# Patient Record
Sex: MALE | Race: WHITE | Employment: FULL TIME | URBAN - METROPOLITAN AREA
[De-identification: names, ages, dates, MRNs, and addresses within clinical notes are randomized per-mention and may not be internally consistent; named-entity substitution may affect disease eponyms.]

---

## 2017-01-25 RX ORDER — DICLOFENAC SODIUM 75 MG/1
75 TABLET, DELAYED RELEASE ORAL 2 TIMES DAILY
Qty: 60 TAB | Refills: 0 | Status: SHIPPED | OUTPATIENT
Start: 2017-01-25 | End: 2017-04-05 | Stop reason: SDUPTHER

## 2017-01-25 NOTE — TELEPHONE ENCOUNTER
Patient called for   Requested Prescriptions     Pending Prescriptions Disp Refills    diclofenac EC (VOLTAREN) 75 mg EC tablet 60 Tab 0     Sig: Take 1 Tab by mouth two (2) times a day. Pharmacy confirmed.

## 2017-02-06 ENCOUNTER — OFFICE VISIT (OUTPATIENT)
Dept: ORTHOPEDIC SURGERY | Age: 57
End: 2017-02-06

## 2017-02-06 VITALS
RESPIRATION RATE: 18 BRPM | SYSTOLIC BLOOD PRESSURE: 148 MMHG | OXYGEN SATURATION: 97 % | HEIGHT: 73 IN | BODY MASS INDEX: 30.43 KG/M2 | DIASTOLIC BLOOD PRESSURE: 81 MMHG | WEIGHT: 229.6 LBS | TEMPERATURE: 98.1 F | HEART RATE: 83 BPM

## 2017-02-06 DIAGNOSIS — M51.26 LUMBAR HERNIATED DISC: Primary | Chronic | ICD-10-CM

## 2017-02-06 RX ORDER — HYDROCODONE BITARTRATE AND ACETAMINOPHEN 5; 325 MG/1; MG/1
TABLET ORAL
Qty: 40 TAB | Refills: 0 | Status: SHIPPED | OUTPATIENT
Start: 2017-02-06 | End: 2017-08-18 | Stop reason: ALTCHOICE

## 2017-02-06 RX ORDER — OXYCODONE AND ACETAMINOPHEN 5; 325 MG/1; MG/1
TABLET ORAL
COMMUNITY
Start: 2016-02-09 | End: 2017-02-06

## 2017-02-06 NOTE — MR AVS SNAPSHOT
Visit Information Date & Time Provider Department Dept. Phone Encounter #  
 2/6/2017  3:45 PM Phuong Lester  [facility], [address] [facility] [phone] [id number] Follow-up Instructions Return for sx eval for L4/5/S1 lami. Your Appointments 2/8/2017  9:45 AM  
LAB with [facility] NURSE VISIT Internist of [facility] ([facility]) Appt Note: labs for rpe sarris; labs for rpe sarris [address], [address] [place] [facility] [address]  
  
   
 [address], [place]  
  
    
 2/14/2017 11:00 AM  
PHYSICAL with Rama Horan MD  
Internist of [facility]) Appt Note: rpe sarris; ov  
 [address], [address] [place] [address] [address]  
  
   
 [address], [facility] Up[facility] Health Maintenance Date Due INFLUENZA AGE 9 TO ADULT 8/1/2016 COLONOSCOPY 1/16/2017 DTaP/Tdap/Td series (3 - Td) 1/16/2024 Allergies as of 2/6/2017  Review Complete On: 2/6/2017 By: Phuong Lester MD  
  
 Severity Noted Reaction Type Reactions Tree Nuts High 03/08/2016    Anaphylaxis Ace Inhibitors  04/19/2013    Cough Nuts [Tree Nut]  04/29/2015    Itching Current Immunizations  Reviewed on 1/28/2015 Name Date Influenza Vaccine 11/12/2014, 11/1/2013 TDAP Vaccine 1/20/2004 Td 1/1/2004 Tdap 1/16/2014 Not reviewed this visit You Were Diagnosed With   
  
 Codes Comments Lumbar herniated disc    -  Primary ICD-10-CM: M51.26 
ICD-9-CM: 722.10 Vitals BP Pulse Temp Resp Height(growth percentile) Weight(growth percentile) 148/81 83 98.1 °F (36.7 °C) (Oral) 18 6' 1\" (1.854 m) 229 lb 9.6 oz (104.1 kg) SpO2 BMI Smoking Status 97% 30.29 kg/m2 Former Smoker BMI and BSA Data Body Mass Index Body Surface Area  
 30.29 kg/m 2 2.32 m 2 Preferred Pharmacy Pharmacy Name Phone [facility], [address] [phone] Your Updated Medication List  
  
   
This list is accurate as of: 2/6/17  4:27 PM.  Always use your most recent med list. amLODIPine 10 mg tablet Commonly known as:  Tad Ty TAKE 1 TABLET DAILY  
  
 ascorbic acid (vitamin C) 500 mg tablet Commonly known as:  VITAMIN C  
500 mg.  
  
 aspirin delayed-release 81 mg tablet 81 mg. Cholecalciferol (Vitamin D3) 2,000 unit Cap capsule Commonly known as:  VITAMIN D3 Take 2,000 Units by mouth daily. * cyclobenzaprine 5 mg tablet Commonly known as:  FLEXERIL Take 1 Tab by mouth three (3) times daily as needed for Muscle Spasm(s). * cyclobenzaprine 5 mg tablet Commonly known as:  FLEXERIL Take 1 Tab by mouth three (3) times daily as needed for Muscle Spasm(s). diazePAM 5 mg tablet Commonly known as:  VALIUM Take 1 Tab by mouth every six (6) hours as needed. Max Daily Amount: 20 mg.  
  
 diclofenac EC 75 mg EC tablet Commonly known as:  VOLTAREN Take 1 Tab by mouth two (2) times a day. fenofibrate micronized 67 mg capsule Commonly known as:  LOFIBRA Take 1 Cap by mouth every morning. FISH OIL PO Take  by mouth daily. gabapentin 300 mg capsule Commonly known as:  NEURONTIN Take 1 Cap by mouth three (3) times daily. HYDROcodone-acetaminophen 5-325 mg per tablet Commonly known as:  Rachna Prakash One-half to one tab po every day to bid prn severe pain  Indications: Pain  
  
 ibuprofen 800 mg tablet Commonly known as:  MOTRIN Take 1 Tab by mouth two (2) times daily as needed for Pain.  
  
 losartan 100 mg tablet Commonly known as:  COZAAR  
TAKE 1 TABLET DAILY  
  
 magnesium oxide 400 mg tablet Commonly known as:  MAG-OX Take 400 mg by mouth.  
  
 methylPREDNISolone 4 mg tablet Commonly known as:  Abbey Blakes Per dose pack instructions nitroglycerin 0.4 mg SL tablet Commonly known as:  NITROSTAT  
1 Tab by SubLINGual route every five (5) minutes as needed for Chest Pain. PROTONIX 40 mg tablet Generic drug:  pantoprazole Take 40 mg by mouth daily as needed. sildenafil citrate 50 mg tablet Commonly known as:  VIAGRA Take 1 Tab by mouth as needed. valACYclovir 500 mg tablet Commonly known as:  VALTREX  
TAKE 1 TABLET DAILY * Notice: This list has 2 medication(s) that are the same as other medications prescribed for you. Read the directions carefully, and ask your doctor or other care provider to review them with you. Prescriptions Printed Refills HYDROcodone-acetaminophen (NORCO) 5-325 mg per tablet 0 Sig: One-half to one tab po every day to bid prn severe pain  Indications: Pain Class: Print Follow-up Instructions Return for sx eval for L4/5/S1 lami. Patient Instructions [email or web address] Activation Thank you for requesting access to [facility]. Please follow the instructions below to securely access and download your online medical record. Everbridge allows you to send messages to your doctor, view your test results, renew your prescriptions, schedule appointments, and more. How Do I Sign Up? 1. In your internet browser, go to [email or web address] 
2. Click on the First Time User? Click Here link in the Sign In box. You will be redirect to the New Member Sign Up page. 3. Enter your Everbridge Access Code exactly as it appears below. You will not need to use this code after youve completed the sign-up process. If you do not sign up before the expiration date, you must request a new code. Everbridge Access Code: [email or web address] Expires: 3/28/2017  8:53 AM (This is the date your Everbridge access code will ) 4. Enter the last four digits of your Social Security Number (xxxx) and Date of Birth (mm/dd/yyyy) as indicated and click Submit.  You will be taken to the next sign-up page. 5. Create a [place] ID. This will be your Ingenico login ID and cannot be changed, so think of one that is secure and easy to remember. 6. Create a Ingenico password. You can change your password at any time. 7. Enter your Password Reset Question and Answer. This can be used at a later time if you forget your password. 8. Enter your e-mail address. You will receive e-mail notification when new information is available in [address] 9. Click Sign Up. You can now view and download portions of your medical record. 10. Click the Download Summary menu link to download a portable copy of your medical information. Additional Information If you have questions, please visit the Frequently Asked Questions section of the Ingenico website at [email or web address] Remember, Ingenico is NOT to be used for urgent needs. For medical emergencies, dial 911. Learning About Laminotomy and Laminectomy Surgery What are laminotomy and laminectomy? Laminotomy and laminectomy are two surgeries to relieve pressure on the spinal cord. They make the opening in the spine (spinal canal) larger. As you get older or if you have an injury, the opening for the spine gets smaller. This condition is called stenosis. During a laminectomy, the doctor removes pieces of the bony covering (the lamina) and other tissues that are squeezing the spinal cord and nerves. A laminotomy removes less of the bony covering. Both surgeries make the opening for the spinal cord and nerves larger. This takes pressure off the nerves. How is a laminotomy or laminectomy done? The doctor will make a cut (incision) over your spine. The muscles around your spine are pulled to the side so the doctor can work on the bones (vertebra) of the spine. The doctor can then trim thickened tissue, such as bulging discs. And he or she can take out some bone to make the opening for the spinal cord or nerves larger. Sometimes, after the doctor makes the opening larger, he or she will do another type of surgery called spinal fusion. The doctor will insert a small piece of bone from another part of the body or from a bone bank. This piece of bone can help the spine heal. Sometimes small plates and screws are used to keep the bones in place. Surgery will last from 1 to 1½ hours, depending on how much tissue the doctor cuts away. If a spinal fusion is done at the same time, surgery will last 2 to 4 hours. What can you expect after a laminectomy or laminotomy? Plan for a hospital stay of 1 or 2 days. You will have some pain after surgery. You will get medicine to treat the pain. Do not be discouraged if you do not feel better right away. As soon as the anesthesia wears off, you can start moving in the hospital. Get up and walk carefully as soon as you can. This will help your body heal. You may shower on the day after surgery. You will have help changing the bandages over your stitches after you bathe. You can return to your usual activities after 4 to 6 weeks. It may take longer for you to do more strenuous exercise. Do not drive until your doctor says it is okay. If you had a spinal fusion, it needs time to heal. Sudden twisting movements can slow healing. If you had a spinal fusion done, you may need more time to get back to your usual activities. You may need to wear a brace. You may feel tired for several weeks after surgery. You will feel stiff and sore. But you should feel a little better every day. Daily exercise will help you get back your energy. Try walking. See if you can walk a little bit farther every day. Follow-up care is a key part of your treatment and safety. Be sure to make and go to all appointments, and call your doctor if you are having problems. It's also a good idea to know your test results and keep a list of the medicines you take. Where can you learn more? Go to http://pat-cuate.info/. Enter B987 in the search box to learn more about \"Learning About Laminotomy and Laminectomy Surgery. \" Current as of: June 30, 2016 Content Version: 11.1 © 7715-1735 [employer]rimmage[employer]. Care instructions adapted under license by [employer] (which disclaims liability or warranty for this information). If you have questions about a medical condition or this instruction, always ask your healthcare professional. [address] any warranty or liability for your use of this information. Introducing [facility]! Curt Aguilera introduces The [employer] Guides patient portal. Now you can access parts of your medical record, email your doctor's office, and request medication refills online. 1. In your internet browser, go to [email or web address] 2. Click on the First Time User? Click Here link in the Sign In box. You will see the New Member Sign Up page. 3. Enter your The [employer] Access Code exactly as it appears below. You will not need to use this code after youve completed the sign-up process. If you do not sign up before the expiration date, you must request a new code. · The [employer] Access Code: [email or web address] Expires: 3/28/2017  8:53 AM 
 
4. Enter the last four digits of your Social Security Number (xxxx) and Date of Birth (mm/dd/yyyy) as indicated and click Submit. You will be taken to the next sign-up page. 5. Create a The [employer]ides ID. This will be your The [employer] login ID and cannot be changed, so think of one that is secure and easy to remember. 6. Create a The [employer]ides password. You can change your password at any time. 7. Enter your Password Reset Question and Answer. This can be used at a later time if you forget your password. 8. Enter your e-mail address. You will receive e-mail notification when new information is available in [address] 9. Click Sign Up. You can now view and download portions of your medical record. 10. Click the Download Summary menu link to download a portable copy of your medical information. If you have questions, please visit the Frequently Asked Questions section of the [employer] website. Remember, [employer] is NOT to be used for urgent needs. For medical emergencies, dial 911. Now available from your iPhone and Android! Please provide this summary of care documentation to your next provider. Your primary care clinician is listed as [facility]. If you have any questions after today's visit, please call [phone].

## 2017-02-06 NOTE — PROGRESS NOTES
[address], [address]  [place]  Phone: [phone]  Fax: [phone]        Clive León  : 1960  PCP: Pratik Pederson MD    PROGRESS NOTE      ASSESSMENT AND PLAN    Haylie Contreras was seen today for back pain. Diagnoses and all orders for this visit:    Lumbar herniated disc  right L4/5/S1 chronic  -     HYDROcodone-acetaminophen (NORCO) 5-325 mg per tablet; One-half to one tab po every day to bid prn severe pain  Indications: Pain    1. Referral to Dr. Julissa Montes for surgical evaluation. 2. Continue Norco and Flexeril. 3. Given surgical information about a laminectomy. Follow-up Disposition:  Return for sx eval for L4/5/S1 lami. HISTORY OF PRESENT ILLNESS  Jesusita Tapia is a 60 y.o. male. Pt presents to the office for a f/u visit for recurrent back pain. He had another recurrence and his tired of his back and right leg flaring- want to discuss surgery. Pt was trying to move a lawnmower and felt instant pain which caused his pain to increase. He was last seen by NP Tayo Magallon who ordered a Lumbar MRI. Last visit pt was sent to have a lumbar spine MRI. Images reviewed with the pt. He has focal disc herniations right paracentral at L4/5 and L5/S1. Pt continues to have back pain. He has had mild benefit from MDP. Had benefit w/blocks years ago. His pain is mainly RT sided that goes into his RLE. He also c/o LT foot pain that he sees Dr. Sonia Escobedo for. He denies any recent fractures. He has bone spurs and a stabbing pain in the small joints in his foot. Pt notes that his RLE has been felt intermittently for years now. He admits to weakness of his RLE. He denies any saddle paresthesia. Pt takes Norco 5-325 mg. Pt denies any dizziness, confusion, uncontrolled constipation, and cravings due to controlled substances. Pt was given Gabapentin 300 mg but has not taken as he read the side effects.  Pt takes Flexeril with Norco in the morning with benefit. Denies persistent fevers, chills, weight changes, neurogenic bowel or bladder symptoms. Pt denies recent ED visits or hospitalizations. His wife has been diagnosed with new breast cancer, previously had lymphoma. Works as an  at [employer] which requires overhead lifting and heavy labor. Pain Assessment  2/6/2017   Location of Pain Back; Foot   Pain Location Comment -   Location Modifiers Left   Severity of Pain 6   Quality of Pain Aching   Quality of Pain Comment numbness, tingling   Duration of Pain -   Frequency of Pain Constant   Aggravating Factors (No Data)   Aggravating Factors Comment any movement   Limiting Behavior -   Relieving Factors Rest   Relieving Factors Comment -   Result of Injury No               MRI Results (most recent):    Results from Hospital Encounter encounter on 12/30/16   MRI LUMB SPINE WO CONT   Narrative Procedure: MRI of the lumbar spine without contrast.    Indications: Lumbar back pain with right lower extremity radiculopathy    Comparisons: Lumbar spine MRI 7/19/2010    Technique: T1 weighted, T2 FSE with fat saturation, FSE inversion recovery  sagittal images are supplemented by T2 weighted with fat saturation and T1  weighted axial images. Findings: There is overall straightening of normal lumbar lordosis. No listhesis. Asymmetric disc desiccation L3-S1 without associated intervertebral disc space  height loss. Remaining more cephalad intervertebral discs are maintained and  well hydrated. Small hemangiomas are seen posteriorly within the L2 and L3  vertebral bodies. No suspicious osseous lesion. Normal vertebral body  morphology. No fracture. Subtle endplate reactive changes are seen anteriorly  adjacent to the L3-L4 and L2-L3 intervertebral discs. Conus medullaris ends at  the L1 vertebral body level.     Correlation of axial and sagittal images reveals the following:    T12-L1: Minimal broad-based disc bulge abuts the ventral thecal sac. No facet  arthropathy. Canal remains adequate. No foraminal stenosis. L1-L2: Very minimal central disc protrusion abuts the ventral thecal sac. Mild  bilateral facet arthropathy and ligamentum flavum buckling. No central canal or  foraminal stenosis. L2-L3: No significant disc pathology. Mild bilateral facet arthropathy and  ligamentum flavum buckling. No central canal or foraminal stenosis. L3-L4: Moderate circumferential disc bulge flattens the ventral thecal sac. Moderate bilateral facet arthropathy and ligamentum flavum buckling. There is  partial effacement of the left lateral recess with abutment of the adjacent  descending left L4 nerve root. Mild overall canal stenosis, largest AP dimension  of the thecal sac measuring just over 8 mm. No right foraminal stenosis. Moderate left, not impinging foraminal stenosis. L4-L5: Moderate broad-based disc bulge with superimposed inferiorly extending  right paracentral disc extrusion (sagittal image 9; axial images 31 and 32). This inferior disc extrusion measures 10 mm transverse by 6 mm anteroposterior,  and extends approximately 7 mm below the level of the L5 superior endplate. There is complete effacement of the right lateral recess with posterior  displacement and impingement of the adjacent descending right L5 nerve root. Moderately severe overall canal stenosis. Mild right and moderate left foraminal  stenoses, neither with foraminal nerve root impingement. L5-S1:  Moderate broad-based disc bulge with prominent superimposed right  paracentral disc protrusion (axial image 36; sagittal image 9). This focal right  paracentral disc protrusion measures 14 mm transverse by 11 mm anteroposterior. There is complete effacement of the right lateral recess with posterior  displacement of the adjacent descending right S1 nerve root. Moderate overall  canal stenosis otherwise.  Moderate right greater than left foraminal stenoses. There is abutment but no deformation of the foraminal right L5 nerve root. The visualized portions of the sacroiliac joints are unremarkable. Partially  visualized exophytic right renal cyst. No acute abnormality in the visualized  retroperitoneal structures. Impression IMPRESSION:    1. Prominent right paracentral L4-L5 disc extrusion with effacement of the right  lateral recess and impingement of the descending right L5 nerve root. Moderately  severe multifactorial canal stenosis at this level otherwise. 2. Additional prominent right paracentral L5-S1 disc protrusion with right  lateral recess effacement and impingement of the descending right S1 nerve root. 3. Abutment of the foraminal right L5 nerve root related to moderate foraminal  stenosis at L5-S1. No high-grade foraminal stenosis or foraminal nerve root  impingement elsewhere. 4. Mild multifactorial canal stenosis at L3-L4 with partial left lateral recess  effacement and abutment of the descending left L4 nerve root. PAST MEDICAL HISTORY   Past Medical History   Diagnosis Date    Abnormal myocardial perfusion study 2/28/2016     Partially reversible inferior perfusion defect, EF 57%    AI (aortic insufficiency)      Moderate with possible bicuspid aortic valve    Chronic low back pain     Chronic venous insufficiency     Colon polyp 03/2012     Benign serrated polyp    Fracture 05/2016     rib fx's    GERD (gastroesophageal reflux disease)     History of echocardiogram 2/28/2016     EF 55%. Basal inferior hypokinesis. Gr 1 DDfx. Mod AI.  Hyperlipidemia     Hypertension     Lumbar herniated disc 07/2010     MRI with moderate sized disc protrusion to right canal at L4-L5 impacting L5 nerve root and at L5-S1 impacting S1 nerve root. No central canal stenosis.     Osteoarthritis of right knee     Prediabetes     Prepatellar bursitis of right knee     Recurrent genital herpes 1/16/2013    S/P cardiac catheterization 3/1/2016     Normal coronary anatomy       Past Surgical History   Procedure Laterality Date    Hx cholecystectomy      Hx tonsillectomy      Endoscopy, colon, diagnostic     . MEDICATIONS    Current Outpatient Prescriptions   Medication Sig Dispense Refill    diclofenac EC (VOLTAREN) 75 mg EC tablet Take 1 Tab by mouth two (2) times a day. 60 Tab 0    HYDROcodone-acetaminophen (NORCO) 5-325 mg per tablet One-half to one tab po every day to bid prn severe pain  Indications: PAIN 40 Tab 0    gabapentin (NEURONTIN) 300 mg capsule Take 1 Cap by mouth three (3) times daily. 30 Cap 1    amLODIPine (NORVASC) 10 mg tablet TAKE 1 TABLET DAILY 90 Tab 3    sildenafil citrate (VIAGRA) 50 mg tablet Take 1 Tab by mouth as needed. 9 Tab 3    valACYclovir (VALTREX) 500 mg tablet TAKE 1 TABLET DAILY 90 Tab 3    fenofibrate micronized (LOFIBRA) 67 mg capsule Take 1 Cap by mouth every morning. 90 Cap 3    losartan (COZAAR) 100 mg tablet TAKE 1 TABLET DAILY 90 Tab 3    nitroglycerin (NITROSTAT) 0.4 mg SL tablet 1 Tab by SubLINGual route every five (5) minutes as needed for Chest Pain. 25 Tab 3    ascorbic acid (VITAMIN C) 500 mg tablet 500 mg.      magnesium oxide (MAG-OX) 400 mg tablet Take 400 mg by mouth.  aspirin delayed-release 81 mg tablet 81 mg.      diazepam (VALIUM) 5 mg tablet Take 1 Tab by mouth every six (6) hours as needed. Max Daily Amount: 20 mg. 30 Tab 0    ibuprofen (MOTRIN) 800 mg tablet Take 1 Tab by mouth two (2) times daily as needed for Pain. 60 Tab 3    cyclobenzaprine (FLEXERIL) 5 mg tablet Take 1 Tab by mouth three (3) times daily as needed for Muscle Spasm(s). 30 Tab 1    Cholecalciferol, Vitamin D3, 2,000 unit cap Take 2,000 Units by mouth daily.  DOCOSAHEXANOIC ACID/EPA (FISH OIL PO) Take  by mouth daily.  oxyCODONE-acetaminophen (PERCOCET) 5-325 mg per tablet Take 1 Tab by Mouth Every 8 Hours As Needed for Pain.       methylPREDNISolone (MEDROL DOSEPACK) 4 mg tablet Per dose pack instructions 1 Dose Pack 0    cyclobenzaprine (FLEXERIL) 5 mg tablet Take 1 Tab by mouth three (3) times daily as needed for Muscle Spasm(s). 90 Tab 0    pantoprazole (PROTONIX) 40 mg tablet Take 40 mg by mouth daily as needed. ALLERGIES  Allergies   Allergen Reactions    Tree Nuts Anaphylaxis    Ace Inhibitors Cough    Nuts [Tree Nut] Itching          SOCIAL HISTORY    Social History     Social History    Marital status:      Spouse name: N/A    Number of children: N/A    Years of education: N/A     Occupational History    Not on file. Social History Main Topics    Smoking status: Former Smoker     Quit date: 6/28/2001    Smokeless tobacco: Former User    Alcohol use 1.0 oz/week     2 Cans of beer per week      Comment: occasionally    Drug use: No    Sexual activity: Not on file     Other Topics Concern    Not on file     Social History Narrative       FAMILY HISTORY  Family History   Problem Relation Age of Onset    Cancer Father 61     esophageal    Hypertension Father     Hypertension Brother     Diabetes Other      Grandmother    Arthritis-osteo Other     Other Other      Stomach problems; Father, grandfather       REVIEW OF SYSTEMS  Review of Systems   Constitutional: Negative for chills, fever and weight loss. Respiratory: Negative for shortness of breath. Cardiovascular: Negative for chest pain. Gastrointestinal: Negative for constipation. Negative for fecal incontinence   Genitourinary: Negative for dysuria. Negative for urinary incontinence   Musculoskeletal:        Per HPI   Skin: Negative for rash. Neurological: Positive for focal weakness. Negative for dizziness, tingling, tremors and headaches. Endo/Heme/Allergies: Does not bruise/bleed easily. Psychiatric/Behavioral: The patient does not have insomnia.         PHYSICAL EXAMINATION  Visit Vitals    /81    Pulse 83    Temp 98.1 °F (36.7 °C) (Oral)    Resp 18    Ht 6' 1\" (1.854 m)    Wt 229 lb 9.6 oz (104.1 kg)    SpO2 97%    BMI 30.29 kg/m2         Accompanied by self. Constitutional:  Well developed, well nourished, in no acute distress. Psychiatric: Affect and mood are appropriate. Integumentary: No rashes or abrasions noted on exposed areas. Cardiovascular/Peripheral Vascular: Intact l pulses. No peripheral edema is noted. Lymphatic:  No evidence of lymphedema. No cervical lymphadenopathy. SPINE/MUSCULOSKELETAL EXAM    Lumbar spine:  No rash, ecchymosis, or gross obliquity. No fasciculations. No focal atrophy is noted. Tenderness to palpation on the RT at L4-5. Tenderness to palpation of RT sciatic notch. SI joints non-tender. Trochanters non tender. Sensation grossly intact to light touch. MOTOR:     Hip Flex Quads Hamstrings Ankle DF EHL Ankle PF   Right 5/5 5/5 5/5 5/5 5/5 5/5   Left 5/5 5/5 5/5 5/5 5/5 5/5         RT eversion weakness , mild 4+/5    DTRs are 2+  patella, and Achilles. Straight Leg raise + on the RT at 90 degrees. Ambulation without assistive device. FWB. Written by Jourdan Medrano, as dictated by Sonia Britt MD.    Mr. Vicente Lancaster may have a reminder for a \"due or due soon\" health maintenance. I have asked that he contact his primary care provider for follow-up on this health maintenance.

## 2017-02-06 NOTE — PATIENT INSTRUCTIONS
[facility] Activation    Thank you for requesting access to [facility]. Please follow the instructions below to securely access and download your online medical record. [facility] allows you to send messages to your doctor, view your test results, renew your prescriptions, schedule appointments, and more. How Do I Sign Up? 1. In your internet browser, go to [email or web address]  2. Click on the First Time User? Click Here link in the Sign In box. You will be redirect to the New Member Sign Up page. 3. Enter your [facility] Access Code exactly as it appears below. You will not need to use this code after youve completed the sign-up process. If you do not sign up before the expiration date, you must request a new code. [facility] Access Code: [email or web address]  Expires: 3/28/2017  8:53 AM (This is the date your [facility] access code will )    4. Enter the last four digits of your Social Security Number (xxxx) and Date of Birth (mm/dd/yyyy) as indicated and click Submit. You will be taken to the next sign-up page. 5. Create a [facility] ID. This will be your [facility] login ID and cannot be changed, so think of one that is secure and easy to remember. 6. Create a [facility] password. You can change your password at any time. 7. Enter your Password Reset Question and Answer. This can be used at a later time if you forget your password. 8. Enter your e-mail address. You will receive e-mail notification when new information is available in [address]. 9. Click Sign Up. You can now view and download portions of your medical record. 10. Click the Download Summary menu link to download a portable copy of your medical information. Additional Information    If you have questions, please visit the Frequently Asked Questions section of the [facility] website at [email or web address]hart/. Remember, [facility] is NOT to be used for urgent needs. For medical emergencies, dial 911.          Learning About Laminotomy and Laminectomy Surgery  What are laminotomy and laminectomy? Laminotomy and laminectomy are two surgeries to relieve pressure on the spinal cord. They make the opening in the spine (spinal canal) larger. As you get older or if you have an injury, the opening for the spine gets smaller. This condition is called stenosis. During a laminectomy, the doctor removes pieces of the bony covering (the lamina) and other tissues that are squeezing the spinal cord and nerves. A laminotomy removes less of the bony covering. Both surgeries make the opening for the spinal cord and nerves larger. This takes pressure off the nerves. How is a laminotomy or laminectomy done? The doctor will make a cut (incision) over your spine. The muscles around your spine are pulled to the side so the doctor can work on the bones (vertebra) of the spine. The doctor can then trim thickened tissue, such as bulging discs. And he or she can take out some bone to make the opening for the spinal cord or nerves larger. Sometimes, after the doctor makes the opening larger, he or she will do another type of surgery called spinal fusion. The doctor will insert a small piece of bone from another part of the body or from a bone bank. This piece of bone can help the spine heal. Sometimes small plates and screws are used to keep the bones in place. Surgery will last from 1 to 1½ hours, depending on how much tissue the doctor cuts away. If a spinal fusion is done at the same time, surgery will last 2 to 4 hours. What can you expect after a laminectomy or laminotomy? Plan for a hospital stay of 1 or 2 days. You will have some pain after surgery. You will get medicine to treat the pain. Do not be discouraged if you do not feel better right away. As soon as the anesthesia wears off, you can start moving in the hospital. Get up and walk carefully as soon as you can. This will help your body heal. You may shower on the day after surgery.  You will have help changing the bandages over your stitches after you bathe. You can return to your usual activities after 4 to 6 weeks. It may take longer for you to do more strenuous exercise. Do not drive until your doctor says it is okay. If you had a spinal fusion, it needs time to heal. Sudden twisting movements can slow healing. If you had a spinal fusion done, you may need more time to get back to your usual activities. You may need to wear a brace. You may feel tired for several weeks after surgery. You will feel stiff and sore. But you should feel a little better every day. Daily exercise will help you get back your energy. Try walking. See if you can walk a little bit farther every day. Follow-up care is a key part of your treatment and safety. Be sure to make and go to all appointments, and call your doctor if you are having problems. It's also a good idea to know your test results and keep a list of the medicines you take. Where can you learn more? Go to http://pat-cuate.info/. Enter E934 in the search box to learn more about \"Learning About Laminotomy and Laminectomy Surgery. \"  Current as of: June 30, 2016  Content Version: 11.1  © 9761-3210 [employer]. Care instructions adapted under license by [employer] (which disclaims liability or warranty for this information). If you have questions about a medical condition or this instruction, always ask your healthcare professional. [address] any warranty or liability for your use of this information.

## 2017-02-08 ENCOUNTER — HOSPITAL ENCOUNTER (OUTPATIENT)
Dept: LAB | Age: 57
Discharge: HOME | End: 2017-02-08
Payer: COMMERCIAL

## 2017-02-08 DIAGNOSIS — E55.9 VITAMIN D INSUFFICIENCY: ICD-10-CM

## 2017-02-08 DIAGNOSIS — R73.03 PREDIABETES: ICD-10-CM

## 2017-02-08 DIAGNOSIS — I10 ESSENTIAL HYPERTENSION WITH GOAL BLOOD PRESSURE LESS THAN 140/90: ICD-10-CM

## 2017-02-08 DIAGNOSIS — E78.5 HYPERLIPIDEMIA, UNSPECIFIED HYPERLIPIDEMIA TYPE: ICD-10-CM

## 2017-02-08 LAB
ALBUMIN SERPL BCP-MCNC: 4.1 G/DL (ref 3.4–5)
ALBUMIN/GLOB SERPL: 1.7 {RATIO} (ref 0.8–1.7)
ALP SERPL-CCNC: 62 U/L (ref 45–117)
ALT SERPL-CCNC: 46 U/L (ref 16–61)
ANION GAP BLD CALC-SCNC: 9 MMOL/L (ref 3–18)
APPEARANCE UR: CLEAR
AST SERPL W P-5'-P-CCNC: 32 U/L (ref 15–37)
BACTERIA URNS QL MICRO: NEGATIVE /HPF
BASOPHILS # BLD AUTO: 0.1 K/UL (ref 0–0.06)
BASOPHILS # BLD: 1 % (ref 0–2)
BILIRUB SERPL-MCNC: 0.5 MG/DL (ref 0.2–1)
BILIRUB UR QL: NEGATIVE
BUN SERPL-MCNC: 18 MG/DL (ref 7–18)
BUN/CREAT SERPL: 15 (ref 12–20)
CALCIUM SERPL-MCNC: 8.8 MG/DL (ref 8.5–10.1)
CHLORIDE SERPL-SCNC: 108 MMOL/L (ref 100–108)
CHOLEST SERPL-MCNC: 188 MG/DL
CO2 SERPL-SCNC: 25 MMOL/L (ref 21–32)
COLOR UR: YELLOW
CREAT SERPL-MCNC: 1.19 MG/DL (ref 0.6–1.3)
DIFFERENTIAL METHOD BLD: ABNORMAL
EOSINOPHIL # BLD: 0.2 K/UL (ref 0–0.4)
EOSINOPHIL NFR BLD: 3 % (ref 0–5)
EPITH CASTS URNS QL MICRO: NORMAL /LPF (ref 0–5)
ERYTHROCYTE [DISTWIDTH] IN BLOOD BY AUTOMATED COUNT: 13.7 % (ref 11.6–14.5)
GLOBULIN SER CALC-MCNC: 2.4 G/DL (ref 2–4)
GLUCOSE SERPL-MCNC: 109 MG/DL (ref 74–99)
GLUCOSE UR STRIP.AUTO-MCNC: NEGATIVE MG/DL
HBA1C MFR BLD: 6 % (ref 4.2–5.6)
HCT VFR BLD AUTO: 44.7 % (ref 36–48)
HDLC SERPL-MCNC: 52 MG/DL (ref 40–60)
HDLC SERPL: 3.6 {RATIO} (ref 0–5)
HGB BLD-MCNC: 15 G/DL (ref 13–16)
HGB UR QL STRIP: NEGATIVE
KETONES UR QL STRIP.AUTO: NEGATIVE MG/DL
LDLC SERPL CALC-MCNC: 117.6 MG/DL (ref 0–100)
LEUKOCYTE ESTERASE UR QL STRIP.AUTO: NEGATIVE
LIPID PROFILE,FLP: ABNORMAL
LYMPHOCYTES # BLD AUTO: 36 % (ref 21–52)
LYMPHOCYTES # BLD: 2.1 K/UL (ref 0.9–3.6)
MCH RBC QN AUTO: 30.8 PG (ref 24–34)
MCHC RBC AUTO-ENTMCNC: 33.6 G/DL (ref 31–37)
MCV RBC AUTO: 91.8 FL (ref 74–97)
MONOCYTES # BLD: 0.3 K/UL (ref 0.05–1.2)
MONOCYTES NFR BLD AUTO: 6 % (ref 3–10)
NEUTS SEG # BLD: 3.1 K/UL (ref 1.8–8)
NEUTS SEG NFR BLD AUTO: 54 % (ref 40–73)
NITRITE UR QL STRIP.AUTO: NEGATIVE
PH UR STRIP: 6.5 [PH] (ref 5–8)
PLATELET # BLD AUTO: 213 K/UL (ref 135–420)
PMV BLD AUTO: 11.3 FL (ref 9.2–11.8)
POTASSIUM SERPL-SCNC: 4.3 MMOL/L (ref 3.5–5.5)
PROT SERPL-MCNC: 6.5 G/DL (ref 6.4–8.2)
PROT UR STRIP-MCNC: NEGATIVE MG/DL
PSA SERPL-MCNC: 0.4 NG/ML (ref 0–4)
RBC # BLD AUTO: 4.87 M/UL (ref 4.7–5.5)
RBC #/AREA URNS HPF: 0 /HPF (ref 0–5)
SODIUM SERPL-SCNC: 142 MMOL/L (ref 136–145)
SP GR UR REFRACTOMETRY: 1.02 (ref 1–1.03)
TRIGL SERPL-MCNC: 92 MG/DL (ref ?–150)
TSH SERPL DL<=0.05 MIU/L-ACNC: 1.08 UIU/ML (ref 0.36–3.74)
UROBILINOGEN UR QL STRIP.AUTO: 0.2 EU/DL (ref 0.2–1)
VLDLC SERPL CALC-MCNC: 18.4 MG/DL
WBC # BLD AUTO: 5.8 K/UL (ref 4.6–13.2)
WBC URNS QL MICRO: 0 /HPF (ref 0–4)

## 2017-02-08 PROCEDURE — 84153 ASSAY OF PSA TOTAL: CPT | Performed by: INTERNAL MEDICINE

## 2017-02-08 PROCEDURE — 80061 LIPID PANEL: CPT | Performed by: INTERNAL MEDICINE

## 2017-02-08 PROCEDURE — 85025 COMPLETE CBC W/AUTO DIFF WBC: CPT | Performed by: INTERNAL MEDICINE

## 2017-02-08 PROCEDURE — 36415 COLL VENOUS BLD VENIPUNCTURE: CPT | Performed by: INTERNAL MEDICINE

## 2017-02-08 PROCEDURE — 82043 UR ALBUMIN QUANTITATIVE: CPT | Performed by: INTERNAL MEDICINE

## 2017-02-08 PROCEDURE — 83036 HEMOGLOBIN GLYCOSYLATED A1C: CPT | Performed by: INTERNAL MEDICINE

## 2017-02-08 PROCEDURE — 84443 ASSAY THYROID STIM HORMONE: CPT | Performed by: INTERNAL MEDICINE

## 2017-02-08 PROCEDURE — 81001 URINALYSIS AUTO W/SCOPE: CPT | Performed by: INTERNAL MEDICINE

## 2017-02-08 PROCEDURE — 80053 COMPREHEN METABOLIC PANEL: CPT | Performed by: INTERNAL MEDICINE

## 2017-02-08 PROCEDURE — 82306 VITAMIN D 25 HYDROXY: CPT | Performed by: INTERNAL MEDICINE

## 2017-02-09 LAB
25(OH)D3 SERPL-MCNC: 36.7 NG/ML (ref 30–100)
CREAT UR-MCNC: 96.07 MG/DL (ref 30–125)
MICROALBUMIN UR-MCNC: 0.6 MG/DL (ref 0–3)
MICROALBUMIN/CREAT UR-RTO: 6 MG/G (ref 0–30)

## 2017-02-14 ENCOUNTER — OFFICE VISIT (OUTPATIENT)
Dept: INTERNAL MEDICINE CLINIC | Age: 57
End: 2017-02-14

## 2017-02-14 VITALS
SYSTOLIC BLOOD PRESSURE: 122 MMHG | WEIGHT: 222 LBS | DIASTOLIC BLOOD PRESSURE: 74 MMHG | BODY MASS INDEX: 29.42 KG/M2 | TEMPERATURE: 97.5 F | HEART RATE: 74 BPM | HEIGHT: 73 IN | OXYGEN SATURATION: 97 %

## 2017-02-14 DIAGNOSIS — Z00.01 ENCOUNTER FOR ROUTINE ADULT PHYSICAL EXAM WITH ABNORMAL FINDINGS: Primary | ICD-10-CM

## 2017-02-14 DIAGNOSIS — M54.41 CHRONIC RIGHT-SIDED LOW BACK PAIN WITH RIGHT-SIDED SCIATICA: ICD-10-CM

## 2017-02-14 DIAGNOSIS — R73.03 PREDIABETES: ICD-10-CM

## 2017-02-14 DIAGNOSIS — M85.80 OSTEOPENIA DETERMINED BY X-RAY: Chronic | ICD-10-CM

## 2017-02-14 DIAGNOSIS — R73.09 ABNORMAL GLUCOSE: ICD-10-CM

## 2017-02-14 DIAGNOSIS — G89.29 CHRONIC RIGHT-SIDED LOW BACK PAIN WITH RIGHT-SIDED SCIATICA: ICD-10-CM

## 2017-02-14 DIAGNOSIS — I10 ESSENTIAL HYPERTENSION: ICD-10-CM

## 2017-02-14 DIAGNOSIS — I35.1 AORTIC VALVE INSUFFICIENCY, UNSPECIFIED ETIOLOGY: ICD-10-CM

## 2017-02-14 DIAGNOSIS — I87.2 CHRONIC VENOUS INSUFFICIENCY: ICD-10-CM

## 2017-02-14 DIAGNOSIS — M51.26 LUMBAR HERNIATED DISC: Chronic | ICD-10-CM

## 2017-02-14 DIAGNOSIS — K21.9 GASTROESOPHAGEAL REFLUX DISEASE WITHOUT ESOPHAGITIS: ICD-10-CM

## 2017-02-14 DIAGNOSIS — E78.5 HYPERLIPIDEMIA, UNSPECIFIED HYPERLIPIDEMIA TYPE: ICD-10-CM

## 2017-02-14 RX ORDER — ROSUVASTATIN CALCIUM 10 MG/1
10 TABLET, COATED ORAL
Qty: 30 TAB | Refills: 5 | Status: SHIPPED | OUTPATIENT
Start: 2017-02-14 | End: 2017-05-08

## 2017-02-14 NOTE — PROGRESS NOTES
1. Have you been to the ER, urgent care clinic or hospitalized since your last visit? YES. ED for right hand injury    2. Have you seen or consulted any other health care providers outside of the [facility] since your last visit (Include any pap smears or colon screening)? NO      Do you have an Advanced Directive? NO    Would you like information on Advanced Directives? NO    Pt aware that colonoscopy needs to be done, he will make this appt.

## 2017-02-14 NOTE — MR AVS SNAPSHOT
Visit Information Date & Time Provider Department Dept. Phone Encounter #  
 2/14/2017 11:00 AM Ravi Young MD [facility] of [place] [phone] [id number] Follow-up Instructions Return in about 6 months (around 8/14/2017), or if symptoms worsen or fail to improve. Your Appointments 3/10/2017  9:00 AM  
SURGERY CONSULT with Adam Morris MD  
[address] [facility], [address] and [facility]) Appt Note: REF DR DURAND/ WILL BRING MRI DISC/  
 [address] [facility] [address]  
[phone]  
  
   
 [address] [facility],[address] [address] [facility]  
  
    
 8/9/2017  8:00 AM  
LAB with [facility] NURSE VISIT Internist of [facility] ([facility]) Appt Note: lab  
 [address], [address] [facility] [address] [address] [facility]  
  
   
 [address], [address]  
  
    
 8/15/2017  9:00 AM  
Office Visit with Ravi Young MD  
Internist of [facility]) Appt Note: ov bs  
 [address] [facility] [address] [address] [facility]  
  
   
 [address], [address] Upcoming Health Maintenance Date Due COLONOSCOPY 1/16/2017 DTaP/Tdap/Td series (3 - Td) 1/16/2024 Allergies as of 2/14/2017  Review Complete On: 2/14/2017 By: Modesta Gosselin, LPN Severity Noted Reaction Type Reactions Tree Nuts High 03/08/2016    Anaphylaxis Ace Inhibitors  04/19/2013    Cough Nuts [Tree Nut]  04/29/2015    Itching Current Immunizations  Reviewed on 1/28/2015 Name Date Influenza Vaccine 11/12/2014, 11/1/2013 TDAP Vaccine 1/20/2004 Td 1/1/2004 Tdap 1/16/2014 Not reviewed this visit You Were Diagnosed With   
  
 Codes Comments Prediabetes    -  Primary ICD-10-CM: R73.03 
ICD-9-CM: 790.29 Vitals BP Pulse Temp Height(growth percentile) Weight(growth percentile) SpO2 122/74 74 97.5 °F (36.4 °C) (Oral) 6' 1\" (1.854 m) 222 lb (100.7 kg) 97% BMI Smoking Status 29.29 kg/m2 Former Smoker Vitals History BMI and BSA Data Body Mass Index Body Surface Area  
 29.29 kg/m 2 2.28 m 2 Preferred Pharmacy Pharmacy Name Phone [facility] [address] [facility] [phone] Your Updated Medication List  
  
   
This list is accurate as of: 2/14/17 12:02 PM.  Always use your most recent med list. amLODIPine 10 mg tablet Commonly known as:  Novoa Higinio TAKE 1 TABLET DAILY  
  
 ascorbic acid (vitamin C) 500 mg tablet Commonly known as:  VITAMIN C  
500 mg.  
  
 aspirin delayed-release 81 mg tablet 81 mg. Cholecalciferol (Vitamin D3) 2,000 unit Cap capsule Commonly known as:  VITAMIN D3 Take 2,000 Units by mouth daily. * cyclobenzaprine 5 mg tablet Commonly known as:  FLEXERIL Take 1 Tab by mouth three (3) times daily as needed for Muscle Spasm(s). * cyclobenzaprine 5 mg tablet Commonly known as:  FLEXERIL Take 1 Tab by mouth three (3) times daily as needed for Muscle Spasm(s). diazePAM 5 mg tablet Commonly known as:  VALIUM Take 1 Tab by mouth every six (6) hours as needed. Max Daily Amount: 20 mg.  
  
 diclofenac EC 75 mg EC tablet Commonly known as:  VOLTAREN Take 1 Tab by mouth two (2) times a day. FISH OIL PO Take  by mouth daily. HYDROcodone-acetaminophen 5-325 mg per tablet Commonly known as:  Wayna Glaze One-half to one tab po every day to bid prn severe pain  Indications: Pain  
  
 ibuprofen 800 mg tablet Commonly known as:  MOTRIN Take 1 Tab by mouth two (2) times daily as needed for Pain.  
  
 losartan 100 mg tablet Commonly known as:  COZAAR  
TAKE 1 TABLET DAILY  
  
 magnesium oxide 400 mg tablet Commonly known as:  MAG-OX Take 400 mg by mouth. nitroglycerin 0.4 mg SL tablet Commonly known as:  NITROSTAT  
1 Tab by SubLINGual route every five (5) minutes as needed for Chest Pain. rosuvastatin 10 mg tablet Commonly known as:  CRESTOR Take 1 Tab by mouth nightly. valACYclovir 500 mg tablet Commonly known as:  VALTREX  
TAKE 1 TABLET DAILY * Notice: This list has 2 medication(s) that are the same as other medications prescribed for you. Read the directions carefully, and ask your doctor or other care provider to review them with you. Prescriptions Sent to Pharmacy Refills  
 rosuvastatin (CRESTOR) 10 mg tablet 5 Sig: Take 1 Tab by mouth nightly. Class: Normal  
 Pharmacy: [id number] [address] [facility], [address] Ph #: [phone] Route: Oral  
  
We Performed the Following  DIABETES FOOT EXAM [[facility] Custom] Follow-up Instructions Return in about 6 months (around 8/14/2017), or if symptoms worsen or fail to improve. Patient Instructions Learning About Diabetes Food Guidelines Your Care Instructions Meal planning is important to manage diabetes. It helps keep your blood sugar at a target level (which you set with your doctor). You don't have to eat special foods. You can eat what your family eats, including sweets once in a while. But you do have to pay attention to how often you eat and how much you eat of certain foods. You may want to work with a dietitian or a certified diabetes educator (CDE) to help you plan meals and snacks. A dietitian or CDE can also help you lose weight if that is one of your goals. What should you know about eating carbs? Managing the amount of carbohydrate (carbs) you eat is an important part of healthy meals when you have diabetes. Carbohydrate is found in many foods. · Learn which foods have carbs. And learn the amounts of carbs in different foods.  
¨ Bread, cereal, pasta, and rice have about 15 grams of carbs in a serving. A serving is 1 slice of bread (1 ounce), ½ cup of cooked cereal, or 1/3 cup of cooked pasta or rice. ¨ Fruits have 15 grams of carbs in a serving. A serving is 1 small fresh fruit, such as an apple or orange; ½ of a banana; ½ cup of cooked or canned fruit; ½ cup of fruit juice; 1 cup of melon or raspberries; or 2 tablespoons of dried fruit. ¨ Milk and no-sugar-added yogurt have 15 grams of carbs in a serving. A serving is 1 cup of milk or 2/3 cup of no-sugar-added yogurt. ¨ Starchy vegetables have 15 grams of carbs in a serving. A serving is ½ cup of mashed potatoes or sweet potato; 1 cup winter squash; ½ of a small baked potato; ½ cup of cooked beans; or ½ cup cooked corn or green peas. · Learn how much carbs to eat each day and at each meal. A dietitian or CDE can teach you how to keep track of the amount of carbs you eat. This is called carbohydrate counting. · If you are not sure how to count carbohydrate grams, use the Plate Method to plan meals. It is a good, quick way to make sure that you have a balanced meal. It also helps you spread carbs throughout the day. ¨ Divide your plate by types of foods. Put non-starchy vegetables on half the plate, meat or other protein food on one-quarter of the plate, and a grain or starchy vegetable in the final quarter of the plate. To this you can add a small piece of fruit and 1 cup of milk or yogurt, depending on how many carbs you are supposed to eat at a meal. 
· Try to eat about the same amount of carbs at each meal. Do not \"save up\" your daily allowance of carbs to eat at one meal. 
· Proteins have very little or no carbs per serving. Examples of proteins are beef, chicken, turkey, fish, eggs, tofu, cheese, cottage cheese, and peanut butter. A serving size of meat is 3 ounces, which is about the size of a deck of cards.  Examples of meat substitute serving sizes (equal to 1 ounce of meat) are 1/4 cup of cottage cheese, 1 egg, 1 tablespoon of peanut butter, and ½ cup of tofu. How can you eat out and still eat healthy? · Learn to estimate the serving sizes of foods that have carbohydrate. If you measure food at home, it will be easier to estimate the amount in a serving of restaurant food. · If the meal you order has too much carbohydrate (such as potatoes, corn, or baked beans), ask to have a low-carbohydrate food instead. Ask for a salad or green vegetables. · If you use insulin, check your blood sugar before and after eating out to help you plan how much to eat in the future. · If you eat more carbohydrate at a meal than you had planned, take a walk or do other exercise. This will help lower your blood sugar. What else should you know? · Limit saturated fat, such as the fat from meat and dairy products. This is a healthy choice because people who have diabetes are at higher risk of heart disease. So choose lean cuts of meat and nonfat or low-fat dairy products. Use olive or canola oil instead of butter or shortening when cooking. · Don't skip meals. Your blood sugar may drop too low if you skip meals and take insulin or certain medicines for diabetes. · Check with your doctor before you drink alcohol. Alcohol can cause your blood sugar to drop too low. Alcohol can also cause a bad reaction if you take certain diabetes medicines. Follow-up care is a key part of your treatment and safety. Be sure to make and go to all appointments, and call your doctor if you are having problems. It's also a good idea to know your test results and keep a list of the medicines you take. Where can you learn more? Go to http://pat-cuate.info/. Enter Q112 in the search box to learn more about \"Learning About Diabetes Food Guidelines. \" Current as of: May 23, 2016 Content Version: 11.1 © 0096-8557 [employer].  Care instructions adapted under license by [employer] (which disclaims liability or warranty for this information). If you have questions about a medical condition or this instruction, always ask your healthcare professional. [address] any warranty or liability for your use of this information. Discontinue fenofibrate Begin rosuvastatin 10 mg each day Introducing [facility] SERVICES! [facility] introduces [employer] patient portal. Now you can access parts of your medical record, email your doctor's office, and request medication refills online. 1. In your internet browser, go to [email or web address] 2. Click on the First Time User? Click Here link in the Sign In box. You will see the New Member Sign Up page. 3. Enter your Aspectiva Access Code exactly as it appears below. You will not need to use this code after youve completed the sign-up process. If you do not sign up before the expiration date, you must request a new code. · As[employer] Access Code: [email or web address] Expires: 3/28/2017  8:53 AM 
 
4. Enter the last four digits of your Social Security Number (xxxx) and Date of Birth (mm/dd/yyyy) as indicated and click Submit. You will be taken to the next sign-up page. 5. Create a Aspectiva ID. This will be your Aspectiva login ID and cannot be changed, so think of one that is secure and easy to remember. 6. Create a As[employer]iva password. You can change your password at any time. 7. Enter your Password Reset Question and Answer. This can be used at a later time if you forget your password. 8. Enter your e-mail address. You will receive e-mail notification when new information is available in [address] 9. Click Sign Up. You can now view and download portions of your medical record. 10. Click the Download Summary menu link to download a portable copy of your medical information. If you have questions, please visit the Frequently Asked Questions section of the [employer] website.  Remember, [employer] is NOT to be used for urgent needs. For medical emergencies, dial 911. Now available from your iPhone and Android! Please provide this summary of care documentation to your next provider. Your primary care clinician is listed as Jil Roach. If you have any questions after today's visit, please call [phone].

## 2017-02-17 NOTE — PROGRESS NOTES
HPI:   Micheline Raymond is a 60y.o. year old male who presents today for evaluation of hypertension, hyperlipidemia, moderate aortic insufficiency, atypical chest pain, prediabetes, GERD, and chronic venous insufficiency. He also has a history of osteoarthritis, involving his right knee, right great toe, and lumbar spine, and reports today that he has been having increasing difficulty with low back pain and right sided sciatica. He underwent a lumbar MRI (12/2016) which showed progression when compared to a prior lumbar MRI from 2010. It showed prominent right paracentral L4-L5 disc extrusion with impingement of the descending right L5 nerve root and moderately severe multifactorial canal stenosis; additional prominent right paracentral L5-S1 disc protrusion with impingement of the descending right S1 nerve root; abutment of the foraminal right L5 nerve root related to moderate foramina stenosis at L5-S1; mild multifactorial canal stenosis at L3-L4 with abutment of the descending left L4 nerve root. He was evaluated by Dr. Lukas Allen, and treated with Medrol dose pack, Norco and Flexeril without significant improvement. He states that he has an appointment with Dr. Niurka Plummer in 3/2017 for evaluation for possible surgical intervention. He denies any neurogenic bowel or bladder symptoms. He has a history of hypertension, treated with losartan and amlodipine. He does not check his blood pressure at home regularly. He also does not exercise regularly, although admits to doing yard work. He denies any shortness of breath at rest or with exertion, palpitations, lightheadedness, or edema. He does have a history of substernal chest pain that has no relation to exertion and was thought to be atypical. [facility] to [facility]2016 with a complaint of chest pain and diaphoresis, which seemed to be relieved by aspirin and sublingual nitroglycerin in the ambulance. Evaluation included negative ECGs and troponins.  He had an exercise nuclear stress test (2/29/2016) which showed normal LV function (EF 57%) and a medium sized partially reversible defect inferiorly (intermediate risk study). On 3/1/2016, he underwent a cardiac catheterization which showed no significant epicardial coronary artery disease. He also had an echocargiogram (2/28/2016) showing mild LV septal hypertrophy, hypokinetic basal inferior wall with preserved LV function (EF 24%), mild diastolic dysfunction, and moderate aortic regurgitation with questionable dilation of the distal arch of aorta (3.5 cm). He was found to have elevated triglycerides, with lipid panel showing total cholesterol 201/ / HDL 52/ LDL 89. He was started on Fenofibrate prior to discharge on 3/1/2016. He reports that has no significant recurrence of chest pain since that time. He does have difficulty with mild lower extremity edema due to chronic renal insufficiency. He is followed by Dr. Alana Briseno. He has a history of prediabetes, with HbA1c ranging from 5.8-6.0 since 2012. He denies any polyuria, polydipsia, nocturia, or blurry vision, and has no history of retinopathy, neuropathy, or nephropathy. He has regular eye exams with Dr. Gamal Zarco. His weight has gradually increased approximately 10 pounds over the last 2 years, but has remained relatively stable over the last 6 months. He has a history of GERD, with symptoms well controlled with prn pantoprazole. He had a screening colonoscopy by Dr. Brandon Montalvo in 3/2012 with removal of a benign serrated polyp (pathology shows hyperplastic and adenomatous features). Follow-up recommended for 5 years. He denies any abdominal pain, nausea, vomiting, melena, hematochezia, or change in bowel movements. In 5/2016, he fell at work with his chest striking the edge of a table and he sustained fractures of the anterior right 7th and 8th ribs.  He had serial rib x-rays since that time showing slow interval healing, but reports that he no longer has pain related to this. He underwent a bone density scan in 10/2016 which showed evidence of osteopenia with T-scores:  femoral neck left -1.7  /right -1.5 and lumbar -1.2. He was started on calcium and Vitamin D supplementation. Past Medical History   Diagnosis Date    Abnormal myocardial perfusion study 2/28/2016     Partially reversible inferior perfusion defect, EF 57%    AI (aortic insufficiency)      Moderate with possible bicuspid aortic valve    Chronic low back pain     Chronic venous insufficiency     Colon polyp 03/2012     Benign serrated polyp    Fracture 05/2016     rib fx's    GERD (gastroesophageal reflux disease)     History of echocardiogram 2/28/2016     EF 55%. Basal inferior hypokinesis. Gr 1 DDfx. Mod AI.  Hyperlipidemia     Hypertension     Osteoarthritis of right knee     Osteopenia     Prediabetes     Prepatellar bursitis of right knee     Recurrent genital herpes 1/16/2013    Right lumbar radiculopathy 10/2016     MRI: prominent right paracentral L4-L5 disc extrusion/impingement of descending right L5 nerve root; moderately severe canal stenosis; prominent right paracentral L5-S1 disc protrusion/ impingement of the descending right S1 nerve root; abutment of the foraminal right L5 nerve root/moderate foramina stenosis at L5-S1; mild canal stenosis at L3-L4 with abutment of the descending left L4 nerve root.  S/P cardiac catheterization 3/1/2016     Normal coronary anatomy     Past Surgical History   Procedure Laterality Date    Hx cholecystectomy      Hx tonsillectomy      Endoscopy, colon, diagnostic       Current Outpatient Prescriptions   Medication Sig    rosuvastatin (CRESTOR) 10 mg tablet Take 1 Tab by mouth nightly.  HYDROcodone-acetaminophen (NORCO) 5-325 mg per tablet One-half to one tab po every day to bid prn severe pain  Indications: Pain    diclofenac EC (VOLTAREN) 75 mg EC tablet Take 1 Tab by mouth two (2) times a day.     amLODIPine (NORVASC) 10 mg tablet TAKE 1 TABLET DAILY    cyclobenzaprine (FLEXERIL) 5 mg tablet Take 1 Tab by mouth three (3) times daily as needed for Muscle Spasm(s).  valACYclovir (VALTREX) 500 mg tablet TAKE 1 TABLET DAILY    losartan (COZAAR) 100 mg tablet TAKE 1 TABLET DAILY    nitroglycerin (NITROSTAT) 0.4 mg SL tablet 1 Tab by SubLINGual route every five (5) minutes as needed for Chest Pain.  ascorbic acid (VITAMIN C) 500 mg tablet 500 mg.    magnesium oxide (MAG-OX) 400 mg tablet Take 400 mg by mouth.  aspirin delayed-release 81 mg tablet 81 mg.    diazepam (VALIUM) 5 mg tablet Take 1 Tab by mouth every six (6) hours as needed. Max Daily Amount: 20 mg.    ibuprofen (MOTRIN) 800 mg tablet Take 1 Tab by mouth two (2) times daily as needed for Pain.  cyclobenzaprine (FLEXERIL) 5 mg tablet Take 1 Tab by mouth three (3) times daily as needed for Muscle Spasm(s).  Cholecalciferol, Vitamin D3, 2,000 unit cap Take 2,000 Units by mouth daily.  DOCOSAHEXANOIC ACID/EPA (FISH OIL PO) Take  by mouth daily. No current facility-administered medications for this visit. Allergies and Intolerances: Allergies   Allergen Reactions    Tree Nuts Anaphylaxis    Ace Inhibitors Cough    Nuts [Tree Nut] Itching     Family History: His mother is alive with stage 3 ovarian cancer. His father  from esophageal cancer at age 61. His maternal aunt  at age 48 from colon cancer. He has no family history of prostate cancer. Family History   Problem Relation Age of Onset    Cancer Father 61     esophageal    Hypertension Father     Hypertension Brother     Diabetes Other      Grandmother    Arthritis-osteo Other     Other Other      Stomach problems; Father, grandfather     Social History:   He  reports that he quit smoking about 15 years ago. He has quit using smokeless tobacco. Reports that he smoked less than 0.5 ppd and stopped in . He is  and they have one son.  His wife was just diagnosed with Paget's disease of the breast, which represents a recurrence of prior breast cancer. He is employed as an  for [employer]. He drinks about a 12 pack of beer each month. History   Alcohol Use    1.0 oz/week    2 Cans of beer per week     Comment: occasionally     Immunization History:  Immunization History   Administered Date(s) Administered    Influenza Vaccine 11/01/2013, 11/12/2014    TDAP Vaccine 01/20/2004    Td 01/01/2004    Tdap 01/16/2014       Review of Systems:   As above included in HPI. Otherwise 11 point review of systems negative including constitutional, skin, HENT, eyes, respiratory, cardiovascular, gastrointestinal, genitourinary, musculoskeletal, endocrine, hematologic, allergy, and neurologic. Physical:   Vitals:   BP: 122/74  HR: 74  WT: 222 lb (100.7 kg)  BMI:  29.29 kg/m2    Exam:   Patient appears in no apparent distress. Affect is appropriate. HEENT --Anicteric sclerae, tympanic membranes normal,  ear canals normal.  PERRL, EOMI, conjunctiva and lids normal.   Sinuses were nontender, turbinates normal, hearing normal.  Oropharynx without  erythema, normal tongue, oral mucosa and tonsils. No cervical lymphadenopathy. No thyromegaly, JVD, or bruits. Carotid pulses 2+ with normal upstroke. Lungs --Clear to auscultation. No wheezing or rales. Heart --Regular rate and rhythm, no murmurs, rubs, gallops, or clicks. Chest wall --Nontender to palpation. PMI normal.  Abdomen -- Soft and nontender, no hepatosplenomegaly or masses. Prostate  -- no asymmetry, nodularity, tenderness or enlargement  Rectal  -- normal tone, guaiac negative brown stool  Extremities -- Without cyanosis, clubbing, edema. 2+ pulses equally and bilaterally.   Normal looking digits, ROM intact  Neuro -- CN 2-12 intact, strength 5/5 with intact soft touch in all extremities, cerebellar  exam finger to nose test normal, 2+DTRs  Derm - no obvious abnormalities noted, no rash    Foot exam: tuning fork and microfilament test with normal sensation    Review of Data:  Labs:  Hospital Outpatient Visit on 02/08/2017   Component Date Value Ref Range Status    LIPID PROFILE 02/08/2017        Final    Cholesterol, total 02/08/2017 188  <200 MG/DL Final    Triglyceride 02/08/2017 92  <150 MG/DL Final    HDL Cholesterol 02/08/2017 52  40 - 60 MG/DL Final    LDL, calculated 02/08/2017 117.6* 0 - 100 MG/DL Final    VLDL, calculated 02/08/2017 18.4  MG/DL Final    CHOL/HDL Ratio 02/08/2017 3.6  0 - 5.0   Final    Hemoglobin A1c 02/08/2017 6.0* 4.2 - 5.6 % Final    Prostate Specific Ag 02/08/2017 0.4  0.0 - 4.0 ng/mL Final    TSH 02/08/2017 1.08  0.36 - 3.74 uIU/mL Final    Microalbumin,urine random 02/08/2017 0.60  0 - 3.0 MG/DL Final    Creatinine, urine 02/08/2017 96.07  30 - 125 mg/dL Final    Microalbumin/Creat ratio (mg/g cre* 02/08/2017 6  0 - 30 mg/g Final    Color 02/08/2017 YELLOW    Final    Appearance 02/08/2017 CLEAR    Final    Specific gravity 02/08/2017 1.018  1.005 - 1.030   Final    pH (UA) 02/08/2017 6.5  5.0 - 8.0   Final    Protein 02/08/2017 NEGATIVE   NEG mg/dL Final    Glucose 02/08/2017 NEGATIVE   NEG mg/dL Final    Ketone 02/08/2017 NEGATIVE   NEG mg/dL Final    Bilirubin 02/08/2017 NEGATIVE   NEG   Final    Blood 02/08/2017 NEGATIVE   NEG   Final    Urobilinogen 02/08/2017 0.2  0.2 - 1.0 EU/dL Final    Nitrites 02/08/2017 NEGATIVE   NEG   Final    Leukocyte Esterase 02/08/2017 NEGATIVE   NEG   Final    WBC 02/08/2017 0  0 - 4 /hpf Final    RBC 02/08/2017 0  0 - 5 /hpf Final    Epithelial cells 02/08/2017 FEW  0 - 5 /lpf Final    Bacteria 02/08/2017 NEGATIVE   NEG /hpf Final    Sodium 02/08/2017 142  136 - 145 mmol/L Final    Potassium 02/08/2017 4.3  3.5 - 5.5 mmol/L Final    Chloride 02/08/2017 108  100 - 108 mmol/L Final    CO2 02/08/2017 25  21 - 32 mmol/L Final    Anion gap 02/08/2017 9  3.0 - 18 mmol/L Final    Glucose 02/08/2017 109* 74 - 99 mg/dL Final    BUN 02/08/2017 18  7.0 - 18 MG/DL Final    Creatinine 02/08/2017 1.19  0.6 - 1.3 MG/DL Final    BUN/Creatinine ratio 02/08/2017 15  12 - 20   Final    GFR est AA 02/08/2017 >60  >60 ml/min/1.73m2 Final    GFR est non-AA 02/08/2017 >60  >60 ml/min/1.73m2 Final    Calcium 02/08/2017 8.8  8.5 - 10.1 MG/DL Final    Bilirubin, total 02/08/2017 0.5  0.2 - 1.0 MG/DL Final    ALT (SGPT) 02/08/2017 46  16 - 61 U/L Final    AST (SGOT) 02/08/2017 32  15 - 37 U/L Final    Alk. phosphatase 02/08/2017 62  45 - 117 U/L Final    Protein, total 02/08/2017 6.5  6.4 - 8.2 g/dL Final    Albumin 02/08/2017 4.1  3.4 - 5.0 g/dL Final    Globulin 02/08/2017 2.4  2.0 - 4.0 g/dL Final    A-G Ratio 02/08/2017 1.7  0.8 - 1.7   Final    WBC 02/08/2017 5.8  4.6 - 13.2 K/uL Final    RBC 02/08/2017 4.87  4.70 - 5.50 M/uL Final    HGB 02/08/2017 15.0  13.0 - 16.0 g/dL Final    HCT 02/08/2017 44.7  36.0 - 48.0 % Final    MCV 02/08/2017 91.8  74.0 - 97.0 FL Final    MCH 02/08/2017 30.8  24.0 - 34.0 PG Final    MCHC 02/08/2017 33.6  31.0 - 37.0 g/dL Final    RDW 02/08/2017 13.7  11.6 - 14.5 % Final    PLATELET 27/54/5787 023  135 - 420 K/uL Final    MPV 02/08/2017 11.3  9.2 - 11.8 FL Final    NEUTROPHILS 02/08/2017 54  40 - 73 % Final    LYMPHOCYTES 02/08/2017 36  21 - 52 % Final    MONOCYTES 02/08/2017 6  3 - 10 % Final    EOSINOPHILS 02/08/2017 3  0 - 5 % Final    BASOPHILS 02/08/2017 1  0 - 2 % Final    ABS. NEUTROPHILS 02/08/2017 3.1  1.8 - 8.0 K/UL Final    ABS. LYMPHOCYTES 02/08/2017 2.1  0.9 - 3.6 K/UL Final    ABS. MONOCYTES 02/08/2017 0.3  0.05 - 1.2 K/UL Final    ABS. EOSINOPHILS 02/08/2017 0.2  0.0 - 0.4 K/UL Final    ABS.  BASOPHILS 02/08/2017 0.1* 0.0 - 0.06 K/UL Final    DF 02/08/2017 AUTOMATED    Final    Vitamin D 25-Hydroxy 02/08/2017 36.7  30 - 100 ng/mL Final     Calculated 10 year ASCVD risk score:  8.0 %    Health Maintenance:  Screening:    Colorectal: colonoscopy (3/2012) benign serrated polyp. Dr. Keith Isbell. Due 2017. Depression: none   DM (HbA1c/FPG): HbA1c 6.0 (2/2017)   Hepatitis C: negative (9/2016)   Falls: none    DEXA: N/A   PSA/TONI: PSA 0.4/ TONI (2/2017)   Glaucoma: regular eye exams with Dr. Trung Galeano. Smoking: none   Vitamin D: 36.7 (2/2017)   Medicare Wellness: N/A    Impression:  Patient Active Problem List   Diagnosis Code    Hyperlipidemia E78.5    GERD (gastroesophageal reflux disease) K21.9    Chronic venous insufficiency I87.2    Colon polyp K63.5    Prediabetes R73.03    Recurrent genital herpes A60.00    Vitamin D insufficiency E55.9    Aortic insufficiency, moderate I35.1    Essential hypertension I10    Primary osteoarthritis of knee M17.10    Closed fracture of multiple ribs of right side with delayed healing S22.41XG    Lumbar herniated disc  right L4/5/S1 chronic M51.26    Pain of right sacroiliac joint-episodic M53.3    Osteopenia determined by DEXA 2016, lowest T score -1.7 M85.80    Annular tear of lumbar disc M51.36    Chronic right-sided low back pain with sciatica M54.40, G89.29       Plan:  1. Hypertension. Well controlled on current regimen of losartan and amlodipine. Renal function normal with creatinine 1.19 / eGFR >60. Continue to follow. 2. Dyslipidemia. Patient with elevated triglycerides of 303 during hospitalization in 3/2016 and started on fenofibrate. However, studies have not been shown that lowering the triglyceride level actually decreases the risk of CAD. In addition, first line therapy for treating triglycerides < 500 would be initiation of a statin rather than a fibrate. Repeat lipid panel shows his calculated 10 year ASCVD risk is 8.0 % while on fibrate, which does place him in one of the four statin benefit groups as per new AHA/ACC guidelines (primary prevention: ASCVD risk > 7.5%). Discussed at length with patient.  Will discontinue fenofibrate and initiating treatment with rosuvastatin 10 mg. Emphasized importance of lifestyle modifications, including diet, exercise, and weight loss. Will repeat lipid panel at next visit to reassess. Continue to follow. 3. Aortic insufficiency, moderate with mildly dilated aortic root. Probable bicuspid valve. Needs annual echocardiograms to evaluate AV. Last 2/2016. Also, considering thoracic CT scan if aortic root appears to progress on echocardiogram.  Patient is due for follow-up with Dr. Marjan Sears. Urged to schedule appointment. 4. Prediabetes. HbA1c increased to 6.0. No evidence of microvascular complications. On ARB and to begin statin. Continue follow-up with  for annual eye exams. Foot exam normal today. Urine microalbumin/ creatinine ratio without evidence of microalbuminuria. Encouraged weight loss and dietary changes, particularly avoiding concentrated sweets. Improvement in blood sugar will also help triglyceride level. 5. Osteopenia. Last bone density scan 10/2016. Using femoral neck T-scores, calculated FRAX score estimates her 10 year risk of a major osteoporetic fracture at 12 % and hip fracture at 2.0 %, which are not an indication for biphosphonate treatment (>20% and >3%, respectively). Continue calcium and Vitamin D. Encouraged exercise, particularly weight bearing activities. Repeat bone density scan in 2018. 6. GERD. Continue occasional use of Protonix with good control of symptoms. Follow. 7. Lumbar radiculopathy. Progression of symptoms and disease on MRI. Not responding to conservative therapy. Appointment with Dr. Christine Coffman to consider possible laminectomy. Follow. 8. Health maintenance. Patient not wishing to receive influenza vaccine today. Other immunizations up to date. Colonoscopy due 3/2017. Urged patient to schedule. Prostate cancer screen up to date. Continue regular eye exams. Stressed importance of weight loss. Vitamin D level low normal. Continue maintenance dose supplement.  .     Total time: 40 minutes spent with the patient in face-to-face consultation of which greater than 50% was spent on counseling, answering questions and/or coordination of care. Complex medical review and management performed. Patient understands recommendations and agrees with plan. Follow-up in 6 months.

## 2017-03-08 ENCOUNTER — TELEPHONE (OUTPATIENT)
Dept: INTERNAL MEDICINE CLINIC | Age: 57
End: 2017-03-08

## 2017-03-08 NOTE — TELEPHONE ENCOUNTER
Pt called stating he is getting a bill for his labs drawn on 02/08/2017.  Insurance company advised him it will need to be re-coded for a preventive care appt    Can we re-code his labs for appt on 02/14/2017

## 2017-03-09 NOTE — TELEPHONE ENCOUNTER
Lolis Newton,    Please contact patient and find out who is billing him? Matty Khan or outside company. If it is Matty Khan need to [employer] with corrected information via fax. Outside company you will need to fax the information to them and ask them to update and re file. Let me know if you have questions.

## 2017-03-14 ENCOUNTER — OFFICE VISIT (OUTPATIENT)
Dept: ORTHOPEDIC SURGERY | Age: 57
End: 2017-03-14

## 2017-03-14 VITALS
HEIGHT: 73 IN | TEMPERATURE: 97.7 F | WEIGHT: 222 LBS | RESPIRATION RATE: 18 BRPM | DIASTOLIC BLOOD PRESSURE: 88 MMHG | OXYGEN SATURATION: 97 % | BODY MASS INDEX: 29.42 KG/M2 | HEART RATE: 74 BPM | SYSTOLIC BLOOD PRESSURE: 141 MMHG

## 2017-03-14 DIAGNOSIS — M51.36 ANNULAR TEAR OF LUMBAR DISC: ICD-10-CM

## 2017-03-14 DIAGNOSIS — M51.26 LUMBAR HERNIATED DISC: Primary | Chronic | ICD-10-CM

## 2017-03-14 RX ORDER — FENOFIBRATE 67 MG/1
CAPSULE ORAL
COMMUNITY
Start: 2017-01-08 | End: 2017-07-13 | Stop reason: SDUPTHER

## 2017-03-14 RX ORDER — METHYLPREDNISOLONE 4 MG/1
TABLET ORAL
Refills: 0 | COMMUNITY
Start: 2016-12-28 | End: 2017-05-08 | Stop reason: ALTCHOICE

## 2017-03-14 RX ORDER — GABAPENTIN 300 MG/1
CAPSULE ORAL
Refills: 0 | COMMUNITY
Start: 2016-12-28 | End: 2017-05-08 | Stop reason: DRUGHIGH

## 2017-03-14 NOTE — PATIENT INSTRUCTIONS
Herniated Disc: Care Instructions  Your Care Instructions    The bones that form the spine in your back are cushioned by small discs. If a disc is damaged, it may bulge or break open (herniate). A herniated disc can result from normal wear and tear as we age or from an injury or disease. If a herniated disc presses on a nerve, it can cause pain and numbness in your leg (sciatica) and/or back pain. You may be able to heal your herniated disc with a few weeks or months of rest, medicine, and exercises. In some cases, you may need surgery. Follow-up care is a key part of your treatment and safety. Be sure to make and go to all appointments, and call your doctor if you are having problems. It's also a good idea to know your test results and keep a list of the medicines you take. How can you care for yourself at home? · Take your medicines exactly as prescribed. Call your doctor if you think you are having a problem with your medicine. · Ask your doctor if you can take an over-the-counter pain medicine, such as acetaminophen (Tylenol), ibuprofen (Advil, Motrin), or naproxen (Aleve). Read and follow all instructions on the label. · Do not take two or more pain medicines at the same time unless the doctor told you to. Many pain medicines have acetaminophen, which is Tylenol. Too much acetaminophen (Tylenol) can be harmful. · Rest your back if your pain is severe. · Avoid movements and positions that increase your pain or numbness. · Try taking short walks and doing light activities that do not cause pain. Even if you are feeling some pain, it is important to keep your muscles active and strong. · Use heat or ice to relieve pain. ¨ To apply heat, put a warm water bottle, heating pad set on low, or warm cloth on your back. Do not go to sleep with a heating pad on your skin. ¨ To use ice, put ice or a cold pack on the area for 10 to 20 minutes at a time. Put a thin cloth between the ice and your skin.   · Your doctor may recommend a physical therapy program, where you learn exercises to do at home. These exercises strengthen the muscles that support your lower back and prevent reinjury. · Stay at a healthy weight. This may reduce the load on your back. · Quit smoking if you smoke. If you need help quitting, talk to your doctor about stop-smoking programs and medicines. These can increase your chances of quitting for good. · To avoid hurting your back when lifting:  ¨ Lift with your legs, not your back, by squatting and bending your knees. Avoid bending forward at the waist when lifting. ¨ Rise slowly. ¨ Keep the load as close to your body as possible, at the level of your navel. ¨ Avoid turning or twisting your body while holding a heavy object. ¨ Get help if you need to lift a heavy object. Never lift a heavy object above shoulder level. When should you call for help? Call 911 anytime you think you may need emergency care. For example, call if:  · You are unable to move a leg at all. Call your doctor now or seek immediate medical care if:  · You have new or worse symptoms in your arms, legs, chest, belly, or buttocks. Symptoms may include:  ¨ Numbness or tingling. ¨ Weakness. ¨ Pain. · You lose bladder or bowel control. Watch closely for changes in your health, and be sure to contact your doctor if:  · You are not getting better as expected. Where can you learn more? Go to http://pat-cuate.info/. Enter F534 in the search box to learn more about \"Herniated Disc: Care Instructions. \"  Current as of: May 23, 2016  Content Version: 11.1  © 5507-7604 "[employer]. Care instructions adapted under license by [employer] (which disclaims liability or warranty for this information).  If you have questions about a medical condition or this instruction, always ask your healthcare professional. [address] any warranty or liability for your use of this information. Herniated Disc: Exercises  Your Care Instructions  Here are some examples of typical rehabilitation exercises for your condition. Start each exercise slowly. Ease off the exercise if you start to have pain. Your doctor or physical therapist will tell you when you can start these exercises and which ones will work best for you. How to do the exercises  Note: These exercises can help you move easier and feel better. But when you first start doing them, you may have more pain in your back. This is normal. But it is important to pay close attention to your pain during and after each exercise. · Keep doing these exercises if your pain stays the same or moves from your leg and buttock more toward the middle of your spine. Pain moving out of your leg and buttock is a good sign. · Stop doing these exercises if your pain gets worse in your leg and buttock. Stop if you start to have pain in your leg and buttock that you didn't have before. Be sure to do these exercises in the order they appear. Note how your pain changes before you move to the next one. If your pain is much worse right after exercise and stays worse the next day, do not do any of these exercises. 1. Rest on belly    1. Lie on your stomach, face down, with your head turned to the side. Place your arms beside your body. If this bothers your neck, place your hands, one on top of the other, underneath your forehead. This will help support your head and neck. 2. Try to relax your lower back muscles as much as you can. 3. Continue to lie on your stomach for 2 minutes. 4. If your pain spreads down your leg or increases down your leg, stop this exercise and do not do the next exercises. 2. Press-up    1. Lie on your stomach, face down. Keep your elbows tucked into your sides and under your shoulders. 2. Press your elbows down into the floor to raise your upper back. As you do this, relax your stomach muscles.  Allow your back to arch without using your back muscles. Let your low back relax completely as you arch up. 3. Hold this position for 2 minutes. 4. Repeat 2 to 4 times. 5. If your pain spreads down your leg or increases down your leg, stop this exercise and do not do the next exercises. 3. Full press-up    1. Lie on your stomach, face down. Keep your elbows tucked into your sides and under your shoulders. 2. Straighten your elbows, and push your upper body up as far as you can. Allow your lower back to sag. Keep your hips, pelvis, and legs relaxed. 3. Hold this position for 5 seconds, and then relax. 4. Repeat 10 times. Each time, try to raise your upper body a little higher and hold your arms a bit straighter. 5. If your pain spreads down your leg or gets worse down your leg, stop this exercise and do not move to the next exercise. 6. If you can't do this exercise, you may instead try the backward bend exercise that follows. 4. Backward bend    · Stand with your feet hip-width apart. Your toes should point forward. Do not lock your knees. · Place your hands in the small of your back. · Bend backward as far as you can, keeping your knees straight. Hold this position for 2 to 3 seconds. Then return to your starting position. · Repeat 2 to 4 times. Each time, try to bend backward a little farther, until you bend backward as far as you can. · If your pain spreads down your leg or increases down your leg, stop this exercise. Follow-up care is a key part of your treatment and safety. Be sure to make and go to all appointments, and call your doctor if you are having problems. It's also a good idea to know your test results and keep a list of the medicines you take. Where can you learn more? Go to http://pat-cuate.info/. Enter 37091 [id number] in the search box to learn more about \"Herniated Disc: Exercises. \"  Current as of: May 23, 2016  Content Version: 11.1  © 4566-7773 [employer], [facility].  Care instructions adapted under license by [employer] (which disclaims liability or warranty for this information). If you have questions about a medical condition or this instruction, always ask your healthcare professional. [address] any warranty or liability for your use of this information.

## 2017-03-14 NOTE — PROGRESS NOTES
[address]  [address], [address]  [address]  Phone: [phone]  Fax: [phone]  INITIAL CONSULTATION  Patient: Roxana aHll                MRN: [id number]       SSN: [id number]  YOB: 1960        AGE: 60 y.o. SEX: male  Body mass index is 29.29 kg/(m^2). PCP: Alfreda Haas MD  03/14/17    Chief Complaint   Patient presents with    Back Pain     lower back pain    Referral / Consult         HISTORY OF PRESENT ILLNESS, RADIOGRAPHS, and PLAN:         HISTORY OF PRESENT ILLNESS:  Mr. Jordan Núñez is seen today at the request of Dr. Inna Cannon and Dr. Lynne Fischer. Mr. Jordan Núñez is a 15-year-old male with a history of mild coronary artery disease. He works as an . He has had a history of chronic, right-sided buttock and leg pain more acute over the past year or so. His pain is a 7/10 and has really interfered with his quality of life. It has been unresponsive to injections, medications, and therapy. He is referred today for consideration for surgical intervention. PHYSICAL EXAMINATION:  His physical exam is significant for positive straight leg raise on the right but normal strength in his lower extremities and normal reflexes and sensation. RADIOGRAPHS:  MRI is significant for large, extruded disc herniations at both levels in a right paracentral position causing significant nerve root compression. ASSESSMENT/PLAN: I discussed the matter at length with him. He has classic sciatica with nerve tension signs with significant disc herniations at two levels. My concern about his outcome would really stem from the chronicity of his symptomatology. I discussed the nature history of disc herniations, the risks, benefits, complications, and alternatives of intervention, which would be a laminotomy at both levels on the right.   At this time, his wife is in treatment for cancer, and he would like to wait a couple of months until that situation clarifies itself before he considers intervention. I told him to simply recontact my office when the time allows for him to focus on his own health and care. I will reevaluate him at that moment and we will discuss treatments. cc: Leyla Ramirez M.D. Past Medical History:   Diagnosis Date    Abnormal myocardial perfusion study 2/28/2016    Partially reversible inferior perfusion defect, EF 57%    AI (aortic insufficiency)     Moderate with possible bicuspid aortic valve    Chronic low back pain     Chronic venous insufficiency     Colon polyp 03/2012    Benign serrated polyp    Fracture 05/2016    rib fx's    GERD (gastroesophageal reflux disease)     History of echocardiogram 2/28/2016    EF 55%. Basal inferior hypokinesis. Gr 1 DDfx. Mod AI.  Hyperlipidemia     Hypertension     Osteoarthritis of right knee     Osteopenia     Prediabetes     Prepatellar bursitis of right knee     Recurrent genital herpes 1/16/2013    Right lumbar radiculopathy 10/2016    MRI: prominent right paracentral L4-L5 disc extrusion/impingement of descending right L5 nerve root; moderately severe canal stenosis; prominent right paracentral L5-S1 disc protrusion/ impingement of the descending right S1 nerve root; abutment of the foraminal right L5 nerve root/moderate foramina stenosis at L5-S1; mild canal stenosis at L3-L4 with abutment of the descending left L4 nerve root.  S/P cardiac catheterization 3/1/2016    Normal coronary anatomy       Family History   Problem Relation Age of Onset    Cancer Father 61     esophageal    Hypertension Father     Hypertension Brother     Diabetes Other      Grandmother    Arthritis-osteo Other     Other Other      Stomach problems;  Father, grandfather       Current Outpatient Prescriptions   Medication Sig Dispense Refill    gabapentin (NEURONTIN) 300 mg capsule take 1 capsule by mouth three times a day  0    HYDROcodone-acetaminophen (NORCO) 5-325 mg per tablet One-half to one tab po every day to bid prn severe pain  Indications: Pain 40 Tab 0    diclofenac EC (VOLTAREN) 75 mg EC tablet Take 1 Tab by mouth two (2) times a day. 60 Tab 0    amLODIPine (NORVASC) 10 mg tablet TAKE 1 TABLET DAILY 90 Tab 3    valACYclovir (VALTREX) 500 mg tablet TAKE 1 TABLET DAILY 90 Tab 3    losartan (COZAAR) 100 mg tablet TAKE 1 TABLET DAILY 90 Tab 3    ascorbic acid (VITAMIN C) 500 mg tablet 500 mg.      magnesium oxide (MAG-OX) 400 mg tablet Take 400 mg by mouth.  aspirin delayed-release 81 mg tablet 81 mg.      diazepam (VALIUM) 5 mg tablet Take 1 Tab by mouth every six (6) hours as needed. Max Daily Amount: 20 mg. 30 Tab 0    cyclobenzaprine (FLEXERIL) 5 mg tablet Take 1 Tab by mouth three (3) times daily as needed for Muscle Spasm(s). 30 Tab 1    Cholecalciferol, Vitamin D3, 2,000 unit cap Take 2,000 Units by mouth daily.  DOCOSAHEXANOIC ACID/EPA (FISH OIL PO) Take  by mouth daily.  fenofibrate micronized (LOFIBRA) 67 mg capsule       methylPREDNISolone (MEDROL DOSEPACK) 4 mg tablet take as directed on pack  0    rosuvastatin (CRESTOR) 10 mg tablet Take 1 Tab by mouth nightly. 30 Tab 5    cyclobenzaprine (FLEXERIL) 5 mg tablet Take 1 Tab by mouth three (3) times daily as needed for Muscle Spasm(s). 90 Tab 0    nitroglycerin (NITROSTAT) 0.4 mg SL tablet 1 Tab by SubLINGual route every five (5) minutes as needed for Chest Pain. 25 Tab 3    ibuprofen (MOTRIN) 800 mg tablet Take 1 Tab by mouth two (2) times daily as needed for Pain.  60 Tab 3       Allergies   Allergen Reactions    Tree Nuts Anaphylaxis    Ace Inhibitors Cough    Nuts [Tree Nut] Itching       Past Surgical History:   Procedure Laterality Date    ENDOSCOPY, COLON, DIAGNOSTIC      HX CHOLECYSTECTOMY      HX TONSILLECTOMY         Past Medical History:   Diagnosis Date    Abnormal myocardial perfusion study 2/28/2016    Partially reversible inferior perfusion defect, EF 57%    AI (aortic insufficiency)     Moderate with possible bicuspid aortic valve    Chronic low back pain     Chronic venous insufficiency     Colon polyp 03/2012    Benign serrated polyp    Fracture 05/2016    rib fx's    GERD (gastroesophageal reflux disease)     History of echocardiogram 2/28/2016    EF 55%. Basal inferior hypokinesis. Gr 1 DDfx. Mod AI.  Hyperlipidemia     Hypertension     Osteoarthritis of right knee     Osteopenia     Prediabetes     Prepatellar bursitis of right knee     Recurrent genital herpes 1/16/2013    Right lumbar radiculopathy 10/2016    MRI: prominent right paracentral L4-L5 disc extrusion/impingement of descending right L5 nerve root; moderately severe canal stenosis; prominent right paracentral L5-S1 disc protrusion/ impingement of the descending right S1 nerve root; abutment of the foraminal right L5 nerve root/moderate foramina stenosis at L5-S1; mild canal stenosis at L3-L4 with abutment of the descending left L4 nerve root.  S/P cardiac catheterization 3/1/2016    Normal coronary anatomy       Social History     Social History    Marital status:      Spouse name: N/A    Number of children: N/A    Years of education: N/A     Occupational History    Not on file. Social History Main Topics    Smoking status: Former Smoker     Quit date: 6/28/2001    Smokeless tobacco: Former User    Alcohol use 1.0 oz/week     2 Cans of beer per week      Comment: occasionally    Drug use: No    Sexual activity: Not on file     Other Topics Concern    Not on file     Social History Narrative       REVIEW OF SYSTEMS:   CONSTITUTIONAL SYMPTOMS:  Negative. EYES:  Negative. EARS, NOSE, THROAT AND MOUTH:  Negative. CARDIOVASCULAR:  Negative. RESPIRATORY:  Negative. GENITOURINARY: Negative. GASTROINTESTINAL:  Negative. INTEGUMENTARY (SKIN AND/OR BREAST):  Negative.    MUSCULOSKELETAL: Per HPI.   ENDOCRINE/RHEUMATOLOGIC:  Negative. NEUROLOGICAL:  Per HPI. HEMATOLOGIC/LYMPHATIC:  Negative. ALLERGIC/IMMUNOLOGIC:  Negative. PSYCHIATRIC:  Negative. PHYSICAL EXAMINATION:   Visit Vitals    /88    Pulse 74    Temp 97.7 °F (36.5 °C) (Oral)    Resp 18    Ht 6' 1\" (1.854 m)    Wt 222 lb (100.7 kg)    SpO2 97%    BMI 29.29 kg/m2    PAIN SCALE: 7/10    CONSTITUTIONAL: The patient is in no apparent distress and is alert and oriented x 3. HEENT: Normocephalic. Hearing grossly intact. NECK: Supple and symmetric. no tenderness, or masses were felt. RESPIRATORY: No labored breathing. CARDIOVASCULAR: The carotid pulses were normal. Peripheral pulses were 2+. CHEST: Normal AP diameter and normal contour without any kyphoscoliosis. LYMPHATIC: No lymphadenopathy was appreciated in the neck, axillae or groin. SKIN:  Negative for scars, rashes, lesions, or ulcers on the right upper, right lower, left upper, left lower and trunk. NEUROLOGICAL: Alert and oriented x 3. Ambulation without assistive device. FWB. EXTREMITIES:  See musculoskeletal.  MUSCULOSKELETAL:   Head and Neck:  Negative for misalignment, asymmetry, crepitation, defects, tenderness masses or effusions.  Left Upper Extremity: Inspection, percussion and palpation preformed. Galavizs sign is negative.  Right Upper Extremity: Inspection, percussion and palpation preformed. Galavizs sign is negative.  Spine, Ribs and Pelvis:RT sided low back pain that radiates into posterior aspect of RLE. Inspection, percussion and palpation preformed. Negative for misalignment, asymmetry, crepitation, defects, tenderness masses or effusions.  Left Lower Extremity: Inspection, percussion and palpation preformed. Negative straight leg raise.  Right Lower Extremity: Pain in posterior aspect of thigh. Paresthesia in ankle. Inspection, percussion and palpation preformed. Positive straight leg raise.       SPINE EXAM:     Lumbar spine: No rash, ecchymosis, or gross obliquity. No focal atrophy is noted. ASSESSMENT    ICD-10-CM ICD-9-CM    1. Lumbar herniated disc  right L4/5/S1 chronic M51.26 722.10    2.  Annular tear of lumbar disc M51.36 722.52        Written by Ruthie Izaguirre, as dictated by Tashia Glass MD.

## 2017-03-14 NOTE — MR AVS SNAPSHOT
Visit Information Date & Time Provider Department Dept. Phone Encounter #  
 3/14/2017  2:20 PM Kitty Chavez  [facility], [address] [facility] [phone] [id number] Your Appointments 8/9/2017  8:00 AM  
LAB with C NURSE VISIT Internist of [facility] ([address]) Appt Note: lab  
 [address], [address] [place] [address]  
  
   
 [address], [address]  
  
    
 8/15/2017  9:00 AM  
Office Visit with Myesha Gregory MD  
Internist of [facility] [address] [place] [address]) Appt Note: ov bs  
 [address] [facility] [place] [address]  
  
   
 [address], [address] Upcoming Health Maintenance Date Due COLONOSCOPY 1/16/2017 DTaP/Tdap/Td series (3 - Td) 1/16/2024 Allergies as of 3/14/2017  Review Complete On: 3/14/2017 By: Kitty Chavez MD  
  
 Severity Noted Reaction Type Reactions Tree Nuts High 03/08/2016    Anaphylaxis Ace Inhibitors  04/19/2013    Cough Nuts [Tree Nut]  04/29/2015    Itching Current Immunizations  Reviewed on 1/28/2015 Name Date Influenza Vaccine 11/12/2014, 11/1/2013 TDAP Vaccine 1/20/2004 Td 1/1/2004 Tdap 1/16/2014 Not reviewed this visit You Were Diagnosed With   
  
 Codes Comments Lumbar herniated disc    -  Primary ICD-10-CM: M51.26 
ICD-9-CM: 722.10 Annular tear of lumbar disc     ICD-10-CM: M51.36 
ICD-9-CM: 722.52 Vitals BP Pulse Temp Resp Height(growth percentile) Weight(growth percentile) 141/88 74 97.7 °F (36.5 °C) (Oral) 18 6' 1\" (1.854 m) 222 lb (100.7 kg) SpO2 BMI Smoking Status 97% 29.29 kg/m2 Former Smoker BMI and BSA Data Body Mass Index Body Surface Area  
 29.29 kg/m 2 2.28 m 2 Preferred Pharmacy Pharmacy Name Phone  [phone] [facility] [place] [phone] Your Updated Medication List  
  
   
This list is accurate as of: 3/14/17  3:35 PM.  Always use your most recent med list. amLODIPine 10 mg tablet Commonly known as:  Justine Ferguson TAKE 1 TABLET DAILY  
  
 ascorbic acid (vitamin C) 500 mg tablet Commonly known as:  VITAMIN C  
500 mg.  
  
 aspirin delayed-release 81 mg tablet 81 mg. Cholecalciferol (Vitamin D3) 2,000 unit Cap capsule Commonly known as:  VITAMIN D3 Take 2,000 Units by mouth daily. * cyclobenzaprine 5 mg tablet Commonly known as:  FLEXERIL Take 1 Tab by mouth three (3) times daily as needed for Muscle Spasm(s). * cyclobenzaprine 5 mg tablet Commonly known as:  FLEXERIL Take 1 Tab by mouth three (3) times daily as needed for Muscle Spasm(s). diazePAM 5 mg tablet Commonly known as:  VALIUM Take 1 Tab by mouth every six (6) hours as needed. Max Daily Amount: 20 mg.  
  
 diclofenac EC 75 mg EC tablet Commonly known as:  VOLTAREN Take 1 Tab by mouth two (2) times a day. fenofibrate micronized 67 mg capsule Commonly known as:  LOFIBRA FISH OIL PO Take  by mouth daily. gabapentin 300 mg capsule Commonly known as:  NEURONTIN  
take 1 capsule by mouth three times a day HYDROcodone-acetaminophen 5-325 mg per tablet Commonly known as:  Hugo Rogers One-half to one tab po every day to bid prn severe pain  Indications: Pain  
  
 ibuprofen 800 mg tablet Commonly known as:  MOTRIN Take 1 Tab by mouth two (2) times daily as needed for Pain.  
  
 losartan 100 mg tablet Commonly known as:  COZAAR  
TAKE 1 TABLET DAILY  
  
 magnesium oxide 400 mg tablet Commonly known as:  MAG-OX Take 400 mg by mouth.  
  
 methylPREDNISolone 4 mg tablet Commonly known as:  MEDROL DOSEPACK  
take as directed on pack  
  
 nitroglycerin 0.4 mg SL tablet Commonly known as:  NITROSTAT  
1 Tab by SubLINGual route every five (5) minutes as needed for Chest Pain. rosuvastatin 10 mg tablet Commonly known as:  CRESTOR Take 1 Tab by mouth nightly. valACYclovir 500 mg tablet Commonly known as:  VALTREX  
TAKE 1 TABLET DAILY * Notice: This list has 2 medication(s) that are the same as other medications prescribed for you. Read the directions carefully, and ask your doctor or other care provider to review them with you. Patient Instructions Herniated Disc: Care Instructions Your Care Instructions The bones that form the spine in your back are cushioned by small discs. If a disc is damaged, it may bulge or break open (herniate). A herniated disc can result from normal wear and tear as we age or from an injury or disease. If a herniated disc presses on a nerve, it can cause pain and numbness in your leg (sciatica) and/or back pain. You may be able to heal your herniated disc with a few weeks or months of rest, medicine, and exercises. In some cases, you may need surgery. Follow-up care is a key part of your treatment and safety. Be sure to make and go to all appointments, and call your doctor if you are having problems. It's also a good idea to know your test results and keep a list of the medicines you take. How can you care for yourself at home? · Take your medicines exactly as prescribed. Call your doctor if you think you are having a problem with your medicine. · Ask your doctor if you can take an over-the-counter pain medicine, such as acetaminophen (Tylenol), ibuprofen (Advil, Motrin), or naproxen (Aleve). Read and follow all instructions on the label. · Do not take two or more pain medicines at the same time unless the doctor told you to. Many pain medicines have acetaminophen, which is Tylenol. Too much acetaminophen (Tylenol) can be harmful. · Rest your back if your pain is severe. · Avoid movements and positions that increase your pain or numbness.  
· Try taking short walks and doing light activities that do not cause pain. Even if you are feeling some pain, it is important to keep your muscles active and strong. · Use heat or ice to relieve pain. ¨ To apply heat, put a warm water bottle, heating pad set on low, or warm cloth on your back. Do not go to sleep with a heating pad on your skin. ¨ To use ice, put ice or a cold pack on the area for 10 to 20 minutes at a time. Put a thin cloth between the ice and your skin. · Your doctor may recommend a physical therapy program, where you learn exercises to do at home. These exercises strengthen the muscles that support your lower back and prevent reinjury. · Stay at a healthy weight. This may reduce the load on your back. · Quit smoking if you smoke. If you need help quitting, talk to your doctor about stop-smoking programs and medicines. These can increase your chances of quitting for good. · To avoid hurting your back when lifting: ¨ Lift with your legs, not your back, by squatting and bending your knees. Avoid bending forward at the waist when lifting. ¨ Rise slowly. ¨ Keep the load as close to your body as possible, at the level of your navel. ¨ Avoid turning or twisting your body while holding a heavy object. ¨ Get help if you need to lift a heavy object. Never lift a heavy object above shoulder level. When should you call for help? Call 911 anytime you think you may need emergency care. For example, call if: 
· You are unable to move a leg at all. Call your doctor now or seek immediate medical care if: 
· You have new or worse symptoms in your arms, legs, chest, belly, or buttocks. Symptoms may include: ¨ Numbness or tingling. ¨ Weakness. ¨ Pain. · You lose bladder or bowel control. Watch closely for changes in your health, and be sure to contact your doctor if: 
· You are not getting better as expected. Where can you learn more? Go to http://pat-cuate.info/.  
Enter F534 in the search box to learn more about \"Herniated Disc: Care Instructions. \" Current as of: May 23, 2016 Content Version: 11.1 © 6480-3032 Merlin. Care instructions adapted under license by [employer] (which disclaims liability or warranty for this information). If you have questions about a medical condition or this instruction, always ask your healthcare professional. [address] any warranty or liability for your use of this information. Herniated Disc: Exercises Your Care Instructions Here are some examples of typical rehabilitation exercises for your condition. Start each exercise slowly. Ease off the exercise if you start to have pain. Your doctor or physical therapist will tell you when you can start these exercises and which ones will work best for you. How to do the exercises Note: These exercises can help you move easier and feel better. But when you first start doing them, you may have more pain in your back. This is normal. But it is important to pay close attention to your pain during and after each exercise. · Keep doing these exercises if your pain stays the same or moves from your leg and buttock more toward the middle of your spine. Pain moving out of your leg and buttock is a good sign. · Stop doing these exercises if your pain gets worse in your leg and buttock. Stop if you start to have pain in your leg and buttock that you didn't have before. Be sure to do these exercises in the order they appear. Note how your pain changes before you move to the next one. If your pain is much worse right after exercise and stays worse the next day, do not do any of these exercises. 1. Rest on belly 1. Lie on your stomach, face down, with your head turned to the side. Place your arms beside your body. If this bothers your neck, place your hands, one on top of the other, underneath your forehead. This will help support your head and neck. 2. Try to relax your lower back muscles as much as you can. 3. Continue to lie on your stomach for 2 minutes. 4. If your pain spreads down your leg or increases down your leg, stop this exercise and do not do the next exercises. 2. Press-up 1. Lie on your stomach, face down. Keep your elbows tucked into your sides and under your shoulders. 2. Press your elbows down into the floor to raise your upper back. As you do this, relax your stomach muscles. Allow your back to arch without using your back muscles. Let your low back relax completely as you arch up. 3. Hold this position for 2 minutes. 4. Repeat 2 to 4 times. 5. If your pain spreads down your leg or increases down your leg, stop this exercise and do not do the next exercises. 3. Full press-up 1. Lie on your stomach, face down. Keep your elbows tucked into your sides and under your shoulders. 2. Straighten your elbows, and push your upper body up as far as you can. Allow your lower back to sag. Keep your hips, pelvis, and legs relaxed. 3. Hold this position for 5 seconds, and then relax. 4. Repeat 10 times. Each time, try to raise your upper body a little higher and hold your arms a bit straighter. 5. If your pain spreads down your leg or gets worse down your leg, stop this exercise and do not move to the next exercise. 6. If you can't do this exercise, you may instead try the backward bend exercise that follows. 4. Backward bend · Stand with your feet hip-width apart. Your toes should point forward. Do not lock your knees. · Place your hands in the small of your back. · Bend backward as far as you can, keeping your knees straight. Hold this position for 2 to 3 seconds. Then return to your starting position. · Repeat 2 to 4 times. Each time, try to bend backward a little farther, until you bend backward as far as you can. · If your pain spreads down your leg or increases down your leg, stop this exercise. Follow-up care is a key part of your treatment and safety.  Be sure to make and go to all appointments, and call your doctor if you are having problems. It's also a good idea to know your test results and keep a list of the medicines you take. Where can you learn more? Go to http://pat-cuate.info/. Enter [id number] [id number] in the search box to learn more about \"Herniated Disc: Exercises. \" Current as of: May 23, 2016 Content Version: 11.1 © 3785-4000 [employer]. Care instructions adapted under license by [employer] (which disclaims liability or warranty for this information). If you have questions about a medical condition or this instruction, always ask your healthcare professional. [address] any warranty or liability for your use of this information. Introducing [facility]! [facility] introduces Educabilia patient portal. Now you can access parts of your medical record, email your doctor's office, and request medication refills online. 1. In your internet browser, go to [email or web address] 2. Click on the First Time User? Click Here link in the Sign In box. You will see the New Member Sign Up page. 3. Enter your Educabilia Access Code exactly as it appears below. You will not need to use this code after youve completed the sign-up process. If you do not sign up before the expiration date, you must request a new code. · Educ[employer]ia Access Code: [email or web address] Expires: 3/28/2017  9:53 AM 
 
4. Enter the last four digits of your Social Security Number (xxxx) and Date of Birth (mm/dd/yyyy) as indicated and click Submit. You will be taken to the next sign-up page. 5. Create a Educabilia ID. This will be your Educabilia login ID and cannot be changed, so think of one that is secure and easy to remember. 6. Create a Educabilia password. You can change your password at any time. 7. Enter your Password Reset Question and Answer. This can be used at a later time if you forget your password. 8. Enter your e-mail address. You will receive e-mail notification when new information is available in [address] 9. Click Sign Up. You can now view and download portions of your medical record. 10. Click the Download Summary menu link to download a portable copy of your medical information. If you have questions, please visit the Frequently Asked Questions section of the Pinch[employer] website. Remember, Pinchd is NOT to be used for urgent needs. For medical emergencies, dial 911. Now available from your iPhone and Android! Please provide this summary of care documentation to your next provider. Your primary care clinician is listed as Yadira Duque. If you have any questions after today's visit, please call [phone].

## 2017-04-05 ENCOUNTER — OFFICE VISIT (OUTPATIENT)
Dept: INTERNAL MEDICINE CLINIC | Age: 57
End: 2017-04-05

## 2017-04-05 VITALS
DIASTOLIC BLOOD PRESSURE: 76 MMHG | OXYGEN SATURATION: 97 % | HEART RATE: 86 BPM | SYSTOLIC BLOOD PRESSURE: 132 MMHG | TEMPERATURE: 99.1 F | HEIGHT: 73 IN | BODY MASS INDEX: 29.03 KG/M2 | WEIGHT: 219 LBS

## 2017-04-05 DIAGNOSIS — M54.41 CHRONIC RIGHT-SIDED LOW BACK PAIN WITH RIGHT-SIDED SCIATICA: ICD-10-CM

## 2017-04-05 DIAGNOSIS — I35.1 AORTIC VALVE REGURGITATION, UNSPECIFIED ETIOLOGY: ICD-10-CM

## 2017-04-05 DIAGNOSIS — I10 ESSENTIAL HYPERTENSION: ICD-10-CM

## 2017-04-05 DIAGNOSIS — M51.26 LUMBAR HERNIATED DISC: Chronic | ICD-10-CM

## 2017-04-05 DIAGNOSIS — G89.29 CHRONIC RIGHT-SIDED LOW BACK PAIN WITH RIGHT-SIDED SCIATICA: ICD-10-CM

## 2017-04-05 DIAGNOSIS — E78.5 HYPERLIPIDEMIA, UNSPECIFIED HYPERLIPIDEMIA TYPE: ICD-10-CM

## 2017-04-05 DIAGNOSIS — J06.9 URTI (ACUTE UPPER RESPIRATORY INFECTION): Primary | ICD-10-CM

## 2017-04-05 DIAGNOSIS — R73.03 PREDIABETES: ICD-10-CM

## 2017-04-05 RX ORDER — DOXYCYCLINE 100 MG/1
100 CAPSULE ORAL 2 TIMES DAILY
Qty: 20 CAP | Refills: 0 | Status: SHIPPED | OUTPATIENT
Start: 2017-04-05 | End: 2017-04-15

## 2017-04-05 RX ORDER — DICLOFENAC SODIUM 75 MG/1
75 TABLET, DELAYED RELEASE ORAL 2 TIMES DAILY
Qty: 180 TAB | Refills: 2 | Status: SHIPPED | OUTPATIENT
Start: 2017-04-05 | End: 2018-06-20

## 2017-04-05 RX ORDER — DICLOFENAC SODIUM 75 MG/1
75 TABLET, DELAYED RELEASE ORAL 2 TIMES DAILY
Qty: 60 TAB | Refills: 0 | Status: SHIPPED | OUTPATIENT
Start: 2017-04-05 | End: 2017-04-05 | Stop reason: SDUPTHER

## 2017-04-05 NOTE — PATIENT INSTRUCTIONS
Cough: Care Instructions  Your Care Instructions  A cough is your body's response to something that bothers your throat or airways. Many things can cause a cough. You might cough because of a cold or the flu, bronchitis, or asthma. Smoking, postnasal drip, allergies, and stomach acid that backs up into your throat also can cause coughs. A cough is a symptom, not a disease. Most coughs stop when the cause, such as a cold, goes away. You can take a few steps at home to cough less and feel better. Follow-up care is a key part of your treatment and safety. Be sure to make and go to all appointments, and call your doctor if you are having problems. It's also a good idea to know your test results and keep a list of the medicines you take. How can you care for yourself at home? · Drink lots of water and other fluids. This helps thin the mucus and soothes a dry or sore throat. Honey or lemon juice in hot water or tea may ease a dry cough. · Take cough medicine as directed by your doctor. · Prop up your head on pillows to help you breathe and ease a dry cough. · Try cough drops to soothe a dry or sore throat. Cough drops don't stop a cough. Medicine-flavored cough drops are no better than candy-flavored drops or hard candy. · Do not smoke. Avoid secondhand smoke. If you need help quitting, talk to your doctor about stop-smoking programs and medicines. These can increase your chances of quitting for good. When should you call for help? Call 911 anytime you think you may need emergency care. For example, call if:  · You have severe trouble breathing. Call your doctor now or seek immediate medical care if:  · You cough up blood. · You have new or worse trouble breathing. · You have a new or higher fever. · You have a new rash.   Watch closely for changes in your health, and be sure to contact your doctor if:  · You cough more deeply or more often, especially if you notice more mucus or a change in the color of your mucus. · You have new symptoms, such as a sore throat, an earache, or sinus pain. · You do not get better as expected. Where can you learn more? Go to http://pat-cuate.info/. Enter D279 in the search box to learn more about \"Cough: Care Instructions. \"  Current as of: May 27, 2016  Content Version: 11.2  © 6825-2513 [employer]. Care instructions adapted under license by [employer] (which disclaims liability or warranty for this information). If you have questions about a medical condition or this instruction, always ask your healthcare professional. [address] any warranty or liability for your use of this information.

## 2017-04-05 NOTE — MR AVS SNAPSHOT
Visit Information Date & Time Provider Department Dept. Phone Encounter #  
 4/5/2017 10:00 AM Brittny Horton MD Internist of [address] [place] [address] [phone] Follow-up Instructions Return if symptoms worsen or fail to improve. Your Appointments 8/9/2017  8:00 AM  
LAB with C NURSE VISIT Internist of [facility] ([address] [place] [address]) Appt Note: lab  
 [address], [address] [address] [address] [place]  
  
   
 [address], [address]  
  
    
 8/15/2017  9:00 AM  
Office Visit with Brittny Horton MD  
Internist of [address]) Appt Note: ov bs  
 [address] [facility], [address] [address] [address] [place]  
  
   
 [address], [address] Upcoming Health Maintenance Date Due COLONOSCOPY 3/22/2022 DTaP/Tdap/Td series (3 - Td) 1/16/2024 Allergies as of 4/5/2017  Review Complete On: 4/5/2017 By: Pippa Ybarra Severity Noted Reaction Type Reactions Tree Nuts High 03/08/2016    Anaphylaxis Ace Inhibitors  04/19/2013    Cough Nuts [Tree Nut]  04/29/2015    Itching Current Immunizations  Reviewed on 1/28/2015 Name Date Influenza Vaccine 11/12/2014, 11/1/2013 TDAP Vaccine 1/20/2004 Td 1/1/2004 Tdap 1/16/2014 Not reviewed this visit Vitals BP Pulse Temp Height(growth percentile) Weight(growth percentile) SpO2  
 132/76 86 99.1 °F (37.3 °C) (Oral) 6' 1\" (1.854 m) 219 lb (99.3 kg) 97% BMI Smoking Status 28.89 kg/m2 Former Smoker Vitals History BMI and BSA Data Body Mass Index Body Surface Area  
 28.89 kg/m 2 2.26 m 2 Preferred Pharmacy Pharmacy Name Phone [facility], [address] [phone] Your Updated Medication List  
  
   
This list is accurate as of: 4/5/17 10:59 AM.  Always use your most recent med list.  
 amLODIPine 10 mg tablet Commonly known as:  Barabara Puls TAKE 1 TABLET DAILY  
  
 ascorbic acid (vitamin C) 500 mg tablet Commonly known as:  VITAMIN C  
500 mg.  
  
 aspirin delayed-release 81 mg tablet 81 mg. Cholecalciferol (Vitamin D3) 2,000 unit Cap capsule Commonly known as:  VITAMIN D3 Take 2,000 Units by mouth daily. cyclobenzaprine 5 mg tablet Commonly known as:  FLEXERIL Take 1 Tab by mouth three (3) times daily as needed for Muscle Spasm(s). diazePAM 5 mg tablet Commonly known as:  VALIUM Take 1 Tab by mouth every six (6) hours as needed. Max Daily Amount: 20 mg.  
  
 diclofenac EC 75 mg EC tablet Commonly known as:  VOLTAREN Take 1 Tab by mouth two (2) times a day. fenofibrate micronized 67 mg capsule Commonly known as:  LOFIBRA FISH OIL PO Take  by mouth daily. gabapentin 300 mg capsule Commonly known as:  NEURONTIN  
take 1 capsule by mouth three times a day HYDROcodone-acetaminophen 5-325 mg per tablet Commonly known as:  [address] One-half to one tab po every day to bid prn severe pain  Indications: Pain  
  
 ibuprofen 800 mg tablet Commonly known as:  MOTRIN Take 1 Tab by mouth two (2) times daily as needed for Pain.  
  
 losartan 100 mg tablet Commonly known as:  COZAAR  
TAKE 1 TABLET DAILY  
  
 magnesium oxide 400 mg tablet Commonly known as:  MAG-OX Take 400 mg by mouth.  
  
 methylPREDNISolone 4 mg tablet Commonly known as:  MEDROL DOSEPACK  
take as directed on pack  
  
 nitroglycerin 0.4 mg SL tablet Commonly known as:  NITROSTAT  
1 Tab by SubLINGual route every five (5) minutes as needed for Chest Pain. rosuvastatin 10 mg tablet Commonly known as:  CRESTOR Take 1 Tab by mouth nightly. valACYclovir 500 mg tablet Commonly known as:  VALTREX  
TAKE 1 TABLET DAILY Follow-up Instructions Return if symptoms worsen or fail to improve. Patient Instructions Cough: Care Instructions Your Care Instructions A cough is your body's response to something that bothers your throat or airways. Many things can cause a cough. You might cough because of a cold or the flu, bronchitis, or asthma. Smoking, postnasal drip, allergies, and stomach acid that backs up into your throat also can cause coughs. A cough is a symptom, not a disease. Most coughs stop when the cause, such as a cold, goes away. You can take a few steps at home to cough less and feel better. Follow-up care is a key part of your treatment and safety. Be sure to make and go to all appointments, and call your doctor if you are having problems. It's also a good idea to know your test results and keep a list of the medicines you take. How can you care for yourself at home? · Drink lots of water and other fluids. This helps thin the mucus and soothes a dry or sore throat. Honey or lemon juice in hot water or tea may ease a dry cough. · Take cough medicine as directed by your doctor. · Prop up your head on pillows to help you breathe and ease a dry cough. · Try cough drops to soothe a dry or sore throat. Cough drops don't stop a cough. Medicine-flavored cough drops are no better than candy-flavored drops or hard candy. · Do not smoke. Avoid secondhand smoke. If you need help quitting, talk to your doctor about stop-smoking programs and medicines. These can increase your chances of quitting for good. When should you call for help? Call 911 anytime you think you may need emergency care. For example, call if: 
· You have severe trouble breathing. Call your doctor now or seek immediate medical care if: 
· You cough up blood. · You have new or worse trouble breathing. · You have a new or higher fever. · You have a new rash.  
Watch closely for changes in your health, and be sure to contact your doctor if: 
· You cough more deeply or more often, especially if you notice more mucus or a change in the color of your mucus. · You have new symptoms, such as a sore throat, an earache, or sinus pain. · You do not get better as expected. Where can you learn more? Go to http://pat-cuate.info/. Enter D279 in the search box to learn more about \"Cough: Care Instructions. \" Current as of: May 27, 2016 Content Version: 11.2 © 9569-3052 [employer]renting. Care instructions adapted under license by [employer] (which disclaims liability or warranty for this information). If you have questions about a medical condition or this instruction, always ask your healthcare professional. [address] any warranty or liability for your use of this information. Introducing [facility]! [facility] introduces AdAdapted patient portal. Now you can access parts of your medical record, email your doctor's office, and request medication refills online. 1. In your internet browser, go to [email or web address] 2. Click on the First Time User? Click Here link in the Sign In box. You will see the New Member Sign Up page. 3. Enter your AdAdapted Access Code exactly as it appears below. You will not need to use this code after youve completed the sign-up process. If you do not sign up before the expiration date, you must request a new code. · AdAdapted Access Code: [email or web address] Expires: 7/4/2017 10:58 AM 
 
4. Enter the last four digits of your Social Security Number (xxxx) and Date of Birth (mm/dd/yyyy) as indicated and click Submit. You will be taken to the next sign-up page. 5. Create a Algorithmiat ID. This will be your AdAdapted login ID and cannot be changed, so think of one that is secure and easy to remember. 6. Create a Ad[employer]pted password. You can change your password at any time. 7. Enter your Password Reset Question and Answer. This can be used at a later time if you forget your password. 8. Enter your e-mail address. You will receive e-mail notification when new information is available in [address] 9. Click Sign Up. You can now view and download portions of your medical record. 10. Click the Download Summary menu link to download a portable copy of your medical information. If you have questions, please visit the Frequently Asked Questions section of the [employer] website. Remember, [employer] is NOT to be used for urgent needs. For medical emergencies, dial 911. Now available from your iPhone and Android! Please provide this summary of care documentation to your next provider. Your primary care clinician is listed as Radha Rodríguez. If you have any questions after today's visit, please call [phone].

## 2017-04-05 NOTE — PROGRESS NOTES
1. Have you been to the ER, urgent care clinic or hospitalized since your last visit? NO.     2. Have you seen or consulted any other health care providers outside of the [address] [place] [address] since your last visit (Include any pap smears or colon screening)? NO      Pt complains of cold like symptoms lasting 4 days. Symptoms include productive cough with green sputum.

## 2017-04-07 RX ORDER — FENOFIBRATE 67 MG/1
CAPSULE ORAL
Qty: 90 CAP | Refills: 3 | OUTPATIENT
Start: 2017-04-07

## 2017-04-09 NOTE — PROGRESS NOTES
HPI:   Venkata Bhardwaj is a 60y.o. year old male who presents today for evaluation of a productive cough. He has a history of hypertension, hyperlipidemia, moderate aortic insufficiency, atypical chest pain, prediabetes, GERD, osteoarthritis, lumbar radiculopathy, and chronic venous insufficiency. He reports that for the last four days he has been experiencing a cough productive of green sputum, myalgias, and mild shortness of breath. He denies any documented fevers but reports frequent chills. He does report post nasal drainage and a mild sore throat. He denies any headache, facial pain, neck pain, abdominal pain, or rash. He has received the influenza vaccine. He is otherwise without complaints. He states that within the next week, he will be accompanying his wife to [place] for treatment of recurrent breast cancer. He has a history of hypertension, treated with losartan and amlodipine. He does not check his blood pressure at home regularly. He also does not exercise regularly, although admits to doing yard work. He denies any shortness of breath at rest or with exertion, palpitations, lightheadedness, or edema. He does have a history of substernal chest pain that has no relation to exertion and was thought to be atypical. He presented to [facility] on 2/27/2016 with a complaint of chest pain and diaphoresis, which seemed to be relieved by aspirin and sublingual nitroglycerin in the ambulance. Evaluation included negative ECGs and troponins. He had an exercise nuclear stress test (2/29/2016) which showed normal LV function (EF 57%) and a medium sized partially reversible defect inferiorly (intermediate risk study). On 3/1/2016, he underwent a cardiac catheterization which showed no significant epicardial coronary artery disease.  He also had an echocargiogram (2/28/2016) showing mild LV septal hypertrophy, hypokinetic basal inferior wall with preserved LV function (EF 07%), mild diastolic dysfunction, and moderate aortic regurgitation with questionable dilation of the distal arch of aorta (3.5 cm). He was found to have elevated triglycerides, with lipid panel showing total cholesterol 201/ / HDL 52/ LDL 89. He was started on Fenofibrate prior to discharge on 3/1/2016. He reports that has no significant recurrence of chest pain since that time. He does have difficulty with mild lower extremity edema due to chronic renal insufficiency. He is followed by Dr. Cardenas Later. He has a history of prediabetes, with HbA1c ranging from 5.8-6.0 since 2012. He denies any polyuria, polydipsia, nocturia, or blurry vision, and has no history of retinopathy, neuropathy, or nephropathy. He has regular eye exams with Dr. Bhargav Schroeder. His weight has gradually increased approximately 10 pounds over the last 2 years, but has remained relatively stable over the last 6 months. He has a history of osteoarthritis, involving his right knee, right great toe, and lumbar spine, with recent increasing difficulty with low back pain and right sided sciatica. He underwent a lumbar MRI (12/2016) which showed progression when compared to a prior lumbar MRI from 2010. It showed prominent right paracentral L4-L5 disc extrusion with impingement of the descending right L5 nerve root and moderately severe multifactorial canal stenosis; additional prominent right paracentral L5-S1 disc protrusion with impingement of the descending right S1 nerve root; abutment of the foraminal right L5 nerve root related to moderate foramina stenosis at L5-S1; mild multifactorial canal stenosis at L3-L4 with abutment of the descending left L4 nerve root. He was evaluated by Dr. Katherine Worthy, and treated with Medrol dose pack, Norco and Flexeril without significant improvement. He was evaluated by Dr. Janae Akins in 3/2017 for possible surgical intervention, and this was deferred until issues regarding his wife's health have resolved.  He denies any neurogenic bowel or bladder symptoms. He has a history of GERD, with symptoms well controlled with prn pantoprazole. He had a screening colonoscopy by Dr. Jeanne Abad in 3/2012 with removal of a benign serrated polyp (pathology shows hyperplastic and adenomatous features). Follow-up recommended for 5 years. He denies any abdominal pain, nausea, vomiting, melena, hematochezia, or change in bowel movements. In 5/2016, he fell at work with his chest striking the edge of a table and he sustained fractures of the anterior right 7th and 8th ribs. He had serial rib x-rays since that time showing slow interval healing, but reports that he no longer has pain related to this. He underwent a bone density scan in 10/2016 which showed evidence of osteopenia with T-scores:  femoral neck left -1.7  /right -1.5 and lumbar -1.2. He was started on calcium and Vitamin D supplementation. Past Medical History:   Diagnosis Date    Abnormal myocardial perfusion study 2/28/2016    Partially reversible inferior perfusion defect, EF 57%    AI (aortic insufficiency)     Moderate with possible bicuspid aortic valve    Chronic low back pain     Chronic venous insufficiency     Colon polyp 03/2012    Benign serrated polyp    Fracture 05/2016    rib fx's    GERD (gastroesophageal reflux disease)     History of echocardiogram 2/28/2016    EF 55%. Basal inferior hypokinesis. Gr 1 DDfx. Mod AI.       Hyperlipidemia     Hypertension     Osteoarthritis of right knee     Osteopenia     Prediabetes     Prepatellar bursitis of right knee     Recurrent genital herpes 1/16/2013    Right lumbar radiculopathy 10/2016    MRI: prominent right paracentral L4-L5 disc extrusion/impingement of descending right L5 nerve root; moderately severe canal stenosis; prominent right paracentral L5-S1 disc protrusion/ impingement of the descending right S1 nerve root; abutment of the foraminal right L5 nerve root/moderate foramina stenosis at L5-S1; mild canal stenosis at L3-L4 with abutment of the descending left L4 nerve root.  S/P cardiac catheterization 3/1/2016    Normal coronary anatomy     Past Surgical History:   Procedure Laterality Date    ENDOSCOPY, COLON, DIAGNOSTIC      HX CHOLECYSTECTOMY      HX TONSILLECTOMY       Current Outpatient Prescriptions   Medication Sig    doxycycline (VIBRAMYCIN) 100 mg capsule Take 1 Cap by mouth two (2) times a day for 10 days.  diclofenac EC (VOLTAREN) 75 mg EC tablet Take 1 Tab by mouth two (2) times a day.  fenofibrate micronized (LOFIBRA) 67 mg capsule     HYDROcodone-acetaminophen (NORCO) 5-325 mg per tablet One-half to one tab po every day to bid prn severe pain  Indications: Pain    amLODIPine (NORVASC) 10 mg tablet TAKE 1 TABLET DAILY    valACYclovir (VALTREX) 500 mg tablet TAKE 1 TABLET DAILY    losartan (COZAAR) 100 mg tablet TAKE 1 TABLET DAILY    nitroglycerin (NITROSTAT) 0.4 mg SL tablet 1 Tab by SubLINGual route every five (5) minutes as needed for Chest Pain.  ascorbic acid (VITAMIN C) 500 mg tablet 500 mg.    magnesium oxide (MAG-OX) 400 mg tablet Take 400 mg by mouth.  aspirin delayed-release 81 mg tablet 81 mg.    diazepam (VALIUM) 5 mg tablet Take 1 Tab by mouth every six (6) hours as needed. Max Daily Amount: 20 mg.    cyclobenzaprine (FLEXERIL) 5 mg tablet Take 1 Tab by mouth three (3) times daily as needed for Muscle Spasm(s).  Cholecalciferol, Vitamin D3, 2,000 unit cap Take 2,000 Units by mouth daily.  DOCOSAHEXANOIC ACID/EPA (FISH OIL PO) Take  by mouth daily.  gabapentin (NEURONTIN) 300 mg capsule take 1 capsule by mouth three times a day    methylPREDNISolone (MEDROL DOSEPACK) 4 mg tablet take as directed on pack    rosuvastatin (CRESTOR) 10 mg tablet Take 1 Tab by mouth nightly. No current facility-administered medications for this visit. Allergies and Intolerances:    Allergies   Allergen Reactions    Tree Nuts Anaphylaxis    Ace Inhibitors Cough    Nuts [Tree Nut] Itching     Family History: His mother is alive with stage 3 ovarian cancer. His father  from esophageal cancer at age 61. His maternal aunt  at age 48 from colon cancer. He has no family history of prostate cancer. Family History   Problem Relation Age of Onset    Cancer Father 61     esophageal    Hypertension Father     Hypertension Brother     Diabetes Other      Grandmother    Arthritis-osteo Other     Other Other      Stomach problems; Father, grandfather     Social History:   He  reports that he quit smoking about 15 years ago. He has quit using smokeless tobacco. Reports that he smoked less than 0.5 ppd and stopped in . He is  and they have one son. His wife was just diagnosed with Paget's disease of the breast, which represents a recurrence of prior breast cancer. He is employed as an  for [employer]. He drinks about a 12 pack of beer each month. History   Alcohol Use    1.0 oz/week    2 Cans of beer per week     Comment: occasionally     Immunization History:  Immunization History   Administered Date(s) Administered    Influenza Vaccine 2013, 2014    TDAP Vaccine 2004    Td 2004    Tdap 2014       Review of Systems:   As above included in HPI. Otherwise 11 point review of systems negative including constitutional, skin, HENT, eyes, respiratory, cardiovascular, gastrointestinal, genitourinary, musculoskeletal, endocrine, hematologic, allergy, and neurologic. Physical:   Vitals:   BP: 132/76  HR: 86  WT: 219 lb (99.3 kg)  BMI:  28.89 kg/m2  T: 99.1 F    Exam:   Pt appears well; alert and oriented x 3; appropriate affect. HEENT: PERRLA, anicteric, oropharynx clear, no JVD, adenopathy or thyromegaly. No facial tenderness. No carotid bruits or radiated murmur. Lungs: clear to auscultation, no wheezes, rhonchi, or rales. Heart: regular rate and rhythm.  No murmur, rubs, gallops  Abdomen: soft, nontender, nondistended, normal bowel sounds, no hepatosplenomegaly or masses. Extremities: without edema. Pulses 1-2+ bilaterally. Review of Data:  Labs:  No visits with results within 1 Month(s) from this visit.   Latest known visit with results is:    Hospital Outpatient Visit on 02/08/2017   Component Date Value Ref Range Status    LIPID PROFILE 02/08/2017        Final    Cholesterol, total 02/08/2017 188  <200 MG/DL Final    Triglyceride 02/08/2017 92  <150 MG/DL Final    HDL Cholesterol 02/08/2017 52  40 - 60 MG/DL Final    LDL, calculated 02/08/2017 117.6* 0 - 100 MG/DL Final    VLDL, calculated 02/08/2017 18.4  MG/DL Final    CHOL/HDL Ratio 02/08/2017 3.6  0 - 5.0   Final    Hemoglobin A1c 02/08/2017 6.0* 4.2 - 5.6 % Final    Prostate Specific Ag 02/08/2017 0.4  0.0 - 4.0 ng/mL Final    TSH 02/08/2017 1.08  0.36 - 3.74 uIU/mL Final    Microalbumin,urine random 02/08/2017 0.60  0 - 3.0 MG/DL Final    Creatinine, urine 02/08/2017 96.07  30 - 125 mg/dL Final    Microalbumin/Creat ratio (mg/g cre* 02/08/2017 6  0 - 30 mg/g Final    Color 02/08/2017 YELLOW    Final    Appearance 02/08/2017 CLEAR    Final    Specific gravity 02/08/2017 1.018  1.005 - 1.030   Final    pH (UA) 02/08/2017 6.5  5.0 - 8.0   Final    Protein 02/08/2017 NEGATIVE   NEG mg/dL Final    Glucose 02/08/2017 NEGATIVE   NEG mg/dL Final    Ketone 02/08/2017 NEGATIVE   NEG mg/dL Final    Bilirubin 02/08/2017 NEGATIVE   NEG   Final    Blood 02/08/2017 NEGATIVE   NEG   Final    Urobilinogen 02/08/2017 0.2  0.2 - 1.0 EU/dL Final    Nitrites 02/08/2017 NEGATIVE   NEG   Final    Leukocyte Esterase 02/08/2017 NEGATIVE   NEG   Final    WBC 02/08/2017 0  0 - 4 /hpf Final    RBC 02/08/2017 0  0 - 5 /hpf Final    Epithelial cells 02/08/2017 FEW  0 - 5 /lpf Final    Bacteria 02/08/2017 NEGATIVE   NEG /hpf Final    Sodium 02/08/2017 142  136 - 145 mmol/L Final    Potassium 02/08/2017 4.3  3.5 - 5.5 mmol/L Final    Chloride 02/08/2017 108  100 - 108 mmol/L Final    CO2 02/08/2017 25  21 - 32 mmol/L Final    Anion gap 02/08/2017 9  3.0 - 18 mmol/L Final    Glucose 02/08/2017 109* 74 - 99 mg/dL Final    BUN 02/08/2017 18  7.0 - 18 MG/DL Final    Creatinine 02/08/2017 1.19  0.6 - 1.3 MG/DL Final    BUN/Creatinine ratio 02/08/2017 15  12 - 20   Final    GFR est AA 02/08/2017 >60  >60 ml/min/1.73m2 Final    GFR est non-AA 02/08/2017 >60  >60 ml/min/1.73m2 Final    Calcium 02/08/2017 8.8  8.5 - 10.1 MG/DL Final    Bilirubin, total 02/08/2017 0.5  0.2 - 1.0 MG/DL Final    ALT (SGPT) 02/08/2017 46  16 - 61 U/L Final    AST (SGOT) 02/08/2017 32  15 - 37 U/L Final    Alk. phosphatase 02/08/2017 62  45 - 117 U/L Final    Protein, total 02/08/2017 6.5  6.4 - 8.2 g/dL Final    Albumin 02/08/2017 4.1  3.4 - 5.0 g/dL Final    Globulin 02/08/2017 2.4  2.0 - 4.0 g/dL Final    A-G Ratio 02/08/2017 1.7  0.8 - 1.7   Final    WBC 02/08/2017 5.8  4.6 - 13.2 K/uL Final    RBC 02/08/2017 4.87  4.70 - 5.50 M/uL Final    HGB 02/08/2017 15.0  13.0 - 16.0 g/dL Final    HCT 02/08/2017 44.7  36.0 - 48.0 % Final    MCV 02/08/2017 91.8  74.0 - 97.0 FL Final    MCH 02/08/2017 30.8  24.0 - 34.0 PG Final    MCHC 02/08/2017 33.6  31.0 - 37.0 g/dL Final    RDW 02/08/2017 13.7  11.6 - 14.5 % Final    PLATELET 92/03/9658 837  135 - 420 K/uL Final    MPV 02/08/2017 11.3  9.2 - 11.8 FL Final    NEUTROPHILS 02/08/2017 54  40 - 73 % Final    LYMPHOCYTES 02/08/2017 36  21 - 52 % Final    MONOCYTES 02/08/2017 6  3 - 10 % Final    EOSINOPHILS 02/08/2017 3  0 - 5 % Final    BASOPHILS 02/08/2017 1  0 - 2 % Final    ABS. NEUTROPHILS 02/08/2017 3.1  1.8 - 8.0 K/UL Final    ABS. LYMPHOCYTES 02/08/2017 2.1  0.9 - 3.6 K/UL Final    ABS. MONOCYTES 02/08/2017 0.3  0.05 - 1.2 K/UL Final    ABS. EOSINOPHILS 02/08/2017 0.2  0.0 - 0.4 K/UL Final    ABS.  BASOPHILS 02/08/2017 0.1* 0.0 - 0.06 K/UL Final    DF 02/08/2017 AUTOMATED    Final    Vitamin D 25-Hydroxy 02/08/2017 36.7  30 - 100 ng/mL Final     Calculated 10 year ASCVD risk score:  8.0 %    Health Maintenance:  Screening:    Colorectal: colonoscopy (3/2012) benign serrated polyp. Dr. Fede Giordano. Due 2017. Depression: none   DM (HbA1c/FPG): HbA1c 6.0 (2/2017)   Hepatitis C: negative (9/2016)   Falls: none    DEXA: N/A   PSA/TONI: PSA 0.4/ TONI (2/2017)   Glaucoma: regular eye exams with Dr. Oxana Ford. Smoking: none   Vitamin D: 36.7 (2/2017)   Medicare Wellness: N/A    Impression:  Patient Active Problem List   Diagnosis Code    Hyperlipidemia E78.5    GERD (gastroesophageal reflux disease) K21.9    Chronic venous insufficiency I87.2    Colon polyp K63.5    Prediabetes R73.03    Recurrent genital herpes A60.00    Vitamin D insufficiency E55.9    Aortic insufficiency, moderate I35.1    Essential hypertension I10    Primary osteoarthritis of knee M17.10    Closed fracture of multiple ribs of right side with delayed healing S22.41XG    Lumbar herniated disc  right L4/5/S1 chronic M51.26    Pain of right sacroiliac joint-episodic M53.3    Osteopenia determined by DEXA 2016, lowest T score -1.7 M85.80    Annular tear of lumbar disc M51.36    Chronic right-sided low back pain with sciatica M54.40, G89.29       Plan:  1. Acute URTI. Productive cough and low grade fever for four days. Will treat with doxycycline 100 mg BID for 10 days. No need for inhaler or steroids as no wheezing. Symptomatic therapies discussed. 2. Hypertension. Well controlled on current regimen of losartan and amlodipine. Renal function normal with creatinine 1.19 / eGFR >60. Continue to follow. 2. Dyslipidemia. Patient with elevated triglycerides of 303 during hospitalization in 3/2016 and started on fenofibrate. However, no studies have shown that triglyceride lowering actually decreases the risk of CAD.  In addition, first line therapy for treating triglycerides < 500 would be initiation of a statin rather than a fibrate. Repeat lipid panel shows his calculated 10 year ASCVD risk is 8.0 % while on fibrate, which does place him in one of the four statin benefit groups as per new AHA/ACC guidelines (primary prevention: ASCVD risk > 7.5%). He was instructed at last visit to discontinue fenofibrate and initiating treatment with rosuvastatin 10 mg. However, he states that he has not done so due to concerns regarding possible side effects from statin. Emphasized importance of lifestyle modifications, including diet, exercise, and weight loss. Will repeat lipid panel at next visit to reassess. Continue to follow. 3. Aortic insufficiency, moderate with mildly dilated aortic root. Probable bicuspid valve. Needs annual echocardiograms to evaluate AV. Last 2/2016. Also, considering thoracic CT scan if aortic root appears to progress on echocardiogram.  Patient is due for follow-up with Dr. Denise Vega. Urged to schedule appointment. 4. Prediabetes. HbA1c increased to 6.0. No evidence of microvascular complications. On ARB and to begin statin. Continue follow-up with  for annual eye exams. Foot exam normal today. Urine microalbumin/ creatinine ratio without evidence of microalbuminuria. Encouraged weight loss and dietary changes, particularly avoiding concentrated sweets. Improvement in blood sugar will also help triglyceride level. 5. Osteopenia. Last bone density scan 10/2016. Using femoral neck T-scores, calculated FRAX score estimates her 10 year risk of a major osteoporetic fracture at 12 % and hip fracture at 2.0 %, which are not an indication for biphosphonate treatment (>20% and >3%, respectively). Continue calcium and Vitamin D. Encouraged exercise, particularly weight bearing activities. Repeat bone density scan in 2018. 6. GERD. Continue occasional use of Protonix with good control of symptoms. Follow. 7. Lumbar radiculopathy. Progression of symptoms and disease on MRI.  Not responding to conservative therapy. Possible surgical intervention by Dr. Devendra Welsh being considered, but on hold until after wife's treatment. Refill for Voltaren EC sent to mail order. Follow. 8. Health maintenance. Patient states that he has received the influenza vaccine. Other immunizations up to date. Colonoscopy due 3/2017. Urged patient to schedule. Prostate cancer screen up to date. Continue regular eye exams. Stressed importance of weight loss. Vitamin D level low normal. Continue maintenance dose supplement. .     Patient understands recommendations and agrees with plan. Follow-up as previously scheduled.

## 2017-04-10 ENCOUNTER — TELEPHONE (OUTPATIENT)
Dept: ORTHOPEDIC SURGERY | Age: 57
End: 2017-04-10

## 2017-04-10 NOTE — TELEPHONE ENCOUNTER
Pt called states he has strained his back out over the weekend and that first available in May will not be soon enough to get in. Pt was under impression that we had same day appts. Pt says he has plenty of medication. Pt states he will not be able to work on the Wednesday 04/12/2017 and Thursday 04/13/2017 and wanted to know if he would be able to get a letter since he would not be able to be seen. Please advise Pt at [phone], last saw Dr. Zeferino Kim on 03/14/17.

## 2017-04-11 NOTE — TELEPHONE ENCOUNTER
Patient already had an appointment for tomorrow at 750Am and message was not closed when this was resolved, my apologies. I did call patient anyway to offer afternoon appointment in case this would have been more convenient for the patient. He indicated that he would prefer to keep the appointment he was given with Dr. Nieves Cole.

## 2017-04-11 NOTE — TELEPHONE ENCOUNTER
Please advise as to whether or not you could see this patient this week, and about OOW note. Thanks!

## 2017-04-12 ENCOUNTER — OFFICE VISIT (OUTPATIENT)
Dept: ORTHOPEDIC SURGERY | Age: 57
End: 2017-04-12

## 2017-04-12 VITALS
HEIGHT: 73 IN | DIASTOLIC BLOOD PRESSURE: 80 MMHG | OXYGEN SATURATION: 97 % | HEART RATE: 72 BPM | WEIGHT: 219 LBS | TEMPERATURE: 97.8 F | SYSTOLIC BLOOD PRESSURE: 123 MMHG | RESPIRATION RATE: 18 BRPM | BODY MASS INDEX: 29.03 KG/M2

## 2017-04-12 DIAGNOSIS — M48.061 LUMBAR SPINAL STENOSIS: ICD-10-CM

## 2017-04-12 DIAGNOSIS — M51.26 HNP (HERNIATED NUCLEUS PULPOSUS), LUMBAR: Primary | ICD-10-CM

## 2017-04-12 DIAGNOSIS — R29.898 RIGHT LEG WEAKNESS: ICD-10-CM

## 2017-04-12 DIAGNOSIS — G57.91 NEURITIS OF LOWER EXTREMITY, RIGHT: ICD-10-CM

## 2017-04-12 PROBLEM — G57.90 NEURITIS OF LOWER EXTREMITY: Status: ACTIVE | Noted: 2017-04-12

## 2017-04-12 RX ORDER — METHYLPREDNISOLONE 4 MG/1
TABLET ORAL
Qty: 1 DOSE PACK | Refills: 0 | Status: SHIPPED | OUTPATIENT
Start: 2017-04-12 | End: 2017-05-08 | Stop reason: ALTCHOICE

## 2017-04-12 RX ORDER — GABAPENTIN 300 MG/1
CAPSULE ORAL
Qty: 90 CAP | Refills: 2 | Status: SHIPPED | OUTPATIENT
Start: 2017-04-12 | End: 2017-05-08 | Stop reason: DRUGHIGH

## 2017-04-12 NOTE — PROGRESS NOTES
[facility]  Brenda Flanagan., [address] [place] [address], [facility] [address]  Phone: [phone]  Fax: [phone]      ASSESSMENT   Ameena Velazco was seen today for back pain. Diagnoses and all orders for this visit:    HNP (herniated nucleus pulposus), lumbar  -     methylPREDNISolone (MEDROL DOSEPACK) 4 mg tablet; Per dose pack instructions  -     gabapentin (NEURONTIN) 300 mg capsule; Take 1 cap po q am and 2 caps po q pm. Taper up as directed    Neuritis of lower extremity, right  -     methylPREDNISolone (MEDROL DOSEPACK) 4 mg tablet; Per dose pack instructions  -     gabapentin (NEURONTIN) 300 mg capsule; Take 1 cap po q am and 2 caps po q pm. Taper up as directed    Right leg weakness  -     methylPREDNISolone (MEDROL DOSEPACK) 4 mg tablet; Per dose pack instructions  -     gabapentin (NEURONTIN) 300 mg capsule; Take 1 cap po q am and 2 caps po q pm. Taper up as directed    Lumbar spinal stenosis      IMPRESSION AND PLAN:  Uma Duffy is a 60 y.o. male with history of chronic lumbar pain. He c/o pain in the lower back that radiates down the right lower extremity. Pt admits to experiencing increased pain when lifting up a  on Monday (04/10/2017). He has followed up with Dr. Cory Briscoe for surgical evaluation but is unable to proceed with surgery at this time. He is currently taking Flexeril 5 mg and Norco 5-325 mg and reports moderate relief when trying a Medrol Dosepak in 12/2016.     1) Pt was given information on herniated disc exercises. 2) Discussed treatment options with the Pt, including medication and steroid injections. 3) I recommend consideration for a right L5 SNRB as symptoms indicate. 4) Pt will continue taking Norco 5-325 mg and Flexeril 5 mg as prescribed and does not need any refills at this time. 5) He was prescribed Neurontin 300 mg 1 tab QAM and 2 tabs QHS, tapering up as directed. 6) Pt was prescribed a Medrol Dosepak.   7) He was given an out of work note for 3 weeks. 8) Mr. Sy Jaimes has a reminder for a \"due or due soon\" health maintenance. I have asked that he contact his primary care provider, Brittny Horton MD, for follow-up on this health maintenance. 9)  reviewed. 10) Pt will follow-up in 3 weeks with Dr. Nani Acosta. HISTORY OF PRESENT ILLNESS:  Sera Landeros is a 60 y.o. male with history of chronic lumbar pain. He c/o pain in the lower back that radiates down the right lower extremity. Pt admits to experiencing increased pain when lifting up a  on Monday (04/10/2017). He has followed up with Dr. Jaqui Espino for surgical evaluation but is unable to proceed with surgery at this time. Pt reports that his wife has breast cancer so he wishes to take care of her medical needs before considering a back surgery. He has considered scheduling the surgery in the summer. Pt has tried lumbar injections in the past with slight relief. He currently takes Flexeril 5 mg and Norco 5-325 mg. Pt states that he is not diabetic and has received moderate relief with a Medrol Dosepak in 12/2016. He has been prescribed Neurontin 300 mg but does not take the medication since he is concerned about the side effects. Pt at this time desires to proceed with medication evaluation. Of note, Pt has not worked since last week. He reports that he had Monday and Tuesday off but will need an out of work note. Pain Scale: 7/10    PCP: Brittny Horton MD       Past Medical History:   Diagnosis Date    Abnormal myocardial perfusion study 2/28/2016    Partially reversible inferior perfusion defect, EF 57%    AI (aortic insufficiency)     Moderate with possible bicuspid aortic valve    Chronic low back pain     Chronic venous insufficiency     Colon polyp 03/2012    Benign serrated polyp    Fracture 05/2016    rib fx's    GERD (gastroesophageal reflux disease)     History of echocardiogram 2/28/2016    EF 55%. Basal inferior hypokinesis.   Gr 1 DDfx.  Mod AI.  Hyperlipidemia     Hypertension     Osteoarthritis of right knee     Osteopenia     Prediabetes     Prepatellar bursitis of right knee     Recurrent genital herpes 1/16/2013    Right lumbar radiculopathy 10/2016    MRI: prominent right paracentral L4-L5 disc extrusion/impingement of descending right L5 nerve root; moderately severe canal stenosis; prominent right paracentral L5-S1 disc protrusion/ impingement of the descending right S1 nerve root; abutment of the foraminal right L5 nerve root/moderate foramina stenosis at L5-S1; mild canal stenosis at L3-L4 with abutment of the descending left L4 nerve root.  S/P cardiac catheterization 3/1/2016    Normal coronary anatomy        Social History     Social History    Marital status:      Spouse name: N/A    Number of children: N/A    Years of education: N/A     Occupational History    Not on file. Social History Main Topics    Smoking status: Former Smoker     Quit date: 6/28/2001    Smokeless tobacco: Former User    Alcohol use 1.0 oz/week     2 Cans of beer per week      Comment: occasionally    Drug use: No    Sexual activity: Not on file     Other Topics Concern    Not on file     Social History Narrative       Current Outpatient Prescriptions   Medication Sig Dispense Refill    methylPREDNISolone (MEDROL DOSEPACK) 4 mg tablet Per dose pack instructions 1 Dose Pack 0    gabapentin (NEURONTIN) 300 mg capsule Take 1 cap po q am and 2 caps po q pm. Taper up as directed 90 Cap 2    doxycycline (VIBRAMYCIN) 100 mg capsule Take 1 Cap by mouth two (2) times a day for 10 days. 20 Cap 0    diclofenac EC (VOLTAREN) 75 mg EC tablet Take 1 Tab by mouth two (2) times a day.  180 Tab 2    fenofibrate micronized (LOFIBRA) 67 mg capsule       gabapentin (NEURONTIN) 300 mg capsule take 1 capsule by mouth three times a day  0    HYDROcodone-acetaminophen (NORCO) 5-325 mg per tablet One-half to one tab po every day to bid prn severe pain  Indications: Pain 40 Tab 0    amLODIPine (NORVASC) 10 mg tablet TAKE 1 TABLET DAILY 90 Tab 3    valACYclovir (VALTREX) 500 mg tablet TAKE 1 TABLET DAILY 90 Tab 3    losartan (COZAAR) 100 mg tablet TAKE 1 TABLET DAILY 90 Tab 3    nitroglycerin (NITROSTAT) 0.4 mg SL tablet 1 Tab by SubLINGual route every five (5) minutes as needed for Chest Pain. 25 Tab 3    ascorbic acid (VITAMIN C) 500 mg tablet 500 mg.      magnesium oxide (MAG-OX) 400 mg tablet Take 400 mg by mouth.  aspirin delayed-release 81 mg tablet 81 mg.      diazepam (VALIUM) 5 mg tablet Take 1 Tab by mouth every six (6) hours as needed. Max Daily Amount: 20 mg. 30 Tab 0    cyclobenzaprine (FLEXERIL) 5 mg tablet Take 1 Tab by mouth three (3) times daily as needed for Muscle Spasm(s). 30 Tab 1    Cholecalciferol, Vitamin D3, 2,000 unit cap Take 2,000 Units by mouth daily.  DOCOSAHEXANOIC ACID/EPA (FISH OIL PO) Take  by mouth daily.  methylPREDNISolone (MEDROL DOSEPACK) 4 mg tablet take as directed on pack  0    rosuvastatin (CRESTOR) 10 mg tablet Take 1 Tab by mouth nightly. 30 Tab 5       Allergies   Allergen Reactions    Tree Nuts Anaphylaxis    Ace Inhibitors Cough    Nuts [Tree Nut] Itching         REVIEW OF SYSTEMS    Constitutional: Negative for fever, chills, or weight change. Respiratory: Negative for cough or shortness of breath. Cardiovascular: Negative for chest pain or palpitations. Gastrointestinal: Negative for acid reflux, change in bowel habits, or constipation. Genitourinary: Negative for dysuria and flank pain. Musculoskeletal: Positive for lumbar pain and right leg weakness. Skin: Negative for rash. Neurological: Negative for headaches, dizziness, or numbness. Endo/Heme/Allergies: Negative for increased bruising. Psychiatric/Behavioral: Negative for difficulty with sleep.       PHYSICAL EXAMINATION  Visit Vitals    /80    Pulse 72    Temp 97.8 °F (36.6 °C) (Oral)    Resp 18  Ht 6' 1\" (1.854 m)    Wt 219 lb (99.3 kg)    SpO2 97%    BMI 28.89 kg/m2       Constitutional: Awake, alert, and in no acute distress  Neurological: 1+ symmetrical DTRs in the lower extremities. Sensation to light touch is intact. Skin: warm, dry, and intact. Musculoskeletal: Pt is unable to fully extend his right leg secondary to pain. Pain with extension, axial loading, and forward flexion. No pain with internal or external rotation of his hips. Negative straight leg raise on the left; positive on the right. Hip Flex  Quads Hamstrings Ankle DF EHL Ankle PF   Right -4/5 -4/5 -4/5 +4/5 +4/5 +4/5   Left 4/5 4/5 4/5 +4/5 +4/5 +4/5     IMAGING:    Lumbar spine MRI from 12/30/2016 was personally reviewed with the Pt and demonstrated:    Results from [place] encounter on 12/30/16   MRI LUMB SPINE WO CONT   Narrative Procedure: MRI of the lumbar spine without contrast.    Indications: Lumbar back pain with right lower extremity radiculopathy    Comparisons: Lumbar spine MRI 7/19/2010    Technique: T1 weighted, T2 FSE with fat saturation, FSE inversion recovery  sagittal images are supplemented by T2 weighted with fat saturation and T1  weighted axial images. Findings: There is overall straightening of normal lumbar lordosis. No listhesis. Asymmetric disc desiccation L3-S1 without associated intervertebral disc space  height loss. Remaining more cephalad intervertebral discs are maintained and  well hydrated. Small hemangiomas are seen posteriorly within the L2 and L3  vertebral bodies. No suspicious osseous lesion. Normal vertebral body  morphology. No fracture. Subtle endplate reactive changes are seen anteriorly  adjacent to the L3-L4 and L2-L3 intervertebral discs. Conus medullaris ends at  the L1 vertebral body level. Correlation of axial and sagittal images reveals the following:    T12-L1: Minimal broad-based disc bulge abuts the ventral thecal sac. No facet  arthropathy.  Canal remains adequate. No foraminal stenosis. L1-L2: Very minimal central disc protrusion abuts the ventral thecal sac. Mild  bilateral facet arthropathy and ligamentum flavum buckling. No central canal or  foraminal stenosis. L2-L3: No significant disc pathology. Mild bilateral facet arthropathy and  ligamentum flavum buckling. No central canal or foraminal stenosis. L3-L4: Moderate circumferential disc bulge flattens the ventral thecal sac. Moderate bilateral facet arthropathy and ligamentum flavum buckling. There is  partial effacement of the left lateral recess with abutment of the adjacent  descending left L4 nerve root. Mild overall canal stenosis, largest AP dimension  of the thecal sac measuring just over 8 mm. No right foraminal stenosis. Moderate left, not impinging foraminal stenosis. L4-L5: Moderate broad-based disc bulge with superimposed inferiorly extending  right paracentral disc extrusion (sagittal image 9; axial images 31 and 32). This inferior disc extrusion measures 10 mm transverse by 6 mm anteroposterior,  and extends approximately 7 mm below the level of the L5 superior endplate. There is complete effacement of the right lateral recess with posterior  displacement and impingement of the adjacent descending right L5 nerve root. Moderately severe overall canal stenosis. Mild right and moderate left foraminal  stenoses, neither with foraminal nerve root impingement. L5-S1:  Moderate broad-based disc bulge with prominent superimposed right  paracentral disc protrusion (axial image 36; sagittal image 9). This focal right  paracentral disc protrusion measures 14 mm transverse by 11 mm anteroposterior. There is complete effacement of the right lateral recess with posterior  displacement of the adjacent descending right S1 nerve root. Moderate overall  canal stenosis otherwise. Moderate right greater than left foraminal stenoses.   There is abutment but no deformation of the foraminal right L5 nerve root. The visualized portions of the sacroiliac joints are unremarkable. Partially  visualized exophytic right renal cyst. No acute abnormality in the visualized  retroperitoneal structures. Impression IMPRESSION:    1. Prominent right paracentral L4-L5 disc extrusion with effacement of the right  lateral recess and impingement of the descending right L5 nerve root. Moderately  severe multifactorial canal stenosis at this level otherwise. 2. Additional prominent right paracentral L5-S1 disc protrusion with right  lateral recess effacement and impingement of the descending right S1 nerve root. 3. Abutment of the foraminal right L5 nerve root related to moderate foraminal  stenosis at L5-S1. No high-grade foraminal stenosis or foraminal nerve root  impingement elsewhere. 4. Mild multifactorial canal stenosis at L3-L4 with partial left lateral recess  effacement and abutment of the descending left L4 nerve root. Written by Lulu Caldwell, as dictated by Berenice Villanueva MD.  I, Dr. Berenice Villanueva confirm that all documentation is accurate.

## 2017-04-12 NOTE — MR AVS SNAPSHOT
Visit Information Date & Time Provider Department Dept. Phone Encounter #  
 4/12/2017  7:50 AM Zeny eLe MD [address] [facility] [phone] Follow-up Instructions Return in about 3 weeks (around 5/3/2017) for follow up with Dr. Joyce Carrasco, Medication follow up. Your Appointments 8/9/2017  8:00 AM  
LAB with [facility] NURSE VISIT [facility] of [facility] ([facility]) Appt Note: lab  
 [address], [place] [id number] [address] [place]  
  
   
 [address], [place]  
  
    
 8/15/2017  9:00 AM  
Office Visit with Leandro Banda MD  
Internist of [facility]) Appt Note: ov bs  
 [address] [facility] [address] [address] [place]  
  
   
 [address], [facility] Upcoming Health Maintenance Date Due COLONOSCOPY 3/22/2022 DTaP/Tdap/Td series (3 - Td) 1/16/2024 Allergies as of 4/12/2017  Review Complete On: 4/12/2017 By: Genny Kearney Severity Noted Reaction Type Reactions Tree Nuts High 03/08/2016    Anaphylaxis Ace Inhibitors  04/19/2013    Cough Nuts [Tree Nut]  04/29/2015    Itching Current Immunizations  Reviewed on 1/28/2015 Name Date Influenza Vaccine 11/12/2014, 11/1/2013 TDAP Vaccine 1/20/2004 Td 1/1/2004 Tdap 1/16/2014 Not reviewed this visit You Were Diagnosed With   
  
 Codes Comments HNP (herniated nucleus pulposus), lumbar    -  Primary ICD-10-CM: M51.26 
ICD-9-CM: 722.10 Neuritis of lower extremity, right     ICD-10-CM: G57.91 
ICD-9-CM: 355.8 Right leg weakness     ICD-10-CM: R29.898 ICD-9-CM: 729.89 Vitals BP Pulse Temp Resp Height(growth percentile) Weight(growth percentile) 123/80 72 97.8 °F (36.6 °C) (Oral) 18 6' 1\" (1.854 m) 219 lb (99.3 kg) SpO2 BMI Smoking Status 97% 28.89 kg/m2 Former Smoker BMI and BSA Data Body Mass Index Body Surface Area  
 28.89 kg/m 2 2.26 m 2 Preferred Pharmacy Pharmacy Name Phone [facility] [address] Christin Pereyra. [phone] Your Updated Medication List  
  
   
This list is accurate as of: 4/12/17  8:33 AM.  Always use your most recent med list. amLODIPine 10 mg tablet Commonly known as:  [place] TAKE 1 TABLET DAILY  
  
 ascorbic acid (vitamin C) 500 mg tablet Commonly known as:  VITAMIN C  
500 mg.  
  
 aspirin delayed-release 81 mg tablet 81 mg. Cholecalciferol (Vitamin D3) 2,000 unit Cap capsule Commonly known as:  VITAMIN D3 Take 2,000 Units by mouth daily. cyclobenzaprine 5 mg tablet Commonly known as:  FLEXERIL Take 1 Tab by mouth three (3) times daily as needed for Muscle Spasm(s). diazePAM 5 mg tablet Commonly known as:  VALIUM Take 1 Tab by mouth every six (6) hours as needed. Max Daily Amount: 20 mg.  
  
 diclofenac EC 75 mg EC tablet Commonly known as:  VOLTAREN Take 1 Tab by mouth two (2) times a day. doxycycline 100 mg capsule Commonly known as:  VIBRAMYCIN Take 1 Cap by mouth two (2) times a day for 10 days. fenofibrate micronized 67 mg capsule Commonly known as:  LOFIBRA FISH OIL PO Take  by mouth daily. gabapentin 300 mg capsule Commonly known as:  NEURONTIN  
take 1 capsule by mouth three times a day HYDROcodone-acetaminophen 5-325 mg per tablet Commonly known as:  Jarome Yuli One-half to one tab po every day to bid prn severe pain  Indications: Pain  
  
 losartan 100 mg tablet Commonly known as:  COZAAR  
TAKE 1 TABLET DAILY  
  
 magnesium oxide 400 mg tablet Commonly known as:  MAG-OX Take 400 mg by mouth.  
  
 methylPREDNISolone 4 mg tablet Commonly known as:  MEDROL DOSEPACK  
take as directed on pack  
  
 nitroglycerin 0.4 mg SL tablet Commonly known as:  NITROSTAT  
1 Tab by SubLINGual route every five (5) minutes as needed for Chest Pain. rosuvastatin 10 mg tablet Commonly known as:  CRESTOR Take 1 Tab by mouth nightly. valACYclovir 500 mg tablet Commonly known as:  VALTREX  
TAKE 1 TABLET DAILY Follow-up Instructions Return in about 3 weeks (around 5/3/2017) for follow up with Dr. Mari Dunne, Medication follow up. Patient Instructions Herniated Disc: Exercises Your Care Instructions Here are some examples of typical rehabilitation exercises for your condition. Start each exercise slowly. Ease off the exercise if you start to have pain. Your doctor or physical therapist will tell you when you can start these exercises and which ones will work best for you. How to do the exercises Note: These exercises can help you move easier and feel better. But when you first start doing them, you may have more pain in your back. This is normal. But it is important to pay close attention to your pain during and after each exercise. · Keep doing these exercises if your pain stays the same or moves from your leg and buttock more toward the middle of your spine. Pain moving out of your leg and buttock is a good sign. · Stop doing these exercises if your pain gets worse in your leg and buttock. Stop if you start to have pain in your leg and buttock that you didn't have before. Be sure to do these exercises in the order they appear. Note how your pain changes before you move to the next one. If your pain is much worse right after exercise and stays worse the next day, do not do any of these exercises. 1. Rest on belly 1. Lie on your stomach, face down, with your head turned to the side. Place your arms beside your body. If this bothers your neck, place your hands, one on top of the other, underneath your forehead. This will help support your head and neck. 2. Try to relax your lower back muscles as much as you can. 3. Continue to lie on your stomach for 2 minutes. 4. If your pain spreads down your leg or increases down your leg, stop this exercise and do not do the next exercises. 2. Press-up 1. Lie on your stomach, face down. Keep your elbows tucked into your sides and under your shoulders. 2. Press your elbows down into the floor to raise your upper back. As you do this, relax your stomach muscles. Allow your back to arch without using your back muscles. Let your low back relax completely as you arch up. 3. Hold this position for 2 minutes. 4. Repeat 2 to 4 times. 5. If your pain spreads down your leg or increases down your leg, stop this exercise and do not do the next exercises. 3. Full press-up 1. Lie on your stomach, face down. Keep your elbows tucked into your sides and under your shoulders. 2. Straighten your elbows, and push your upper body up as far as you can. Allow your lower back to sag. Keep your hips, pelvis, and legs relaxed. 3. Hold this position for 5 seconds, and then relax. 4. Repeat 10 times. Each time, try to raise your upper body a little higher and hold your arms a bit straighter. 5. If your pain spreads down your leg or gets worse down your leg, stop this exercise and do not move to the next exercise. 6. If you can't do this exercise, you may instead try the backward bend exercise that follows. 4. Backward bend · Stand with your feet hip-width apart. Your toes should point forward. Do not lock your knees. · Place your hands in the small of your back. · Bend backward as far as you can, keeping your knees straight. Hold this position for 2 to 3 seconds. Then return to your starting position. · Repeat 2 to 4 times. Each time, try to bend backward a little farther, until you bend backward as far as you can. · If your pain spreads down your leg or increases down your leg, stop this exercise. Follow-up care is a key part of your treatment and safety. Be sure to make and go to all appointments, and call your doctor if you are having problems. It's also a good idea to know your test results and keep a list of the medicines you take. Where can you learn more? Go to http://pat-cuate.info/. Enter [phone] in the search box to learn more about \"Herniated Disc: Exercises. \" Current as of: May 23, 2016 Content Version: 11.2 © 5757-5814 [employer]. Care instructions adapted under license by [employer] (which disclaims liability or warranty for this information). If you have questions about a medical condition or this instruction, always ask your healthcare professional. [address] any warranty or liability for your use of this information. Introducing [facility]! [employer] introduces [employer] patient portal. Now you can access parts of your medical record, email your doctor's office, and request medication refills online. 1. In your internet browser, go to [email or web address] 2. Click on the First Time User? Click Here link in the Sign In box. You will see the New Member Sign Up page. 3. Enter your [employer] Access Code exactly as it appears below. You will not need to use this code after youve completed the sign-up process. If you do not sign up before the expiration date, you must request a new code. · [employer] Access Code: [email or web address] Expires: 7/4/2017 10:58 AM 
 
4. Enter the last four digits of your Social Security Number (xxxx) and Date of Birth (mm/dd/yyyy) as indicated and click Submit. You will be taken to the next sign-up page. 5. Create a [employer] ID. This will be your [employer] login ID and cannot be changed, so think of one that is secure and easy to remember. 6. Create a [employer] password. You can change your password at any time. 7. Enter your Password Reset Question and Answer. This can be used at a later time if you forget your password. 8. Enter your e-mail address. You will receive e-mail notification when new information is available in [address] 9. Click Sign Up. You can now view and download portions of your medical record. 10. Click the Download Summary menu link to download a portable copy of your medical information. If you have questions, please visit the Frequently Asked Questions section of the [employer] website. Remember, [employer] is NOT to be used for urgent needs. For medical emergencies, dial 911. Now available from your iPhone and Android! Please provide this summary of care documentation to your next provider. Your primary care clinician is listed as Chrissy Purvis. If you have any questions after today's visit, please call [phone].

## 2017-04-12 NOTE — PATIENT INSTRUCTIONS
Herniated Disc: Exercises  Your Care Instructions  Here are some examples of typical rehabilitation exercises for your condition. Start each exercise slowly. Ease off the exercise if you start to have pain. Your doctor or physical therapist will tell you when you can start these exercises and which ones will work best for you. How to do the exercises  Note: These exercises can help you move easier and feel better. But when you first start doing them, you may have more pain in your back. This is normal. But it is important to pay close attention to your pain during and after each exercise. · Keep doing these exercises if your pain stays the same or moves from your leg and buttock more toward the middle of your spine. Pain moving out of your leg and buttock is a good sign. · Stop doing these exercises if your pain gets worse in your leg and buttock. Stop if you start to have pain in your leg and buttock that you didn't have before. Be sure to do these exercises in the order they appear. Note how your pain changes before you move to the next one. If your pain is much worse right after exercise and stays worse the next day, do not do any of these exercises. 1. Rest on belly    1. Lie on your stomach, face down, with your head turned to the side. Place your arms beside your body. If this bothers your neck, place your hands, one on top of the other, underneath your forehead. This will help support your head and neck. 2. Try to relax your lower back muscles as much as you can. 3. Continue to lie on your stomach for 2 minutes. 4. If your pain spreads down your leg or increases down your leg, stop this exercise and do not do the next exercises. 2. Press-up    1. Lie on your stomach, face down. Keep your elbows tucked into your sides and under your shoulders. 2. Press your elbows down into the floor to raise your upper back. As you do this, relax your stomach muscles.  Allow your back to arch without using your back muscles. Let your low back relax completely as you arch up. 3. Hold this position for 2 minutes. 4. Repeat 2 to 4 times. 5. If your pain spreads down your leg or increases down your leg, stop this exercise and do not do the next exercises. 3. Full press-up    1. Lie on your stomach, face down. Keep your elbows tucked into your sides and under your shoulders. 2. Straighten your elbows, and push your upper body up as far as you can. Allow your lower back to sag. Keep your hips, pelvis, and legs relaxed. 3. Hold this position for 5 seconds, and then relax. 4. Repeat 10 times. Each time, try to raise your upper body a little higher and hold your arms a bit straighter. 5. If your pain spreads down your leg or gets worse down your leg, stop this exercise and do not move to the next exercise. 6. If you can't do this exercise, you may instead try the backward bend exercise that follows. 4. Backward bend    · Stand with your feet hip-width apart. Your toes should point forward. Do not lock your knees. · Place your hands in the small of your back. · Bend backward as far as you can, keeping your knees straight. Hold this position for 2 to 3 seconds. Then return to your starting position. · Repeat 2 to 4 times. Each time, try to bend backward a little farther, until you bend backward as far as you can. · If your pain spreads down your leg or increases down your leg, stop this exercise. Follow-up care is a key part of your treatment and safety. Be sure to make and go to all appointments, and call your doctor if you are having problems. It's also a good idea to know your test results and keep a list of the medicines you take. Where can you learn more? Go to http://pat-cuate.info/. Enter 53211 88 64 30 in the search box to learn more about \"Herniated Disc: Exercises. \"  Current as of: May 23, 2016  Content Version: 11.2  © 3229-2666 [employer].  Care instructions adapted under license by [address] (which disclaims liability or warranty for this information). If you have questions about a medical condition or this instruction, always ask your healthcare professional. [address] any warranty or liability for your use of this information.

## 2017-04-12 NOTE — LETTER
NOTIFICATION RETURN TO WORK 
 
4/12/2017 8:57 AM 
 
Mr. Ayaz Galvin [address] [address] [address] [id number] To Whom It May Concern: 
 
Ayaz Galvin is currently under the care of [facility]. He will be out of work for the next 3 weeks until estimated re-evaluation in the of 5/4/17. If there are questions or concerns please have the patient contact our office. Sincerely, Agnes Britt MD

## 2017-04-17 ENCOUNTER — DOCUMENTATION ONLY (OUTPATIENT)
Dept: ORTHOPEDIC SURGERY | Age: 57
End: 2017-04-17

## 2017-04-24 RX ORDER — VALACYCLOVIR HYDROCHLORIDE 500 MG/1
TABLET, FILM COATED ORAL
Qty: 90 TAB | Refills: 3 | Status: SHIPPED | OUTPATIENT
Start: 2017-04-24 | End: 2017-10-12 | Stop reason: SDUPTHER

## 2017-04-26 ENCOUNTER — TELEPHONE (OUTPATIENT)
Dept: INTERNAL MEDICINE CLINIC | Age: 57
End: 2017-04-26

## 2017-04-26 NOTE — TELEPHONE ENCOUNTER
Pharmacist from [facility] called, they said that they want to change the valacyclovir to acyclovir BID, is this ok?  This change is due to his insurance plan     [phone] direct number with same reference number

## 2017-04-27 NOTE — TELEPHONE ENCOUNTER
Suppressive therapy on valacyclovir is once per day. If he switches to acyclovir, he will need to take it twice per day. Is insurance requiring switch to pay for it or are they just suggesting change? Would prefer he stay on valacyclovir if possible.

## 2017-05-08 ENCOUNTER — OFFICE VISIT (OUTPATIENT)
Dept: ORTHOPEDIC SURGERY | Age: 57
End: 2017-05-08

## 2017-05-08 VITALS
OXYGEN SATURATION: 96 % | HEIGHT: 73 IN | TEMPERATURE: 98.1 F | HEART RATE: 71 BPM | SYSTOLIC BLOOD PRESSURE: 133 MMHG | DIASTOLIC BLOOD PRESSURE: 76 MMHG | RESPIRATION RATE: 18 BRPM | WEIGHT: 229.8 LBS | BODY MASS INDEX: 30.46 KG/M2

## 2017-05-08 DIAGNOSIS — M51.26 LUMBAR HERNIATED DISC: Primary | Chronic | ICD-10-CM

## 2017-05-08 RX ORDER — CALCIUM CARBONATE 500(1250)
TABLET ORAL DAILY
COMMUNITY
End: 2018-06-20

## 2017-05-08 RX ORDER — GABAPENTIN 300 MG/1
CAPSULE ORAL
Qty: 90 CAP | Refills: 2 | Status: SHIPPED | OUTPATIENT
Start: 2017-05-08 | End: 2017-08-18 | Stop reason: ALTCHOICE

## 2017-05-08 NOTE — LETTER
5/8/2017 12:01 PM 
 
Mr. Timi Castro [address] [place] [id number] To Whom It May Concern: 
 
Timi Castro is currently under the care of [address] [place] [address]. He is to be out of work until Sunday 5/14/17 and will return to work Monday 5/15/17. If there are questions or concerns please have the patient contact our office. Sincerely, Francisco Meredith MD

## 2017-05-08 NOTE — PATIENT INSTRUCTIONS
Herniated Disc: Exercises  Your Care Instructions  Here are some examples of typical rehabilitation exercises for your condition. Start each exercise slowly. Ease off the exercise if you start to have pain. Your doctor or physical therapist will tell you when you can start these exercises and which ones will work best for you. How to do the exercises  Note: These exercises can help you move easier and feel better. But when you first start doing them, you may have more pain in your back. This is normal. But it is important to pay close attention to your pain during and after each exercise. · Keep doing these exercises if your pain stays the same or moves from your leg and buttock more toward the middle of your spine. Pain moving out of your leg and buttock is a good sign. · Stop doing these exercises if your pain gets worse in your leg and buttock. Stop if you start to have pain in your leg and buttock that you didn't have before. Be sure to do these exercises in the order they appear. Note how your pain changes before you move to the next one. If your pain is much worse right after exercise and stays worse the next day, do not do any of these exercises. 1. Rest on belly    1. Lie on your stomach, face down, with your head turned to the side. Place your arms beside your body. If this bothers your neck, place your hands, one on top of the other, underneath your forehead. This will help support your head and neck. 2. Try to relax your lower back muscles as much as you can. 3. Continue to lie on your stomach for 2 minutes. 4. If your pain spreads down your leg or increases down your leg, stop this exercise and do not do the next exercises. 2. Press-up    1. Lie on your stomach, face down. Keep your elbows tucked into your sides and under your shoulders. 2. Press your elbows down into the floor to raise your upper back. As you do this, relax your stomach muscles.  Allow your back to arch without using your back muscles. Let your low back relax completely as you arch up. 3. Hold this position for 2 minutes. 4. Repeat 2 to 4 times. 5. If your pain spreads down your leg or increases down your leg, stop this exercise and do not do the next exercises. 3. Full press-up    1. Lie on your stomach, face down. Keep your elbows tucked into your sides and under your shoulders. 2. Straighten your elbows, and push your upper body up as far as you can. Allow your lower back to sag. Keep your hips, pelvis, and legs relaxed. 3. Hold this position for 5 seconds, and then relax. 4. Repeat 10 times. Each time, try to raise your upper body a little higher and hold your arms a bit straighter. 5. If your pain spreads down your leg or gets worse down your leg, stop this exercise and do not move to the next exercise. 6. If you can't do this exercise, you may instead try the backward bend exercise that follows. 4. Backward bend    · Stand with your feet hip-width apart. Your toes should point forward. Do not lock your knees. · Place your hands in the small of your back. · Bend backward as far as you can, keeping your knees straight. Hold this position for 2 to 3 seconds. Then return to your starting position. · Repeat 2 to 4 times. Each time, try to bend backward a little farther, until you bend backward as far as you can. · If your pain spreads down your leg or increases down your leg, stop this exercise. Follow-up care is a key part of your treatment and safety. Be sure to make and go to all appointments, and call your doctor if you are having problems. It's also a good idea to know your test results and keep a list of the medicines you take. Where can you learn more? Go to http://pat-cuate.info/. Enter 04121 88 64 30 in the search box to learn more about \"Herniated Disc: Exercises. \"  Current as of: May 23, 2016  Content Version: 11.2  © 1811-5489 [employer].  Care instructions adapted under license by [address] (which disclaims liability or warranty for this information). If you have questions about a medical condition or this instruction, always ask your healthcare professional. [address] any warranty or liability for your use of this information.

## 2017-05-08 NOTE — MR AVS SNAPSHOT
Visit Information Date & Time Provider Department Dept. Phone Encounter #  
 5/8/2017 11:10 AM Martha Jacob MD [facility] [phone] [id number] Follow-up Instructions Return in about 3 months (around 8/8/2017) for routine med f/u. Your Appointments 5/24/2017  8:50 AM  
Follow Up with Martha Jacob MD  
[facility] ([facility]) Appt Note: f/u med f/u  
 [address] [facility] [address]  
[phone]  
  
   
 [address] [facility],[address] [facility]  
  
    
 8/9/2017  8:00 AM  
LAB with [facility] NURSE VISIT Internist of [facility] ([facility]) Appt Note: lab  
 [address], [address] [address] [address] [place]  
  
   
 [address], [address]  
  
    
 8/15/2017  9:00 AM  
Office Visit with Cb Harper MD  
[facility]ist of [facility] Appt Note: ov bs  
 [address] [facility], [facility] [address] [address] [place]  
  
   
 [address], [address] Upcoming Health Maintenance Date Due INFLUENZA AGE 9 TO ADULT 8/1/2017 COLONOSCOPY 3/22/2022 DTaP/Tdap/Td series (3 - Td) 1/16/2024 Allergies as of 5/8/2017  Review Complete On: 5/8/2017 By: Martha Jacob MD  
  
 Severity Noted Reaction Type Reactions Tree Nuts High 03/08/2016    Anaphylaxis Ace Inhibitors  04/19/2013    Cough Nuts [Tree Nut]  04/29/2015    Itching Current Immunizations  Reviewed on 1/28/2015 Name Date Influenza Vaccine 11/12/2014, 11/1/2013 TDAP Vaccine 1/20/2004 Td 1/1/2004 Tdap 1/16/2014 Not reviewed this visit You Were Diagnosed With   
  
 Codes Comments Lumbar herniated disc    -  Primary ICD-10-CM: M51.26 
ICD-9-CM: 722.10 Vitals BP Pulse Temp Resp Height(growth percentile) Weight(growth percentile) 133/76 71 98.1 °F (36.7 °C) (Oral) 18 6' 1\" (1.854 m) 229 lb 12.8 oz (104.2 kg) SpO2 BMI Smoking Status 96% 30.32 kg/m2 Former Smoker BMI and BSA Data Body Mass Index Body Surface Area  
 30.32 kg/m 2 2.32 m 2 Preferred Pharmacy Pharmacy Name Phone [facility] [address] [phone] Your Updated Medication List  
  
   
This list is accurate as of: 5/8/17 12:07 PM.  Always use your most recent med list. amLODIPine 10 mg tablet Commonly known as:  Micki Fraga TAKE 1 TABLET DAILY  
  
 ascorbic acid (vitamin C) 500 mg tablet Commonly known as:  VITAMIN C  
500 mg.  
  
 aspirin delayed-release 81 mg tablet 81 mg.  
  
 calcium carbonate 500 mg calcium (1,250 mg) tablet Commonly known as:  OS-KIARA Take  by mouth daily. Cholecalciferol (Vitamin D3) 2,000 unit Cap capsule Commonly known as:  VITAMIN D3 Take 2,000 Units by mouth daily. diazePAM 5 mg tablet Commonly known as:  VALIUM Take 1 Tab by mouth every six (6) hours as needed. Max Daily Amount: 20 mg.  
  
 diclofenac EC 75 mg EC tablet Commonly known as:  VOLTAREN Take 1 Tab by mouth two (2) times a day. fenofibrate micronized 67 mg capsule Commonly known as:  LOFIBRA FISH OIL PO Take  by mouth daily. gabapentin 300 mg capsule Commonly known as:  NEURONTIN Take 1 cap po q am and 2 caps po q pm. Taper up as directed HYDROcodone-acetaminophen 5-325 mg per tablet Commonly known as:  Amy Proper One-half to one tab po every day to bid prn severe pain  Indications: Pain  
  
 losartan 100 mg tablet Commonly known as:  COZAAR  
TAKE 1 TABLET DAILY  
  
 magnesium oxide 400 mg tablet Commonly known as:  MAG-OX Take 400 mg by mouth. valACYclovir 500 mg tablet Commonly known as:  VALTREX  
TAKE 1 TABLET DAILY Prescriptions Sent to Pharmacy Refills gabapentin (NEURONTIN) 300 mg capsule 2 Sig: Take 1 cap po q am and 2 caps po q pm. Taper up as directed Class: Normal  
 Pharmacy: [id number] [address] [facility],[address] Floor, [address]  #: [phone] Follow-up Instructions Return in about 3 months (around 8/8/2017) for routine med f/u. Patient Instructions Herniated Disc: Exercises Your Care Instructions Here are some examples of typical rehabilitation exercises for your condition. Start each exercise slowly. Ease off the exercise if you start to have pain. Your doctor or physical therapist will tell you when you can start these exercises and which ones will work best for you. How to do the exercises Note: These exercises can help you move easier and feel better. But when you first start doing them, you may have more pain in your back. This is normal. But it is important to pay close attention to your pain during and after each exercise. · Keep doing these exercises if your pain stays the same or moves from your leg and buttock more toward the middle of your spine. Pain moving out of your leg and buttock is a good sign. · Stop doing these exercises if your pain gets worse in your leg and buttock. Stop if you start to have pain in your leg and buttock that you didn't have before. Be sure to do these exercises in the order they appear. Note how your pain changes before you move to the next one. If your pain is much worse right after exercise and stays worse the next day, do not do any of these exercises. 1. Rest on belly 1. Lie on your stomach, face down, with your head turned to the side. Place your arms beside your body. If this bothers your neck, place your hands, one on top of the other, underneath your forehead. This will help support your head and neck. 2. Try to relax your lower back muscles as much as you can. 3. Continue to lie on your stomach for 2 minutes. 4. If your pain spreads down your leg or increases down your leg, stop this exercise and do not do the next exercises. 2. Press-up 1. Lie on your stomach, face down. Keep your elbows tucked into your sides and under your shoulders. 2. Press your elbows down into the floor to raise your upper back. As you do this, relax your stomach muscles. Allow your back to arch without using your back muscles. Let your low back relax completely as you arch up. 3. Hold this position for 2 minutes. 4. Repeat 2 to 4 times. 5. If your pain spreads down your leg or increases down your leg, stop this exercise and do not do the next exercises. 3. Full press-up 1. Lie on your stomach, face down. Keep your elbows tucked into your sides and under your shoulders. 2. Straighten your elbows, and push your upper body up as far as you can. Allow your lower back to sag. Keep your hips, pelvis, and legs relaxed. 3. Hold this position for 5 seconds, and then relax. 4. Repeat 10 times. Each time, try to raise your upper body a little higher and hold your arms a bit straighter. 5. If your pain spreads down your leg or gets worse down your leg, stop this exercise and do not move to the next exercise. 6. If you can't do this exercise, you may instead try the backward bend exercise that follows. 4. Backward bend · Stand with your feet hip-width apart. Your toes should point forward. Do not lock your knees. · Place your hands in the small of your back. · Bend backward as far as you can, keeping your knees straight. Hold this position for 2 to 3 seconds. Then return to your starting position. · Repeat 2 to 4 times. Each time, try to bend backward a little farther, until you bend backward as far as you can. · If your pain spreads down your leg or increases down your leg, stop this exercise. Follow-up care is a key part of your treatment and safety.  Be sure to make and go to all appointments, and call your doctor if you are having problems. It's also a good idea to know your test results and keep a list of the medicines you take. Where can you learn more? Go to http://pat-cuate.info/. Enter [phone] [id number] [phone] in the search box to learn more about \"Herniated Disc: Exercises. \" Current as of: May 23, 2016 Content Version: 11.2 © 6737-9142 [employer]. Care instructions adapted under license by [employer] (which disclaims liability or warranty for this information). If you have questions about a medical condition or this instruction, always ask your healthcare professional. [place] [address] any warranty or liability for your use of this information. Introducing hospitals & HEALTH SERVICES! [employer] introduces epicurio patient portal. Now you can access parts of your medical record, email your doctor's office, and request medication refills online. 1. In your internet browser, go to [email or web address] 2. Click on the First Time User? Click Here link in the Sign In box. You will see the New Member Sign Up page. 3. Enter your epicurio Access Code exactly as it appears below. You will not need to use this code after youve completed the sign-up process. If you do not sign up before the expiration date, you must request a new code. · epicurio Access Code: [email or web address] Expires: 7/4/2017 10:58 AM 
 
4. Enter the last four digits of your Social Security Number (xxxx) and Date of Birth (mm/dd/yyyy) as indicated and click Submit. You will be taken to the next sign-up page. 5. Create a epicurio ID. This will be your epicurio login ID and cannot be changed, so think of one that is secure and easy to remember. 6. Create a epicurio password. You can change your password at any time. 7. Enter your Password Reset Question and Answer. This can be used at a later time if you forget your password. 8. Enter your e-mail address.  You will receive e-mail notification when new information is available in LineHop. 9. Click Sign Up. You can now view and download portions of your medical record. 10. Click the Download Summary menu link to download a portable copy of your medical information. If you have questions, please visit the Frequently Asked Questions section of the LineHop website. Remember, LineHop is NOT to be used for urgent needs. For medical emergencies, dial 911. Now available from your iPhone and Android! Please provide this summary of care documentation to your next provider. Your primary care clinician is listed as Armani Crawford. If you have any questions after today's visit, please call [phone].

## 2017-05-08 NOTE — PROGRESS NOTES
[address]  [address], [address]  [place], [facility]  Phone: [phone]  Fax: [phone]        Antelmo Knapp  : 1960  PCP: Stefani Gee MD    PROGRESS NOTE      ASSESSMENT AND PLAN    Ana Rosa Love was seen today for back pain. Diagnoses and all orders for this visit:    Lumbar herniated disc  right L4/5/S1 chronic  -     gabapentin (NEURONTIN) 300 mg capsule; Take 1 cap po q am and 2 caps po q pm. Taper up as directed    1. Continue Gabapentin and Voltaren. 2. Given work note: He is to be out of work until 17 and will return to work Monday 5/15/17. 3. Given home exercises. Follow-up Disposition:  Return in about 3 months (around 2017) for routine med f/u. HISTORY OF PRESENT ILLNESS  Angi Rose is a 60 y.o. male. Pt presents to the office for a f/u visit for back pain. Pt was seen in my absence by Dr. Marcos Perkins for back pain radiating into RLE. He was given MDP and Gabapentin 300/600. Pt has been evaluated by Dr. Alysha Etienne and was recommended a laminectomy but put on hold due to spouse's medical issues. His flared pain has calmed down since his last office visit with Dr. Marcos Perkins. Pt notes that he no longer has any shooting RLE pain. He denies any constant paresthesia. He has not been back to work but is only scheduled to work two days this week. No saddle paresthesia. He takes Gabapentin 300 mg QAM and 600 mg QHS but will sometimes forget to take a dose. Pt takes Norco 5-325 mg PRN. He takes Voltaren 75 mg QAM. He has not been taking Flexeril. Denies persistent fevers, chills, weight changes, neurogenic bowel or bladder symptoms. Pt denies recent ED visits or hospitalizations. Pt works for [employer].      Pain Assessment  2017   Location of Pain Back   Pain Location Comment -   Location Modifiers -   Severity of Pain 2   Quality of Pain Aching   Quality of Pain Comment -   Duration of Pain Persistent Frequency of Pain Constant   Aggravating Factors -   Aggravating Factors Comment -   Relieving Factors -   Relieving Factors Comment -   Result of Injury No       PAST MEDICAL HISTORY   Past Medical History:   Diagnosis Date    Abnormal myocardial perfusion study 2/28/2016    Partially reversible inferior perfusion defect, EF 57%    AI (aortic insufficiency)     Moderate with possible bicuspid aortic valve    Chronic low back pain     Chronic venous insufficiency     Colon polyp 03/2012    Benign serrated polyp    Fracture 05/2016    rib fx's    GERD (gastroesophageal reflux disease)     History of echocardiogram 2/28/2016    EF 55%. Basal inferior hypokinesis. Gr 1 DDfx. Mod AI.  Hyperlipidemia     Hypertension     Osteoarthritis of right knee     Osteopenia     Prediabetes     Prepatellar bursitis of right knee     Recurrent genital herpes 1/16/2013    Right lumbar radiculopathy 10/2016    MRI: prominent right paracentral L4-L5 disc extrusion/impingement of descending right L5 nerve root; moderately severe canal stenosis; prominent right paracentral L5-S1 disc protrusion/ impingement of the descending right S1 nerve root; abutment of the foraminal right L5 nerve root/moderate foramina stenosis at L5-S1; mild canal stenosis at L3-L4 with abutment of the descending left L4 nerve root.  S/P cardiac catheterization 3/1/2016    Normal coronary anatomy       Past Surgical History:   Procedure Laterality Date    ENDOSCOPY, COLON, DIAGNOSTIC      HX CHOLECYSTECTOMY      HX TONSILLECTOMY     . MEDICATIONS    Current Outpatient Prescriptions   Medication Sig Dispense Refill    calcium carbonate (OS-KIARA) 500 mg calcium (1,250 mg) tablet Take  by mouth daily.  valACYclovir (VALTREX) 500 mg tablet TAKE 1 TABLET DAILY 90 Tab 3    diclofenac EC (VOLTAREN) 75 mg EC tablet Take 1 Tab by mouth two (2) times a day.  180 Tab 2    fenofibrate micronized (LOFIBRA) 67 mg capsule       HYDROcodone-acetaminophen (NORCO) 5-325 mg per tablet One-half to one tab po every day to bid prn severe pain  Indications: Pain 40 Tab 0    amLODIPine (NORVASC) 10 mg tablet TAKE 1 TABLET DAILY 90 Tab 3    losartan (COZAAR) 100 mg tablet TAKE 1 TABLET DAILY 90 Tab 3    ascorbic acid (VITAMIN C) 500 mg tablet 500 mg.      magnesium oxide (MAG-OX) 400 mg tablet Take 400 mg by mouth.  aspirin delayed-release 81 mg tablet 81 mg.      diazepam (VALIUM) 5 mg tablet Take 1 Tab by mouth every six (6) hours as needed. Max Daily Amount: 20 mg. 30 Tab 0    cyclobenzaprine (FLEXERIL) 5 mg tablet Take 1 Tab by mouth three (3) times daily as needed for Muscle Spasm(s). 30 Tab 1    Cholecalciferol, Vitamin D3, 2,000 unit cap Take 2,000 Units by mouth daily.  DOCOSAHEXANOIC ACID/EPA (FISH OIL PO) Take  by mouth daily.  gabapentin (NEURONTIN) 300 mg capsule Take 1 cap po q am and 2 caps po q pm. Taper up as directed 90 Cap 2    rosuvastatin (CRESTOR) 10 mg tablet Take 1 Tab by mouth nightly. 30 Tab 5    nitroglycerin (NITROSTAT) 0.4 mg SL tablet 1 Tab by SubLINGual route every five (5) minutes as needed for Chest Pain. 25 Tab 3        ALLERGIES  Allergies   Allergen Reactions    Tree Nuts Anaphylaxis    Ace Inhibitors Cough    Nuts [Tree Nut] Itching          SOCIAL HISTORY    Social History     Social History    Marital status:      Spouse name: N/A    Number of children: N/A    Years of education: N/A     Occupational History    Not on file.      Social History Main Topics    Smoking status: Former Smoker     Quit date: 6/28/2000    Smokeless tobacco: Former User    Alcohol use 1.0 oz/week     2 Cans of beer per week      Comment: occasionally    Drug use: No    Sexual activity: Not on file     Other Topics Concern    Not on file     Social History Narrative       FAMILY HISTORY  Family History   Problem Relation Age of Onset    Cancer Father 61     esophageal    Hypertension Father  Hypertension Brother     Diabetes Other      Grandmother    Arthritis-osteo Other     Other Other      Stomach problems; Father, grandfather       REVIEW OF SYSTEMS  Review of Systems   Constitutional: Negative for chills, fever and weight loss. Respiratory: Negative for shortness of breath. Cardiovascular: Negative for chest pain. Gastrointestinal: Negative for constipation. Negative for fecal incontinence   Genitourinary: Negative for dysuria. Negative for urinary incontinence   Musculoskeletal:        Per HPI   Skin: Negative for rash. Neurological: Negative for dizziness, tingling, tremors, focal weakness and headaches. Endo/Heme/Allergies: Does not bruise/bleed easily. Psychiatric/Behavioral: The patient does not have insomnia. PHYSICAL EXAMINATION  Visit Vitals    /76    Pulse 71    Temp 98.1 °F (36.7 °C) (Oral)    Resp 18    Ht 6' 1\" (1.854 m)    Wt 229 lb 12.8 oz (104.2 kg)    SpO2 96%    BMI 30.32 kg/m2         Accompanied by self. Constitutional:  Well developed, well nourished, in no acute distress. Psychiatric: Affect and mood are appropriate. Integumentary: No rashes or abrasions noted on exposed areas. Cardiovascular/Peripheral Vascular: Intact l pulses. No peripheral edema is noted. Lymphatic:  No evidence of lymphedema. No cervical lymphadenopathy. SPINE/MUSCULOSKELETAL EXAM    Lumbar spine:  No rash, ecchymosis, or gross obliquity. No fasciculations. No focal atrophy is noted. Tenderness to palpation on the RT at L5-S1. No tenderness to palpation at the sciatic notch. SI joints non-tender. Trochanters non tender. Sensation grossly intact to light touch. MOTOR:       Hip Flex  Quads Hamstrings Ankle DF EHL Ankle PF   Right +4/5 +4/5 +4/5 +4/5 +4/5 +4/5   Left +4/5 +4/5 +4/5 +4/5 +4/5 +4/5       Straight Leg raise + on the RT at 45 degrees. Ambulation without assistive device. FWB.     Written by Jesusita Golden, Anisa Watt, as dictated by Tonio Sloan MD.    I, Dr. Tonio Sloan MD, confirm that all documentation is accurate. Mr. Noam Koch may have a reminder for a \"due or due soon\" health maintenance. I have asked that he contact his primary care provider for follow-up on this health maintenance.

## 2017-05-09 ENCOUNTER — TELEPHONE (OUTPATIENT)
Dept: INTERNAL MEDICINE CLINIC | Age: 57
End: 2017-05-09

## 2017-05-09 NOTE — TELEPHONE ENCOUNTER
Pt had labs done on 2/8/2017 for his pe appt. Labs were not coded as pe. Billing needs lab order corrected with Z00.00 as primary dx so they can re file. Please give to  Cool when done.     Thanks,

## 2017-05-09 NOTE — TELEPHONE ENCOUNTER
Pt calling again, says he has received another bill. I sent message to [facility] in billing to correct and refile.

## 2017-05-09 NOTE — TELEPHONE ENCOUNTER
Valorie Merlos, Dr. Graciela Bermudez ordered these labs in the [address] encounter on 9/8/16. I went back to add Z00.00 and then associate the labs to this dx code, and it's already been done. This dx code is already listed in that encounter and associated to the labs.

## 2017-06-19 RX ORDER — LOSARTAN POTASSIUM 100 MG/1
TABLET ORAL
Qty: 90 TAB | Refills: 3 | OUTPATIENT
Start: 2017-06-19

## 2017-07-13 NOTE — TELEPHONE ENCOUNTER
Patient last seen  3/24/16    Patient scheduled for follow up on  20 Sept    Dr Nola Alejandra informed about patient needing medication. Dr Nola Alejandra stated OK to do bridge medications, since patient is scheduled.

## 2017-07-14 RX ORDER — LOSARTAN POTASSIUM 100 MG/1
TABLET ORAL
Qty: 90 TAB | Refills: 3 | Status: SHIPPED | OUTPATIENT
Start: 2017-07-14 | End: 2018-06-29 | Stop reason: SDUPTHER

## 2017-07-14 RX ORDER — FENOFIBRATE 67 MG/1
67 CAPSULE ORAL DAILY
Qty: 90 CAP | Refills: 3 | Status: SHIPPED | OUTPATIENT
Start: 2017-07-14 | End: 2017-08-18

## 2017-08-09 ENCOUNTER — HOSPITAL ENCOUNTER (OUTPATIENT)
Dept: LAB | Age: 57
Discharge: HOME | End: 2017-08-09
Payer: COMMERCIAL

## 2017-08-09 DIAGNOSIS — R73.09 ABNORMAL GLUCOSE: ICD-10-CM

## 2017-08-09 DIAGNOSIS — I10 ESSENTIAL HYPERTENSION: ICD-10-CM

## 2017-08-09 DIAGNOSIS — R73.03 PREDIABETES: ICD-10-CM

## 2017-08-09 DIAGNOSIS — E78.5 HYPERLIPIDEMIA, UNSPECIFIED HYPERLIPIDEMIA TYPE: ICD-10-CM

## 2017-08-09 LAB
ANION GAP BLD CALC-SCNC: 6 MMOL/L (ref 3–18)
BUN SERPL-MCNC: 16 MG/DL (ref 7–18)
BUN/CREAT SERPL: 14 (ref 12–20)
CALCIUM SERPL-MCNC: 8.6 MG/DL (ref 8.5–10.1)
CHLORIDE SERPL-SCNC: 107 MMOL/L (ref 100–108)
CHOLEST SERPL-MCNC: 202 MG/DL
CO2 SERPL-SCNC: 26 MMOL/L (ref 21–32)
CREAT SERPL-MCNC: 1.17 MG/DL (ref 0.6–1.3)
EST. AVERAGE GLUCOSE BLD GHB EST-MCNC: 126 MG/DL
GLUCOSE SERPL-MCNC: 100 MG/DL (ref 74–99)
HBA1C MFR BLD: 6 % (ref 4.2–5.6)
HDLC SERPL-MCNC: 54 MG/DL (ref 40–60)
HDLC SERPL: 3.7 {RATIO} (ref 0–5)
LDLC SERPL CALC-MCNC: 124.8 MG/DL (ref 0–100)
LIPID PROFILE,FLP: ABNORMAL
POTASSIUM SERPL-SCNC: 4.3 MMOL/L (ref 3.5–5.5)
SODIUM SERPL-SCNC: 139 MMOL/L (ref 136–145)
TRIGL SERPL-MCNC: 116 MG/DL (ref ?–150)
VLDLC SERPL CALC-MCNC: 23.2 MG/DL

## 2017-08-09 PROCEDURE — 36415 COLL VENOUS BLD VENIPUNCTURE: CPT | Performed by: INTERNAL MEDICINE

## 2017-08-09 PROCEDURE — 83036 HEMOGLOBIN GLYCOSYLATED A1C: CPT | Performed by: INTERNAL MEDICINE

## 2017-08-09 PROCEDURE — 80048 BASIC METABOLIC PNL TOTAL CA: CPT | Performed by: INTERNAL MEDICINE

## 2017-08-09 PROCEDURE — 80061 LIPID PANEL: CPT | Performed by: INTERNAL MEDICINE

## 2017-08-14 ENCOUNTER — OFFICE VISIT (OUTPATIENT)
Dept: ORTHOPEDIC SURGERY | Age: 57
End: 2017-08-14

## 2017-08-14 VITALS
TEMPERATURE: 98.1 F | DIASTOLIC BLOOD PRESSURE: 90 MMHG | RESPIRATION RATE: 18 BRPM | HEIGHT: 73 IN | WEIGHT: 228.2 LBS | OXYGEN SATURATION: 95 % | SYSTOLIC BLOOD PRESSURE: 137 MMHG | BODY MASS INDEX: 30.24 KG/M2 | HEART RATE: 67 BPM

## 2017-08-14 DIAGNOSIS — M51.26 LUMBAR HERNIATED DISC: Primary | Chronic | ICD-10-CM

## 2017-08-14 NOTE — MR AVS SNAPSHOT
Visit Information Date & Time Provider Department Dept. Phone Encounter #  
 8/14/2017  8:40 AM Phuong Lester MD [facility] [phone] [id number] Follow-up Instructions Return if symptoms worsen or fail to improve. Your Appointments 8/15/2017  9:00 AM  
Office Visit with Kitty Emmanuel MD  
[facility]ist of [facility] [address] [place] [address]) Appt Note: ov bs  
 [address] [facility] [address] [place]  
  
   
 [address], [address] [facility]  
  
    
 9/20/2017  3:00 PM  
Follow Up with Rea Fam MD  
[facility] [place] [address] ([facility]) Appt Note: r/S  
 [place] [address] [address] [address] [phone] [address] [place] [address] [address] Upcoming Health Maintenance Date Due INFLUENZA AGE 9 TO ADULT 8/1/2017 COLONOSCOPY 3/22/2022 DTaP/Tdap/Td series (3 - Td) 1/16/2024 Allergies as of 8/14/2017  Review Complete On: 8/14/2017 By: Phuong Lester MD  
  
 Severity Noted Reaction Type Reactions Tree Nuts High 03/08/2016    Anaphylaxis Ace Inhibitors  04/19/2013    Cough Nuts [Tree Nut]  04/29/2015    Itching Current Immunizations  Reviewed on 1/28/2015 Name Date Influenza Vaccine 11/12/2014, 11/1/2013 TDAP Vaccine 1/20/2004 Td 1/1/2004 Tdap 1/16/2014 Not reviewed this visit Vitals BP Pulse Temp Resp Height(growth percentile) Weight(growth percentile) 137/90 67 98.1 °F (36.7 °C) (Oral) 18 6' 1\" (1.854 m) 228 lb 3.2 oz (103.5 kg) SpO2 BMI Smoking Status 95% 30.11 kg/m2 Former Smoker BMI and BSA Data Body Mass Index Body Surface Area  
 30.11 kg/m 2 2.31 m 2 Preferred Pharmacy Pharmacy Name Phone  [address] [phone] Your Updated Medication List  
  
   
 This list is accurate as of: 8/14/17  9:32 AM.  Always use your most recent med list. amLODIPine 10 mg tablet Commonly known as:  Tim Sal TAKE 1 TABLET DAILY  
  
 ascorbic acid (vitamin C) 500 mg tablet Commonly known as:  VITAMIN C  
500 mg.  
  
 aspirin delayed-release 81 mg tablet 81 mg.  
  
 calcium carbonate 500 mg calcium (1,250 mg) tablet Commonly known as:  OS-KIARA Take  by mouth daily. Cholecalciferol (Vitamin D3) 2,000 unit Cap capsule Commonly known as:  VITAMIN D3 Take 2,000 Units by mouth daily. diazePAM 5 mg tablet Commonly known as:  VALIUM Take 1 Tab by mouth every six (6) hours as needed. Max Daily Amount: 20 mg.  
  
 diclofenac EC 75 mg EC tablet Commonly known as:  VOLTAREN Take 1 Tab by mouth two (2) times a day. fenofibrate micronized 67 mg capsule Commonly known as:  LOFIBRA Take 1 Cap by mouth daily. FISH OIL PO Take  by mouth daily. gabapentin 300 mg capsule Commonly known as:  NEURONTIN Take 1 cap po q am and 2 caps po q pm. Taper up as directed HYDROcodone-acetaminophen 5-325 mg per tablet Commonly known as:  Lj Quirk One-half to one tab po every day to bid prn severe pain  Indications: Pain  
  
 losartan 100 mg tablet Commonly known as:  COZAAR  
TAKE 1 TABLET DAILY  
  
 magnesium oxide 400 mg tablet Commonly known as:  MAG-OX Take 400 mg by mouth. valACYclovir 500 mg tablet Commonly known as:  VALTREX  
TAKE 1 TABLET DAILY Follow-up Instructions Return if symptoms worsen or fail to improve. Patient Instructions AllClear ID Activation Thank you for requesting access to AllClear ID. Please follow the instructions below to securely access and download your online medical record. AllClear ID allows you to send messages to your doctor, view your test results, renew your prescriptions, schedule appointments, and more. How Do I Sign Up? 1. In your internet browser, go to [email or web address] 
2. Click on the First Time User? Click Here link in the Sign In box. You will be redirect to the New Member Sign Up page. 3. Enter your [employer] Access Code exactly as it appears below. You will not need to use this code after youve completed the sign-up process. If you do not sign up before the expiration date, you must request a new code. [employer] Access Code: [email or web address] Expires: 10/7/2017  2:07 PM (This is the date your [employer] access code will ) 4. Enter the last four digits of your Social Security Number (xxxx) and Date of Birth (mm/dd/yyyy) as indicated and click Submit. You will be taken to the next sign-up page. 5. Create a [employer] ID. This will be your [employer] login ID and cannot be changed, so think of one that is secure and easy to remember. 6. Create a [employer] password. You can change your password at any time. 7. Enter your Password Reset Question and Answer. This can be used at a later time if you forget your password. 8. Enter your e-mail address. You will receive e-mail notification when new information is available in [address] 9. Click Sign Up. You can now view and download portions of your medical record. 10. Click the Download Summary menu link to download a portable copy of your medical information. Additional Information If you have questions, please visit the Frequently Asked Questions section of the [employer] website at [email or web address] Remember, [employer] is NOT to be used for urgent needs. For medical emergencies, dial 911. Back Care and Preventing Injuries: Care Instructions Your Care Instructions You can hurt your back doing many everyday activities: lifting a heavy box, bending down to garden, exercising at the gym, and even getting out of bed.  But you can keep your back strong and healthy by doing some exercises. You also can follow a few tips for sitting, sleeping, and lifting to avoid hurting your back again. Talk to your doctor before you start an exercise program. Ask for help if you want to learn more about keeping your back healthy. Follow-up care is a key part of your treatment and safety. Be sure to make and go to all appointments, and call your doctor if you are having problems. It's also a good idea to know your test results and keep a list of the medicines you take. How can you care for yourself at home? · Stay at a healthy weight to avoid strain on your lower back. · Do not smoke. Smoking increases the risk of osteoporosis, which weakens the spine. If you need help quitting, talk to your doctor about stop-smoking programs and medicines. These can increase your chances of quitting for good. · Make sure you sleep in a position that maintains your back's normal curves and on a mattress that feels comfortable. Sleep on your side with a pillow between your knees, or sleep on your back with a pillow under your knees. These positions can reduce strain on your back. · When you get out of bed, lie on your side and bend both knees. Drop your feet over the edge of the bed as you push up with both arms. Scoot to the edge of the bed. Make sure your feet are in line with your rear end (buttocks), and then stand up. · If you must stand for a long time, put one foot on a stool, ledge, or box. Exercise to strengthen your back and other muscles · Get at least 30 minutes of exercise on most days of the week. Walking is a good choice. You also may want to do other activities, such as running, swimming, cycling, or playing tennis or team sports. · Stretch your back muscles. Here are few exercises to try: ¨ Lie on your back with your knees bent and your feet flat on the floor.  Gently pull one bent knee to your chest. Put that foot back on the floor, and then pull the other knee to your chest. Hold for 15 to 30 seconds. Repeat 2 to 4 times. ¨ Do pelvic tilts. Lie on your back with your knees bent. Tighten your stomach muscles. Pull your belly button (navel) in and up toward your ribs. You should feel like your back is pressing to the floor and your hips and pelvis are slightly lifting off the floor. Hold for 6 seconds while breathing smoothly. · Keep your core muscles strong. The muscles of your back, belly (abdomen), and buttocks support your spine. ¨ Pull in your belly, and imagine pulling your navel toward your spine. Hold this for 6 seconds, then relax. Remember to keep breathing normally as you tense your muscles. ¨ Do curl-ups. Always do them with your knees bent. Keep your low back on the floor, and curl your shoulders toward your knees using a smooth, slow motion. Keep your arms folded across your chest. If this bothers your neck, try putting your hands behind your neck (not your head), with your elbows spread apart. ¨ Lie on your back with your knees bent and your feet flat on the floor. Tighten your belly muscles, and then push with your feet and raise your buttocks up a few inches. Hold this position 6 seconds as you continue to breathe normally, then lower yourself slowly to the floor. Repeat 8 to 12 times. ¨ If you like group exercise, try Pilates or yoga. These classes have poses that strengthen the core muscles. Protect your back when you sit · Place a small pillow, a rolled-up towel, or a lumbar roll in the curve of your back if you need extra support. · Sit in a chair that is low enough to let you place both feet flat on the floor with both knees nearly level with your hips. If your chair or desk is too high, use a foot rest to raise your knees. · When driving, keep your knees nearly level with your hips. Sit straight, and drive with both hands on the steering wheel. Your arms should be in a slightly bent position. · Try a kneeling chair, which helps tilt your hips forward. This takes pressure off your lower back. · Try sitting on an exercise ball. It can rock from side to side, which helps keep your back loose. Lift properly · Squat down, bending at the hips and knees only. If you need to, put one knee to the floor and extend your other knee in front of you, bent at a right angle (half kneeling). · Press your chest straight forward. This helps keep your upper back straight while keeping a slight arch in your low back. · Hold the load as close to your body as possible, at the level of your navel. · Use your feet to change direction, taking small steps. · Lead with your hips as you change direction. Keep your shoulders in line with your hips as you move. Do not twist your body. · Set down your load carefully, squatting with your knees and hips only. When should you call for help? Watch closely for changes in your health, and be sure to contact your doctor if: 
· You want more exercises to make your back and other core muscles stronger. Where can you learn more? Go to http://pat-cuate.info/. Enter S810 in the search box to learn more about \"Back Care and Preventing Injuries: Care Instructions. \" Current as of: March 21, 2017 Content Version: 11.3 © 6865-3162 Healthwise, Incorporated. Care instructions adapted under license by [employer] (which disclaims liability or warranty for this information). If you have questions about a medical condition or this instruction, always ask your healthcare professional. [address] any warranty or liability for your use of this information. Introducing [facility]! Libby Chapman introduces [employer] patient portal. Now you can access parts of your medical record, email your doctor's office, and request medication refills online. 1. In your internet browser, go to [email or web address] 2. Click on the First Time User? Click Here link in the Sign In box. You will see the New Member Sign Up page. 3. Enter your Xeebel Access Code exactly as it appears below. You will not need to use this code after youve completed the sign-up process. If you do not sign up before the expiration date, you must request a new code. · [employer]ee[employer] Access Code: [email or web address] Expires: 10/7/2017  2:07 PM 
 
4. Enter the last four digits of your Social Security Number (xxxx) and Date of Birth (mm/dd/yyyy) as indicated and click Submit. You will be taken to the next sign-up page. 5. Create a Xeebel ID. This will be your Xeebel login ID and cannot be changed, so think of one that is secure and easy to remember. 6. Create a Xeebel password. You can change your password at any time. 7. Enter your Password Reset Question and Answer. This can be used at a later time if you forget your password. 8. Enter your e-mail address. You will receive e-mail notification when new information is available in [address] 9. Click Sign Up. You can now view and download portions of your medical record. 10. Click the Download Summary menu link to download a portable copy of your medical information. If you have questions, please visit the Frequently Asked Questions section of the [employer] website. Remember, [employer] is NOT to be used for urgent needs. For medical emergencies, dial 911. Now available from your iPhone and Android! Please provide this summary of care documentation to your next provider. Your primary care clinician is listed as Lance Kaye. If you have any questions after today's visit, please call [phone].

## 2017-08-14 NOTE — PROGRESS NOTES
[facility] [address]  [address], [address]  [place]  Phone: [phone]  Fax: [phone]        Jonnie Martines  : 1960  PCP: Melisa Rutledge MD    PROGRESS NOTE      ASSESSMENT AND PLAN    Diagnoses and all orders for this visit:    1. Lumbar herniated disc  right L4/5/S1 chronic- radiculopathy resolved     1. Continue Voltaren PRN through Dr. Yaron Mosley. 2. No indications for surgery or injections at this time, has been off Gabapentin for some time. 3. Urgent symptoms discussed with pt.   4. Advised to stay active as tolerated. Avoid any repetitive bending, lifting, and twisting or any heavy impact activities. 5. Proper lifting mechanics discussed. 6. Given injury prevention information. Follow-up Disposition:  Return if symptoms worsen or fail to improve. HISTORY OF PRESENT ILLNESS  Pablo Guajardo is a 59 y.o. male. Pt presents to the office for a f/u visit for RT lumbar HNP, sciatica. He went back to work on 5/15/17. He was previously recommended for laminectomy. Pt  was on Gabapentin 300/600. Pt notes that he has been doing well with his back pain. He is interested in surgical intervention if this is needed. His back pain is mainly soreness. He no longer has any radiating pain. His back pain is mainly in his low back, intermittent. Bending over will increase his pain. No weakness of BLE. No saddle paresthesia. He is no longer taking Gabapentin x a few weeks. Pt notes that he gradually started to stop, he denies any increased pain. He has had injections in his RT wrist for arthritis . He is on Voltaren 75 mg QAM. No longer taking Norco. Denies persistent fevers, chills, weight changes, neurogenic bowel or bladder symptoms. Pt denies recent ED visits or hospitalizations. Wife is doing better. He is an  at [employer], requires heavy labor (20+lbs).  Planning on retiring next year    Pain Assessment  2017 Location of Pain Back   Pain Location Comment -   Location Modifiers -   Severity of Pain 4   Quality of Pain Aching   Quality of Pain Comment -   Duration of Pain -   Frequency of Pain Constant   Aggravating Factors Walking;Bending   Aggravating Factors Comment lifting   Limiting Behavior -   Relieving Factors Rest   Relieving Factors Comment pain med every now and then   Result of Injury -       PAST MEDICAL HISTORY   Past Medical History:   Diagnosis Date    Abnormal myocardial perfusion study 2/28/2016    Partially reversible inferior perfusion defect, EF 57%    AI (aortic insufficiency)     Moderate with possible bicuspid aortic valve    Chronic low back pain     Chronic venous insufficiency     Colon polyp 03/2012    Benign serrated polyp    Fracture 05/2016    rib fx's    GERD (gastroesophageal reflux disease)     History of echocardiogram 2/28/2016    EF 55%. Basal inferior hypokinesis. Gr 1 DDfx. Mod AI.  Hyperlipidemia     Hypertension     Osteoarthritis of right knee     Osteopenia     Prediabetes     Prepatellar bursitis of right knee     Recurrent genital herpes 1/16/2013    Right lumbar radiculopathy 10/2016    MRI: prominent right paracentral L4-L5 disc extrusion/impingement of descending right L5 nerve root; moderately severe canal stenosis; prominent right paracentral L5-S1 disc protrusion/ impingement of the descending right S1 nerve root; abutment of the foraminal right L5 nerve root/moderate foramina stenosis at L5-S1; mild canal stenosis at L3-L4 with abutment of the descending left L4 nerve root.  S/P cardiac catheterization 3/1/2016    Normal coronary anatomy       Past Surgical History:   Procedure Laterality Date    ENDOSCOPY, COLON, DIAGNOSTIC      HX CHOLECYSTECTOMY      HX TONSILLECTOMY     . MEDICATIONS      Current Outpatient Prescriptions   Medication Sig Dispense Refill    fenofibrate micronized (LOFIBRA) 67 mg capsule Take 1 Cap by mouth daily.  80 Cap 3    losartan (COZAAR) 100 mg tablet TAKE 1 TABLET DAILY 90 Tab 3    calcium carbonate (OS-KIARA) 500 mg calcium (1,250 mg) tablet Take  by mouth daily.  valACYclovir (VALTREX) 500 mg tablet TAKE 1 TABLET DAILY 90 Tab 3    diclofenac EC (VOLTAREN) 75 mg EC tablet Take 1 Tab by mouth two (2) times a day. 180 Tab 2    HYDROcodone-acetaminophen (NORCO) 5-325 mg per tablet One-half to one tab po every day to bid prn severe pain  Indications: Pain 40 Tab 0    amLODIPine (NORVASC) 10 mg tablet TAKE 1 TABLET DAILY 90 Tab 3    ascorbic acid (VITAMIN C) 500 mg tablet 500 mg.      magnesium oxide (MAG-OX) 400 mg tablet Take 400 mg by mouth.  aspirin delayed-release 81 mg tablet 81 mg.      Cholecalciferol, Vitamin D3, 2,000 unit cap Take 2,000 Units by mouth daily.  DOCOSAHEXANOIC ACID/EPA (FISH OIL PO) Take  by mouth daily.  gabapentin (NEURONTIN) 300 mg capsule Take 1 cap po q am and 2 caps po q pm. Taper up as directed 90 Cap 2    diazepam (VALIUM) 5 mg tablet Take 1 Tab by mouth every six (6) hours as needed. Max Daily Amount: 20 mg. 30 Tab 0        ALLERGIES    Allergies   Allergen Reactions    Tree Nuts Anaphylaxis    Ace Inhibitors Cough    Nuts [Tree Nut] Itching          SOCIAL HISTORY    Social History     Social History    Marital status:      Spouse name: N/A    Number of children: N/A    Years of education: N/A     Occupational History    Not on file.      Social History Main Topics    Smoking status: Former Smoker     Quit date: 6/28/2000    Smokeless tobacco: Former User    Alcohol use 1.0 oz/week     2 Cans of beer per week      Comment: occasionally    Drug use: No    Sexual activity: Not on file     Other Topics Concern    Not on file     Social History Narrative       FAMILY HISTORY    Family History   Problem Relation Age of Onset    Cancer Father 61     esophageal    Hypertension Father     Hypertension Brother     Diabetes Other      Grandmother    Arthritis-osteo Other     Other Other      Stomach problems; Father, grandfather       REVIEW OF SYSTEMS  Review of Systems   Constitutional: Negative for chills, fever and weight loss. Respiratory: Negative for shortness of breath. Cardiovascular: Negative for chest pain. Gastrointestinal: Negative for constipation. Negative for fecal incontinence   Genitourinary: Negative for dysuria. Negative for urinary incontinence   Musculoskeletal: Positive for back pain. Per HPI   Skin: Negative for rash. Neurological: Negative for dizziness, tingling, tremors, focal weakness and headaches. Endo/Heme/Allergies: Does not bruise/bleed easily. Psychiatric/Behavioral: The patient does not have insomnia. PHYSICAL EXAMINATION  Visit Vitals    /90    Pulse 67    Temp 98.1 °F (36.7 °C) (Oral)    Resp 18    Ht 6' 1\" (1.854 m)    Wt 228 lb 3.2 oz (103.5 kg)    SpO2 95%    BMI 30.11 kg/m2         Accompanied by self. Constitutional:  Well developed, well nourished, in no acute distress. Psychiatric: Affect and mood are appropriate. Integumentary: No rashes or abrasions noted on exposed areas. Cardiovascular/Peripheral Vascular: Intact l pulses. No peripheral edema is noted. Lymphatic:  No evidence of lymphedema. No cervical lymphadenopathy. SPINE/MUSCULOSKELETAL EXAM       Lumbar spine:  No rash, ecchymosis, or gross obliquity. No fasciculations. No focal atrophy is noted. TTP mild R L4/5. Good ROM. No tenderness to palpation at the sciatic notch. SI joints non-tender. Trochanters non tender. Sensation grossly intact to light touch. MOTOR:       Hip Flex  Quads Hamstrings Ankle DF EHL Ankle PF   Right +4/5 +4/5 +4/5 +4/5 +4/5 +4/5   Left +4/5 +4/5 +4/5 +4/5 +4/5 +4/5     Straight Leg raise negative. No difficulty with tandem gait. Heel walk intact. .     Ambulation without assistive device. FWB.     Written by Julieta Jones, as dictated by Lynn Simon MD.    I, Dr. Lynn iSmon MD, confirm that all documentation is accurate. Mr. Bonifacio Zelaya may have a reminder for a \"due or due soon\" health maintenance. I have asked that he contact his primary care provider for follow-up on this health maintenance.

## 2017-08-14 NOTE — PATIENT INSTRUCTIONS
Zenytime Activation    Thank you for requesting access to [employer]. Please follow the instructions below to securely access and download your online medical record. [employer] allows you to send messages to your doctor, view your test results, renew your prescriptions, schedule appointments, and more. How Do I Sign Up? 1. In your internet browser, go to [email or web address]  2. Click on the First Time User? Click Here link in the Sign In box. You will be redirect to the New Member Sign Up page. 3. Enter your Zenytime Access Code exactly as it appears below. You will not need to use this code after youve completed the sign-up process. If you do not sign up before the expiration date, you must request a new code. Zenytime Access Code: [email or web address]  Expires: 10/7/2017  2:07 PM (This is the date your Zenytime access code will )    4. Enter the last four digits of your Social Security Number (xxxx) and Date of Birth (mm/dd/yyyy) as indicated and click Submit. You will be taken to the next sign-up page. 5. Create a Zenytime ID. This will be your Zenytime login ID and cannot be changed, so think of one that is secure and easy to remember. 6. Create a Zenytime password. You can change your password at any time. 7. Enter your Password Reset Question and Answer. This can be used at a later time if you forget your password. 8. Enter your e-mail address. You will receive e-mail notification when new information is available in [address]. 9. Click Sign Up. You can now view and download portions of your medical record. 10. Click the Download Summary menu link to download a portable copy of your medical information. Additional Information    If you have questions, please visit the Frequently Asked Questions section of the Zenytime website at [email or web address] Remember, Zenytime is NOT to be used for urgent needs. For medical emergencies, dial 911.          Back Care and Preventing Injuries: Care Instructions  Your Care Instructions  You can hurt your back doing many everyday activities: lifting a heavy box, bending down to garden, exercising at the gym, and even getting out of bed. But you can keep your back strong and healthy by doing some exercises. You also can follow a few tips for sitting, sleeping, and lifting to avoid hurting your back again. Talk to your doctor before you start an exercise program. Ask for help if you want to learn more about keeping your back healthy. Follow-up care is a key part of your treatment and safety. Be sure to make and go to all appointments, and call your doctor if you are having problems. It's also a good idea to know your test results and keep a list of the medicines you take. How can you care for yourself at home? · Stay at a healthy weight to avoid strain on your lower back. · Do not smoke. Smoking increases the risk of osteoporosis, which weakens the spine. If you need help quitting, talk to your doctor about stop-smoking programs and medicines. These can increase your chances of quitting for good. · Make sure you sleep in a position that maintains your back's normal curves and on a mattress that feels comfortable. Sleep on your side with a pillow between your knees, or sleep on your back with a pillow under your knees. These positions can reduce strain on your back. · When you get out of bed, lie on your side and bend both knees. Drop your feet over the edge of the bed as you push up with both arms. Scoot to the edge of the bed. Make sure your feet are in line with your rear end (buttocks), and then stand up. · If you must stand for a long time, put one foot on a stool, ledge, or box. Exercise to strengthen your back and other muscles  · Get at least 30 minutes of exercise on most days of the week. Walking is a good choice.  You also may want to do other activities, such as running, swimming, cycling, or playing tennis or team sports. · Stretch your back muscles. Here are few exercises to try:  Kaylene Andujar on your back with your knees bent and your feet flat on the floor. Gently pull one bent knee to your chest. Put that foot back on the floor, and then pull the other knee to your chest. Hold for 15 to 30 seconds. Repeat 2 to 4 times. ¨ Do pelvic tilts. Lie on your back with your knees bent. Tighten your stomach muscles. Pull your belly button (navel) in and up toward your ribs. You should feel like your back is pressing to the floor and your hips and pelvis are slightly lifting off the floor. Hold for 6 seconds while breathing smoothly. · Keep your core muscles strong. The muscles of your back, belly (abdomen), and buttocks support your spine. ¨ Pull in your belly, and imagine pulling your navel toward your spine. Hold this for 6 seconds, then relax. Remember to keep breathing normally as you tense your muscles. ¨ Do curl-ups. Always do them with your knees bent. Keep your low back on the floor, and curl your shoulders toward your knees using a smooth, slow motion. Keep your arms folded across your chest. If this bothers your neck, try putting your hands behind your neck (not your head), with your elbows spread apart. ¨ Lie on your back with your knees bent and your feet flat on the floor. Tighten your belly muscles, and then push with your feet and raise your buttocks up a few inches. Hold this position 6 seconds as you continue to breathe normally, then lower yourself slowly to the floor. Repeat 8 to 12 times. ¨ If you like group exercise, try Pilates or yoga. These classes have poses that strengthen the core muscles. Protect your back when you sit  · Place a small pillow, a rolled-up towel, or a lumbar roll in the curve of your back if you need extra support. · Sit in a chair that is low enough to let you place both feet flat on the floor with both knees nearly level with your hips.  If your chair or desk is too high, use a foot rest to raise your knees. · When driving, keep your knees nearly level with your hips. Sit straight, and drive with both hands on the steering wheel. Your arms should be in a slightly bent position. · Try a kneeling chair, which helps tilt your hips forward. This takes pressure off your lower back. · Try sitting on an exercise ball. It can rock from side to side, which helps keep your back loose. Lift properly  · Squat down, bending at the hips and knees only. If you need to, put one knee to the floor and extend your other knee in front of you, bent at a right angle (half kneeling). · Press your chest straight forward. This helps keep your upper back straight while keeping a slight arch in your low back. · Hold the load as close to your body as possible, at the level of your navel. · Use your feet to change direction, taking small steps. · Lead with your hips as you change direction. Keep your shoulders in line with your hips as you move. Do not twist your body. · Set down your load carefully, squatting with your knees and hips only. When should you call for help? Watch closely for changes in your health, and be sure to contact your doctor if:  · You want more exercises to make your back and other core muscles stronger. Where can you learn more? Go to http://pat-cuate.info/. Enter S810 in the search box to learn more about \"Back Care and Preventing Injuries: Care Instructions. \"  Current as of: March 21, 2017  Content Version: 11.3  © 3219-2042 "[employer]". Care instructions adapted under license by [employer] (which disclaims liability or warranty for this information). If you have questions about a medical condition or this instruction, always ask your healthcare professional. [place] [address] any warranty or liability for your use of this information.

## 2017-08-15 ENCOUNTER — OFFICE VISIT (OUTPATIENT)
Dept: INTERNAL MEDICINE CLINIC | Age: 57
End: 2017-08-15

## 2017-08-15 VITALS
SYSTOLIC BLOOD PRESSURE: 126 MMHG | TEMPERATURE: 98.1 F | BODY MASS INDEX: 30.22 KG/M2 | RESPIRATION RATE: 18 BRPM | HEART RATE: 68 BPM | DIASTOLIC BLOOD PRESSURE: 76 MMHG | OXYGEN SATURATION: 97 % | HEIGHT: 73 IN | WEIGHT: 228 LBS

## 2017-08-15 DIAGNOSIS — R73.09 ABNORMAL GLUCOSE: ICD-10-CM

## 2017-08-15 DIAGNOSIS — E55.9 VITAMIN D INSUFFICIENCY: ICD-10-CM

## 2017-08-15 DIAGNOSIS — M54.41 CHRONIC RIGHT-SIDED LOW BACK PAIN WITH RIGHT-SIDED SCIATICA: ICD-10-CM

## 2017-08-15 DIAGNOSIS — I10 ESSENTIAL HYPERTENSION: Primary | ICD-10-CM

## 2017-08-15 DIAGNOSIS — I35.1 AORTIC VALVE REGURGITATION, UNSPECIFIED ETIOLOGY: ICD-10-CM

## 2017-08-15 DIAGNOSIS — E78.5 HYPERLIPIDEMIA, UNSPECIFIED HYPERLIPIDEMIA TYPE: ICD-10-CM

## 2017-08-15 DIAGNOSIS — Z12.5 SCREENING PSA (PROSTATE SPECIFIC ANTIGEN): ICD-10-CM

## 2017-08-15 DIAGNOSIS — K21.9 GASTROESOPHAGEAL REFLUX DISEASE WITHOUT ESOPHAGITIS: ICD-10-CM

## 2017-08-15 DIAGNOSIS — G89.29 CHRONIC RIGHT-SIDED LOW BACK PAIN WITH RIGHT-SIDED SCIATICA: ICD-10-CM

## 2017-08-15 DIAGNOSIS — M85.80 OSTEOPENIA DETERMINED BY X-RAY: Chronic | ICD-10-CM

## 2017-08-15 DIAGNOSIS — M51.26 LUMBAR HERNIATED DISC: Chronic | ICD-10-CM

## 2017-08-15 DIAGNOSIS — R73.03 PREDIABETES: ICD-10-CM

## 2017-08-15 NOTE — MR AVS SNAPSHOT
Visit Information Date & Time Provider Department Dept. Phone Encounter #  
 8/15/2017  9:00 AM Ananth Archibald MD Internist of [address] [place] [address] [phone] Follow-up Instructions Return in about 6 months (around 2/15/2018), or if symptoms worsen or fail to improve. Your Appointments 9/20/2017  3:00 PM  
Follow Up with Zoe Patino MD  
[facility] [place] ([address]) Appt Note: r/S  
 [facility] [address] [address]  
[phone] [place] [phone]  
  
    
 2/16/2018  8:05 AM  
LAB with [facility] SPECIAL[facility] NURSE VISIT Internist of [facility] ([address] [place] [address]) Appt Note: lab  
 [address], [address] [facility] [address] [place]  
  
   
 [address], [address]  
  
    
 2/21/2018  9:00 AM  
PHYSICAL with Ananth Archibald MD  
Internist of [address]) Appt Note: rpe bs  
 [address] [facility], [address] [facility] [address] [place]  
  
   
 [address], [address] Upcoming Health Maintenance Date Due INFLUENZA AGE 9 TO ADULT 8/1/2017 COLONOSCOPY 3/22/2022 DTaP/Tdap/Td series (3 - Td) 1/16/2024 Allergies as of 8/15/2017  Review Complete On: 8/15/2017 By: Rock Harrington Severity Noted Reaction Type Reactions Tree Nuts High 03/08/2016    Anaphylaxis Ace Inhibitors  04/19/2013    Cough Nuts [Tree Nut]  04/29/2015    Itching Current Immunizations  Reviewed on 1/28/2015 Name Date Influenza Vaccine 11/12/2014, 11/1/2013 TDAP Vaccine 1/20/2004 Td 1/1/2004 Tdap 1/16/2014 Not reviewed this visit You Were Diagnosed With   
  
 Codes Comments Aortic valve regurgitation, unspecified etiology    -  Primary ICD-10-CM: I35.1 ICD-9-CM: 424.1 Vitals BP Pulse Temp Resp Height(growth percentile) Weight(growth percentile) 126/76 68 98.1 °F (36.7 °C) (Oral) 18 6' 1\" (1.854 m) 228 lb (103.4 kg) SpO2 BMI Smoking Status 97% 30.08 kg/m2 Former Smoker Vitals History BMI and BSA Data Body Mass Index Body Surface Area 30.08 kg/m 2 2.31 m 2 Preferred Pharmacy Pharmacy Name Phone  [address] [phone] Your Updated Medication List  
  
   
This list is accurate as of: 8/15/17  9:55 AM.  Always use your most recent med list. amLODIPine 10 mg tablet Commonly known as:  Tawanda Milton TAKE 1 TABLET DAILY  
  
 ascorbic acid (vitamin C) 500 mg tablet Commonly known as:  VITAMIN C  
500 mg.  
  
 aspirin delayed-release 81 mg tablet 81 mg.  
  
 calcium carbonate 500 mg calcium (1,250 mg) tablet Commonly known as:  OS-KIARA Take  by mouth daily. Cholecalciferol (Vitamin D3) 2,000 unit Cap capsule Commonly known as:  VITAMIN D3 Take 2,000 Units by mouth daily. diazePAM 5 mg tablet Commonly known as:  VALIUM Take 1 Tab by mouth every six (6) hours as needed. Max Daily Amount: 20 mg.  
  
 diclofenac EC 75 mg EC tablet Commonly known as:  VOLTAREN Take 1 Tab by mouth two (2) times a day. fenofibrate micronized 67 mg capsule Commonly known as:  LOFIBRA Take 1 Cap by mouth daily. FISH OIL PO Take  by mouth daily. gabapentin 300 mg capsule Commonly known as:  NEURONTIN Take 1 cap po q am and 2 caps po q pm. Taper up as directed HYDROcodone-acetaminophen 5-325 mg per tablet Commonly known as:  Lottie Chou One-half to one tab po every day to bid prn severe pain  Indications: Pain  
  
 losartan 100 mg tablet Commonly known as:  COZAAR  
TAKE 1 TABLET DAILY  
  
 magnesium oxide 400 mg tablet Commonly known as:  MAG-OX Take 400 mg by mouth. valACYclovir 500 mg tablet Commonly known as:  VALTREX TAKE 1 TABLET DAILY Follow-up Instructions Return in about 6 months (around 2/15/2018), or if symptoms worsen or fail to improve. To-Do List   
 08/15/2017 ECHO:  2D ECHO COMPLETE ADULT (TTE) W OR WO CONTR Patient Instructions Learning About Diabetes Food Guidelines Your Care Instructions Meal planning is important to manage diabetes. It helps keep your blood sugar at a target level (which you set with your doctor). You don't have to eat special foods. You can eat what your family eats, including sweets once in a while. But you do have to pay attention to how often you eat and how much you eat of certain foods. You may want to work with a dietitian or a certified diabetes educator (CDE) to help you plan meals and snacks. A dietitian or CDE can also help you lose weight if that is one of your goals. What should you know about eating carbs? Managing the amount of carbohydrate (carbs) you eat is an important part of healthy meals when you have diabetes. Carbohydrate is found in many foods. · Learn which foods have carbs. And learn the amounts of carbs in different foods. ¨ Bread, cereal, pasta, and rice have about 15 grams of carbs in a serving. A serving is 1 slice of bread (1 ounce), ½ cup of cooked cereal, or 1/3 cup of cooked pasta or rice. ¨ Fruits have 15 grams of carbs in a serving. A serving is 1 small fresh fruit, such as an apple or orange; ½ of a banana; ½ cup of cooked or canned fruit; ½ cup of fruit juice; 1 cup of melon or raspberries; or 2 tablespoons of dried fruit. ¨ Milk and no-sugar-added yogurt have 15 grams of carbs in a serving. A serving is 1 cup of milk or 2/3 cup of no-sugar-added yogurt. ¨ Starchy vegetables have 15 grams of carbs in a serving. A serving is ½ cup of mashed potatoes or sweet potato; 1 cup winter squash; ½ of a small baked potato; ½ cup of cooked beans; or ½ cup cooked corn or green peas. · Learn how much carbs to eat each day and at each meal. A dietitian or CDE can teach you how to keep track of the amount of carbs you eat. This is called carbohydrate counting. · If you are not sure how to count carbohydrate grams, use the Plate Method to plan meals. It is a good, quick way to make sure that you have a balanced meal. It also helps you spread carbs throughout the day. ¨ Divide your plate by types of foods. Put non-starchy vegetables on half the plate, meat or other protein food on one-quarter of the plate, and a grain or starchy vegetable in the final quarter of the plate. To this you can add a small piece of fruit and 1 cup of milk or yogurt, depending on how many carbs you are supposed to eat at a meal. 
· Try to eat about the same amount of carbs at each meal. Do not \"save up\" your daily allowance of carbs to eat at one meal. 
· Proteins have very little or no carbs per serving. Examples of proteins are beef, chicken, turkey, fish, eggs, tofu, cheese, cottage cheese, and peanut butter. A serving size of meat is 3 ounces, which is about the size of a deck of cards. Examples of meat substitute serving sizes (equal to 1 ounce of meat) are 1/4 cup of cottage cheese, 1 egg, 1 tablespoon of peanut butter, and ½ cup of tofu. How can you eat out and still eat healthy? · Learn to estimate the serving sizes of foods that have carbohydrate. If you measure food at home, it will be easier to estimate the amount in a serving of restaurant food. · If the meal you order has too much carbohydrate (such as potatoes, corn, or baked beans), ask to have a low-carbohydrate food instead. Ask for a salad or green vegetables. · If you use insulin, check your blood sugar before and after eating out to help you plan how much to eat in the future. · If you eat more carbohydrate at a meal than you had planned, take a walk or do other exercise. This will help lower your blood sugar. What else should you know? · Limit saturated fat, such as the fat from meat and dairy products. This is a healthy choice because people who have diabetes are at higher risk of heart disease. So choose lean cuts of meat and nonfat or low-fat dairy products. Use olive or canola oil instead of butter or shortening when cooking. · Don't skip meals. Your blood sugar may drop too low if you skip meals and take insulin or certain medicines for diabetes. · Check with your doctor before you drink alcohol. Alcohol can cause your blood sugar to drop too low. Alcohol can also cause a bad reaction if you take certain diabetes medicines. Follow-up care is a key part of your treatment and safety. Be sure to make and go to all appointments, and call your doctor if you are having problems. It's also a good idea to know your test results and keep a list of the medicines you take. Where can you learn more? Go to http://pat-cuate.info/. Enter J814 in the search box to learn more about \"Learning About Diabetes Food Guidelines. \" Current as of: March 13, 2017 Content Version: 11.3 © 5029-2073 [email or web address]. Care instructions adapted under license by [employer] (which disclaims liability or warranty for this information). If you have questions about a medical condition or this instruction, always ask your healthcare professional. [address] any warranty or liability for your use of this information. Learning About Meal Planning for Diabetes Why plan your meals? Meal planning can be a key part of managing diabetes. Planning meals and snacks with the right balance of carbohydrate, protein, and fat can help you keep your blood sugar at the target level you set with your doctor. You don't have to eat special foods. You can eat what your family eats, including sweets once in a while. But you do have to pay attention to how often you eat and how much you eat of certain foods. You may want to work with a dietitian or a certified diabetes educator. He or she can give you tips and meal ideas and can answer your questions about meal planning. This health professional can also help you reach a healthy weight if that is one of your goals. What plan is right for you? Your dietitian or diabetes educator may suggest that you start with the plate format or carbohydrate counting. The plate format The plate format is a simple way to help you manage how you eat. You plan meals by learning how much space each food should take on a plate. Using the plate format helps you spread carbohydrate throughout the day. It can make it easier to keep your blood sugar level within your target range. It also helps you see if you're eating healthy portion sizes. To use the plate format, you put non-starchy vegetables on half your plate. Add meat or meat substitutes on one-quarter of the plate. Put a grain or starchy vegetable (such as brown rice or a potato) on the final quarter of the plate. You can add a small piece of fruit and some low-fat or fat-free milk or yogurt, depending on your carbohydrate goal for each meal. 
Here are some tips for using the plate format: · Make sure that you are not using an oversized plate. A 9-inch plate is best. Many restaurants use larger plates. · Get used to using the plate format at home. Then you can use it when you eat out. · Write down your questions about using the plate format. Talk to your doctor, a dietitian, or a diabetes educator about your concerns. Carbohydrate counting With carbohydrate counting, you plan meals based on the amount of carbohydrate in each food. Carbohydrate raises blood sugar higher and more quickly than any other nutrient. It is found in desserts, breads and cereals, and fruit. It's also found in starchy vegetables such as potatoes and corn, grains such as rice and pasta, and milk and yogurt.  Spreading carbohydrate throughout the day helps keep your blood sugar levels within your target range. Your daily amount depends on several things, including your weight, how active you are, which diabetes medicines you take, and what your goals are for your blood sugar levels. A registered dietitian or diabetes educator can help you plan how much carbohydrate to include in each meal and snack. A guideline for your daily amount of carbohydrate is: · 45 to 60 grams at each meal. That's about the same as 3 to 4 carbohydrate servings. · 15 to 20 grams at each snack. That's about the same as 1 carbohydrate serving. The Nutrition Facts label on packaged foods tells you how much carbohydrate is in a serving of the food. First, look at the serving size on the food label. Is that the amount you eat in a serving? All of the nutrition information on a food label is based on that serving size. So if you eat more or less than that, you'll need to adjust the other numbers. Total carbohydrate is the next thing you need to look for on the label. If you count carbohydrate servings, one serving of carbohydrate is 15 grams. For foods that don't come with labels, such as fresh fruits and vegetables, you'll need a guide that lists carbohydrate in these foods. Ask your doctor, dietitian, or diabetes educator about books or other nutrition guides you can use. If you take insulin, you need to know how many grams of carbohydrate are in a meal. This lets you know how much rapid-acting insulin to take before you eat. If you use an insulin pump, you get a constant rate of insulin during the day. So the pump must be programmed at meals to give you extra insulin to cover the rise in blood sugar after meals. When you know how much carbohydrate you will eat, you can take the right amount of insulin. Or, if you always use the same amount of insulin, you need to make sure that you eat the same amount of carbohydrate at meals. If you need more help to understand carbohydrate counting and food labels, ask your doctor, dietitian, or diabetes educator. How do you get started with meal planning? Here are some tips to get started: 
· Plan your meals a week at a time. Don't forget to include snacks too. · Use cookbooks or online recipes to plan several main meals. Plan some quick meals for busy nights. You also can double some recipes that freeze well. Then you can save half for other busy nights when you don't have time to cook. · Make sure you have the ingredients you need for your recipes. If you're running low on basic items, put these items on your shopping list too. · List foods that you use to make breakfasts, lunches, and snacks. List plenty of fruits and vegetables. · Post this list on the refrigerator. Add to it as you think of more things you need. · Take the list to the store to do your weekly shopping. Follow-up care is a key part of your treatment and safety. Be sure to make and go to all appointments, and call your doctor if you are having problems. It's also a good idea to know your test results and keep a list of the medicines you take. Where can you learn more? Go to http://pat-cuate.info/. Opal Mckeon in the search box to learn more about \"Learning About Meal Planning for Diabetes. \" Current as of: March 13, 2017 Content Version: 11.3 © 2884-1074 [employer]. Care instructions adapted under license by [employer] (which disclaims liability or warranty for this information). If you have questions about a medical condition or this instruction, always ask your healthcare professional. [place] [address] any warranty or liability for your use of this information. Hyperlipidemia: After Your Visit Your Care Instructions Hyperlipidemia is too much fat in your blood.  The body has several kinds of fat, including cholesterol and triglycerides. Your body needs fat for many things, such as making new cells. But too much fat in your blood increases your chances of having a heart attack or stroke. You may be able to lower your cholesterol and triglycerides with a heart-healthy diet, exercise, and if needed, medicine. Your doctor may want you to try lifestyle changes first to see whether they lower the fat in your blood. You may need to take medicine if lifestyle changes do not lower the fat in your blood enough. Follow-up care is a key part of your treatment and safety. Be sure to make and go to all appointments, and call your doctor if you are having problems. Its also a good idea to know your test results and keep a list of the medicines you take. How can you care for yourself at home? Take your medicines · Take your medicines exactly as prescribed. Call your doctor if you think you are having a problem with your medicine. · If you take medicine to lower your cholesterol, go to follow-up visits. You will need to have blood tests. · Do not take large doses of niacin, which is a B vitamin, while taking medicine called statins. It may increase the chance of muscle pain and liver problems. · Talk to your doctor about avoiding grapefruit juice if you are taking statins. Grapefruit juice can raise the level of this medicine in your blood. This could increase side effects. Eat more fruits, vegetables, and fiber · Fruits and vegetables have lots of nutrients that help protect against heart disease, and they have littleif anyfat. Try to eat at least five servings a day. Dark green, deep orange, or yellow fruits and vegetables are healthy choices. · Keep carrots, celery, and other veggies handy for snacks. Buy fruit that is in season and store it where you can see it so that you will be tempted to eat it. Cook dishes that have a lot of veggies in them, such as stir-fries and soups. · Foods high in fiber may reduce your cholesterol and provide important vitamins and minerals. High-fiber foods include whole-grain cereals and breads, oatmeal, beans, brown rice, citrus fruits, and apples. · Buy whole-grain breads and cereals instead of white bread and pastries. Limit saturated fat · Read food labels and try to avoid saturated fat and trans fat. They increase your risk of heart disease. · Use olive or canola oil when you cook. Try cholesterol-lowering spreads, such as Benecol or Take Control. · Bake, broil, grill, or steam foods instead of frying them. · Limit the amount of high-fat meats you eat, including hot dogs and sausages. Cut out all visible fat when you prepare meat. · Eat fish, skinless poultry, and soy products such as tofu instead of high-fat meats. Soybeans may be especially good for your heart. Eat at least two servings of fish a week. Certain fish, such as salmon, contain omega-3 fatty acids, which may help reduce your risk of heart attack. · Choose low-fat or fat-free milk and dairy products. Get exercise, limit alcohol, and quit smoking · Get more exercise. Work with your doctor to set up an exercise program. Even if you can do only a small amount, exercise will help you get stronger, have more energy, and manage your weight and your stress. Walking is an easy way to get exercise. Gradually increase the amount you walk every day. Aim for at least 30 minutes on most days of the week. You also may want to swim, bike, or do other activities. · Limit alcohol to no more than 2 drinks a day for men and 1 drink a day for women. · Do not smoke. If you need help quitting, talk to your doctor about stop-smoking programs and medicines. These can increase your chances of quitting for good. When should you call for help? Call 911 anytime you think you may need emergency care. For example, call if: 
· You have symptoms of a heart attack. These may include: ¨ Chest pain or pressure, or a strange feeling in the chest. 
¨ Sweating. ¨ Shortness of breath. ¨ Nausea or vomiting. ¨ Pain, pressure, or a strange feeling in the back, neck, jaw, or upper belly or in one or both shoulders or arms. ¨ Lightheadedness or sudden weakness. ¨ A fast or irregular heartbeat. After you call 911, the  may tell you to chew 1 adult-strength or 2 to 4 low-dose aspirin. Wait for an ambulance. Do not try to drive yourself. · You have signs of a stroke. These may include: 
¨ Sudden numbness, paralysis, or weakness in your face, arm, or leg, especially on only one side of your body. ¨ New problems with walking or balance. ¨ Sudden vision changes. ¨ Drooling or slurred speech. ¨ New problems speaking or understanding simple statements, or feeling confused. ¨ A sudden, severe headache that is different from past headaches. · You passed out (lost consciousness). Call your doctor now or seek immediate medical care if: 
· You have muscle pain or weakness. Watch closely for changes in your health, and be sure to contact your doctor if: 
· You are very tired. · You have an upset stomach, gas, constipation, or belly pain or cramps. Where can you learn more? Go to Offline Media.be Enter C406 in the search box to learn more about \"Hyperlipidemia: After Your Visit. \"  
© 4099-0774 Healthwise, Incorporated. Care instructions adapted under license by Jaydon Hernandez (which disclaims liability or warranty for this information). This care instruction is for use with your licensed healthcare professional. If you have questions about a medical condition or this instruction, always ask your healthcare professional. [place] [address] any warranty or liability for your use of this information. Content Version: 6.3.369002; Last Revised: October 13, 2011 Introducing [facility] SERVICES! [address] [facility] introduces Doctor kinetic patient portal. Now you can access parts of your medical record, email your doctor's office, and request medication refills online. 1. In your internet browser, go to [email or web address] 2. Click on the First Time User? Click Here link in the Sign In box. You will see the New Member Sign Up page. 3. Enter your Doctor kinetic Access Code exactly as it appears below. You will not need to use this code after youve completed the sign-up process. If you do not sign up before the expiration date, you must request a new code. · Doctor kinetic Access Code: [email or web address] Expires: 10/7/2017  2:07 PM 
 
4. Enter the last four digits of your Social Security Number (xxxx) and Date of Birth (mm/dd/yyyy) as indicated and click Submit. You will be taken to the next sign-up page. 5. Create a Doctor kinetic ID. This will be your Doctor kinetic login ID and cannot be changed, so think of one that is secure and easy to remember. 6. Create a Doctor kinetic password. You can change your password at any time. 7. Enter your Password Reset Question and Answer. This can be used at a later time if you forget your password. 8. Enter your e-mail address. You will receive e-mail notification when new information is available in [address] 9. Click Sign Up. You can now view and download portions of your medical record. 10. Click the Download Summary menu link to download a portable copy of your medical information. If you have questions, please visit the Frequently Asked Questions section of the Doctor kinetic website. Remember, Doctor kinetic is NOT to be used for urgent needs. For medical emergencies, dial 911. Now available from your iPhone and Android! Please provide this summary of care documentation to your next provider. Your primary care clinician is listed as Tejal Stevens. If you have any questions after today's visit, please call [phone].

## 2017-08-15 NOTE — PATIENT INSTRUCTIONS
Learning About Diabetes Food Guidelines  Your Care Instructions  Meal planning is important to manage diabetes. It helps keep your blood sugar at a target level (which you set with your doctor). You don't have to eat special foods. You can eat what your family eats, including sweets once in a while. But you do have to pay attention to how often you eat and how much you eat of certain foods. You may want to work with a dietitian or a certified diabetes educator (CDE) to help you plan meals and snacks. A dietitian or CDE can also help you lose weight if that is one of your goals. What should you know about eating carbs? Managing the amount of carbohydrate (carbs) you eat is an important part of healthy meals when you have diabetes. Carbohydrate is found in many foods. · Learn which foods have carbs. And learn the amounts of carbs in different foods. ¨ Bread, cereal, pasta, and rice have about 15 grams of carbs in a serving. A serving is 1 slice of bread (1 ounce), ½ cup of cooked cereal, or 1/3 cup of cooked pasta or rice. ¨ Fruits have 15 grams of carbs in a serving. A serving is 1 small fresh fruit, such as an apple or orange; ½ of a banana; ½ cup of cooked or canned fruit; ½ cup of fruit juice; 1 cup of melon or raspberries; or 2 tablespoons of dried fruit. ¨ Milk and no-sugar-added yogurt have 15 grams of carbs in a serving. A serving is 1 cup of milk or 2/3 cup of no-sugar-added yogurt. ¨ Starchy vegetables have 15 grams of carbs in a serving. A serving is ½ cup of mashed potatoes or sweet potato; 1 cup winter squash; ½ of a small baked potato; ½ cup of cooked beans; or ½ cup cooked corn or green peas. · Learn how much carbs to eat each day and at each meal. A dietitian or CDE can teach you how to keep track of the amount of carbs you eat. This is called carbohydrate counting. · If you are not sure how to count carbohydrate grams, use the Plate Method to plan meals.  It is a good, quick way to make sure that you have a balanced meal. It also helps you spread carbs throughout the day. ¨ Divide your plate by types of foods. Put non-starchy vegetables on half the plate, meat or other protein food on one-quarter of the plate, and a grain or starchy vegetable in the final quarter of the plate. To this you can add a small piece of fruit and 1 cup of milk or yogurt, depending on how many carbs you are supposed to eat at a meal.  · Try to eat about the same amount of carbs at each meal. Do not \"save up\" your daily allowance of carbs to eat at one meal.  · Proteins have very little or no carbs per serving. Examples of proteins are beef, chicken, turkey, fish, eggs, tofu, cheese, cottage cheese, and peanut butter. A serving size of meat is 3 ounces, which is about the size of a deck of cards. Examples of meat substitute serving sizes (equal to 1 ounce of meat) are 1/4 cup of cottage cheese, 1 egg, 1 tablespoon of peanut butter, and ½ cup of tofu. How can you eat out and still eat healthy? · Learn to estimate the serving sizes of foods that have carbohydrate. If you measure food at home, it will be easier to estimate the amount in a serving of restaurant food. · If the meal you order has too much carbohydrate (such as potatoes, corn, or baked beans), ask to have a low-carbohydrate food instead. Ask for a salad or green vegetables. · If you use insulin, check your blood sugar before and after eating out to help you plan how much to eat in the future. · If you eat more carbohydrate at a meal than you had planned, take a walk or do other exercise. This will help lower your blood sugar. What else should you know? · Limit saturated fat, such as the fat from meat and dairy products. This is a healthy choice because people who have diabetes are at higher risk of heart disease. So choose lean cuts of meat and nonfat or low-fat dairy products. Use olive or canola oil instead of butter or shortening when cooking.   · Don't skip meals. Your blood sugar may drop too low if you skip meals and take insulin or certain medicines for diabetes. · Check with your doctor before you drink alcohol. Alcohol can cause your blood sugar to drop too low. Alcohol can also cause a bad reaction if you take certain diabetes medicines. Follow-up care is a key part of your treatment and safety. Be sure to make and go to all appointments, and call your doctor if you are having problems. It's also a good idea to know your test results and keep a list of the medicines you take. Where can you learn more? Go to http://pat-cuate.info/. Enter J181 in the search box to learn more about \"Learning About Diabetes Food Guidelines. \"  Current as of: March 13, 2017  Content Version: 11.3  © 5953-0244 [employer]. Care instructions adapted under license by [employer] (which disclaims liability or warranty for this information). If you have questions about a medical condition or this instruction, always ask your healthcare professional. [address] any warranty or liability for your use of this information. Learning About Meal Planning for Diabetes  Why plan your meals? Meal planning can be a key part of managing diabetes. Planning meals and snacks with the right balance of carbohydrate, protein, and fat can help you keep your blood sugar at the target level you set with your doctor. You don't have to eat special foods. You can eat what your family eats, including sweets once in a while. But you do have to pay attention to how often you eat and how much you eat of certain foods. You may want to work with a dietitian or a certified diabetes educator. He or she can give you tips and meal ideas and can answer your questions about meal planning. This health professional can also help you reach a healthy weight if that is one of your goals. What plan is right for you?   Your dietitian or diabetes educator may suggest that you start with the plate format or carbohydrate counting. The plate format  The plate format is a simple way to help you manage how you eat. You plan meals by learning how much space each food should take on a plate. Using the plate format helps you spread carbohydrate throughout the day. It can make it easier to keep your blood sugar level within your target range. It also helps you see if you're eating healthy portion sizes. To use the plate format, you put non-starchy vegetables on half your plate. Add meat or meat substitutes on one-quarter of the plate. Put a grain or starchy vegetable (such as brown rice or a potato) on the final quarter of the plate. You can add a small piece of fruit and some low-fat or fat-free milk or yogurt, depending on your carbohydrate goal for each meal.  Here are some tips for using the plate format:  · Make sure that you are not using an oversized plate. A 9-inch plate is best. Many restaurants use larger plates. · Get used to using the plate format at home. Then you can use it when you eat out. · Write down your questions about using the plate format. Talk to your doctor, a dietitian, or a diabetes educator about your concerns. Carbohydrate counting  With carbohydrate counting, you plan meals based on the amount of carbohydrate in each food. Carbohydrate raises blood sugar higher and more quickly than any other nutrient. It is found in desserts, breads and cereals, and fruit. It's also found in starchy vegetables such as potatoes and corn, grains such as rice and pasta, and milk and yogurt. Spreading carbohydrate throughout the day helps keep your blood sugar levels within your target range. Your daily amount depends on several things, including your weight, how active you are, which diabetes medicines you take, and what your goals are for your blood sugar levels.  A registered dietitian or diabetes educator can help you plan how much carbohydrate to include in each meal and snack. A guideline for your daily amount of carbohydrate is:  · 45 to 60 grams at each meal. That's about the same as 3 to 4 carbohydrate servings. · 15 to 20 grams at each snack. That's about the same as 1 carbohydrate serving. The Nutrition Facts label on packaged foods tells you how much carbohydrate is in a serving of the food. First, look at the serving size on the food label. Is that the amount you eat in a serving? All of the nutrition information on a food label is based on that serving size. So if you eat more or less than that, you'll need to adjust the other numbers. Total carbohydrate is the next thing you need to look for on the label. If you count carbohydrate servings, one serving of carbohydrate is 15 grams. For foods that don't come with labels, such as fresh fruits and vegetables, you'll need a guide that lists carbohydrate in these foods. Ask your doctor, dietitian, or diabetes educator about books or other nutrition guides you can use. If you take insulin, you need to know how many grams of carbohydrate are in a meal. This lets you know how much rapid-acting insulin to take before you eat. If you use an insulin pump, you get a constant rate of insulin during the day. So the pump must be programmed at meals to give you extra insulin to cover the rise in blood sugar after meals. When you know how much carbohydrate you will eat, you can take the right amount of insulin. Or, if you always use the same amount of insulin, you need to make sure that you eat the same amount of carbohydrate at meals. If you need more help to understand carbohydrate counting and food labels, ask your doctor, dietitian, or diabetes educator. How do you get started with meal planning? Here are some tips to get started:  · Plan your meals a week at a time. Don't forget to include snacks too. · Use cookbooks or online recipes to plan several main meals. Plan some quick meals for busy nights. You also can double some recipes that freeze well. Then you can save half for other busy nights when you don't have time to cook. · Make sure you have the ingredients you need for your recipes. If you're running low on basic items, put these items on your shopping list too. · List foods that you use to make breakfasts, lunches, and snacks. List plenty of fruits and vegetables. · Post this list on the refrigerator. Add to it as you think of more things you need. · Take the list to the store to do your weekly shopping. Follow-up care is a key part of your treatment and safety. Be sure to make and go to all appointments, and call your doctor if you are having problems. It's also a good idea to know your test results and keep a list of the medicines you take. Where can you learn more? Go to http://patNotizzacuate.info/. Maximo Gee in the search box to learn more about \"Learning About Meal Planning for Diabetes. \"  Current as of: March 13, 2017  Content Version: 11.3  © 5188-1968 [employer]. Care instructions adapted under license by [employer] (which disclaims liability or warranty for this information). If you have questions about a medical condition or this instruction, always ask your healthcare professional. [place] [address] any warranty or liability for your use of this information. Hyperlipidemia: After Your Visit  Your Care Instructions  Hyperlipidemia is too much fat in your blood. The body has several kinds of fat, including cholesterol and triglycerides. Your body needs fat for many things, such as making new cells. But too much fat in your blood increases your chances of having a heart attack or stroke. You may be able to lower your cholesterol and triglycerides with a heart-healthy diet, exercise, and if needed, medicine. Your doctor may want you to try lifestyle changes first to see whether they lower the fat in your blood.  You may need to take medicine if lifestyle changes do not lower the fat in your blood enough. Follow-up care is a key part of your treatment and safety. Be sure to make and go to all appointments, and call your doctor if you are having problems. Its also a good idea to know your test results and keep a list of the medicines you take. How can you care for yourself at home? Take your medicines  · Take your medicines exactly as prescribed. Call your doctor if you think you are having a problem with your medicine. · If you take medicine to lower your cholesterol, go to follow-up visits. You will need to have blood tests. · Do not take large doses of niacin, which is a B vitamin, while taking medicine called statins. It may increase the chance of muscle pain and liver problems. · Talk to your doctor about avoiding grapefruit juice if you are taking statins. Grapefruit juice can raise the level of this medicine in your blood. This could increase side effects. Eat more fruits, vegetables, and fiber  · Fruits and vegetables have lots of nutrients that help protect against heart disease, and they have little--if any--fat. Try to eat at least five servings a day. Dark green, deep orange, or yellow fruits and vegetables are healthy choices. · Keep carrots, celery, and other veggies handy for snacks. Buy fruit that is in season and store it where you can see it so that you will be tempted to eat it. Cook dishes that have a lot of veggies in them, such as stir-fries and soups. · Foods high in fiber may reduce your cholesterol and provide important vitamins and minerals. High-fiber foods include whole-grain cereals and breads, oatmeal, beans, brown rice, citrus fruits, and apples. · Buy whole-grain breads and cereals instead of white bread and pastries. Limit saturated fat  · Read food labels and try to avoid saturated fat and trans fat. They increase your risk of heart disease. · Use olive or canola oil when you cook.  Try cholesterol-lowering spreads, such as Benecol or Take Control. · Bake, broil, grill, or steam foods instead of frying them. · Limit the amount of high-fat meats you eat, including hot dogs and sausages. Cut out all visible fat when you prepare meat. · Eat fish, skinless poultry, and soy products such as tofu instead of high-fat meats. Soybeans may be especially good for your heart. Eat at least two servings of fish a week. Certain fish, such as salmon, contain omega-3 fatty acids, which may help reduce your risk of heart attack. · Choose low-fat or fat-free milk and dairy products. Get exercise, limit alcohol, and quit smoking  · Get more exercise. Work with your doctor to set up an exercise program. Even if you can do only a small amount, exercise will help you get stronger, have more energy, and manage your weight and your stress. Walking is an easy way to get exercise. Gradually increase the amount you walk every day. Aim for at least 30 minutes on most days of the week. You also may want to swim, bike, or do other activities. · Limit alcohol to no more than 2 drinks a day for men and 1 drink a day for women. · Do not smoke. If you need help quitting, talk to your doctor about stop-smoking programs and medicines. These can increase your chances of quitting for good. When should you call for help? Call 911 anytime you think you may need emergency care. For example, call if:  · You have symptoms of a heart attack. These may include:  ¨ Chest pain or pressure, or a strange feeling in the chest.  ¨ Sweating. ¨ Shortness of breath. ¨ Nausea or vomiting. ¨ Pain, pressure, or a strange feeling in the back, neck, jaw, or upper belly or in one or both shoulders or arms. ¨ Lightheadedness or sudden weakness. ¨ A fast or irregular heartbeat. After you call 911, the  may tell you to chew 1 adult-strength or 2 to 4 low-dose aspirin. Wait for an ambulance. Do not try to drive yourself.   · You have signs of a stroke. These may include:  ¨ Sudden numbness, paralysis, or weakness in your face, arm, or leg, especially on only one side of your body. ¨ New problems with walking or balance. ¨ Sudden vision changes. ¨ Drooling or slurred speech. ¨ New problems speaking or understanding simple statements, or feeling confused. ¨ A sudden, severe headache that is different from past headaches. · You passed out (lost consciousness). Call your doctor now or seek immediate medical care if:  · You have muscle pain or weakness. Watch closely for changes in your health, and be sure to contact your doctor if:  · You are very tired. · You have an upset stomach, gas, constipation, or belly pain or cramps. Where can you learn more? Go to Infolinks.be  Enter C406 in the search box to learn more about \"Hyperlipidemia: After Your Visit. \"   © 0024-7074 [employer]. Care instructions adapted under license by [facility] [employer] (which disclaims liability or warranty for this information). This care instruction is for use with your licensed healthcare professional. If you have questions about a medical condition or this instruction, always ask your healthcare professional. [place] [address] any warranty or liability for your use of this information.   Content Version: 1.2.673416; Last Revised: October 13, 2011

## 2017-08-15 NOTE — PROGRESS NOTES
1. Have you been to the ER, urgent care clinic or hospitalized since your last visit? NO.     2. Have you seen or consulted any other health care providers outside of the [address] [place] [address] since your last visit (Include any pap smears or colon screening)? NO      Do you have an Advanced Directive? NO    Would you like information on Advanced Directives? NO    Health Maintenance Due   Topic Date Due    INFLUENZA AGE 9 TO ADULT  08/01/2017       Patient states he gets his influenza vaccine from his job.

## 2017-08-18 NOTE — PROGRESS NOTES
HPI:   Lluvia Gomez is a 59y.o. year old male who presents today for evaluation of hypertension, hyperlipidemia, moderate aortic insufficiency, atypical chest pain, prediabetes, GERD, osteoarthritis, lumbar radiculopathy, and chronic venous insufficiency. He reports that he is doing relatively well. He reports that he has been having increasing pain at the base of his right thumb and was evaluated by a hand specialist, Dr. Cornell Hernandez, at [address]. He states that x-rays revealed he had severe osteoarthritis, and he received a cortisone injection with some improvement. He also reports an episode while at work where he felt somewhat unsteady, described that \"his balance was off\". He states that he did not check his blood pressure, and was unsure if he was experiencing vertigo or palpitations. He denies any chest pain or shortness of breath associated with the episode. He denies any focal weakness or visual changes. He states that it only lasted for a few minutes and has not recurred. He is otherwise without complaints. He has a history of hypertension, treated with losartan and amlodipine. He does not check his blood pressure at home regularly. He also does not exercise regularly, although admits to doing yard work. He denies any shortness of breath at rest or with exertion, palpitations, lightheadedness, or edema. He does have a history of substernal chest pain that has no relation to exertion and was thought to be atypical. He presented to [facility] on 2/27/2016 with a complaint of chest pain and diaphoresis, which seemed to be relieved by aspirin and sublingual nitroglycerin in the ambulance. Evaluation included negative ECGs and troponins. He had an exercise nuclear stress test (2/29/2016) which showed normal LV function (EF 57%) and a medium sized partially reversible defect inferiorly (intermediate risk study).  On 3/1/2016, he underwent a cardiac catheterization which showed no significant epicardial coronary artery disease. He also had an echocargiogram (2/28/2016) showing mild LV septal hypertrophy, hypokinetic basal inferior wall with preserved LV function (EF 46%), mild diastolic dysfunction, and moderate aortic regurgitation with questionable dilation of the distal arch of aorta (3.5 cm). He was found to have elevated triglycerides, with lipid panel showing total cholesterol 201/ / HDL 52/ LDL 89. He was started on Fenofibrate prior to discharge on 3/1/2016. He reports that he has had no significant recurrence of chest pain since that time. He does have difficulty with mild lower extremity edema due to chronic venous insufficiency. He is followed by Dr. Monika Calvin. He has a history of prediabetes, with HbA1c ranging from 5.8-6.0 since 2012. He denies any polyuria, polydipsia, nocturia, or blurry vision, and has no history of retinopathy, neuropathy, or nephropathy. He has regular eye exams with Dr. Alan Pineda. He has a history of osteoarthritis, involving his right knee, right great toe, and lumbar spine, with recent increasing difficulty with low back pain and right sided sciatica. He underwent a lumbar MRI (12/2016) which showed progression when compared to a prior lumbar MRI from 2010. It showed prominent right paracentral L4-L5 disc extrusion with impingement of the descending right L5 nerve root and moderately severe multifactorial canal stenosis; additional prominent right paracentral L5-S1 disc protrusion with impingement of the descending right S1 nerve root; abutment of the foraminal right L5 nerve root related to moderate foramina stenosis at L5-S1; mild multifactorial canal stenosis at L3-L4 with abutment of the descending left L4 nerve root. He was evaluated by Dr. Aga Morejon, and treated with Medrol dose pack, Norco and Flexeril without significant improvement.  He was evaluated by Dr. Luis Fernando Bill in 3/2017 for possible surgical intervention, but this was deferred due to issues regarding his wife's health. He was treated with gabapentin, and was reevaluated by Dr. Gladys Beck in 8/2017, at which time he reported significant improvement. He states that he no longer required treatment with gabapentin or Norco, and is continuing to take Voltaren ER with good control of symptoms. He has a history of GERD, with symptoms well controlled with prn pantoprazole. He had a screening colonoscopy by Dr. Rea Finney in 3/2012 with removal of a benign serrated polyp (pathology shows hyperplastic and adenomatous features). He had repeat screening colonoscopy in 3/2017 by Dr Tori Olivares which was normal. Follow-up recommended for five years. He denies any abdominal pain, nausea, vomiting, melena, hematochezia, or change in bowel movements. In 5/2016, he fell at work with his chest striking the edge of a table and he sustained fractures of the anterior right 7th and 8th ribs. He had serial rib x-rays since that time showing slow interval healing, but reports that he no longer has pain related to this. He underwent a bone density scan in 10/2016 which showed evidence of osteopenia with T-scores:  femoral neck left -1.7  /right -1.5 and lumbar -1.2. He was started on calcium and Vitamin D supplementation. Past Medical History:   Diagnosis Date    Abnormal myocardial perfusion study 2/28/2016    Partially reversible inferior perfusion defect, EF 57%    AI (aortic insufficiency)     Moderate with possible bicuspid aortic valve    Chronic low back pain     Chronic venous insufficiency     Colon polyp 03/2012    Benign serrated polyp    Fracture 05/2016    rib fx's    GERD (gastroesophageal reflux disease)     History of echocardiogram 2/28/2016    EF 55%. Basal inferior hypokinesis. Gr 1 DDfx. Mod AI.       Hyperlipidemia     Hypertension     Osteoarthritis of right knee     Osteopenia     Prediabetes     Prepatellar bursitis of right knee     Recurrent genital herpes 1/16/2013    Right lumbar radiculopathy 10/2016    MRI: prominent right paracentral L4-L5 disc extrusion/impingement of descending right L5 nerve root; moderately severe canal stenosis; prominent right paracentral L5-S1 disc protrusion/ impingement of the descending right S1 nerve root; abutment of the foraminal right L5 nerve root/moderate foramina stenosis at L5-S1; mild canal stenosis at L3-L4 with abutment of the descending left L4 nerve root.  S/P cardiac catheterization 3/1/2016    Normal coronary anatomy     Past Surgical History:   Procedure Laterality Date    ENDOSCOPY, COLON, DIAGNOSTIC      HX CHOLECYSTECTOMY      HX TONSILLECTOMY       Current Outpatient Prescriptions   Medication Sig    losartan (COZAAR) 100 mg tablet TAKE 1 TABLET DAILY    calcium carbonate (OS-KIARA) 500 mg calcium (1,250 mg) tablet Take  by mouth daily.  valACYclovir (VALTREX) 500 mg tablet TAKE 1 TABLET DAILY    diclofenac EC (VOLTAREN) 75 mg EC tablet Take 1 Tab by mouth two (2) times a day.  amLODIPine (NORVASC) 10 mg tablet TAKE 1 TABLET DAILY    ascorbic acid (VITAMIN C) 500 mg tablet 500 mg.    magnesium oxide (MAG-OX) 400 mg tablet Take 400 mg by mouth.  aspirin delayed-release 81 mg tablet 81 mg.    Cholecalciferol, Vitamin D3, 2,000 unit cap Take 2,000 Units by mouth daily.  DOCOSAHEXANOIC ACID/EPA (FISH OIL PO) Take  by mouth daily.  fenofibrate micronized (LOFIBRA) 67 mg capsule Take 1 Cap by mouth daily.  gabapentin (NEURONTIN) 300 mg capsule Take 1 cap po q am and 2 caps po q pm. Taper up as directed    HYDROcodone-acetaminophen (NORCO) 5-325 mg per tablet One-half to one tab po every day to bid prn severe pain  Indications: Pain    diazepam (VALIUM) 5 mg tablet Take 1 Tab by mouth every six (6) hours as needed. Max Daily Amount: 20 mg. No current facility-administered medications for this visit. Allergies and Intolerances:    Allergies   Allergen Reactions    Tree Nuts Anaphylaxis    Ace Inhibitors Cough  Nuts [Tree Nut] Itching     Family History: His mother is alive with stage 3 ovarian cancer. His father  from esophageal cancer at age 61. His maternal aunt  at age 48 from colon cancer. He has no family history of prostate cancer. Family History   Problem Relation Age of Onset    Cancer Father 61     esophageal    Hypertension Father     Hypertension Brother     Diabetes Other      Grandmother    Arthritis-osteo Other     Other Other      Stomach problems; Father, grandfather     Social History:   He  reports that he quit smoking about 17 years ago. He has quit using smokeless tobacco. Reports that he smoked less than 0.5 ppd and stopped in . He is  and they have one son. His wife was recently diagnosed with Paget's disease of the breast, which represents a recurrence of prior breast cancer. He is employed as an  for [employer]. He drinks about a 12 pack of beer each month. History   Alcohol Use    1.0 oz/week    2 Cans of beer per week     Comment: occasionally     Immunization History:  Immunization History   Administered Date(s) Administered    Influenza Vaccine 2013, 2014    TDAP Vaccine 2004    Td 2004    Tdap 2014       Review of Systems:   As above included in HPI. Otherwise 11 point review of systems negative including constitutional, skin, HENT, eyes, respiratory, cardiovascular, gastrointestinal, genitourinary, musculoskeletal, endocrine, hematologic, allergy, and neurologic. Physical:   Vitals:   BP: 126/76  HR: 68  WT: 228 lb (103.4 kg)  BMI:  30.08 kg/m2    Exam:   Pt appears well; alert and oriented x 3; appropriate affect. HEENT: PERRLA, anicteric, oropharynx clear, no JVD, adenopathy or thyromegaly. No facial tenderness. No carotid bruits or radiated murmur. Lungs: clear to auscultation, no wheezes, rhonchi, or rales. Heart: regular rate and rhythm.  No murmur, rubs, gallops  Abdomen: soft, nontender, nondistended, normal bowel sounds, no hepatosplenomegaly or masses. Extremities: without edema. Pulses 1-2+ bilaterally. Review of Data:  Labs:  Hospital Outpatient Visit on 08/09/2017   Component Date Value Ref Range Status    LIPID PROFILE 08/09/2017        Final    Cholesterol, total 08/09/2017 202* <200 MG/DL Final    Triglyceride 08/09/2017 116  <150 MG/DL Final    HDL Cholesterol 08/09/2017 54  40 - 60 MG/DL Final    LDL, calculated 08/09/2017 124.8* 0 - 100 MG/DL Final    VLDL, calculated 08/09/2017 23.2  MG/DL Final    CHOL/HDL Ratio 08/09/2017 3.7  0 - 5.0   Final    Hemoglobin A1c 08/09/2017 6.0* 4.2 - 5.6 % Final    Est. average glucose 08/09/2017 126  mg/dL Final    Sodium 08/09/2017 139  136 - 145 mmol/L Final    Potassium 08/09/2017 4.3  3.5 - 5.5 mmol/L Final    Chloride 08/09/2017 107  100 - 108 mmol/L Final    CO2 08/09/2017 26  21 - 32 mmol/L Final    Anion gap 08/09/2017 6  3.0 - 18 mmol/L Final    Glucose 08/09/2017 100* 74 - 99 mg/dL Final    BUN 08/09/2017 16  7.0 - 18 MG/DL Final    Creatinine 08/09/2017 1.17  0.6 - 1.3 MG/DL Final    BUN/Creatinine ratio 08/09/2017 14  12 - 20   Final    GFR est AA 08/09/2017 >60  >60 ml/min/1.73m2 Final    GFR est non-AA 08/09/2017 >60  >60 ml/min/1.73m2 Final    Calcium 08/09/2017 8.6  8.5 - 10.1 MG/DL Final     Calculated 10 year ASCVD risk score:  8.6 %    Health Maintenance:  Screening:    Colorectal: colonoscopy (3/2017) normal. Dr. Merrick Keller. Due 2022.    Depression: none   DM (HbA1c/FPG): HbA1c 6.0 (8/2017)   Hepatitis C: negative (9/2016)   Falls: none    DEXA: N/A   PSA/TONI: PSA 0.4/ TONI (2/2017)   Glaucoma: regular eye exams with Dr. Ginny Malik (last 1/2017)   Smoking: none   Vitamin D: 36.7 (2/2017)   Medicare Wellness: N/A    Impression:  Patient Active Problem List   Diagnosis Code    Hyperlipidemia E78.5    GERD (gastroesophageal reflux disease) K21.9    Chronic venous insufficiency I87.2    Colon polyp K63.5    Prediabetes R73.03    Recurrent genital herpes A60.00    Vitamin D insufficiency E55.9    Aortic insufficiency, moderate I35.1    Essential hypertension I10    Primary osteoarthritis of knee M17.10    Closed fracture of multiple ribs of right side with delayed healing S22.41XG    Lumbar herniated disc  right L4/5/S1 chronic M51.26    Pain of right sacroiliac joint-episodic M53.3    Osteopenia determined by DEXA 2016, lowest T score -1.7 M85.80    Annular tear of lumbar disc M51.36    Chronic right-sided low back pain with sciatica M54.40, G89.29    Neuritis of lower extremity G57.90    Right leg weakness R29.898       Plan:    1. Hypertension. Well controlled on current regimen of losartan and amlodipine. Renal function normal with creatinine 1.17 / eGFR >60. Continue to follow. 2. Dyslipidemia. Patient no longer taking fenofibrate. Discontinued last visit given that is unclear that lowering of triglycerides decreases the risk of CAD. In addition, first line therapy for treating triglycerides < 500 would be initiation of a statin rather than a fibrate. Repeat lipid panel after stopping fibrate shows triglyceride level to be only 116. However, his calculated 10 year ASCVD risk is 8.6 %, which does place him in one of the four statin benefit groups as per new AHA/ACC guidelines (primary prevention: ASCVD risk > 7.5%). He was prescribed rosuvastatin 10 mg. However, he has been unwilling to start treatment due to concerns regarding possible side effects from statin. Emphasized importance of lifestyle modifications, including diet, exercise, and weight loss. Will repeat lipid panel at next visit to reassess. Continue to follow. 3. Aortic insufficiency, moderate with mildly dilated aortic root. Probable bicuspid valve. Needs annual echocardiograms to evaluate AV. Last 2/2016.  Also, considering thoracic CT scan if aortic root appears to progress on echocardiogram.  Patient is overdue for follow-up with Dr. Burnie Osgood and has an appointment scheduled in 9/2017. Will order repeat echocardiogram to reassess valve. 4. Prediabetes. HbA1c remains stable at 6.0. No evidence of microvascular complications. On ARB but unwilling to begin statin. Continue follow-up with  for annual eye exams. Foot exam normal 4/2017. Urine microalbumin/ creatinine ratio without evidence of microalbuminuria. Encouraged weight loss and dietary changes, particularly avoiding concentrated sweets. 5. Osteopenia. Last bone density scan 10/2016. Using femoral neck T-scores, calculated FRAX score estimates her 10 year risk of a major osteoporetic fracture at 12 % and hip fracture at 2.0 %, which are not an indication for biphosphonate treatment (>20% and >3%, respectively). Continue calcium and Vitamin D. Encouraged exercise, particularly weight bearing activities. Repeat bone density scan in 2018. 6. GERD. Continue occasional use of Protonix with good control of symptoms. Follow. 7. Lumbar radiculopathy. Progression of symptoms and disease on MRI. Responded well to conservative therapy, so no need for surgical intervention at this time. No longer requiring gabapentin or [place]. Using Voltaren ER with good control. Follow. 8. Right thumb osteoarthritis. Improved pain control with cortisone injection. Being followed by Dr. Han Buchanan. 9. Imbalance. Non specific episode of \"feeling off balance\", although difficult to qualify. No associated symptoms that could help determine cause. Discussed observing to see if it recurs and asked that he make note of details if does occur again. Follow. 10. Health maintenance. Patient receives influenza vaccine at work. Other immunizations up to date. Colonoscopy due 2022. Prostate cancer screen up to date. Continue regular eye exams. Stressed importance of weight loss. Vitamin D level low normal. Continue maintenance dose supplement.  .     Patient understands recommendations and agrees with plan.  Follow-up in 6 months.

## 2017-09-07 ENCOUNTER — HOSPITAL ENCOUNTER (OUTPATIENT)
Dept: NON INVASIVE DIAGNOSTICS | Age: 57
Discharge: HOME | End: 2017-09-07
Attending: INTERNAL MEDICINE
Payer: COMMERCIAL

## 2017-09-07 DIAGNOSIS — I35.1 AORTIC VALVE REGURGITATION, UNSPECIFIED ETIOLOGY: ICD-10-CM

## 2017-09-07 PROCEDURE — 93306 TTE W/DOPPLER COMPLETE: CPT

## 2017-09-20 ENCOUNTER — OFFICE VISIT (OUTPATIENT)
Dept: CARDIOLOGY CLINIC | Age: 57
End: 2017-09-20

## 2017-09-20 VITALS
SYSTOLIC BLOOD PRESSURE: 112 MMHG | HEIGHT: 73 IN | HEART RATE: 72 BPM | WEIGHT: 226 LBS | BODY MASS INDEX: 29.95 KG/M2 | DIASTOLIC BLOOD PRESSURE: 70 MMHG | OXYGEN SATURATION: 97 %

## 2017-09-20 DIAGNOSIS — I35.1 AORTIC VALVE REGURGITATION, UNSPECIFIED ETIOLOGY: Primary | ICD-10-CM

## 2017-09-20 DIAGNOSIS — I87.2 VENOUS INSUFFICIENCY: ICD-10-CM

## 2017-09-20 DIAGNOSIS — E78.5 HYPERLIPIDEMIA, UNSPECIFIED HYPERLIPIDEMIA TYPE: ICD-10-CM

## 2017-09-20 DIAGNOSIS — I10 ESSENTIAL HYPERTENSION: ICD-10-CM

## 2017-09-20 DIAGNOSIS — R07.9 CHEST PAIN, UNSPECIFIED TYPE: ICD-10-CM

## 2017-09-20 NOTE — PROGRESS NOTES
HISTORY OF PRESENT ILLNESS  Shelly Brown is a 59 y.o. male. Follow-up   Pertinent negatives include no chest pain, no abdominal pain, no headaches and no shortness of breath. Hypertension     Pertinent negatives include no chest pain, no abdominal pain, no headaches and no shortness of breath. Patient presents for a followup office visit. Patient was initially referred here for evaluation of worsening hypertension and substernal chest pain. He underwent a low risk stress test at that time. The patient has a history of hypertension, dyslipidemia, aortic insufficiency and possibly a bicuspid aortic valve. The patient was last hospitalized at [facility] in 2016 for an episode of acute chest pain, which was apparently relieved by sublingual nitroglycerin in the ambulance. The patient states he had been under increased amount of stress at his job. He was ruled out for myocardial infarction, but underwent a nuclear stress test which was felt to be intermediate risk with a reversible inferior perfusion defect. This led to a cardiac catheterization on March 2016  which showed normal coronary anatomy. He had preserved LV systolic function. Moderate aortic insufficiency which was unchanged. He was also found to have significantly elevated triglycerides and was started on fenofibrate during his hospital stay. Patient recently underwent a follow-up echocardiogram in September 2017 which showed a low normal left ventricular ejection fraction, EF 50-55% with moderate aortic insufficiency, mild aortic root enlargement, overall unchanged compared to the previous study. He was last seen in the office about a year and a half ago. Since last visit, his been feeling well. His biggest complaint is worsening leg swelling and pain when he is on his feet all day at at his job. He does have a history of known venous insufficiency. He denies any exertional chest pain or shortness of breath.   No orthopnea, no PND.    Past Medical History:   Diagnosis Date    Abnormal myocardial perfusion study 2/28/2016    Partially reversible inferior perfusion defect, EF 57%    AI (aortic insufficiency)     Moderate with possible bicuspid aortic valve    Chronic low back pain     Chronic venous insufficiency     Colon polyp 03/2012    Benign serrated polyp    Fracture 05/2016    rib fx's    GERD (gastroesophageal reflux disease)     History of echocardiogram 2/28/2016    EF 55%. Basal inferior hypokinesis. Gr 1 DDfx. Mod AI.  Hyperlipidemia     Hypertension     Osteoarthritis of right knee     Osteopenia     Prediabetes     Prepatellar bursitis of right knee     Recurrent genital herpes 1/16/2013    Right lumbar radiculopathy 10/2016    MRI: prominent right paracentral L4-L5 disc extrusion/impingement of descending right L5 nerve root; moderately severe canal stenosis; prominent right paracentral L5-S1 disc protrusion/ impingement of the descending right S1 nerve root; abutment of the foraminal right L5 nerve root/moderate foramina stenosis at L5-S1; mild canal stenosis at L3-L4 with abutment of the descending left L4 nerve root.  S/P cardiac catheterization 3/1/2016    Normal coronary anatomy      Current Outpatient Prescriptions   Medication Sig Dispense Refill    losartan (COZAAR) 100 mg tablet TAKE 1 TABLET DAILY 90 Tab 3    calcium carbonate (OS-KIARA) 500 mg calcium (1,250 mg) tablet Take  by mouth daily.  valACYclovir (VALTREX) 500 mg tablet TAKE 1 TABLET DAILY 90 Tab 3    diclofenac EC (VOLTAREN) 75 mg EC tablet Take 1 Tab by mouth two (2) times a day. 180 Tab 2    amLODIPine (NORVASC) 10 mg tablet TAKE 1 TABLET DAILY 90 Tab 3    ascorbic acid (VITAMIN C) 500 mg tablet 500 mg.      magnesium oxide (MAG-OX) 400 mg tablet Take 400 mg by mouth.  aspirin delayed-release 81 mg tablet 81 mg.      diazepam (VALIUM) 5 mg tablet Take 1 Tab by mouth every six (6) hours as needed.  Max Daily Amount: 20 mg. 30 Tab 0    Cholecalciferol, Vitamin D3, 2,000 unit cap Take 2,000 Units by mouth daily.  DOCOSAHEXANOIC ACID/EPA (FISH OIL PO) Take  by mouth daily. Allergies   Allergen Reactions    Tree Nuts Anaphylaxis    Ace Inhibitors Cough    Nuts [Tree Nut] Itching      Social History   Substance Use Topics    Smoking status: Former Smoker     Quit date: 6/28/2000    Smokeless tobacco: Former User    Alcohol use 1.0 oz/week     2 Cans of beer per week      Comment: occasionally               Review of Systems   Constitutional: Negative for chills, fever and weight loss. HENT: Negative for nosebleeds. Eyes: Negative for blurred vision and double vision. Respiratory: Negative for cough, shortness of breath and wheezing. Cardiovascular: Positive for leg swelling. Negative for chest pain, palpitations, orthopnea, claudication and PND. Gastrointestinal: Negative for abdominal pain, heartburn, nausea and vomiting. Genitourinary: Negative for dysuria and hematuria. Musculoskeletal: Negative for falls and myalgias. Skin: Negative for rash. Neurological: Negative for dizziness, focal weakness and headaches. Endo/Heme/Allergies: Does not bruise/bleed easily. Psychiatric/Behavioral: Negative for substance abuse. Visit Vitals    /70    Pulse 72    Ht 6' 1\" (1.854 m)    Wt 102.5 kg (226 lb)    SpO2 97%    BMI 29.82 kg/m2      Physical Exam   Constitutional: He is oriented to person, place, and time. He appears well-developed and well-nourished. HENT:   Head: Normocephalic and atraumatic. Eyes: Conjunctivae are normal.   Neck: Neck supple. No JVD present. Carotid bruit is not present. Cardiovascular: Normal rate, regular rhythm, S1 normal, S2 normal and normal pulses. Exam reveals no gallop and no S3.    Murmur heard.    Midsystolic murmur is present with a grade of 1/6  at the upper right sternal border radiating to the neck   Diastolic murmur is present with a grade of 1/6   Pulmonary/Chest: Breath sounds normal. He has no wheezes. He has no rales. Abdominal: Soft. Bowel sounds are normal. There is no tenderness. Musculoskeletal: He exhibits edema ( Trace bilateral extremity). Neurological: He is alert and oriented to person, place, and time. Skin: Skin is warm and dry. EKG:   Normal sinus rhythm,  Normal axis, normal QTc interval, Nonspecific T wave abnormality, voltage criteria for LVH, no significant change compared the previous EKG. ASSESSMENT and PLAN      Aortic insufficiency. This remains moderate in severity by echocardiogram in September 2017. This was unchanged compared to the previous study from 2016. He did have a calcified aortic valve which did appear to be bicuspid, so this will need to be followed up at least every other year, unless new symptoms arise. Mildly dilated aortic root. He may have an underlying aortopathy as well given the likelihood that he has a bicuspid aortic valve. At some point he should be evaluated with a  CT of his thorax. Hypertension. This Is now reasonably well controlled on his current regimen, Which includes amlodipine and losartan. Dyslipidemia. This is primarily secondary to elevated triglycerides. Patient has been managing this with fish oil capsules. This is followed by his PCP. Chronic lower extremity venous insufficiency. Patient is now complaining of worsening swelling and leg pain especially when he is been on his feet. I have recommended further evaluation with vascular surgery to see if he is a candidate for a venous closure procedure.     Followup in 12 months, sooner

## 2017-09-20 NOTE — PATIENT INSTRUCTIONS
Continue current medications.    If you have any further questions or concerns, please contact our office. [phone]

## 2017-09-20 NOTE — MR AVS SNAPSHOT
Visit Information Date & Time Provider Department Dept. Phone Encounter #  
 9/20/2017  3:00 PM Valorie Galindo MD [facility] [address] [id number] Your Appointments 10/18/2017  9:00 AM  
Office Visit with [facility] ([address] [place] [address]) Appt Note: IOV REFERRAL BY  [facility] FOR VENOUS INSUFF  [address] [place] [address] 090 200 [facility]  
[phone] [address]  
  
    
 2/16/2018  8:05 AM  
LAB with [facility] NURSE VISIT Internist of [facility] ([address] [place] [address]) Appt Note: lab  
 [address], [address] [place] [address] [address]  
  
   
 [address], [address]  
  
    
 2/21/2018  9:00 AM  
PHYSICAL with Ave Riggins MD  
Internist of [address]) Appt Note: rpe bs  
 [address] [facility], [facility] [address]  
  
   
 [address], [address] 9/17/2018  9:20 AM  
Follow Up with Valorie Galindo MD  
[facility] ([address] [place] [address]) Appt Note: 1 year follow up with an EKG  
 [place] [address] [place] [id number]  
[phone] 2300 [facility] [address]  [address] Upcoming Health Maintenance Date Due INFLUENZA AGE 9 TO ADULT 8/1/2017 COLONOSCOPY 3/22/2022 DTaP/Tdap/Td series (3 - Td) 1/16/2024 Allergies as of 9/20/2017  Review Complete On: 9/20/2017 By: Oanh Madrigal Severity Noted Reaction Type Reactions Tree Nuts High 03/08/2016    Anaphylaxis Ace Inhibitors  04/19/2013    Cough Nuts [Tree Nut]  04/29/2015    Itching Current Immunizations  Reviewed on 1/28/2015 Name Date Influenza Vaccine 11/12/2014, 11/1/2013 TDAP Vaccine 1/20/2004 Td 1/1/2004 Tdap 1/16/2014 Not reviewed this visit You Were Diagnosed With   
  
 Codes Comments Chest pain, unspecified type    -  Primary ICD-10-CM: R07.9 ICD-9-CM: 786.50 Venous insufficiency     ICD-10-CM: I87.2 ICD-9-CM: 459.81 Vitals BP Pulse Height(growth percentile) Weight(growth percentile) SpO2 BMI  
 112/70 72 6' 1\" (1.854 m) 226 lb (102.5 kg) 97% 29.82 kg/m2 Smoking Status Former Smoker Vitals History BMI and BSA Data Body Mass Index Body Surface Area  
 29.82 kg/m 2 2.3 m 2 Preferred Pharmacy Pharmacy Name Phone  [address] [place] [facility] [phone] Your Updated Medication List  
  
   
This list is accurate as of: 9/20/17  3:53 PM.  Always use your most recent med list. amLODIPine 10 mg tablet Commonly known as:  Sundra Amboy TAKE 1 TABLET DAILY  
  
 ascorbic acid (vitamin C) 500 mg tablet Commonly known as:  VITAMIN C  
500 mg.  
  
 aspirin delayed-release 81 mg tablet 81 mg.  
  
 calcium carbonate 500 mg calcium (1,250 mg) tablet Commonly known as:  OS-KIARA Take  by mouth daily. Cholecalciferol (Vitamin D3) 2,000 unit Cap capsule Commonly known as:  VITAMIN D3 Take 2,000 Units by mouth daily. diazePAM 5 mg tablet Commonly known as:  VALIUM Take 1 Tab by mouth every six (6) hours as needed. Max Daily Amount: 20 mg.  
  
 diclofenac EC 75 mg EC tablet Commonly known as:  VOLTAREN Take 1 Tab by mouth two (2) times a day. FISH OIL PO Take  by mouth daily. losartan 100 mg tablet Commonly known as:  COZAAR  
TAKE 1 TABLET DAILY  
  
 magnesium oxide 400 mg tablet Commonly known as:  MAG-OX Take 400 mg by mouth. valACYclovir 500 mg tablet Commonly known as:  VALTREX  
TAKE 1 TABLET DAILY We Performed the Following AMB POC EKG ROUTINE W/ 12 LEADS, INTER & REP [10927 CPT(R)] REFERRAL TO VASCULAR SURGERY [XSF082 Custom] Comments:  
 Please evaluate patient for venus insuffiencey . Referral Information Referral ID Referred By Referred To  
  
 [id number] Himanshu Cantrell MD   
   [address] [place] [address], [address] Phone: [phone] Fax: [phone] Visits Status Start Date End Date 1 New Request 9/20/17 9/20/18 If your referral has a status of pending review or denied, additional information will be sent to support the outcome of this decision. Patient Instructions Continue current medications. If you have any further questions or concerns, please contact our office. [phone] Introducing [facility] & HEALTH SERVICES! [address] introduces [employer] patient portal. Now you can access parts of your medical record, email your doctor's office, and request medication refills online. 1. In your internet browser, go to [email or web address] 2. Click on the First Time User? Click Here link in the Sign In box. You will see the New Member Sign Up page. 3. Enter your [employer] Access Code exactly as it appears below. You will not need to use this code after youve completed the sign-up process. If you do not sign up before the expiration date, you must request a new code. · [employer] Access Code: [email or web address] Expires: 10/7/2017  2:07 PM 
 
4. Enter the last four digits of your Social Security Number (xxxx) and Date of Birth (mm/dd/yyyy) as indicated and click Submit. You will be taken to the next sign-up page. 5. Create a Zaldivat ID. This will be your [employer] login ID and cannot be changed, so think of one that is secure and easy to remember. 6. Create a [employer] password. You can change your password at any time. 7. Enter your Password Reset Question and Answer. This can be used at a later time if you forget your password. 8. Enter your e-mail address. You will receive e-mail notification when new information is available in [address] 9. Click Sign Up. You can now view and download portions of your medical record. 10. Click the Download Summary menu link to download a portable copy of your medical information. If you have questions, please visit the Frequently Asked Questions section of the [employer] website. Remember, [employer] is NOT to be used for urgent needs. For medical emergencies, dial 911. Now available from your iPhone and Android! Please provide this summary of care documentation to your next provider. Your primary care clinician is listed as Orinda Habermann. If you have any questions after today's visit, please call [phone].

## 2017-09-20 NOTE — PROGRESS NOTES
1. Have you been to the ER, urgent care clinic since your last visit? Hospitalized since your last visit? no  2. Have you seen or consulted any other health care providers outside of the [address] [place] [address] since your last visit? Include any pap smears or colon screening.   no

## 2017-10-12 RX ORDER — VALACYCLOVIR HYDROCHLORIDE 500 MG/1
TABLET, FILM COATED ORAL
Qty: 90 TAB | Refills: 3 | Status: SHIPPED | OUTPATIENT
Start: 2017-10-12 | End: 2018-09-27 | Stop reason: SDUPTHER

## 2017-10-18 ENCOUNTER — OFFICE VISIT (OUTPATIENT)
Dept: VASCULAR SURGERY | Age: 57
End: 2017-10-18

## 2017-10-18 VITALS
HEIGHT: 73 IN | WEIGHT: 226 LBS | BODY MASS INDEX: 29.95 KG/M2 | DIASTOLIC BLOOD PRESSURE: 84 MMHG | HEART RATE: 76 BPM | SYSTOLIC BLOOD PRESSURE: 140 MMHG

## 2017-10-18 DIAGNOSIS — Z87.891 HISTORY OF TOBACCO ABUSE: ICD-10-CM

## 2017-10-18 DIAGNOSIS — I87.2 VENOUS (PERIPHERAL) INSUFFICIENCY: Primary | ICD-10-CM

## 2017-10-18 DIAGNOSIS — I87.2 VENOUS (PERIPHERAL) INSUFFICIENCY: ICD-10-CM

## 2017-10-18 DIAGNOSIS — I83.813 VARICOSE VEINS OF BOTH LOWER EXTREMITIES WITH PAIN: ICD-10-CM

## 2017-10-18 DIAGNOSIS — I10 ESSENTIAL HYPERTENSION: ICD-10-CM

## 2017-10-18 NOTE — MR AVS SNAPSHOT
Visit Information Date & Time Provider Department Dept. Phone Encounter #  
 10/18/2017  9:00 AM TANK [facility] [phone] [id number] Your Appointments 10/18/2017 10:00 AM  
PROCEDURE with [facility] IMAGING 2 Ozzie Secag[facility]in [facility] ([facility]) Appt Note: reflux wild [address] [facility]  
[phone] 1212 [facility] [address] [facility]  
  
    
 10/25/2017 11:15 AM  
Follow Up with Fransico Stringer [facility] ([facility]) Appt Note: follow up after reflux [address] 200 [facility]  
[phone] 1212 [facility] 47 [facility]  
  
    
 2/16/2018  8:05 AM  
LAB with [facility] NURSE VISIT Internists of [facility] ([facility]) Appt Note: lab  
 [address], [address] [facility] [address]  
  
   
 [address], [address]  
  
    
 2/21/2018  9:00 AM  
PHYSICAL with Alfreda Haas MD  
Internists of [facility] Appt Note: rpe bs  
 [address] [facility], [address] [address] [address]  
  
   
 [address], [address] 9/17/2018  9:20 AM  
Follow Up with Tone Peoples MD  
[facility] ([facility]) Appt Note: 1 year follow up with an EKG  
 [facility] [address] [address] [address] [phone] [address] [place] [address] [address] Upcoming Health Maintenance Date Due INFLUENZA AGE 9 TO ADULT 8/1/2017 COLONOSCOPY 3/22/2022 DTaP/Tdap/Td series (3 - Td) 1/16/2024 Allergies as of 10/18/2017  Review Complete On: 10/18/2017 By: Perry Araiza LPN Severity Noted Reaction Type Reactions Tree Nuts High 03/08/2016    Anaphylaxis Ace Inhibitors  04/19/2013    Cough Nuts [Tree Nut]  04/29/2015    Itching Current Immunizations  Reviewed on 1/28/2015 Name Date Influenza Vaccine 11/12/2014, 11/1/2013 TDAP Vaccine 1/20/2004 Td 1/1/2004 Tdap 1/16/2014 Not reviewed this visit You Were Diagnosed With   
  
 Codes Comments Venous (peripheral) insufficiency    -  Primary ICD-10-CM: K21.3 ICD-9-CM: 459.81 Varicose veins of both lower extremities with pain     ICD-10-CM: L85.706 ICD-9-CM: 454.8 Vitals BP Pulse Height(growth percentile) Weight(growth percentile) BMI Smoking Status 140/84 (BP 1 Location: Right arm, BP Patient Position: Sitting) 76 6' 1\" (1.854 m) 226 lb (102.5 kg) 29.82 kg/m2 Former Smoker BMI and BSA Data Body Mass Index Body Surface Area  
 29.82 kg/m 2 2.3 m 2 Preferred Pharmacy Pharmacy Name Phone  [address] [phone] Your Updated Medication List  
  
   
This list is accurate as of: 10/18/17  9:52 AM.  Always use your most recent med list. amLODIPine 10 mg tablet Commonly known as:  Tam Escobar TAKE 1 TABLET DAILY  
  
 ascorbic acid (vitamin C) 500 mg tablet Commonly known as:  VITAMIN C  
500 mg.  
  
 aspirin delayed-release 81 mg tablet 81 mg.  
  
 calcium carbonate 500 mg calcium (1,250 mg) tablet Commonly known as:  OS-KIARA Take  by mouth daily. Cholecalciferol (Vitamin D3) 2,000 unit Cap capsule Commonly known as:  VITAMIN D3 Take 2,000 Units by mouth daily. diazePAM 5 mg tablet Commonly known as:  VALIUM Take 1 Tab by mouth every six (6) hours as needed. Max Daily Amount: 20 mg.  
  
 diclofenac EC 75 mg EC tablet Commonly known as:  VOLTAREN Take 1 Tab by mouth two (2) times a day. FISH OIL PO Take  by mouth daily. losartan 100 mg tablet Commonly known as:  COZAAR  
TAKE 1 TABLET DAILY magnesium oxide 400 mg tablet Commonly known as:  MAG-OX Take 400 mg by mouth. valACYclovir 500 mg tablet Commonly known as:  VALTREX  
TAKE 1 TABLET DAILY To-Do List   
 11/03/2017 Imaging:  DUPLEX LOWER EXT VENOUS BILAT AMB [facility] & HEALTH SERVICES! [facility] introduces [facility] [employer] patient portal. Now you can access parts of your medical record, email your doctor's office, and request medication refills online. 1. In your internet browser, go to [email or web address] 2. Click on the First Time User? Click Here link in the Sign In box. You will see the New Member Sign Up page. 3. Enter your [facility] [employer] Access Code exactly as it appears below. You will not need to use this code after youve completed the sign-up process. If you do not sign up before the expiration date, you must request a new code. · [facility] [employer] Access Code: [email or web address] Expires: 1/16/2018  9:12 AM 
 
4. Enter the last four digits of your Social Security Number (xxxx) and Date of Birth (mm/dd/yyyy) as indicated and click Submit. You will be taken to the next sign-up page. 5. Create a [facility] [employer] ID. This will be your [facility] [employer] login ID and cannot be changed, so think of one that is secure and easy to remember. 6. Create a [facility] [employer] password. You can change your password at any time. 7. Enter your Password Reset Question and Answer. This can be used at a later time if you forget your password. 8. Enter your e-mail address. You will receive e-mail notification when new information is available in [address] 9. Click Sign Up. You can now view and download portions of your medical record. 10. Click the Download Summary menu link to download a portable copy of your medical information. If you have questions, please visit the Frequently Asked Questions section of the [facility] [employer] website. Remember, [facility] [employer] is NOT to be used for urgent needs. For medical emergencies, dial 911. Now available from your iPhone and Android! Please provide this summary of care documentation to your next provider. Your primary care clinician is listed as Mar Decker. If you have any questions after today's visit, please call [phone].

## 2017-10-18 NOTE — PROCEDURES
[facility]   *** FINAL REPORT ***    Name: Melissa Bunn  MRN: [id number]       Outpatient  : 1960  HIS Order #: [id number]  [address] [facility] Visit #: [id number]  Date: 18 Oct 2017    TYPE OF TEST: Peripheral Venous Testing    REASON FOR TEST  Varicose veins, Venous Insufficiency    Right Leg:-  Deep venous thrombosis:           No  Superficial venous thrombosis:    No  Deep venous insufficiency:        Yes  Superficial venous insufficiency: Yes    Left Leg:-  Deep venous thrombosis:           No  Superficial venous thrombosis:    No  Deep venous insufficiency:        Yes  Superficial venous insufficiency: Yes    Abnormal Valve Closure Times (seconds):    Right Common Femoral: 2.2    Right Femoral:        1.0    Right GSV mid:        3.8    Right GSV distal:     3.9    Left Common Femoral:  2.0    Left GSV mid:         4.7    Left GSV distal:      6.0      INTERPRETATION/FINDINGS  Duplex images were obtained using 2-D gray scale, color flow and  spectral doppler analysis. 1. No evidence of deep venous thrombosis identified in the common  femoral, femoral, profunda, popliteal, posterior tibial or peroneal  veins bilaterally. 2. No reflux detected in the bilateral sapheno-femoral junction in  reverse trendelenburg. 3. Reflux detected in the thigh level of the bilateral great saphenous   veins. 4. No reflux detected in the sapheno-popliteal junction in reverse  trendelenburg. No reflux detected in the remaining levels. 5. Reflux of 0.7 sec noted in a right mid calf  measuring  3.6 mm.  6. Triphasic doppler signals in the tibial vessel at rest.    ADDITIONAL COMMENTS    I have personally reviewed the data relevant to the interpretation of  this  study. TECHNOLOGIST: Arch Homans [place], RVT, ESTELA  Signed: 10/18/2017 11:19 AM    PHYSICIAN: Candie Navarro MD  Signed: 10/18/2017 02:40 PM

## 2017-10-18 NOTE — PROGRESS NOTES
Rodney Duffy    Chief Complaint   Patient presents with    Leg Pain       HPI    Rodney Duffy is a 59 y.o. male who presents to the office today for varicose veins. Patient states that since teenage years he has struggled with varicose veins in his legs bilaterally. He states that he works at [employer] and is on his feet for 12 hours at a time. He has been wearing compression stockings for 22 years. He does also elevate his legs as often as possible throughout the day. He does state that the varicosities will ache and throb and he does take Voltaren to try and help with his discomfort. He did have venous reflux studies in 2009 which showed reflux in the bilateral greater saphenous veins. He states that his wife became ill with cancer at that time and he never underwent any type of therapy or treatment for his veins. He denies any history of DVT. He does report some swelling in his feet and ankles mostly at the end of the day. This does seem to resolve with elevation. He also has some chronic skin changes to his lower legs in the sock region. He denies any history of ulcers. No fevers or chills. He does not describe any claudication type symptoms or rest pain. Patient has no personal history of diabetes. He is a former smoker. Past Medical History:   Diagnosis Date    Abnormal myocardial perfusion study 2/28/2016    Partially reversible inferior perfusion defect, EF 57%    AI (aortic insufficiency)     Moderate with possible bicuspid aortic valve    Chronic low back pain     Chronic venous insufficiency     Colon polyp 03/2012    Benign serrated polyp    Fracture 05/2016    rib fx's    GERD (gastroesophageal reflux disease)     History of echocardiogram 2/28/2016    EF 55%. Basal inferior hypokinesis. Gr 1 DDfx. Mod AI.       Hyperlipidemia     Hypertension     Osteoarthritis of right knee     Osteopenia     Prediabetes     Prepatellar bursitis of right knee     Recurrent genital herpes 1/16/2013    Right lumbar radiculopathy 10/2016    MRI: prominent right paracentral L4-L5 disc extrusion/impingement of descending right L5 nerve root; moderately severe canal stenosis; prominent right paracentral L5-S1 disc protrusion/ impingement of the descending right S1 nerve root; abutment of the foraminal right L5 nerve root/moderate foramina stenosis at L5-S1; mild canal stenosis at L3-L4 with abutment of the descending left L4 nerve root.  S/P cardiac catheterization 3/1/2016    Normal coronary anatomy     Patient Active Problem List   Diagnosis Code    Hyperlipidemia E78.5    GERD (gastroesophageal reflux disease) K21.9    Chronic venous insufficiency I87.2    Colon polyp K63.5    Prediabetes R73.03    Recurrent genital herpes A60.00    Vitamin D insufficiency E55.9    Aortic insufficiency, moderate I35.1    Essential hypertension I10    Primary osteoarthritis of knee M17.10    Closed fracture of multiple ribs of right side with delayed healing S22.41XG    Lumbar herniated disc  right L4/5/S1 chronic M51.26    Pain of right sacroiliac joint-episodic M53.3    Osteopenia determined by DEXA 2016, lowest T score -1.7 M85.80    Annular tear of lumbar disc M51.36    Chronic right-sided low back pain with sciatica M54.40, G89.29    Neuritis of lower extremity G57.90    Right leg weakness R29.898     Past Surgical History:   Procedure Laterality Date    ENDOSCOPY, COLON, DIAGNOSTIC      HX CHOLECYSTECTOMY      HX TONSILLECTOMY       Current Outpatient Prescriptions   Medication Sig Dispense Refill    valACYclovir (VALTREX) 500 mg tablet TAKE 1 TABLET DAILY 90 Tab 3    losartan (COZAAR) 100 mg tablet TAKE 1 TABLET DAILY 90 Tab 3    calcium carbonate (OS-KIARA) 500 mg calcium (1,250 mg) tablet Take  by mouth daily.  diclofenac EC (VOLTAREN) 75 mg EC tablet Take 1 Tab by mouth two (2) times a day.  180 Tab 2    amLODIPine (NORVASC) 10 mg tablet TAKE 1 TABLET DAILY 90 Tab 3    ascorbic acid (VITAMIN C) 500 mg tablet 500 mg.      magnesium oxide (MAG-OX) 400 mg tablet Take 400 mg by mouth.  aspirin delayed-release 81 mg tablet 81 mg.      diazepam (VALIUM) 5 mg tablet Take 1 Tab by mouth every six (6) hours as needed. Max Daily Amount: 20 mg. 30 Tab 0    Cholecalciferol, Vitamin D3, 2,000 unit cap Take 2,000 Units by mouth daily.  DOCOSAHEXANOIC ACID/EPA (FISH OIL PO) Take  by mouth daily. Allergies   Allergen Reactions    Tree Nuts Anaphylaxis    Ace Inhibitors Cough    Nuts Nguyễn Tony Nut] Itching     Social History     Social History    Marital status:      Spouse name: N/A    Number of children: N/A    Years of education: N/A     Occupational History    Not on file. Social History Main Topics    Smoking status: Former Smoker     Quit date: 6/28/2000    Smokeless tobacco: Former User    Alcohol use 1.0 oz/week     2 Cans of beer per week      Comment: occasionally    Drug use: No    Sexual activity: Not on file     Other Topics Concern    Not on file     Social History Narrative      Family History   Problem Relation Age of Onset    Cancer Father 61     esophageal    Hypertension Father     Hypertension Brother     Diabetes Other      Grandmother    Arthritis-osteo Other     Other Other      Stomach problems;  Father, grandfather       Review of Systems    Constitutional: negative   HEENT: negative   Respiratory: negative   Cardiovascular: negative   Gastrointestinal: negative   Genitourinary:negative   Hematologic/lymphatic: negative   Musculoskeletal: Positive for bilateral lower extremity varicose veins which are painful, leg swelling  Neurological: negative   Behavioral/Psych: negative   Endocrine: negative   Allergic/Immunologic: negative      Physical Exam:    Visit Vitals    /84 (BP 1 Location: Right arm, BP Patient Position: Sitting)    Pulse 76    Ht 6' 1\" (1.854 m)    Wt 226 lb (102.5 kg)    BMI 29.82 kg/m2 General: Well-appearing male in no acute distress   HEENT: EOMI, no scleral icterus is noted. No carotid bruits are heard bilaterally   Cardiovascular: Regular rhythm normal S1-S2 no rubs murmurs or gallops   Pulmonary: No increased work or breathing is noted. Clear to auscultation bilaterally. No wheeze, rales or rhonchi. Abdomen: Obese, nondistended. Extremities: Warm and well perfused bilaterally. Pt has no significant bilateral lower extremity edema on exam today. There are torturous varicosities in the bilateral lower extremities extending from his distal thigh to his feet. He does have some hemosiderin staining to the legs and feet bilaterally. Neuro: Cranial nerves II through XII are grossly intact   Integument: No ulcerations are identified visibly      Impression and Plan:  Bharat Valdes is a 59 y.o. male with chronic venous insufficiency and painful varicose veins of the bilateral legs. He does also get some swelling in the foot and ankle regions. He did have ultrasounds done in 2009 which were positive for bilateral greater saphenous vein reflux. Discussed that we will obtain new venous reflux studies and he will follow-up afterwards. Patient was given educational material in the office today. I did also encourage him to continue good compliance with his compression therapy and leg elevation. I did discuss the possibility of a closure procedure pending his ultrasound results. All questions answered to the best my ability. Plan was discussed. Patient expresses understanding and agrees. [address] [place] [address]        PLEASE NOTE:  This document has been produced using voice recognition software. Unrecognized errors in transcription may be present.

## 2017-11-02 ENCOUNTER — OFFICE VISIT (OUTPATIENT)
Dept: VASCULAR SURGERY | Age: 57
End: 2017-11-02

## 2017-11-02 VITALS
DIASTOLIC BLOOD PRESSURE: 78 MMHG | WEIGHT: 226 LBS | SYSTOLIC BLOOD PRESSURE: 124 MMHG | HEART RATE: 76 BPM | HEIGHT: 73 IN | BODY MASS INDEX: 29.95 KG/M2

## 2017-11-02 DIAGNOSIS — I87.2 VENOUS STASIS DERMATITIS OF BOTH LOWER EXTREMITIES: ICD-10-CM

## 2017-11-02 DIAGNOSIS — I83.813 VARICOSE VEINS OF BOTH LOWER EXTREMITIES WITH PAIN: ICD-10-CM

## 2017-11-02 DIAGNOSIS — I87.2 CHRONIC VENOUS INSUFFICIENCY: Primary | ICD-10-CM

## 2017-11-02 DIAGNOSIS — M79.89 LEG SWELLING: ICD-10-CM

## 2017-11-02 NOTE — Clinical Note
Claude Aurora, pt needs closure of BLE GSV. Would like to start with left leg. Pt states will call to speak with you after speaking with his wife and schedule.   thanks

## 2017-11-02 NOTE — PROGRESS NOTES
Alyx Quezada    Chief Complaint   Patient presents with    Leg Pain       History and Physical    Alyx Quezada is a 59 y.o. male who presents to the office today in follow-up for painful varicose veins and leg swelling. Patient states that since teenage years he has struggled with varicose veins in his legs bilaterally. He states that he works at [employer] and is on his feet for 12 hours at a time. He has been wearing compression stockings for 22 years. He does also elevate his legs as often as possible throughout the day. He does state that the varicosities will ache and throb and he does take Voltaren to try and help with his discomfort. He did have venous reflux studies in 2009 which showed reflux in the bilateral greater saphenous veins. He states that his wife became ill with cancer at that time and he never underwent any type of therapy or treatment for his veins. He denies any history of DVT. He does report some swelling in his feet and ankles mostly at the end of the day. This does seem to resolve with elevation. He also has some chronic skin changes to his lower legs in the sock region. He denies any history of ulcers. No fevers or chills. He does report that his pain and symptoms are becoming quite lifestyle limiting at this point is starting to affect his job. His ultrasounds were negative for DVT and do show significant venous insufficiency in the bilateral greater saphenous veins with up to 6 seconds of reflux on the left and 4 seconds on the right.      Past Medical History:   Diagnosis Date    Abnormal myocardial perfusion study 2/28/2016    Partially reversible inferior perfusion defect, EF 57%    AI (aortic insufficiency)     Moderate with possible bicuspid aortic valve    Chronic low back pain     Chronic venous insufficiency     Colon polyp 03/2012    Benign serrated polyp    Fracture 05/2016    rib fx's    GERD (gastroesophageal reflux disease)     History of echocardiogram 2/28/2016    EF 55%. Basal inferior hypokinesis. Gr 1 DDfx. Mod AI.  Hyperlipidemia     Hypertension     Osteoarthritis of right knee     Osteopenia     Prediabetes     Prepatellar bursitis of right knee     Recurrent genital herpes 1/16/2013    Right lumbar radiculopathy 10/2016    MRI: prominent right paracentral L4-L5 disc extrusion/impingement of descending right L5 nerve root; moderately severe canal stenosis; prominent right paracentral L5-S1 disc protrusion/ impingement of the descending right S1 nerve root; abutment of the foraminal right L5 nerve root/moderate foramina stenosis at L5-S1; mild canal stenosis at L3-L4 with abutment of the descending left L4 nerve root.     S/P cardiac catheterization 3/1/2016    Normal coronary anatomy     Past Surgical History:   Procedure Laterality Date    ENDOSCOPY, COLON, DIAGNOSTIC      HX CHOLECYSTECTOMY      HX TONSILLECTOMY       Patient Active Problem List   Diagnosis Code    Hyperlipidemia E78.5    GERD (gastroesophageal reflux disease) K21.9    Chronic venous insufficiency I87.2    Colon polyp K63.5    Prediabetes R73.03    Recurrent genital herpes A60.00    Vitamin D insufficiency E55.9    Aortic insufficiency, moderate I35.1    Essential hypertension I10    Primary osteoarthritis of knee M17.10    Closed fracture of multiple ribs of right side with delayed healing S22.41XG    Lumbar herniated disc  right L4/5/S1 chronic M51.26    Pain of right sacroiliac joint-episodic M53.3    Osteopenia determined by DEXA 2016, lowest T score -1.7 M85.80    Annular tear of lumbar disc M51.36    Chronic right-sided low back pain with sciatica M54.40, G89.29    Neuritis of lower extremity G57.90    Right leg weakness R29.898     Current Outpatient Prescriptions   Medication Sig Dispense Refill    valACYclovir (VALTREX) 500 mg tablet TAKE 1 TABLET DAILY 90 Tab 3    losartan (COZAAR) 100 mg tablet TAKE 1 TABLET DAILY 90 Tab 3  calcium carbonate (OS-KIARA) 500 mg calcium (1,250 mg) tablet Take  by mouth daily.  diclofenac EC (VOLTAREN) 75 mg EC tablet Take 1 Tab by mouth two (2) times a day. 180 Tab 2    amLODIPine (NORVASC) 10 mg tablet TAKE 1 TABLET DAILY 90 Tab 3    ascorbic acid (VITAMIN C) 500 mg tablet 500 mg.      magnesium oxide (MAG-OX) 400 mg tablet Take 400 mg by mouth.  aspirin delayed-release 81 mg tablet 81 mg.      diazepam (VALIUM) 5 mg tablet Take 1 Tab by mouth every six (6) hours as needed. Max Daily Amount: 20 mg. 30 Tab 0    Cholecalciferol, Vitamin D3, 2,000 unit cap Take 2,000 Units by mouth daily.  DOCOSAHEXANOIC ACID/EPA (FISH OIL PO) Take  by mouth daily. Allergies   Allergen Reactions    Tree Nuts Anaphylaxis    Ace Inhibitors Cough    Nuts [Tree Nut] Itching       Physical Exam:    Visit Vitals    /78 (BP 1 Location: Right arm, BP Patient Position: Sitting)    Pulse 76    Ht 6' 1\" (1.854 m)    Wt 226 lb (102.5 kg)    BMI 29.82 kg/m2      General: Well-appearing male in no acute distress   HEENT: EOMI, no scleral icterus is noted. Pulmonary: No increased work or breathing is noted. Abdomen: nondistended. Extremities: Warm and well perfused bilaterally. Neuro: Cranial nerves II through XII are grossly intact   Integument: No ulcerations are identified visibly      Impression and Plan:  Marycruz Arnett is a 59 y.o. male with chronic venous insufficiency and painful varicose veins of the bilateral lower extremities. Imaging was reviewed along with him in the office today. Symptoms have become quite lifestyle limiting at this point as above. He has been wearing compression stockings for greater than the past 20 years and elevates his leg on a regular basis however his symptoms continue to worsen. I did discuss intervention with closure procedure and patient would like to move forward. He feels that his left leg may be slightly worse than his right.   Discussed that we will start with a closure of the left greater saphenous vein and can follow-up with a right greater saphenous vein closure if he is happy with the results of his left leg. Risks versus benefits of the procedure were discussed with him at length. All questions answered to the best my ability. Educational material was given again in the office today. Plan was discussed. Patient expresses understanding and agrees. Nadia Morgan  [phone]        PLEASE NOTE:  This document has been produced using voice recognition software. Unrecognized errors in transcription may be present.

## 2017-11-02 NOTE — MR AVS SNAPSHOT
Visit Information Date & Time Provider Department Dept. Phone Encounter #  
 11/2/2017  1:30 PM Lorenzo Villa, [address] [id number] Your Appointments 2/16/2018  8:05 AM  
LAB with [facility] NURSE VISIT [facility] [address] [place] [address] ([facility]) Appt Note: lab  
 [address], [address] [place] [address] [address] [place]  
  
   
 [address], [address]  
  
    
 2/21/2018  9:00 AM  
PHYSICAL with Jil Roach MD  
[facility] Appt Note: rpe bs  
 [address] [facility], [address] [place]  
  
   
 [address], [address] 9/17/2018  9:20 AM  
Follow Up with Gunjan Monteiro MD  
[facility] ([facility]) Appt Note: 1 year follow up with an EKG  
 [address] [place] [id number]  
[phone] [facility] [place] [address] Upcoming Health Maintenance Date Due INFLUENZA AGE 9 TO ADULT 8/1/2017 COLONOSCOPY 3/22/2022 DTaP/Tdap/Td series (3 - Td) 1/16/2024 Allergies as of 11/2/2017  Review Complete On: 11/2/2017 By: Saranya Aguilera LPN Severity Noted Reaction Type Reactions Tree Nuts High 03/08/2016    Anaphylaxis Ace Inhibitors  04/19/2013    Cough Nuts [Tree Nut]  04/29/2015    Itching Current Immunizations  Reviewed on 1/28/2015 Name Date Influenza Vaccine 11/12/2014, 11/1/2013 TDAP Vaccine 1/20/2004 Td 1/1/2004 Tdap 1/16/2014 Not reviewed this visit Vitals BP Pulse Height(growth percentile) Weight(growth percentile) BMI Smoking Status 124/78 (BP 1 Location: Right arm, BP Patient Position: Sitting) 76 6' 1\" (1.854 m) 226 lb (102.5 kg) 29.82 kg/m2 Former Smoker BMI and BSA Data Body Mass Index Body Surface Area 29.82 kg/m 2 2.3 m 2 Preferred Pharmacy Pharmacy Name Phone  [address] [phone] Your Updated Medication List  
  
   
This list is accurate as of: 11/2/17  3:06 PM.  Always use your most recent med list. amLODIPine 10 mg tablet Commonly known as:  Meme Blade TAKE 1 TABLET DAILY  
  
 ascorbic acid (vitamin C) 500 mg tablet Commonly known as:  VITAMIN C  
500 mg.  
  
 aspirin delayed-release 81 mg tablet 81 mg.  
  
 calcium carbonate 500 mg calcium (1,250 mg) tablet Commonly known as:  OS-KIARA Take  by mouth daily. Cholecalciferol (Vitamin D3) 2,000 unit Cap capsule Commonly known as:  VITAMIN D3 Take 2,000 Units by mouth daily. diazePAM 5 mg tablet Commonly known as:  VALIUM Take 1 Tab by mouth every six (6) hours as needed. Max Daily Amount: 20 mg.  
  
 diclofenac EC 75 mg EC tablet Commonly known as:  VOLTAREN Take 1 Tab by mouth two (2) times a day. FISH OIL PO Take  by mouth daily. losartan 100 mg tablet Commonly known as:  COZAAR  
TAKE 1 TABLET DAILY  
  
 magnesium oxide 400 mg tablet Commonly known as:  MAG-OX Take 400 mg by mouth. valACYclovir 500 mg tablet Commonly known as:  VALTREX  
TAKE 1 TABLET DAILY Introducing [facility]! Annie Bui introduces [employer] patient portal. Now you can access parts of your medical record, email your doctor's office, and request medication refills online. 1. In your internet browser, go to [email or web address] 2. Click on the First Time User? Click Here link in the Sign In box. You will see the New Member Sign Up page. 3. Enter your [employer] Access Code exactly as it appears below. You will not need to use this code after youve completed the sign-up process. If you do not sign up before the expiration date, you must request a new code. · [employer] Access Code: [email or web address] Expires: 1/16/2018  9:12 AM 
 
4. Enter the last four digits of your Social Security Number (xxxx) and Date of Birth (mm/dd/yyyy) as indicated and click Submit. You will be taken to the next sign-up page. 5. Create a [employer] ID. This will be your [employer] login ID and cannot be changed, so think of one that is secure and easy to remember. 6. Create a [employer] password. You can change your password at any time. 7. Enter your Password Reset Question and Answer. This can be used at a later time if you forget your password. 8. Enter your e-mail address. You will receive e-mail notification when new information is available in [address] 9. Click Sign Up. You can now view and download portions of your medical record. 10. Click the Download Summary menu link to download a portable copy of your medical information. If you have questions, please visit the Frequently Asked Questions section of the [employer] website. Remember, [employer] is NOT to be used for urgent needs. For medical emergencies, dial 911. Now available from your iPhone and Android! Please provide this summary of care documentation to your next provider. Your primary care clinician is listed as [facility]. If you have any questions after today's visit, please call [phone].

## 2017-11-30 ENCOUNTER — DOCUMENTATION ONLY (OUTPATIENT)
Dept: VASCULAR SURGERY | Age: 57
End: 2017-11-30

## 2018-02-16 ENCOUNTER — HOSPITAL ENCOUNTER (OUTPATIENT)
Dept: LAB | Age: 58
Discharge: HOME | End: 2018-02-16
Payer: COMMERCIAL

## 2018-02-16 DIAGNOSIS — I10 ESSENTIAL HYPERTENSION: ICD-10-CM

## 2018-02-16 DIAGNOSIS — R73.03 PREDIABETES: ICD-10-CM

## 2018-02-16 DIAGNOSIS — E55.9 VITAMIN D INSUFFICIENCY: ICD-10-CM

## 2018-02-16 DIAGNOSIS — M85.80 OSTEOPENIA DETERMINED BY X-RAY: Chronic | ICD-10-CM

## 2018-02-16 DIAGNOSIS — R73.09 ABNORMAL GLUCOSE: ICD-10-CM

## 2018-02-16 DIAGNOSIS — E78.5 HYPERLIPIDEMIA, UNSPECIFIED HYPERLIPIDEMIA TYPE: ICD-10-CM

## 2018-02-16 DIAGNOSIS — Z12.5 SCREENING PSA (PROSTATE SPECIFIC ANTIGEN): ICD-10-CM

## 2018-02-16 LAB
ALBUMIN SERPL-MCNC: 3.9 G/DL (ref 3.4–5)
ALBUMIN/GLOB SERPL: 1.4 {RATIO} (ref 0.8–1.7)
ALP SERPL-CCNC: 77 U/L (ref 45–117)
ALT SERPL-CCNC: 42 U/L (ref 16–61)
ANION GAP SERPL CALC-SCNC: 9 MMOL/L (ref 3–18)
APPEARANCE UR: CLEAR
AST SERPL-CCNC: 30 U/L (ref 15–37)
BACTERIA URNS QL MICRO: NEGATIVE /HPF
BASOPHILS # BLD: 0.1 K/UL (ref 0–0.06)
BASOPHILS NFR BLD: 1 % (ref 0–2)
BILIRUB SERPL-MCNC: 0.6 MG/DL (ref 0.2–1)
BILIRUB UR QL: NEGATIVE
BUN SERPL-MCNC: 15 MG/DL (ref 7–18)
BUN/CREAT SERPL: 13 (ref 12–20)
CALCIUM SERPL-MCNC: 9 MG/DL (ref 8.5–10.1)
CHLORIDE SERPL-SCNC: 107 MMOL/L (ref 100–108)
CHOLEST SERPL-MCNC: 198 MG/DL
CO2 SERPL-SCNC: 25 MMOL/L (ref 21–32)
COLOR UR: YELLOW
CREAT SERPL-MCNC: 1.18 MG/DL (ref 0.6–1.3)
DIFFERENTIAL METHOD BLD: ABNORMAL
EOSINOPHIL # BLD: 0.2 K/UL (ref 0–0.4)
EOSINOPHIL NFR BLD: 3 % (ref 0–5)
ERYTHROCYTE [DISTWIDTH] IN BLOOD BY AUTOMATED COUNT: 13.3 % (ref 11.6–14.5)
EST. AVERAGE GLUCOSE BLD GHB EST-MCNC: 128 MG/DL
GLOBULIN SER CALC-MCNC: 2.7 G/DL (ref 2–4)
GLUCOSE SERPL-MCNC: 105 MG/DL (ref 74–99)
GLUCOSE UR STRIP.AUTO-MCNC: NEGATIVE MG/DL
HBA1C MFR BLD: 6.1 % (ref 4.2–5.6)
HCT VFR BLD AUTO: 47.8 % (ref 36–48)
HDLC SERPL-MCNC: 45 MG/DL (ref 40–60)
HDLC SERPL: 4.4 {RATIO} (ref 0–5)
HGB BLD-MCNC: 16.3 G/DL (ref 13–16)
HGB UR QL STRIP: NEGATIVE
KETONES UR QL STRIP.AUTO: NEGATIVE MG/DL
LDLC SERPL CALC-MCNC: 125.6 MG/DL (ref 0–100)
LEUKOCYTE ESTERASE UR QL STRIP.AUTO: NEGATIVE
LIPID PROFILE,FLP: ABNORMAL
LYMPHOCYTES # BLD: 2.3 K/UL (ref 0.9–3.6)
LYMPHOCYTES NFR BLD: 38 % (ref 21–52)
MCH RBC QN AUTO: 31.4 PG (ref 24–34)
MCHC RBC AUTO-ENTMCNC: 34.1 G/DL (ref 31–37)
MCV RBC AUTO: 92.1 FL (ref 74–97)
MONOCYTES # BLD: 0.5 K/UL (ref 0.05–1.2)
MONOCYTES NFR BLD: 9 % (ref 3–10)
NEUTS SEG # BLD: 3.1 K/UL (ref 1.8–8)
NEUTS SEG NFR BLD: 49 % (ref 40–73)
NITRITE UR QL STRIP.AUTO: NEGATIVE
PH UR STRIP: 6.5 [PH] (ref 5–8)
PLATELET # BLD AUTO: 197 K/UL (ref 135–420)
PMV BLD AUTO: 11.1 FL (ref 9.2–11.8)
POTASSIUM SERPL-SCNC: 4.4 MMOL/L (ref 3.5–5.5)
PROT SERPL-MCNC: 6.6 G/DL (ref 6.4–8.2)
PROT UR STRIP-MCNC: NEGATIVE MG/DL
PSA SERPL-MCNC: 0.5 NG/ML (ref 0–4)
RBC # BLD AUTO: 5.19 M/UL (ref 4.7–5.5)
RBC #/AREA URNS HPF: NORMAL /HPF (ref 0–5)
SODIUM SERPL-SCNC: 141 MMOL/L (ref 136–145)
SP GR UR REFRACTOMETRY: 1.02 (ref 1–1.03)
TRIGL SERPL-MCNC: 137 MG/DL (ref ?–150)
TSH SERPL DL<=0.05 MIU/L-ACNC: 1.91 UIU/ML (ref 0.36–3.74)
UROBILINOGEN UR QL STRIP.AUTO: 0.2 EU/DL (ref 0.2–1)
VLDLC SERPL CALC-MCNC: 27.4 MG/DL
WBC # BLD AUTO: 6.1 K/UL (ref 4.6–13.2)
WBC URNS QL MICRO: NORMAL /HPF (ref 0–4)

## 2018-02-16 PROCEDURE — 85025 COMPLETE CBC W/AUTO DIFF WBC: CPT | Performed by: INTERNAL MEDICINE

## 2018-02-16 PROCEDURE — 84443 ASSAY THYROID STIM HORMONE: CPT | Performed by: INTERNAL MEDICINE

## 2018-02-16 PROCEDURE — 80061 LIPID PANEL: CPT | Performed by: INTERNAL MEDICINE

## 2018-02-16 PROCEDURE — 36415 COLL VENOUS BLD VENIPUNCTURE: CPT | Performed by: INTERNAL MEDICINE

## 2018-02-16 PROCEDURE — 81001 URINALYSIS AUTO W/SCOPE: CPT | Performed by: INTERNAL MEDICINE

## 2018-02-16 PROCEDURE — 84153 ASSAY OF PSA TOTAL: CPT | Performed by: INTERNAL MEDICINE

## 2018-02-16 PROCEDURE — 82306 VITAMIN D 25 HYDROXY: CPT | Performed by: INTERNAL MEDICINE

## 2018-02-16 PROCEDURE — 80053 COMPREHEN METABOLIC PANEL: CPT | Performed by: INTERNAL MEDICINE

## 2018-02-16 PROCEDURE — 83036 HEMOGLOBIN GLYCOSYLATED A1C: CPT | Performed by: INTERNAL MEDICINE

## 2018-02-17 LAB — 25(OH)D3 SERPL-MCNC: 38.3 NG/ML (ref 30–100)

## 2018-02-19 RX ORDER — AMLODIPINE BESYLATE 10 MG/1
TABLET ORAL
Qty: 90 TAB | Refills: 3 | Status: SHIPPED | OUTPATIENT
Start: 2018-02-19 | End: 2019-02-12 | Stop reason: SDUPTHER

## 2018-02-21 ENCOUNTER — OFFICE VISIT (OUTPATIENT)
Dept: INTERNAL MEDICINE CLINIC | Age: 58
End: 2018-02-21

## 2018-02-21 VITALS
RESPIRATION RATE: 16 BRPM | DIASTOLIC BLOOD PRESSURE: 80 MMHG | TEMPERATURE: 98.6 F | WEIGHT: 228 LBS | BODY MASS INDEX: 30.88 KG/M2 | SYSTOLIC BLOOD PRESSURE: 122 MMHG | HEIGHT: 72 IN | HEART RATE: 73 BPM | OXYGEN SATURATION: 94 %

## 2018-02-21 DIAGNOSIS — E78.5 HYPERLIPIDEMIA, UNSPECIFIED HYPERLIPIDEMIA TYPE: ICD-10-CM

## 2018-02-21 DIAGNOSIS — I35.1 AORTIC VALVE INSUFFICIENCY, ETIOLOGY OF CARDIAC VALVE DISEASE UNSPECIFIED: ICD-10-CM

## 2018-02-21 DIAGNOSIS — G89.29 CHRONIC RIGHT-SIDED LOW BACK PAIN WITH RIGHT-SIDED SCIATICA: ICD-10-CM

## 2018-02-21 DIAGNOSIS — I87.2 CHRONIC VENOUS INSUFFICIENCY: ICD-10-CM

## 2018-02-21 DIAGNOSIS — M51.26 LUMBAR HERNIATED DISC: Chronic | ICD-10-CM

## 2018-02-21 DIAGNOSIS — M79.644 CHRONIC THUMB PAIN, RIGHT: ICD-10-CM

## 2018-02-21 DIAGNOSIS — Z00.01 ENCOUNTER FOR ROUTINE ADULT PHYSICAL EXAM WITH ABNORMAL FINDINGS: Primary | ICD-10-CM

## 2018-02-21 DIAGNOSIS — M17.11 PRIMARY OSTEOARTHRITIS OF RIGHT KNEE: ICD-10-CM

## 2018-02-21 DIAGNOSIS — I10 ESSENTIAL HYPERTENSION: ICD-10-CM

## 2018-02-21 DIAGNOSIS — R73.03 PREDIABETES: ICD-10-CM

## 2018-02-21 DIAGNOSIS — K21.9 GASTROESOPHAGEAL REFLUX DISEASE WITHOUT ESOPHAGITIS: ICD-10-CM

## 2018-02-21 DIAGNOSIS — G89.29 CHRONIC THUMB PAIN, RIGHT: ICD-10-CM

## 2018-02-21 DIAGNOSIS — K62.5 RECTAL BLEEDING: ICD-10-CM

## 2018-02-21 DIAGNOSIS — M54.41 CHRONIC RIGHT-SIDED LOW BACK PAIN WITH RIGHT-SIDED SCIATICA: ICD-10-CM

## 2018-02-21 NOTE — MR AVS SNAPSHOT
[address] [facility] 
 
 
 [address], [address] [facility] 
[phone] Patient: Shweta Guerra MRN: [id number] OOY:[birth year] Visit Information Date & Time Provider Department Dept. Phone Encounter #  
 2/21/2018  9:00 AM Rosie Wild MD [facility] [address] [place] [address] [phone] [id number] Follow-up Instructions Return in about 6 months (around 8/21/2018), or if symptoms worsen or fail to improve. Your Appointments 8/21/2018  8:30 AM  
LAB with [facility] NURSE VISIT [facility] [address] [place] [address] [facility] Appt Note: lab  
 [address], [address] [place] [address]  
  
   
 [address], [address]  
  
    
 8/28/2018  9:00 AM  
Office Visit with Rosie Wild MD  
[facility] Appt Note: 6 month f/u  
 [address] [facility], [address] [place] [address]  
  
   
 [address], [address] 9/17/2018  9:20 AM  
Follow Up with Severo Patee, MD  
[facility] ([facility]) Appt Note: 1 year follow up with an EKG  
 [facility] [place] [address] [phone] [facility] [address] Upcoming Health Maintenance Date Due COLONOSCOPY 3/22/2022 DTaP/Tdap/Td series (3 - Td) 1/16/2024 Allergies as of 2/21/2018  Review Complete On: 11/2/2017 By: HATTIE Prater Severity Noted Reaction Type Reactions Tree Nuts High 03/08/2016    Anaphylaxis Ace Inhibitors  04/19/2013    Cough Nuts [Tree Nut]  04/29/2015    Itching Current Immunizations  Reviewed on 1/28/2015 Name Date Influenza Vaccine 11/12/2014, 11/1/2013 TDAP Vaccine 1/20/2004 Td 1/1/2004 Tdap 1/16/2014 Not reviewed this visit Vitals BP Pulse Temp Resp Height(growth percentile) Weight(growth percentile) 122/80 (BP 1 Location: Left arm, BP Patient Position: Sitting) 73 98.6 °F (37 °C) (Oral) 16 6' (1.829 m) 228 lb (103.4 kg) SpO2 BMI Smoking Status 94% 30.92 kg/m2 Former Smoker Vitals History BMI and BSA Data Body Mass Index Body Surface Area 30.92 kg/m 2 2.29 m 2 Preferred Pharmacy Pharmacy Name Phone  [address] [phone] Your Updated Medication List  
  
   
This list is accurate as of 2/21/18  9:58 AM.  Always use your most recent med list. amLODIPine 10 mg tablet Commonly known as:  Ardeyehuda Squire TAKE 1 TABLET DAILY  
  
 ascorbic acid (vitamin C) 500 mg tablet Commonly known as:  VITAMIN C  
500 mg.  
  
 aspirin delayed-release 81 mg tablet 81 mg.  
  
 calcium carbonate 500 mg calcium (1,250 mg) tablet Commonly known as:  OS-KIARA Take  by mouth daily. Cholecalciferol (Vitamin D3) 2,000 unit Cap capsule Commonly known as:  VITAMIN D3 Take 2,000 Units by mouth daily. diazePAM 5 mg tablet Commonly known as:  VALIUM Take 1 Tab by mouth every six (6) hours as needed. Max Daily Amount: 20 mg.  
  
 diclofenac EC 75 mg EC tablet Commonly known as:  VOLTAREN Take 1 Tab by mouth two (2) times a day. FISH OIL PO Take  by mouth daily. losartan 100 mg tablet Commonly known as:  COZAAR  
TAKE 1 TABLET DAILY  
  
 magnesium oxide 400 mg tablet Commonly known as:  MAG-OX Take 400 mg by mouth. valACYclovir 500 mg tablet Commonly known as:  VALTREX  
TAKE 1 TABLET DAILY Follow-up Instructions Return in about 6 months (around 8/21/2018), or if symptoms worsen or fail to improve. Patient Instructions Learning About Healthy Weight What is a healthy weight?  
 
A healthy weight is the weight at which you feel good about yourself and have energy for work and play. It's also one that lowers your risk for health problems. What can you do to stay at a healthy weight? It can be hard to stay at a healthy weight, especially when fast food, vending-machine snacks, and processed foods are so easy to find. And with your busy lifestyle, activity may be low on your list of things to do. But staying at a healthy weight may be easier than you think. Here are some dos and don'ts for staying at a healthy weight: 
Do eat healthy foods The kinds of foods you eat have a big impact on both your weight and your health. Reaching and staying at a healthy weight is not about going on a diet. It's about making healthier food choices every day and changing your diet for good. Healthy eating means eating a variety of foods so that you get all the nutrients you need. Your body needs protein, carbohydrate, and fats for energy. They keep your heart beating, your brain active, and your muscles working. On most days, try to eat from each food group. This means eating a variety of: · Whole grains, such as whole wheat breads and pastas. · Fruits and vegetables. · Dairy products, such as low-fat milk, yogurt, and cheese. · Lean proteins, such as all types of fish, chicken without the skin, and beans. Don't have too much or too little of one thing. All foods, if eaten in moderation, can be part of healthy eating. Even sweets can be okay. If your favorite foods are high in fat, salt, sugar, or calories, limit how often you eat them. Eat smaller servings, or look for healthy substitutes. Do watch what you eat Many people eat more than their bodies need. Part of staying at a healthy weight means learning how much food you really need from day to day and not eating more than that. Even with healthy foods, eating too much can make you gain weight.  
Having a well-balanced diet means that you eat enough, but not too much, and that your food gives you the nutrients you need to stay healthy. So listen to your body. Eat when you're hungry. Stop when you feel satisfied. It's a good idea to have healthy snacks ready for when you get hungry. Keep healthy snacks with you at work, in your car, and at home. If you have a healthy snack easily available, you'll be less likely to pick a candy bar or bag of chips from a vending machine instead. Some healthy snacks you might want to keep on hand are fruit, low-fat yogurt, string cheese, low-fat microwave popcorn, raisins and other dried fruit, nuts, whole wheat crackers, pretzels, carrots, celery sticks, and broccoli. Do some physical activity A big part of reaching and staying at a healthy weight is being active. When you're active, you burn calories. This makes it easier to reach and stay at a healthy weight. When you're active on a regular basis, your body burns more calories, even when you're at rest. Being active helps you lose fat and build lean muscle. Try to be active for at least 1 hour every day. This may sound like a lot, but it's okay to be active in smaller blocks of time that add up to 1 hour a day. Any activity that makes your heart beat faster and keeps it there for a while counts. A brisk walk, run, or swim will get your heart beating faster. So will climbing stairs, shooting baskets, or cycling. Even some household chores like vacuuming and mowing the lawn will get your heart rate up. Pick activities that you enjoy-ones that make your heart beat faster, your muscles stronger, and your muscles and joints more flexible. If you find more than one thing you like doing, do them all. You don't have to do the same thing every day. Don't diet Diets don't work. Diets are temporary. Because you give up so much when you diet, you may be hungry and think about food all the time.  And after you stop dieting, you also may overeat to make up for what you missed. Most people who diet end up gaining back the pounds they lost-and more. Remember that healthy bodies come in lots of shapes and sizes. Everyone can get healthier by eating better and being more active. Where can you learn more? Go to http://pat-cuate.info/. Enter 423 5807 in the search box to learn more about \"Learning About Healthy Weight. \" Current as of: October 13, 2016 Content Version: 11.4 © 5379-3240 zweitgeist. Care instructions adapted under license by [employer] (which disclaims liability or warranty for this information). If you have questions about a medical condition or this instruction, always ask your healthcare professional. [address] any warranty or liability for your use of this information. Body Mass Index: Care Instructions Your Care Instructions Body mass index (BMI) can help you see if your weight is raising your risk for health problems. It uses a formula to compare how much you weigh with how tall you are. · A BMI lower than 18.5 is considered underweight. · A BMI between 18.5 and 24.9 is considered healthy. · A BMI between 25 and 29.9 is considered overweight. A BMI of 30 or higher is considered obese. If your BMI is in the normal range, it means that you have a lower risk for weight-related health problems. If your BMI is in the overweight or obese range, you may be at increased risk for weight-related health problems, such as high blood pressure, heart disease, stroke, arthritis or joint pain, and diabetes. If your BMI is in the underweight range, you may be at increased risk for health problems such as fatigue, lower protection (immunity) against illness, muscle loss, bone loss, hair loss, and hormone problems. BMI is just one measure of your risk for weight-related health problems. You may be at higher risk for health problems if you are not active, you eat an unhealthy diet, or you drink too much alcohol or use tobacco products. Follow-up care is a key part of your treatment and safety. Be sure to make and go to all appointments, and call your doctor if you are having problems. It's also a good idea to know your test results and keep a list of the medicines you take. How can you care for yourself at home? · Practice healthy eating habits. This includes eating plenty of fruits, vegetables, whole grains, lean protein, and low-fat dairy. · If your doctor recommends it, get more exercise. Walking is a good choice. Bit by bit, increase the amount you walk every day. Try for at least 30 minutes on most days of the week. · Do not smoke. Smoking can increase your risk for health problems. If you need help quitting, talk to your doctor about stop-smoking programs and medicines. These can increase your chances of quitting for good. · Limit alcohol to 2 drinks a day for men and 1 drink a day for women. Too much alcohol can cause health problems. If you have a BMI higher than 25 · Your doctor may do other tests to check your risk for weight-related health problems. This may include measuring the distance around your waist. A waist measurement of more than 40 inches in men or 35 inches in women can increase the risk of weight-related health problems. · Talk with your doctor about steps you can take to stay healthy or improve your health. You may need to make lifestyle changes to lose weight and stay healthy, such as changing your diet and getting regular exercise. If you have a BMI lower than 18.5 · Your doctor may do other tests to check your risk for health problems. · Talk with your doctor about steps you can take to stay healthy or improve your health.  You may need to make lifestyle changes to gain or maintain weight and stay healthy, such as getting more healthy foods in your diet and doing exercises to build muscle. Where can you learn more? Go to http://pat-cuate.info/. Enter S176 in the search box to learn more about \"Body Mass Index: Care Instructions. \" Current as of: October 13, 2016 Content Version: 11.4 © 8913-8429 ChannelMeter. Care instructions adapted under license by [employer] (which disclaims liability or warranty for this information). If you have questions about a medical condition or this instruction, always ask your healthcare professional. [address] any warranty or liability for your use of this information. Learning About Diabetes Food Guidelines Your Care Instructions Meal planning is important to manage diabetes. It helps keep your blood sugar at a target level (which you set with your doctor). You don't have to eat special foods. You can eat what your family eats, including sweets once in a while. But you do have to pay attention to how often you eat and how much you eat of certain foods. You may want to work with a dietitian or a certified diabetes educator (CDE) to help you plan meals and snacks. A dietitian or CDE can also help you lose weight if that is one of your goals. What should you know about eating carbs? Managing the amount of carbohydrate (carbs) you eat is an important part of healthy meals when you have diabetes. Carbohydrate is found in many foods. · Learn which foods have carbs. And learn the amounts of carbs in different foods. ¨ Bread, cereal, pasta, and rice have about 15 grams of carbs in a serving. A serving is 1 slice of bread (1 ounce), ½ cup of cooked cereal, or 1/3 cup of cooked pasta or rice. ¨ Fruits have 15 grams of carbs in a serving. A serving is 1 small fresh fruit, such as an apple or orange; ½ of a banana; ½ cup of cooked or canned fruit; ½ cup of fruit juice; 1 cup of melon or raspberries; or 2 tablespoons of dried fruit. ¨ Milk and no-sugar-added yogurt have 15 grams of carbs in a serving. A serving is 1 cup of milk or 2/3 cup of no-sugar-added yogurt. ¨ Starchy vegetables have 15 grams of carbs in a serving. A serving is ½ cup of mashed potatoes or sweet potato; 1 cup winter squash; ½ of a small baked potato; ½ cup of cooked beans; or ½ cup cooked corn or green peas. · Learn how much carbs to eat each day and at each meal. A dietitian or CDE can teach you how to keep track of the amount of carbs you eat. This is called carbohydrate counting. · If you are not sure how to count carbohydrate grams, use the Plate Method to plan meals. It is a good, quick way to make sure that you have a balanced meal. It also helps you spread carbs throughout the day. ¨ Divide your plate by types of foods. Put non-starchy vegetables on half the plate, meat or other protein food on one-quarter of the plate, and a grain or starchy vegetable in the final quarter of the plate. To this you can add a small piece of fruit and 1 cup of milk or yogurt, depending on how many carbs you are supposed to eat at a meal. 
· Try to eat about the same amount of carbs at each meal. Do not \"save up\" your daily allowance of carbs to eat at one meal. 
· Proteins have very little or no carbs per serving. Examples of proteins are beef, chicken, turkey, fish, eggs, tofu, cheese, cottage cheese, and peanut butter. A serving size of meat is 3 ounces, which is about the size of a deck of cards. Examples of meat substitute serving sizes (equal to 1 ounce of meat) are 1/4 cup of cottage cheese, 1 egg, 1 tablespoon of peanut butter, and ½ cup of tofu. How can you eat out and still eat healthy? · Learn to estimate the serving sizes of foods that have carbohydrate. If you measure food at home, it will be easier to estimate the amount in a serving of restaurant food.  
· If the meal you order has too much carbohydrate (such as potatoes, corn, or baked beans), ask to have a low-carbohydrate food instead. Ask for a salad or green vegetables. · If you use insulin, check your blood sugar before and after eating out to help you plan how much to eat in the future. · If you eat more carbohydrate at a meal than you had planned, take a walk or do other exercise. This will help lower your blood sugar. What else should you know? · Limit saturated fat, such as the fat from meat and dairy products. This is a healthy choice because people who have diabetes are at higher risk of heart disease. So choose lean cuts of meat and nonfat or low-fat dairy products. Use olive or canola oil instead of butter or shortening when cooking. · Don't skip meals. Your blood sugar may drop too low if you skip meals and take insulin or certain medicines for diabetes. · Check with your doctor before you drink alcohol. Alcohol can cause your blood sugar to drop too low. Alcohol can also cause a bad reaction if you take certain diabetes medicines. Follow-up care is a key part of your treatment and safety. Be sure to make and go to all appointments, and call your doctor if you are having problems. It's also a good idea to know your test results and keep a list of the medicines you take. Where can you learn more? Go to http://pat-cuate.info/. Enter E713 in the search box to learn more about \"Learning About Diabetes Food Guidelines. \" Current as of: March 13, 2017 Content Version: 11.4 © 4998-1131 Healthwise, Accelereach. Care instructions adapted under license by [employer] (which disclaims liability or warranty for this information). If you have questions about a medical condition or this instruction, always ask your healthcare professional. [place] [address] any warranty or liability for your use of this information. Introducing [facility]!    
 Héctor Thomas introduces [employer] patient portal. Now you can access parts of your medical record, email your doctor's office, and request medication refills online. 1. In your internet browser, go to [email or web address] 2. Click on the First Time User? Click Here link in the Sign In box. You will see the New Member Sign Up page. 3. Enter your Innobits Access Code exactly as it appears below. You will not need to use this code after youve completed the sign-up process. If you do not sign up before the expiration date, you must request a new code. · Innobits Access Code: [email or web address] Expires: 5/22/2018  9:58 AM 
 
4. Enter the last four digits of your Social Security Number (xxxx) and Date of Birth (mm/dd/yyyy) as indicated and click Submit. You will be taken to the next sign-up page. 5. Create a Innobits ID. This will be your Innobits login ID and cannot be changed, so think of one that is secure and easy to remember. 6. Create a Innobits password. You can change your password at any time. 7. Enter your Password Reset Question and Answer. This can be used at a later time if you forget your password. 8. Enter your e-mail address. You will receive e-mail notification when new information is available in [address] 9. Click Sign Up. You can now view and download portions of your medical record. 10. Click the Download Summary menu link to download a portable copy of your medical information. If you have questions, please visit the Frequently Asked Questions section of the Innobits website. Remember, Innobits is NOT to be used for urgent needs. For medical emergencies, dial 911. Now available from your iPhone and Android! Please provide this summary of care documentation to your next provider. Your primary care clinician is listed as Marcio Valencia. If you have any questions after today's visit, please call [phone].

## 2018-02-21 NOTE — PATIENT INSTRUCTIONS
Learning About Healthy Weight  What is a healthy weight? A healthy weight is the weight at which you feel good about yourself and have energy for work and play. It's also one that lowers your risk for health problems. What can you do to stay at a healthy weight? It can be hard to stay at a healthy weight, especially when fast food, vending-machine snacks, and processed foods are so easy to find. And with your busy lifestyle, activity may be low on your list of things to do. But staying at a healthy weight may be easier than you think. Here are some dos and don'ts for staying at a healthy weight:  Do eat healthy foods  The kinds of foods you eat have a big impact on both your weight and your health. Reaching and staying at a healthy weight is not about going on a diet. It's about making healthier food choices every day and changing your diet for good. Healthy eating means eating a variety of foods so that you get all the nutrients you need. Your body needs protein, carbohydrate, and fats for energy. They keep your heart beating, your brain active, and your muscles working. On most days, try to eat from each food group. This means eating a variety of:  · Whole grains, such as whole wheat breads and pastas. · Fruits and vegetables. · Dairy products, such as low-fat milk, yogurt, and cheese. · Lean proteins, such as all types of fish, chicken without the skin, and beans. Don't have too much or too little of one thing. All foods, if eaten in moderation, can be part of healthy eating. Even sweets can be okay. If your favorite foods are high in fat, salt, sugar, or calories, limit how often you eat them. Eat smaller servings, or look for healthy substitutes. Do watch what you eat  Many people eat more than their bodies need. Part of staying at a healthy weight means learning how much food you really need from day to day and not eating more than that.  Even with healthy foods, eating too much can make you gain weight. Having a well-balanced diet means that you eat enough, but not too much, and that your food gives you the nutrients you need to stay healthy. So listen to your body. Eat when you're hungry. Stop when you feel satisfied. It's a good idea to have healthy snacks ready for when you get hungry. Keep healthy snacks with you at work, in your car, and at home. If you have a healthy snack easily available, you'll be less likely to pick a candy bar or bag of chips from a vending machine instead. Some healthy snacks you might want to keep on hand are fruit, low-fat yogurt, string cheese, low-fat microwave popcorn, raisins and other dried fruit, nuts, whole wheat crackers, pretzels, carrots, celery sticks, and broccoli. Do some physical activity  A big part of reaching and staying at a healthy weight is being active. When you're active, you burn calories. This makes it easier to reach and stay at a healthy weight. When you're active on a regular basis, your body burns more calories, even when you're at rest. Being active helps you lose fat and build lean muscle. Try to be active for at least 1 hour every day. This may sound like a lot, but it's okay to be active in smaller blocks of time that add up to 1 hour a day. Any activity that makes your heart beat faster and keeps it there for a while counts. A brisk walk, run, or swim will get your heart beating faster. So will climbing stairs, shooting baskets, or cycling. Even some household chores like vacuuming and mowing the lawn will get your heart rate up. Pick activities that you enjoy-ones that make your heart beat faster, your muscles stronger, and your muscles and joints more flexible. If you find more than one thing you like doing, do them all. You don't have to do the same thing every day. Don't diet  Diets don't work. Diets are temporary. Because you give up so much when you diet, you may be hungry and think about food all the time.  And after you stop dieting, you also may overeat to make up for what you missed. Most people who diet end up gaining back the pounds they lost-and more. Remember that healthy bodies come in lots of shapes and sizes. Everyone can get healthier by eating better and being more active. Where can you learn more? Go to http://pat-cuate.info/. Enter 462 8469 in the search box to learn more about \"Learning About Healthy Weight. \"  Current as of: October 13, 2016  Content Version: 11.4  © 1936-8595 [employer]. Care instructions adapted under license by [employer] (which disclaims liability or warranty for this information). If you have questions about a medical condition or this instruction, always ask your healthcare professional. [address] any warranty or liability for your use of this information. Body Mass Index: Care Instructions  Your Care Instructions    Body mass index (BMI) can help you see if your weight is raising your risk for health problems. It uses a formula to compare how much you weigh with how tall you are. · A BMI lower than 18.5 is considered underweight. · A BMI between 18.5 and 24.9 is considered healthy. · A BMI between 25 and 29.9 is considered overweight. A BMI of 30 or higher is considered obese. If your BMI is in the normal range, it means that you have a lower risk for weight-related health problems. If your BMI is in the overweight or obese range, you may be at increased risk for weight-related health problems, such as high blood pressure, heart disease, stroke, arthritis or joint pain, and diabetes. If your BMI is in the underweight range, you may be at increased risk for health problems such as fatigue, lower protection (immunity) against illness, muscle loss, bone loss, hair loss, and hormone problems. BMI is just one measure of your risk for weight-related health problems.  You may be at higher risk for health problems if you are not active, you eat an unhealthy diet, or you drink too much alcohol or use tobacco products. Follow-up care is a key part of your treatment and safety. Be sure to make and go to all appointments, and call your doctor if you are having problems. It's also a good idea to know your test results and keep a list of the medicines you take. How can you care for yourself at home? · Practice healthy eating habits. This includes eating plenty of fruits, vegetables, whole grains, lean protein, and low-fat dairy. · If your doctor recommends it, get more exercise. Walking is a good choice. Bit by bit, increase the amount you walk every day. Try for at least 30 minutes on most days of the week. · Do not smoke. Smoking can increase your risk for health problems. If you need help quitting, talk to your doctor about stop-smoking programs and medicines. These can increase your chances of quitting for good. · Limit alcohol to 2 drinks a day for men and 1 drink a day for women. Too much alcohol can cause health problems. If you have a BMI higher than 25  · Your doctor may do other tests to check your risk for weight-related health problems. This may include measuring the distance around your waist. A waist measurement of more than 40 inches in men or 35 inches in women can increase the risk of weight-related health problems. · Talk with your doctor about steps you can take to stay healthy or improve your health. You may need to make lifestyle changes to lose weight and stay healthy, such as changing your diet and getting regular exercise. If you have a BMI lower than 18.5  · Your doctor may do other tests to check your risk for health problems. · Talk with your doctor about steps you can take to stay healthy or improve your health. You may need to make lifestyle changes to gain or maintain weight and stay healthy, such as getting more healthy foods in your diet and doing exercises to build muscle.   Where can you learn more?  Go to http://pat-cuate.info/. Enter S176 in the search box to learn more about \"Body Mass Index: Care Instructions. \"  Current as of: October 13, 2016  Content Version: 11.4  © 5228-2443 [employer]. Care instructions adapted under license by [employer] (which disclaims liability or warranty for this information). If you have questions about a medical condition or this instruction, always ask your healthcare professional. [address] any warranty or liability for your use of this information. Learning About Diabetes Food Guidelines  Your Care Instructions    Meal planning is important to manage diabetes. It helps keep your blood sugar at a target level (which you set with your doctor). You don't have to eat special foods. You can eat what your family eats, including sweets once in a while. But you do have to pay attention to how often you eat and how much you eat of certain foods. You may want to work with a dietitian or a certified diabetes educator (CDE) to help you plan meals and snacks. A dietitian or CDE can also help you lose weight if that is one of your goals. What should you know about eating carbs? Managing the amount of carbohydrate (carbs) you eat is an important part of healthy meals when you have diabetes. Carbohydrate is found in many foods. · Learn which foods have carbs. And learn the amounts of carbs in different foods. ¨ Bread, cereal, pasta, and rice have about 15 grams of carbs in a serving. A serving is 1 slice of bread (1 ounce), ½ cup of cooked cereal, or 1/3 cup of cooked pasta or rice. ¨ Fruits have 15 grams of carbs in a serving. A serving is 1 small fresh fruit, such as an apple or orange; ½ of a banana; ½ cup of cooked or canned fruit; ½ cup of fruit juice; 1 cup of melon or raspberries; or 2 tablespoons of dried fruit. ¨ Milk and no-sugar-added yogurt have 15 grams of carbs in a serving.  A serving is 1 cup of milk or 2/3 cup of no-sugar-added yogurt. ¨ Starchy vegetables have 15 grams of carbs in a serving. A serving is ½ cup of mashed potatoes or sweet potato; 1 cup winter squash; ½ of a small baked potato; ½ cup of cooked beans; or ½ cup cooked corn or green peas. · Learn how much carbs to eat each day and at each meal. A dietitian or CDE can teach you how to keep track of the amount of carbs you eat. This is called carbohydrate counting. · If you are not sure how to count carbohydrate grams, use the Plate Method to plan meals. It is a good, quick way to make sure that you have a balanced meal. It also helps you spread carbs throughout the day. ¨ Divide your plate by types of foods. Put non-starchy vegetables on half the plate, meat or other protein food on one-quarter of the plate, and a grain or starchy vegetable in the final quarter of the plate. To this you can add a small piece of fruit and 1 cup of milk or yogurt, depending on how many carbs you are supposed to eat at a meal.  · Try to eat about the same amount of carbs at each meal. Do not \"save up\" your daily allowance of carbs to eat at one meal.  · Proteins have very little or no carbs per serving. Examples of proteins are beef, chicken, turkey, fish, eggs, tofu, cheese, cottage cheese, and peanut butter. A serving size of meat is 3 ounces, which is about the size of a deck of cards. Examples of meat substitute serving sizes (equal to 1 ounce of meat) are 1/4 cup of cottage cheese, 1 egg, 1 tablespoon of peanut butter, and ½ cup of tofu. How can you eat out and still eat healthy? · Learn to estimate the serving sizes of foods that have carbohydrate. If you measure food at home, it will be easier to estimate the amount in a serving of restaurant food. · If the meal you order has too much carbohydrate (such as potatoes, corn, or baked beans), ask to have a low-carbohydrate food instead. Ask for a salad or green vegetables.   · If you use insulin, check your blood sugar before and after eating out to help you plan how much to eat in the future. · If you eat more carbohydrate at a meal than you had planned, take a walk or do other exercise. This will help lower your blood sugar. What else should you know? · Limit saturated fat, such as the fat from meat and dairy products. This is a healthy choice because people who have diabetes are at higher risk of heart disease. So choose lean cuts of meat and nonfat or low-fat dairy products. Use olive or canola oil instead of butter or shortening when cooking. · Don't skip meals. Your blood sugar may drop too low if you skip meals and take insulin or certain medicines for diabetes. · Check with your doctor before you drink alcohol. Alcohol can cause your blood sugar to drop too low. Alcohol can also cause a bad reaction if you take certain diabetes medicines. Follow-up care is a key part of your treatment and safety. Be sure to make and go to all appointments, and call your doctor if you are having problems. It's also a good idea to know your test results and keep a list of the medicines you take. Where can you learn more? Go to http://pat-cuate.info/. Enter A479 in the search box to learn more about \"Learning About Diabetes Food Guidelines. \"  Current as of: March 13, 2017  Content Version: 11.4  © 4806-7678 [employer]. Care instructions adapted under license by [employer] (which disclaims liability or warranty for this information). If you have questions about a medical condition or this instruction, always ask your healthcare professional. [place] [address] any warranty or liability for your use of this information.

## 2018-02-21 NOTE — PROGRESS NOTES
Chief Complaint   Patient presents with    Complete Physical     Yearly physical with lab results. Hemoglobin A1C 6.1% resulted 2/16/2018. Health Maintenance Due   Topic Date Due    Influenza Age 5 to Adult  08/01/2017     Patient states he hasn't had his flu vaccine. 1. Have you been to the ER, urgent care clinic or hospitalized since your last visit? NO.     2. Have you seen or consulted any other health care providers outside of the [address] [place] [address] since your last visit (Include any pap smears or colon screening)? YES, Patient states he went to [facility] for a cortisone shot in his right bottom thumb joint. Do you have an Advanced Directive? NO    Would you like information on Advanced Directives?  NO

## 2018-02-24 PROBLEM — G89.29 CHRONIC THUMB PAIN, RIGHT: Status: ACTIVE | Noted: 2018-02-24

## 2018-02-24 PROBLEM — M79.644 CHRONIC THUMB PAIN, RIGHT: Status: ACTIVE | Noted: 2018-02-24

## 2018-02-24 PROBLEM — K62.5 RECTAL BLEEDING: Status: ACTIVE | Noted: 2018-02-24

## 2018-02-24 NOTE — PROGRESS NOTES
HPI:   Collette Marks is a 59y.o. year old male who presents today for evaluation of hypertension, hyperlipidemia, moderate aortic insufficiency, atypical chest pain, prediabetes, GERD, osteoarthritis, lumbar radiculopathy, and chronic venous insufficiency. He reports that he is doing relatively well. He reports that he retired from work in 12/2017. He states that he continues to be under a lot of stress related to his wife's breast cancer. He said that they just returned late last night after a follow-up visit in [place]. He does report that he has been noticing some rectal pain and bleeding with wiping intermittently following a bowel movement. He states that he does not have any symptoms currently. He denies any abdominal pain, weight loss, or change in bowel movements. He is otherwise without complaints. He has a history of hypertension, treated with losartan and amlodipine. He does not check his blood pressure at home regularly. He also does not exercise regularly, although admits to doing yard work and projects around the house. He denies any shortness of breath at rest or with exertion, palpitations, lightheadedness, or edema. He does have a history of substernal chest pain that has no relation to exertion and was thought to be atypical. He presented to [facility] on 2/27/2016 with a complaint of chest pain and diaphoresis, which seemed to be relieved by aspirin and sublingual nitroglycerin in the ambulance. Evaluation included negative ECGs and troponins. He had an exercise nuclear stress test (2/29/2016) which showed normal LV function (EF 57%) and a medium sized partially reversible defect inferiorly (intermediate risk study). On 3/1/2016, he underwent a cardiac catheterization which showed no significant epicardial coronary artery disease.  He also had an echocargiogram (2/28/2016) showing mild LV septal hypertrophy, hypokinetic basal inferior wall with preserved LV function (EF 55%), mild diastolic dysfunction, and moderate aortic regurgitation with questionable dilation of the distal arch of aorta (3.5 cm). He was found to have elevated triglycerides, with lipid panel showing total cholesterol 201/ / HDL 52/ LDL 89. He was started on Fenofibrate prior to discharge on 3/1/2016. He reports that he has had no significant recurrence of chest pain since that time. He does have difficulty with mild lower extremity edema due to chronic venous insufficiency. He is followed by Dr. Blanquita Navarro. He has a history of prediabetes, with HbA1c ranging from 5.8-6.0 since 2012. He denies any polyuria, polydipsia, nocturia, or blurry vision, and has no history of retinopathy, neuropathy, or nephropathy. He has regular eye exams with Dr. Carole Holbrook. He has a history of osteoarthritis, involving his right knee, right great toe, and lumbar spine, with recent increasing difficulty with low back pain and right sided sciatica. He underwent a lumbar MRI (12/2016) which showed progression when compared to a prior lumbar MRI from 2010. It showed prominent right paracentral L4-L5 disc extrusion with impingement of the descending right L5 nerve root and moderately severe multifactorial canal stenosis; additional prominent right paracentral L5-S1 disc protrusion with impingement of the descending right S1 nerve root; abutment of the foraminal right L5 nerve root related to moderate foramina stenosis at L5-S1; mild multifactorial canal stenosis at L3-L4 with abutment of the descending left L4 nerve root. He was evaluated by Dr. Shahab Garcia, and treated with Medrol dose pack, Norco and Flexeril without significant improvement. He was evaluated by Dr. Tete Delgado in 3/2017 for possible surgical intervention, but this was deferred due to issues regarding his wife's health. He was treated with gabapentin, and was reevaluated by Dr. Shahab Garcia in 8/2017, at which time he reported significant improvement.  He states that he no longer required treatment with gabapentin or Norco, and is continuing to take Voltaren ER with good control of symptoms. He also reports that he has had increasing pain at the base of his right thumb and was evaluated by Dr. Maylon Schlatter at [address]. He states that x-rays revealed he had severe osteoarthritis, and he received a cortisone injection with some improvement. He has a history of GERD, with symptoms well controlled with prn pantoprazole. He had a screening colonoscopy by Dr. Remy Blackwood in 3/2012 with removal of a benign serrated polyp (pathology shows hyperplastic and adenomatous features). He had repeat screening colonoscopy in 3/2017 by Dr Damaso Lopez which was normal. Follow-up recommended for five years. He denies any abdominal pain, nausea, vomiting, melena, hematochezia, or change in bowel movements. In 5/2016, he fell at work with his chest striking the edge of a table and he sustained fractures of the anterior right 7th and 8th ribs. He had serial rib x-rays since that time showing slow interval healing, but reports that he no longer has pain related to this. He underwent a bone density scan in 10/2016 which showed evidence of osteopenia with T-scores:  femoral neck left -1.7  /right -1.5 and lumbar -1.2. He was started on calcium and Vitamin D supplementation. Past Medical History:   Diagnosis Date    Abnormal myocardial perfusion study 2/28/2016    Partially reversible inferior perfusion defect, EF 57%    AI (aortic insufficiency)     Moderate with possible bicuspid aortic valve    Chronic low back pain     Chronic venous insufficiency     Colon polyp 03/2012    Benign serrated polyp    Fracture 05/2016    rib fx's    GERD (gastroesophageal reflux disease)     History of echocardiogram 2/28/2016    EF 55%. Basal inferior hypokinesis. Gr 1 DDfx. Mod AI.       Hyperlipidemia     Hypertension     Osteoarthritis of right knee     Osteopenia     Prediabetes     Prepatellar bursitis of right knee     Recurrent genital herpes 2013    Right lumbar radiculopathy 10/2016    MRI: prominent right paracentral L4-L5 disc extrusion/impingement of descending right L5 nerve root; moderately severe canal stenosis; prominent right paracentral L5-S1 disc protrusion/ impingement of the descending right S1 nerve root; abutment of the foraminal right L5 nerve root/moderate foramina stenosis at L5-S1; mild canal stenosis at L3-L4 with abutment of the descending left L4 nerve root.  S/P cardiac catheterization 3/1/2016    Normal coronary anatomy     Past Surgical History:   Procedure Laterality Date    ENDOSCOPY, COLON, DIAGNOSTIC      HX CHOLECYSTECTOMY      HX TONSILLECTOMY       Current Outpatient Prescriptions   Medication Sig    amLODIPine (NORVASC) 10 mg tablet TAKE 1 TABLET DAILY    valACYclovir (VALTREX) 500 mg tablet TAKE 1 TABLET DAILY    losartan (COZAAR) 100 mg tablet TAKE 1 TABLET DAILY    diclofenac EC (VOLTAREN) 75 mg EC tablet Take 1 Tab by mouth two (2) times a day.  aspirin delayed-release 81 mg tablet 81 mg.    Cholecalciferol, Vitamin D3, 2,000 unit cap Take 2,000 Units by mouth daily.  DOCOSAHEXANOIC ACID/EPA (FISH OIL PO) Take  by mouth daily.  calcium carbonate (OS-KIARA) 500 mg calcium (1,250 mg) tablet Take  by mouth daily.  ascorbic acid (VITAMIN C) 500 mg tablet 500 mg.    magnesium oxide (MAG-OX) 400 mg tablet Take 400 mg by mouth.  diazepam (VALIUM) 5 mg tablet Take 1 Tab by mouth every six (6) hours as needed. Max Daily Amount: 20 mg. No current facility-administered medications for this visit. Allergies and Intolerances: Allergies   Allergen Reactions    Tree Nuts Anaphylaxis    Ace Inhibitors Cough    Nuts [Tree Nut] Itching     Family History: His mother is alive with stage 3 ovarian cancer. His father  from esophageal cancer at age 61. His maternal aunt  at age 48 from colon cancer.  He has no family history of prostate cancer. Family History   Problem Relation Age of Onset    Cancer Father 61     esophageal    Hypertension Father     Cancer Mother 80     ovarian cancer     Hypertension Brother     Diabetes Other      Grandmother    Arthritis-osteo Other     Other Other      Stomach problems; Father, grandfather     Social History:   He  reports that he quit smoking about 17 years ago. He has quit using smokeless tobacco. Reports that he smoked less than 0.5 ppd and stopped in 2001. He is  and they have one son. His wife was recently diagnosed with Paget's disease of the breast, which represents a recurrence of prior breast cancer. He is employed as an  for [employer]. He drinks about a 12 pack of beer each month. History   Alcohol Use    1.0 oz/week    2 Cans of beer per week     Comment: occasionally     Immunization History:  Immunization History   Administered Date(s) Administered    Influenza Vaccine 11/01/2013, 11/12/2014    TDAP Vaccine 01/20/2004    Td 01/01/2004    Tdap 01/16/2014       Review of Systems:   As above included in HPI. Otherwise 11 point review of systems negative including constitutional, skin, HENT, eyes, respiratory, cardiovascular, gastrointestinal, genitourinary, musculoskeletal, endocrine, hematologic, allergy, and neurologic. Physical:   Vitals:   BP: 122/80  HR: 73  WT: 228 lb (103.4 kg)  BMI:  30.92 kg/m2    Exam:   Patient appears in no apparent distress. Affect is appropriate. HEENT --Anicteric sclerae, tympanic membranes normal,  ear canals normal.  PERRL, EOMI, conjunctiva and lids normal.   Sinuses were nontender, turbinates normal, hearing normal.  Oropharynx without  erythema, normal tongue, oral mucosa and tonsils. No cervical lymphadenopathy. No thyromegaly, JVD, or bruits. Carotid pulses 2+ with normal upstroke. Lungs --Clear to auscultation. No wheezing or rales.   Heart --Regular rate and rhythm with +S4, no murmurs or rubs.  Chest wall --Nontender to palpation. PMI normal.  Abdomen -- Soft and nontender, no hepatosplenomegaly or masses. Prostate  -- no asymmetry, nodularity, tenderness or enlargement  Rectal  -- normal tone, guaiac negative brown stool  Extremities -- Without cyanosis, clubbing, edema. 2+ pulses equally and bilaterally. Normal looking digits, ROM intact  Neuro -- CN 2-12 intact, strength 5/5 with intact soft touch in all extremities  Derm - no obvious abnormalities noted, no rash    Review of Data:  Labs:  Hospital Outpatient Visit on 02/16/2018   Component Date Value Ref Range Status    WBC 02/16/2018 6.1  4.6 - 13.2 K/uL Final    RBC 02/16/2018 5.19  4.70 - 5.50 M/uL Final    HGB 02/16/2018 16.3* 13.0 - 16.0 g/dL Final    HCT 02/16/2018 47.8  36.0 - 48.0 % Final    MCV 02/16/2018 92.1  74.0 - 97.0 FL Final    MCH 02/16/2018 31.4  24.0 - 34.0 PG Final    MCHC 02/16/2018 34.1  31.0 - 37.0 g/dL Final    RDW 02/16/2018 13.3  11.6 - 14.5 % Final    PLATELET 32/72/2436 329  135 - 420 K/uL Final    MPV 02/16/2018 11.1  9.2 - 11.8 FL Final    NEUTROPHILS 02/16/2018 49  40 - 73 % Final    LYMPHOCYTES 02/16/2018 38  21 - 52 % Final    MONOCYTES 02/16/2018 9  3 - 10 % Final    EOSINOPHILS 02/16/2018 3  0 - 5 % Final    BASOPHILS 02/16/2018 1  0 - 2 % Final    ABS. NEUTROPHILS 02/16/2018 3.1  1.8 - 8.0 K/UL Final    ABS. LYMPHOCYTES 02/16/2018 2.3  0.9 - 3.6 K/UL Final    ABS. MONOCYTES 02/16/2018 0.5  0.05 - 1.2 K/UL Final    ABS. EOSINOPHILS 02/16/2018 0.2  0.0 - 0.4 K/UL Final    ABS.  BASOPHILS 02/16/2018 0.1* 0.0 - 0.06 K/UL Final    DF 02/16/2018 AUTOMATED    Final    Hemoglobin A1c 02/16/2018 6.1* 4.2 - 5.6 % Final    Est. average glucose 02/16/2018 128  mg/dL Final    LIPID PROFILE 02/16/2018        Final    Cholesterol, total 02/16/2018 198  <200 MG/DL Final    Triglyceride 02/16/2018 137  <150 MG/DL Final    HDL Cholesterol 02/16/2018 45  40 - 60 MG/DL Final    LDL, calculated 02/16/2018 125.6* 0 - 100 MG/DL Final    VLDL, calculated 02/16/2018 27.4  MG/DL Final    CHOL/HDL Ratio 02/16/2018 4.4  0 - 5.0   Final    Sodium 02/16/2018 141  136 - 145 mmol/L Final    Potassium 02/16/2018 4.4  3.5 - 5.5 mmol/L Final    Chloride 02/16/2018 107  100 - 108 mmol/L Final    CO2 02/16/2018 25  21 - 32 mmol/L Final    Anion gap 02/16/2018 9  3.0 - 18 mmol/L Final    Glucose 02/16/2018 105* 74 - 99 mg/dL Final    BUN 02/16/2018 15  7.0 - 18 MG/DL Final    Creatinine 02/16/2018 1.18  0.6 - 1.3 MG/DL Final    BUN/Creatinine ratio 02/16/2018 13  12 - 20   Final    GFR est AA 02/16/2018 >60  >60 ml/min/1.73m2 Final    GFR est non-AA 02/16/2018 >60  >60 ml/min/1.73m2 Final    Calcium 02/16/2018 9.0  8.5 - 10.1 MG/DL Final    Bilirubin, total 02/16/2018 0.6  0.2 - 1.0 MG/DL Final    ALT (SGPT) 02/16/2018 42  16 - 61 U/L Final    AST (SGOT) 02/16/2018 30  15 - 37 U/L Final    Alk.  phosphatase 02/16/2018 77  45 - 117 U/L Final    Protein, total 02/16/2018 6.6  6.4 - 8.2 g/dL Final    Albumin 02/16/2018 3.9  3.4 - 5.0 g/dL Final    Globulin 02/16/2018 2.7  2.0 - 4.0 g/dL Final    A-G Ratio 02/16/2018 1.4  0.8 - 1.7   Final    Color 02/16/2018 YELLOW    Final    Appearance 02/16/2018 CLEAR    Final    Specific gravity 02/16/2018 1.019  1.005 - 1.030   Final    pH (UA) 02/16/2018 6.5  5.0 - 8.0   Final    Protein 02/16/2018 NEGATIVE   NEG mg/dL Final    Glucose 02/16/2018 NEGATIVE   NEG mg/dL Final    Ketone 02/16/2018 NEGATIVE   NEG mg/dL Final    Bilirubin 02/16/2018 NEGATIVE   NEG   Final    Blood 02/16/2018 NEGATIVE   NEG   Final    Urobilinogen 02/16/2018 0.2  0.2 - 1.0 EU/dL Final    Nitrites 02/16/2018 NEGATIVE   NEG   Final    Leukocyte Esterase 02/16/2018 NEGATIVE   NEG   Final    WBC 02/16/2018 NONE  0 - 4 /hpf Final    RBC 02/16/2018 NONE  0 - 5 /hpf Final    Bacteria 02/16/2018 NEGATIVE   NEG /hpf Final    TSH 02/16/2018 1.91  0.36 - 3.74 uIU/mL Final    Prostate Specific Ag 02/16/2018 0.5  0.0 - 4.0 ng/mL Final    Vitamin D 25-Hydroxy 02/16/2018 38.3  30 - 100 ng/mL Final     Calculated 10 year ASCVD risk score:  9.1 %    Health Maintenance:  Screening:    Colorectal: colonoscopy (3/2017) normal. Dr. Wing Carver. Due 2022. Depression: none   DM (HbA1c/FPG): HbA1c 6.1 (2/2018)   Hepatitis C: negative (9/2016)   Falls: none    DEXA: N/A   PSA/TONI: PSA 0.5/ TONI (2/2018)   Glaucoma: regular eye exams with Dr. Mirta Landa (last 1/2017)   Smoking: none   Vitamin D: 38.3 (2/2018)   Medicare Wellness: N/A    Impression:  Patient Active Problem List   Diagnosis Code    Hyperlipidemia E78.5    GERD (gastroesophageal reflux disease) K21.9    Chronic venous insufficiency I87.2    Colon polyp K63.5    Prediabetes R73.03    Recurrent genital herpes A60.00    Vitamin D insufficiency E55.9    Aortic insufficiency, moderate I35.1    Essential hypertension I10    Primary osteoarthritis of knee M17.10    Closed fracture of multiple ribs of right side with delayed healing S22.41XG    Lumbar herniated disc  right L4/5/S1 chronic M51.26    Pain of right sacroiliac joint-episodic M53.3    Osteopenia determined by DEXA 2016, lowest T score -1.7 M85.80    Annular tear of lumbar disc M51.36    Chronic right-sided low back pain with sciatica M54.40, G89.29    Neuritis of lower extremity G57.90    Right leg weakness R29.898       Plan:    1. Hypertension. Well controlled on current regimen of losartan and amlodipine. Renal function normal with creatinine 1.18 / eGFR >60. Continue to follow. 2. Dyslipidemia. Patient no longer taking fenofibrate. Discontinued last visit given lack of diet showing that lowering triglycerides decreases the risk of CAD. In addition, first line therapy for treating triglycerides < 500 would be initiation of a statin. Repeat lipid panel today with triglyceride level 137.  However, his calculated 10 year ASCVD risk is 9.1 %, which does place him in one of the four statin benefit groups as per new AHA/ACC guidelines (primary prevention: ASCVD risk > 7.5%). However, he has been unwilling to start treatment with statin due to concerns regarding possible side effects. Emphasized importance of lifestyle modifications, including diet, exercise, and weight loss. Will repeat lipid panel at next visit to reassess. Continue to follow. 3. Aortic insufficiency, moderate with mildly dilated aortic root. Probable bicuspid valve. Needs annual echocardiograms to evaluate AV. Also, considering thoracic CT scan if aortic root appears to progress on echocardiogram.  Repeat echocardiogram to reassess valve in 9/2017 without change. Being followed by Dr. Camila Chávez. 4. Prediabetes. HbA1c increased slightly to 6.1. No evidence of microvascular complications. On ARB but unwilling to begin statin. Continue follow-up with  for annual eye exams. Foot exam normal 4/2017. Urine microalbumin/ creatinine ratio without evidence of microalbuminuria. Encouraged weight loss and dietary changes, particularly avoiding concentrated sweets. 5. Osteopenia. Last bone density scan 10/2016. Using femoral neck T-scores, calculated FRAX score estimates her 10 year risk of a major osteoporetic fracture at 12 % and hip fracture at 2.0 %, which are not an indication for biphosphonate treatment (>20% and >3%, respectively). Continue calcium and Vitamin D. Encouraged exercise, particularly weight bearing activities. Consider repeat bone density scan at next visit. 6. GERD. Continue occasional use of Protonix with good control of symptoms. Follow. 7. Lumbar radiculopathy. Progression of symptoms and disease on MRI. Responded well to conservative therapy, so no need for surgical intervention at this time. No longer requiring gabapentin or [place]. Using Voltaren ER with good control. Follow. 8. Right thumb osteoarthritis. Improved pain control with cortisone injection.  Being followed by  Christy Stockton. 9. Rectal bleeding. Guaiac negative on exam today. No visible hemorrhoids. Colonoscopy normal 3/2017. Suspect symptoms may be secondary to fissure. Discussed increasing fiber in diet. Follow. 10. Health maintenance. Patient already received influenza vaccine at work. Other immunizations up to date. Colonoscopy due 2022. Prostate cancer screen up to date. Continue regular eye exams. Stressed importance of weight loss. Vitamin D level normal. Continue maintenance dose supplement. Emphasized importance of lifestyle modifications, including diet, exercise, and weight loss. Patient understands recommendations and agrees with plan. Follow-up in 6 months.

## 2018-03-01 ENCOUNTER — OFFICE VISIT (OUTPATIENT)
Dept: ORTHOPEDIC SURGERY | Age: 58
End: 2018-03-01

## 2018-03-01 VITALS
DIASTOLIC BLOOD PRESSURE: 90 MMHG | BODY MASS INDEX: 31.29 KG/M2 | TEMPERATURE: 97.9 F | RESPIRATION RATE: 18 BRPM | HEART RATE: 73 BPM | WEIGHT: 231 LBS | SYSTOLIC BLOOD PRESSURE: 144 MMHG | HEIGHT: 72 IN | OXYGEN SATURATION: 94 %

## 2018-03-01 DIAGNOSIS — M54.10 RADICULAR PAIN: ICD-10-CM

## 2018-03-01 DIAGNOSIS — M51.26 LUMBAR HERNIATED DISC: Primary | Chronic | ICD-10-CM

## 2018-03-01 RX ORDER — IBUPROFEN 800 MG/1
800 TABLET ORAL
COMMUNITY
Start: 2016-02-09 | End: 2018-09-08

## 2018-03-01 RX ORDER — KETOROLAC TROMETHAMINE 15 MG/ML
60 INJECTION, SOLUTION INTRAMUSCULAR; INTRAVENOUS ONCE
Qty: 1 VIAL | Refills: 0
Start: 2018-03-01 | End: 2018-03-01

## 2018-03-01 RX ORDER — GABAPENTIN 300 MG/1
300 CAPSULE ORAL
Qty: 90 CAP | Refills: 1 | Status: SHIPPED | OUTPATIENT
Start: 2018-03-01 | End: 2018-06-20

## 2018-03-01 RX ORDER — METHYLPREDNISOLONE 4 MG/1
TABLET ORAL
Qty: 1 DOSE PACK | Refills: 0 | Status: SHIPPED | OUTPATIENT
Start: 2018-03-01 | End: 2018-06-20 | Stop reason: ALTCHOICE

## 2018-03-01 NOTE — PATIENT INSTRUCTIONS
Sciatica: Care Instructions  Your Care Instructions    Sciatica (say \"thn-IE-he-kuh\") is an irritation of one of the sciatic nerves, which come from the spinal cord in the lower back. The sciatic nerves and their branches extend down through the buttock to the foot. Sciatica can develop when an injured disc in the back presses against a spinal nerve root. Its main symptom is pain, numbness, or weakness that is often worse in the leg or foot than in the back. Sciatica often will improve and go away with time. Early treatment usually includes medicines and exercises to relieve pain. Follow-up care is a key part of your treatment and safety. Be sure to make and go to all appointments, and call your doctor if you are having problems. It's also a good idea to know your test results and keep a list of the medicines you take. How can you care for yourself at home? · Take pain medicines exactly as directed. ¨ If the doctor gave you a prescription medicine for pain, take it as prescribed. ¨ If you are not taking a prescription pain medicine, ask your doctor if you can take an over-the-counter medicine. · Use heat or ice to relieve pain. ¨ To apply heat, put a warm water bottle, heating pad set on low, or warm cloth on your back. Do not go to sleep with a heating pad on your skin. ¨ To use ice, put ice or a cold pack on the area for 10 to 20 minutes at a time. Put a thin cloth between the ice and your skin. · Avoid sitting if possible, unless it feels better than standing. · Alternate lying down with short walks. Increase your walking distance as you are able to without making your symptoms worse. · Do not do anything that makes your symptoms worse. When should you call for help? Call 911 anytime you think you may need emergency care. For example, call if:  · You are unable to move a leg at all.   Call your doctor now or seek immediate medical care if:  · You have new or worse symptoms in your legs or buttocks. Symptoms may include:  ¨ Numbness or tingling. ¨ Weakness. ¨ Pain. · You lose bladder or bowel control. Watch closely for changes in your health, and be sure to contact your doctor if:  · You are not getting better as expected. Where can you learn more? Go to http://pat-cuate.info/. Enter [phone] in the search box to learn more about \"Sciatica: Care Instructions. \"  Current as of: March 21, 2017  Content Version: 11.4  © 1007-4120 [employer]. Care instructions adapted under license by [employer] (which disclaims liability or warranty for this information). If you have questions about a medical condition or this instruction, always ask your healthcare professional. [address] any warranty or liability for your use of this information. Sciatica: Exercises  Your Care Instructions  Here are some examples of typical rehabilitation exercises for your condition. Start each exercise slowly. Ease off the exercise if you start to have pain. Your doctor or physical therapist will tell you when you can start these exercises and which ones will work best for you. When you are not being active, find a comfortable position for rest. Some people are comfortable on the floor or a medium-firm bed with a small pillow under their head and another under their knees. Some people prefer to lie on their side with a pillow between their knees. Don't stay in one position for too long. Take short walks (10 to 20 minutes) every 2 to 3 hours. Avoid slopes, hills, and stairs until you feel better. Walk only distances you can manage without pain, especially leg pain. How to do the exercises  Back stretches    1. Get down on your hands and knees on the floor. 2. Relax your head and allow it to droop. Round your back up toward the ceiling until you feel a nice stretch in your upper, middle, and lower back.  Hold this stretch for as long as it feels comfortable, or about 15 to 30 seconds. 3. Return to the starting position with a flat back while you are on your hands and knees. 4. Let your back sway by pressing your stomach toward the floor. Lift your buttocks toward the ceiling. 5. Hold this position for 15 to 30 seconds. 6. Repeat 2 to 4 times. Follow-up care is a key part of your treatment and safety. Be sure to make and go to all appointments, and call your doctor if you are having problems. It's also a good idea to know your test results and keep a list of the medicines you take. Where can you learn more? Go to http://pat-cuate.info/. Enter G066 in the search box to learn more about \"Sciatica: Exercises. \"  Current as of: March 21, 2017  Content Version: 11.4  © 7279-9566 [employer]. Care instructions adapted under license by [employer] (which disclaims liability or warranty for this information). If you have questions about a medical condition or this instruction, always ask your healthcare professional. [place] [address] any warranty or liability for your use of this information.

## 2018-03-01 NOTE — MR AVS SNAPSHOT
[address] [facility] [address] [facility] [address] 
[phone] Patient: Gina Hernandez MRN: [id number] [id number] Visit Information Date & Time Provider Department Dept. Phone Encounter #  
 3/1/2018  3:00 PM Phuong Lester MD [facility] [phone] [id number] Follow-up Instructions Return if symptoms worsen or fail to improve. Your Appointments 8/21/2018  8:30 AM  
LAB with [facility] NURSE VISIT [facility] ([facility] Appt Note: lab  
 [address], [address] [place] [address] [place]  
  
   
 [address], [address]  
  
    
 8/28/2018  9:00 AM  
Office Visit with Lena Martinez MD  
[facility] Appt Note: 6 month f/u  
 [address] [facility], [address] [place] [address] [place]  
  
   
 [address], [address] 9/17/2018  9:20 AM  
Follow Up with Selam Mazariegos MD  
[facility] ([facility] Appt Note: 1 year follow up with an EKG  
 [place] [id number]  
[phone] [address] Upcoming Health Maintenance Date Due COLONOSCOPY 3/22/2022 DTaP/Tdap/Td series (3 - Td) 1/16/2024 Allergies as of 3/1/2018  Review Complete On: 3/1/2018 By: Gopi Ramos Severity Noted Reaction Type Reactions Tree Nuts High 03/08/2016    Anaphylaxis Ace Inhibitors  04/19/2013    Cough Nuts [Tree Nut]  04/29/2015    Itching Current Immunizations  Reviewed on 1/28/2015 Name Date Influenza Vaccine 11/12/2014, 11/1/2013 TDAP Vaccine 1/20/2004 Td 1/1/2004 Tdap 1/16/2014 Not reviewed this visit You Were Diagnosed With   
  
 Codes Comments Lumbar herniated disc    -  Primary ICD-10-CM: M51.26 
ICD-9-CM: 722.10 Vitals BP Pulse Temp Resp Height(growth percentile) Weight(growth percentile) 144/90 73 97.9 °F (36.6 °C) (Oral) 18 6' (1.829 m) 231 lb (104.8 kg) SpO2 BMI Smoking Status 94% 31.33 kg/m2 Former Smoker BMI and BSA Data Body Mass Index Body Surface Area  
 31.33 kg/m 2 2.31 m 2 Preferred Pharmacy Pharm[facility] Name Phone [facility], [address] [phone] Your Updated Medication List  
  
   
This list is accurate as of 3/1/18  3:43 PM.  Always use your most recent med list. amLODIPine 10 mg tablet Commonly known as:  Pascale [place] TAKE 1 TABLET DAILY  
  
 ascorbic acid (vitamin C) 500 mg tablet Commonly known as:  VITAMIN C  
500 mg.  
  
 aspirin delayed-release 81 mg tablet 81 mg.  
  
 calcium carbonate 500 mg calcium (1,250 mg) tablet Commonly known as:  OS-KIARA Take  by mouth daily. Cholecalciferol (Vitamin D3) 2,000 unit Cap capsule Commonly known as:  VITAMIN D3 Take 2,000 Units by mouth daily. diclofenac EC 75 mg EC tablet Commonly known as:  VOLTAREN Take 1 Tab by mouth two (2) times a day. FISH OIL PO Take  by mouth daily. gabapentin 300 mg capsule Commonly known as:  NEURONTIN Take 1 Cap by mouth three (3) times daily as needed. Indications: nerve pain  
  
 ibuprofen 800 mg tablet Commonly known as:  MOTRIN  
800 mg.  
  
 ketorolac 15 mg/mL Soln injection Commonly known as:  TORADOL  
4 mL by IntraMUSCular route once for 1 dose. losartan 100 mg tablet Commonly known as:  COZAAR  
TAKE 1 TABLET DAILY  
  
 magnesium oxide 400 mg tablet Commonly known as:  MAG-OX Take 400 mg by mouth.  
  
 methylPREDNISolone 4 mg tablet Commonly known as:  Parveen Sitter Take as directed NORCO PO Take  by mouth. valACYclovir 500 mg tablet Commonly known as:  VALTREX  
TAKE 1 TABLET DAILY Prescriptions Sent to Pharmacy Refills  
 gabapentin (NEURONTIN) 300 mg capsule 1 Sig: Take 1 Cap by mouth three (3) times daily as needed. Indications: nerve pain  
 Class: Normal  
 [id number] [address], [address] Ph #: [phone] Route: Oral  
 methylPREDNISolone (MEDROL DOSEPACK) 4 mg tablet 0 Sig: Take as directed Class: Normal  
 Pharmacy: [facility] [address] [facility], [address] Ph #: [phone] We Performed the Following AMB POC XRAY, SPINE, LUMBOSACRAL; 4+ [id number] CPT(R)] KETOROLAC TROMETHAMINE INJ [ [facility]] UT THER/PROPH/DIAG INJECTION, SUBCUT/IM [id number] CPT(R)] Follow-up Instructions Return if symptoms worsen or fail to improve. Patient Instructions Sciatica: Care Instructions Your Care Instructions Sciatica (say \"zfc-UX-ek-kuh\") is an irritation of one of the sciatic nerves, which come from the spinal cord in the lower back. The sciatic nerves and their branches extend down through the buttock to the foot. Sciatica can develop when an injured disc in the back presses against a spinal nerve root. Its main symptom is pain, numbness, or weakness that is often worse in the leg or foot than in the back. Sciatica often will improve and go away with time. Early treatment usually includes medicines and exercises to relieve pain. Follow-up care is a key part of your treatment and safety. Be sure to make and go to all appointments, and call your doctor if you are having problems. It's also a good idea to know your test results and keep a list of the medicines you take. How can you care for yourself at home? · Take pain medicines exactly as directed. ¨ If the doctor gave you a prescription medicine for pain, take it as prescribed.  
¨ If you are not taking a prescription pain medicine, ask your doctor if you can take an over-the-counter medicine. · Use heat or ice to relieve pain. ¨ To apply heat, put a warm water bottle, heating pad set on low, or warm cloth on your back. Do not go to sleep with a heating pad on your skin. ¨ To use ice, put ice or a cold pack on the area for 10 to 20 minutes at a time. Put a thin cloth between the ice and your skin. · Avoid sitting if possible, unless it feels better than standing. · Alternate lying down with short walks. Increase your walking distance as you are able to without making your symptoms worse. · Do not do anything that makes your symptoms worse. When should you call for help? Call 911 anytime you think you may need emergency care. For example, call if: 
· You are unable to move a leg at all. Call your doctor now or seek immediate medical care if: 
· You have new or worse symptoms in your legs or buttocks. Symptoms may include: ¨ Numbness or tingling. ¨ Weakness. ¨ Pain. · You lose bladder or bowel control. Watch closely for changes in your health, and be sure to contact your doctor if: 
· You are not getting better as expected. Where can you learn more? Go to http://pat-cuate.info/. Enter [phone] in the search box to learn more about \"Sciatica: Care Instructions. \" Current as of: March 21, 2017 Content Version: 11.4 © 4836-5907 PhilSmile. Care instructions adapted under license by iCetana (which disclaims liability or warranty for this information). If you have questions about a medical condition or this instruction, always ask your healthcare professional. [address] any warranty or liability for your use of this information. Sciatica: Exercises Your Care Instructions Here are some examples of typical rehabilitation exercises for your condition. Start each exercise slowly. Ease off the exercise if you start to have pain. Your doctor or physical therapist will tell you when you can start these exercises and which ones will work best for you. When you are not being active, find a comfortable position for rest. Some people are comfortable on the floor or a medium-firm bed with a small pillow under their head and another under their knees. Some people prefer to lie on their side with a pillow between their knees. Don't stay in one position for too long. Take short walks (10 to 20 minutes) every 2 to 3 hours. Avoid slopes, hills, and stairs until you feel better. Walk only distances you can manage without pain, especially leg pain. How to do the exercises Back stretches 1. Get down on your hands and knees on the floor. 2. Relax your head and allow it to droop. Round your back up toward the ceiling until you feel a nice stretch in your upper, middle, and lower back. Hold this stretch for as long as it feels comfortable, or about 15 to 30 seconds. 3. Return to the starting position with a flat back while you are on your hands and knees. 4. Let your back sway by pressing your stomach toward the floor. Lift your buttocks toward the ceiling. 5. Hold this position for 15 to 30 seconds. 6. Repeat 2 to 4 times. Follow-up care is a key part of your treatment and safety. Be sure to make and go to all appointments, and call your doctor if you are having problems. It's also a good idea to know your test results and keep a list of the medicines you take. Where can you learn more? Go to http://pat-cuate.info/. Enter Q085 in the search box to learn more about \"Sciatica: Exercises. \" Current as of: March 21, 2017 Content Version: 11.4 © 9558-4170 SnipSnap. Care instructions adapted under license by ZQGame (which disclaims liability or warranty for this information).  If you have questions about a medical condition or this instruction, always ask your healthcare professional. [address]  any warranty or liability for your use of this information. Introducing [facility] [place] [facility]! Wally Guardado introduces Therma Flite patient portal. Now you can access parts of your medical record, email your doctor's office, and request medication refills online. 1. In your internet browser, go to [email or web address] 2. Click on the First Time User? Click Here link in the Sign In box. You will see the New Member Sign Up page. 3. Enter your Therma Flite Access Code exactly as it appears below. You will not need to use this code after youve completed the sign-up process. If you do not sign up before the expiration date, you must request a new code. · Therma Flite Access Code: [email or web address] Expires: 5/22/2018  9:58 AM 
 
4. Enter the last four digits of your Social Security Number (xxxx) and Date of Birth (mm/dd/yyyy) as indicated and click Submit. You will be taken to the next sign-up page. 5. Create a Therma Flite ID. This will be your Therma Flite login ID and cannot be changed, so think of one that is secure and easy to remember. 6. Create a Therma Flite password. You can change your password at any time. 7. Enter your Password Reset Question and Answer. This can be used at a later time if you forget your password. 8. Enter your e-mail address. You will receive e-mail notification when new information is available in [address] 9. Click Sign Up. You can now view and download portions of your medical record. 10. Click the Download Summary menu link to download a portable copy of your medical information. If you have questions, please visit the Frequently Asked Questions section of the Therma Flite website. Remember, Therma Flite is NOT to be used for urgent needs. For medical emergencies, dial 911. Now available from your iPhone and Android! Please provide this summary of care documentation to your next provider. Your primary care clinician is listed as Jagdish Wild. If you have any questions after today's visit, please call [phone].

## 2018-03-01 NOTE — PROGRESS NOTES
[facility] [address], [address]  [place], [facility] [address]  Phone: [phone]  Fax: [phone]        Vincent Schofield  : 1960  PCP: Erick Cedeno MD    PROGRESS NOTE      ASSESSMENT AND PLAN    Diagnoses and all orders for this visit:    1. Lumbar herniated disc  right L4/5/S1 chronic  -     [34550] LS Spine 4V  -     KETOROLAC TROMETHAMINE INJ  -     THER/PROPH/DIAG INJECTION, SUBCUT/IM    2. Radicular pain- recurrence    Other orders  -     ketorolac (TORADOL) 15 mg/mL soln injection; 4 mL by IntraMUSCular route once for 1 dose.  -     gabapentin (NEURONTIN) 300 mg capsule; Take 1 Cap by mouth three (3) times daily as needed. Indications: nerve pain  -     methylPREDNISolone (MEDROL DOSEPACK) 4 mg tablet; Take as directed    1. Advised to continue HEP. 2. Rx for MDP. 3. Rx for Gabapentin   4. Toradol injection in the office today. 5. Given information on sciatica. Follow-up Disposition:  Return if symptoms worsen or fail to improve. HISTORY OF PRESENT ILLNESS  Ari Joel is a 59 y.o. male. Pt was last evaluated about 6 months ago, has Hx of HNP. At that time his radiculopathy had resolved. He had been previously recommended for a laminectomy. Pt returns after a flare of pain when driving from [place] for wife's CA tx. . He has tried taking Norco, Motrin and muscle relaxer since his pain flared. Pt reports pain does radiate into RLE, just to the buttock and upper posterior thigh at this time. He wishes to \"nip this in the bud\" before it progresses. He was doing well before his pain flared post long drive. He had some paresthesias when he was driving. Pt denies weakness in BLE. Pt denies saddle paresthesias. Pt states he has been using Motrin, Norco, and muscle relaxer with mild relief. Does not have any Gabapentin.  He takes Diclofenac QAM.  Denies persistent fevers, chills, weight changes, neurogenic bowel or bladder symptoms. Pt denies recent ED visits or hospitalizations. Pt denies any recent GI ulcers, bleeds or renal dysfucntion.  reviewed. Hx of venous and aortic insufficiency. He retired in 12/2017, was working as an  at [employer]. His insurance would not cover venous closure surgery, continues to wear compression hose. Wife and mother have cancer. Pain Assessment  3/1/2018   Location of Pain Back   Pain Location Comment -   Location Modifiers -   Severity of Pain 8   Quality of Pain Aching   Quality of Pain Comment numbness, tingling   Duration of Pain -   Frequency of Pain Constant   Aggravating Factors Standing;Bending   Aggravating Factors Comment siitting   Limiting Behavior -   Relieving Factors (No Data)   Relieving Factors Comment norco   Result of Injury -       PAST MEDICAL HISTORY   Past Medical History:   Diagnosis Date    Abnormal myocardial perfusion study 2/28/2016    Partially reversible inferior perfusion defect, EF 57%    AI (aortic insufficiency)     Moderate with possible bicuspid aortic valve    Chronic low back pain     Chronic venous insufficiency     Colon polyp 03/2012    Benign serrated polyp    Fracture 05/2016    rib fx's    GERD (gastroesophageal reflux disease)     History of echocardiogram 2/28/2016    EF 55%. Basal inferior hypokinesis. Gr 1 DDfx. Mod AI.       Hyperlipidemia     Hypertension     Osteoarthritis of right knee     Osteopenia     Prediabetes     Prepatellar bursitis of right knee     Recurrent genital herpes 1/16/2013    Right lumbar radiculopathy 10/2016    MRI: prominent right paracentral L4-L5 disc extrusion/impingement of descending right L5 nerve root; moderately severe canal stenosis; prominent right paracentral L5-S1 disc protrusion/ impingement of the descending right S1 nerve root; abutment of the foraminal right L5 nerve root/moderate foramina stenosis at L5-S1; mild canal stenosis at L3-L4 with abutment of the descending left L4 nerve root.  S/P cardiac catheterization 3/1/2016    Normal coronary anatomy       Past Surgical History:   Procedure Laterality Date    ENDOSCOPY, COLON, DIAGNOSTIC      HX CHOLECYSTECTOMY      HX TONSILLECTOMY     . MEDICATIONS      Current Outpatient Prescriptions   Medication Sig Dispense Refill    HYDROCODONE/ACETAMINOPHEN (NORCO PO) Take  by mouth.  gabapentin (NEURONTIN) 300 mg capsule Take 1 Cap by mouth three (3) times daily as needed. Indications: nerve pain 90 Cap 1    methylPREDNISolone (MEDROL DOSEPACK) 4 mg tablet Take as directed 1 Dose Pack 0    amLODIPine (NORVASC) 10 mg tablet TAKE 1 TABLET DAILY 90 Tab 3    valACYclovir (VALTREX) 500 mg tablet TAKE 1 TABLET DAILY 90 Tab 3    losartan (COZAAR) 100 mg tablet TAKE 1 TABLET DAILY 90 Tab 3    calcium carbonate (OS-KIARA) 500 mg calcium (1,250 mg) tablet Take  by mouth daily.  diclofenac EC (VOLTAREN) 75 mg EC tablet Take 1 Tab by mouth two (2) times a day. 180 Tab 2    ascorbic acid (VITAMIN C) 500 mg tablet 500 mg.      magnesium oxide (MAG-OX) 400 mg tablet Take 400 mg by mouth.  aspirin delayed-release 81 mg tablet 81 mg.      Cholecalciferol, Vitamin D3, 2,000 unit cap Take 2,000 Units by mouth daily.  DOCOSAHEXANOIC ACID/EPA (FISH OIL PO) Take  by mouth daily.  ibuprofen (MOTRIN) 800 mg tablet 800 mg. ALLERGIES    Allergies   Allergen Reactions    Tree Nuts Anaphylaxis    Ace Inhibitors Cough    Nuts [Tree Nut] Itching          SOCIAL HISTORY    Social History     Social History    Marital status:      Spouse name: N/A    Number of children: N/A    Years of education: N/A     Occupational History    Not on file.      Social History Main Topics    Smoking status: Former Smoker     Quit date: 6/28/2000    Smokeless tobacco: Former User    Alcohol use 1.0 oz/week     2 Cans of beer per week      Comment: occasionally    Drug use: No    Sexual activity: Not Currently Partners: Female     Other Topics Concern    Not on file     Social History Narrative       FAMILY HISTORY    Family History   Problem Relation Age of Onset    Cancer Father 61     esophageal    Hypertension Father     Cancer Mother 80     ovarian cancer     Hypertension Brother     Diabetes Other      Grandmother    Arthritis-osteo Other     Other Other      Stomach problems; Father, grandfather       REVIEW OF SYSTEMS  Review of Systems   Constitutional: Negative for chills, fever and weight loss. Respiratory: Negative for shortness of breath. Cardiovascular: Negative for chest pain. Gastrointestinal: Negative for constipation. Negative for fecal incontinence   Genitourinary: Negative for dysuria. Negative for urinary incontinence   Musculoskeletal:        Per HPI   Skin: Negative for rash. Neurological: Negative for dizziness, tingling, tremors, focal weakness and headaches. Endo/Heme/Allergies: Does not bruise/bleed easily. Psychiatric/Behavioral: The patient does not have insomnia. PHYSICAL EXAMINATION  Visit Vitals    /90    Pulse 73    Temp 97.9 °F (36.6 °C) (Oral)    Resp 18    Ht 6' (1.829 m)    Wt 231 lb (104.8 kg)    SpO2 94%    BMI 31.33 kg/m2         Accompanied by self. Constitutional:  Well developed, well nourished, in no acute distress. Psychiatric: Affect and mood are appropriate. Integumentary: No rashes or abrasions noted on exposed areas. Cardiovascular/Peripheral Vascular: Intact l pulses. No peripheral edema is noted. Lymphatic:  No evidence of lymphedema. No cervical lymphadenopathy. SPINE/MUSCULOSKELETAL EXAM      Lumbar spine:  No rash, ecchymosis, or gross obliquity. No fasciculations. No focal atrophy is noted. Back pain with R hip ROM. Tenderness to palpation L4-5. Tenderness to palpation at the R sciatic notch. SI joints non-tender. Trochanters non tender. Sensation grossly intact to light touch.       MOTOR: Hip Flex  Quads Hamstrings Ankle DF EHL Ankle PF   Right +4/5 +4/5 +4/5 +4/5 +4/5 +4/5   Left +4/5 +4/5 +4/5 +4/5 +4/5 +4/5     DTRs are 1+ patella and achilles. Straight Leg raise + R at 75 degrees. No difficulty with tandem gait. Heel walk elicits R leg paresthesias. Toe rise intact. Ambulation without assistive device. FWB. RADIOGRAPHS  Lumbar spine xray films reviewed:  1) Degenerative changes at L4-5 and L5-S1  2) No instability      Written by Poncho Breen, as dictated by Carleen Price MD.    I, Dr. Carleen Price MD, confirm that all documentation is accurate. Mr. Aziza Rogers may have a reminder for a \"due or due soon\" health maintenance. I have asked that he contact his primary care provider for follow-up on this health maintenance.

## 2018-03-01 NOTE — PROGRESS NOTES
Verbal order entered per Dr. Stephanie Santiago as documented on blue sheet:MDP take as directed. Gabapentin 300mg take 1 tab po TID prn nerve pain. Disp 90 with 1 refill. Toradol 60mg IM x 1 now right gluteus.

## 2018-03-13 ENCOUNTER — TELEPHONE (OUTPATIENT)
Dept: ORTHOPEDIC SURGERY | Age: 58
End: 2018-03-13

## 2018-03-13 NOTE — TELEPHONE ENCOUNTER
He has already had toradol inj and MDP. If he is still in pain then he may need to consider the laminectomy.   He will need a new MRI and surgical consult with Dr Cristian Shah

## 2018-03-13 NOTE — TELEPHONE ENCOUNTER
Patient saw Dr. Taylor Ross 03-01-18 and is still having just as much pain. Patient is asking for something to help.   Patient can be reached at [phone]

## 2018-03-16 NOTE — TELEPHONE ENCOUNTER
Patient identified self on voice mail, informed of NP [place] message below. Patient to return call to schedule MRI and SC with Dr. Vania Choe if he wants to proceed.

## 2018-03-16 NOTE — TELEPHONE ENCOUNTER
Patient returned call, informed of NP [place] message below. Patient stated understanding, and that he would discuss it with his wife. They would contact the office next week with a decision.

## 2018-03-27 ENCOUNTER — TELEPHONE (OUTPATIENT)
Dept: INTERNAL MEDICINE CLINIC | Age: 58
End: 2018-03-27

## 2018-03-27 NOTE — TELEPHONE ENCOUNTER
Call came in from [facility] stating the patient had contacted them in regards to [facility] 2/21/18. Stating he was here for his physical that day but it was not coded accordingly and now he is getting a bill for 97.10. They are asking that this be recoded as his annual well visit and resubmitted to the insurance.

## 2018-03-30 ENCOUNTER — TELEPHONE (OUTPATIENT)
Dept: INTERNAL MEDICINE CLINIC | Age: 58
End: 2018-03-30

## 2018-03-30 NOTE — TELEPHONE ENCOUNTER
Patient should not take both ibuprofen and alleve as they are both NSAIDS. He should take either one alone and may supplement with extra strength Tylenol instead.

## 2018-06-06 ENCOUNTER — TELEPHONE (OUTPATIENT)
Dept: CARDIOLOGY CLINIC | Age: 58
End: 2018-06-06

## 2018-06-06 NOTE — TELEPHONE ENCOUNTER
Dr Ivette Leyva's office called to get appt for surgical clearance for lumbar decompression on 7/5/18. Patient was seen by Dr Simone Staton over 6 months ago, so appt was made per [facility]. Patient made aware by their office.       Verbal order and read back per Early MD Zoë

## 2018-06-20 ENCOUNTER — TELEPHONE (OUTPATIENT)
Dept: INTERNAL MEDICINE CLINIC | Age: 58
End: 2018-06-20

## 2018-06-20 ENCOUNTER — OFFICE VISIT (OUTPATIENT)
Dept: INTERNAL MEDICINE CLINIC | Age: 58
End: 2018-06-20

## 2018-06-20 VITALS
WEIGHT: 228 LBS | HEIGHT: 72 IN | HEART RATE: 77 BPM | BODY MASS INDEX: 30.88 KG/M2 | OXYGEN SATURATION: 97 % | RESPIRATION RATE: 14 BRPM | DIASTOLIC BLOOD PRESSURE: 70 MMHG | SYSTOLIC BLOOD PRESSURE: 116 MMHG | TEMPERATURE: 97.8 F

## 2018-06-20 DIAGNOSIS — Z01.818 PREOPERATIVE CLEARANCE: ICD-10-CM

## 2018-06-20 DIAGNOSIS — I10 ESSENTIAL HYPERTENSION: Primary | ICD-10-CM

## 2018-06-20 DIAGNOSIS — K21.9 GASTROESOPHAGEAL REFLUX DISEASE, ESOPHAGITIS PRESENCE NOT SPECIFIED: ICD-10-CM

## 2018-06-20 DIAGNOSIS — I35.1 AORTIC VALVE INSUFFICIENCY, ETIOLOGY OF CARDIAC VALVE DISEASE UNSPECIFIED: ICD-10-CM

## 2018-06-20 DIAGNOSIS — E55.9 VITAMIN D INSUFFICIENCY: ICD-10-CM

## 2018-06-20 DIAGNOSIS — M51.26 LUMBAR HERNIATED DISC: Chronic | ICD-10-CM

## 2018-06-20 DIAGNOSIS — A60.00 RECURRENT GENITAL HERPES: ICD-10-CM

## 2018-06-20 DIAGNOSIS — R73.03 PREDIABETES: ICD-10-CM

## 2018-06-20 DIAGNOSIS — E78.5 HYPERLIPIDEMIA, UNSPECIFIED HYPERLIPIDEMIA TYPE: ICD-10-CM

## 2018-06-20 DIAGNOSIS — M79.644 THUMB PAIN, RIGHT: ICD-10-CM

## 2018-06-20 NOTE — PROGRESS NOTES
1. Have you been to the ER, urgent care clinic or hospitalized since your last visit? NO.     2. Have you seen or consulted any other health care providers outside of the [facility] since your last visit (Include any pap smears or colon screening)? Dr. Alison Ventura      Do you have an Advanced Directive? NO    Would you like information on Advanced Directives?  NO

## 2018-06-20 NOTE — TELEPHONE ENCOUNTER
Called and spoke with Patient to let him know that Viry Cadegi requested that he come back in and have an EKG done as part of his pre-op clearance. Patient stated that he was already 30 plus minutes away from the office but he would try to come back this afternoon. Patient stated that he would call back with an estimate of when he can return.

## 2018-06-20 NOTE — TELEPHONE ENCOUNTER
Called and spoke with patient to let him know that we were able to [facility] and per BM he does not need to come back for another EKG. Patient understood, no further questions.

## 2018-06-20 NOTE — PROGRESS NOTES
HPI/History  Mello Edmond is a 59 y.o.  male who presents for med clearance for low back surgery (decompression) with Dr. Rustam Clement on 7/5. HTN treated as below and appears controlled. Hx of AI. Followed by Dr. Scout Lui. No cardiopulmonary sxs. HLD treated with fish oil and on 81mg ASA. PreDM with A1c of 6.1 in February. Gerd intermittently, usually after known triggers. Takes prn tums. Vit D insuff and taking OTC supplements. On herpes suppression therapy with valtrex. Reports being followed by Dr. Larry Kay ([facility]) for right thumb issues (arthritis and possible De Quervain tenosynovitis following remote injury). Injections have been given in the past per report. Otherwise, pt states he is doing well.     Patient Active Problem List   Diagnosis Code    Hyperlipidemia E78.5    GERD (gastroesophageal reflux disease) K21.9    Chronic venous insufficiency I87.2    Colon polyp K63.5    Prediabetes R73.03    Recurrent genital herpes A60.00    Vitamin D insufficiency E55.9    Aortic insufficiency, moderate I35.1    Essential hypertension I10    Primary osteoarthritis of knee M17.10    Closed fracture of multiple ribs of right side with delayed healing S22.41XG    Lumbar herniated disc  right L4/5/S1 chronic M51.26    Pain of right sacroiliac joint-episodic M53.3    Osteopenia determined by DEXA 2016, lowest T score -1.7 M85.80    Annular tear of lumbar disc M51.36    Chronic right-sided low back pain with sciatica M54.40, G89.29    Neuritis of lower extremity G57.90    Right leg weakness R29.898    Chronic thumb pain, right M79.644, G89.29    Rectal bleeding K62.5     Past Medical History:   Diagnosis Date    Abnormal myocardial perfusion study 2/28/2016    Partially reversible inferior perfusion defect, EF 57%    AI (aortic insufficiency)     Moderate with possible bicuspid aortic valve    Chronic low back pain     Chronic venous insufficiency     Colon polyp 03/2012    Benign serrated polyp    Fracture 05/2016    rib fx's    GERD (gastroesophageal reflux disease)     History of echocardiogram 2/28/2016    EF 55%. Basal inferior hypokinesis. Gr 1 DDfx. Mod AI.  Hyperlipidemia     Hypertension     Osteoarthritis of right knee     Osteopenia     Prediabetes     Prepatellar bursitis of right knee     Recurrent genital herpes 1/16/2013    Right lumbar radiculopathy 10/2016    MRI: prominent right paracentral L4-L5 disc extrusion/impingement of descending right L5 nerve root; moderately severe canal stenosis; prominent right paracentral L5-S1 disc protrusion/ impingement of the descending right S1 nerve root; abutment of the foraminal right L5 nerve root/moderate foramina stenosis at L5-S1; mild canal stenosis at L3-L4 with abutment of the descending left L4 nerve root.  S/P cardiac catheterization 3/1/2016    Normal coronary anatomy     Past Surgical History:   Procedure Laterality Date    ENDOSCOPY, COLON, DIAGNOSTIC      HX CHOLECYSTECTOMY      HX TONSILLECTOMY       Social History     Social History    Marital status:      Spouse name: N/A    Number of children: N/A    Years of education: N/A     Occupational History    Not on file. Social History Main Topics    Smoking status: Former Smoker     Quit date: 6/28/2000    Smokeless tobacco: Former User    Alcohol use 1.0 oz/week     2 Cans of beer per week      Comment: occasionally    Drug use: No    Sexual activity: Not Currently     Partners: Female     Other Topics Concern    Not on file     Social History Narrative     Family History   Problem Relation Age of Onset    Cancer Father 61     esophageal    Hypertension Father     Cancer Mother 80     ovarian cancer     Hypertension Brother     Diabetes Other      Grandmother    Arthritis-osteo Other     Other Other      Stomach problems;  Father, grandfather     Current Outpatient Prescriptions   Medication Sig    ibuprofen (MOTRIN) 800 mg tablet 800 mg.    amLODIPine (NORVASC) 10 mg tablet TAKE 1 TABLET DAILY    valACYclovir (VALTREX) 500 mg tablet TAKE 1 TABLET DAILY    losartan (COZAAR) 100 mg tablet TAKE 1 TABLET DAILY    aspirin delayed-release 81 mg tablet 81 mg.    Cholecalciferol, Vitamin D3, 2,000 unit cap Take 2,000 Units by mouth daily.  DOCOSAHEXANOIC ACID/EPA (FISH OIL PO) Take  by mouth daily. No current facility-administered medications for this visit. Allergies   Allergen Reactions    Tree Nuts Anaphylaxis    Ace Inhibitors Cough    Nuts [Tree Nut] Itching       Review of Systems  Aside from those included in HPI, remainder of complete ROS negative. Physical Examination  Visit Vitals    /70 (BP 1 Location: Left arm, BP Patient Position: Sitting)    Pulse 77    Temp 97.8 °F (36.6 °C) (Oral)    Resp 14    Ht 6' (1.829 m)    Wt 228 lb (103.4 kg)    SpO2 97%    BMI 30.92 kg/m2     Preop CBC and BMP unremarkable ([facility]). Preop EKG ([facility]): no ischemic changes; no significant changes from 9/20/17 tracing (Reviewed by Dr. Arie Uriostegui). General - Alert and in no acute distress. Pt appears well, comfortable, and in good spirits. Pleasant, engaging. Nontoxic. Not anxious, non-diaphoretic. Mental status - Appropriate mood, behavior, speech content, dress, and thought processes. Eyes - Pupils equal and reactive, extraocular movements intact. No erythema or discharge. Ears - Auditory canals appear normal.  TMs appear normal.  Nose - No erythema. No rhinorrhea. Mouth - Mucous membranes moist. Pharynx without lesions, swelling, erythema, or exudate. Neck - Supple without rigidity. Lymph - No periauricular, perimandibular, or cervical tenderness or swelling. Pulm - No tachypnea, retractions, or cyanosis. Good respiratory effort. Clear to auscultation bilat. No appreciable wheezes, rales, or rhonchi. Cardiovascular - Normal rate, regular rhythm.  No appreciable murmurs or gallops. Abdomen - Obese. Active bowel sounds. Soft, nontender. No guarding, rigidity, or rebound. No appreciable masses. LEs - Stockings in place. Distal pulses intact. Neuromuscular - No gross focal findings or movement disorder noted currently. Assessment and Plan  1. HTN - Appears controlled. Continue current and TLC. 2. AI - symptomatically stable. F/u cardio. 3. HLD - to hold fish oil and ASA prior to procedure. TLC and PCP to follow. 4. PreDM - TLC and PCP to follow. 5. GERD - discussed preventive measures and OTC tx.  6. Vit D insuff - continue supplementation. Monitor. 7. Herpes - suppressive therapy. 8. Right thumb pain - continue f/u with Dr. Maricarmen Burnett. 9. Med clearance - Pt to hold fish oil, ASA, and NSAIDs as directed. Otherwise, pt appears medically stable to undergo procedure as planned. Further planning as warranted. Pt happily agrees with plan. PLEASE NOTE:   This document has been produced using voice recognition software. Unrecognized errors in transcription may be present.     Noreen Walters [employer] [place]  [phone]  6/20/2018

## 2018-06-20 NOTE — MR AVS SNAPSHOT
[facility] 
 
 
 [address], [address] [facility] 
[phone] Patient: Kaya Cantrell MRN: [id number] LFL:5[birth year][id number]/[birth year]15 Visit Information Date & Time Provider Department Dept. Phone Encounter #  
 6/20/2018 10:30 AM Levin Hashimoto, [place] [facility] [phone] [id number] Your Appointments 6/26/2018  8:00 AM  
Follow Up with Stacy Shields MD  
[facility] ([facility]) Appt Note: surg clearance for lumbar decompression with Dr Manuel Schaumann on 7/5/18 [place] [id number] [phone] [place]  
  
    
 8/21/2018  8:30 AM  
LAB with [facility] NURSE VISIT [facility] ([facility]) Appt Note: lab  
 [address], [address] [place] [address] [place]  
  
   
 [address], [address]  
  
    
 9/5/2018  8:30 AM  
Office Visit with Dhiraj Brown MD  
[facility] Appt Note: 6 month f/u; 6 month f/u  
 [address] [facility], [address] [place] [address] [place]  
  
   
 [address], [address] 9/17/2018  9:20 AM  
Follow Up with Stacy Shields MD  
[facility] ([facility]) Appt Note: 1 year follow up with an EKG  
 [place] [id number] [phone] Upcoming Health Maintenance Date Due ZOSTER VACCINE AGE 60> 4/1/2018 Influenza Age 5 to Adult 8/1/2018 COLONOSCOPY 3/22/2022 DTaP/Tdap/Td series (3 - Td) 1/16/2024 Allergies as of 6/20/2018  Review Complete On: 6/20/2018 By: Willingham Plant, LPN Severity Noted Reaction Type Reactions Tree Nuts High 03/08/2016    Anaphylaxis Ace Inhibitors  04/19/2013    Cough Nuts [Tree Nut]  04/29/2015    Itching Current Immunizations  Reviewed on 1/28/2015 Name Date Influenza Vaccine 11/12/2014, 11/1/2013 TDAP Vaccine 1/20/2004 Td 1/1/2004 Tdap 1/16/2014 Not reviewed this visit Vitals BP Pulse Temp Resp Height(growth percentile) Weight(growth percentile) 116/70 (BP 1 Location: Left arm, BP Patient Position: Sitting) 77 97.8 °F (36.6 °C) (Oral) 14 6' (1.829 m) 228 lb (103.4 kg) SpO2 BMI Smoking Status 97% 30.92 kg/m2 Former Smoker Vitals History BMI and BSA Data Body Mass Index Body Surface Area 30.92 kg/m 2 2.29 m 2 Preferred Pharmacy Pharmacy Name Phone [facility], [facility] [address] [phone] Your Updated Medication List  
  
   
This list is accurate as of 6/20/18 10:35 AM.  Always use your most recent med list. amLODIPine 10 mg tablet Commonly known as:  Unknown [place] TAKE 1 TABLET DAILY  
  
 ascorbic acid (vitamin C) 500 mg tablet Commonly known as:  VITAMIN C  
500 mg.  
  
 aspirin delayed-release 81 mg tablet 81 mg.  
  
 calcium carbonate 500 mg calcium (1,250 mg) tablet Commonly known as:  OS-KIARA Take  by mouth daily. Cholecalciferol (Vitamin D3) 2,000 unit Cap capsule Commonly known as:  VITAMIN D3 Take 2,000 Units by mouth daily. diclofenac EC 75 mg EC tablet Commonly known as:  VOLTAREN Take 1 Tab by mouth two (2) times a day. FISH OIL PO Take  by mouth daily. gabapentin 300 mg capsule Commonly known as:  NEURONTIN Take 1 Cap by mouth three (3) times daily as needed. Indications: nerve pain  
  
 ibuprofen 800 mg tablet Commonly known as:  MOTRIN  
800 mg.  
  
 losartan 100 mg tablet Commonly known as:  COZAAR  
TAKE 1 TABLET DAILY  
  
 magnesium oxide 400 mg tablet Commonly known as:  MAG-OX Take 400 mg by mouth. NORCO PO Take  by mouth. valACYclovir 500 mg tablet Commonly known as:  VALTREX  
TAKE 1 TABLET DAILY Introducing [address] [place] introduces 1-800-DENTIST patient portal. Now you can access parts of your medical record, email your doctor's office, and request medication refills online. 1. In your internet browser, go to [email or web address] 2. Click on the First Time User? Click Here link in the Sign In box. You will see the New Member Sign Up page. 3. Enter your 1-800-DENTIST Access Code exactly as it appears below. You will not need to use this code after youve completed the sign-up process. If you do not sign up before the expiration date, you must request a new code. · 1-800-DENTIST Access Code: [email or web address] Expires: 9/18/2018 10:35 AM 
 
4. Enter the last four digits of your Social Security Number (xxxx) and Date of Birth (mm/dd/yyyy) as indicated and click Submit. You will be taken to the next sign-up page. 5. Create a 1-800-DENTIST ID. This will be your 1-800-DENTIST login ID and cannot be changed, so think of one that is secure and easy to remember. 6. Create a 1-800-DENTIST password. You can change your password at any time. 7. Enter your Password Reset Question and Answer. This can be used at a later time if you forget your password. 8. Enter your e-mail address. You will receive e-mail notification when new information is available in [address] 9. Click Sign Up. You can now view and download portions of your medical record. 10. Click the Download Summary menu link to download a portable copy of your medical information. If you have questions, please visit the Frequently Asked Questions section of the 1-800-DENTIST website. Remember, 1-800-DENTIST is NOT to be used for urgent needs. For medical emergencies, dial 911. Now available from your iPhone and Android! Please provide this summary of care documentation to your next provider. Your primary care clinician is listed as Olivia Bryan. If you have any questions after today's visit, please call [phone].

## 2018-06-20 NOTE — TELEPHONE ENCOUNTER
Arnav Alejandra is calling from Dr. Dotty Hsu office requesting pre op note and tests.     Fax: [phone]

## 2018-06-26 ENCOUNTER — OFFICE VISIT (OUTPATIENT)
Dept: CARDIOLOGY CLINIC | Age: 58
End: 2018-06-26

## 2018-06-26 VITALS
WEIGHT: 228 LBS | BODY MASS INDEX: 30.88 KG/M2 | DIASTOLIC BLOOD PRESSURE: 80 MMHG | HEIGHT: 72 IN | SYSTOLIC BLOOD PRESSURE: 142 MMHG | OXYGEN SATURATION: 98 % | HEART RATE: 67 BPM

## 2018-06-26 DIAGNOSIS — R94.31 ABNORMAL EKG: ICD-10-CM

## 2018-06-26 DIAGNOSIS — E78.5 HYPERLIPIDEMIA, UNSPECIFIED HYPERLIPIDEMIA TYPE: ICD-10-CM

## 2018-06-26 DIAGNOSIS — I10 ESSENTIAL HYPERTENSION: ICD-10-CM

## 2018-06-26 DIAGNOSIS — I35.1 AORTIC VALVE INSUFFICIENCY, ETIOLOGY OF CARDIAC VALVE DISEASE UNSPECIFIED: Primary | ICD-10-CM

## 2018-06-26 NOTE — PROGRESS NOTES
HISTORY OF PRESENT ILLNESS  Kaya Cantrell is a 59 y.o. male. Follow-up   Pertinent negatives include no chest pain, no abdominal pain, no headaches and no shortness of breath. Hypertension   Pertinent negatives include no chest pain, no abdominal pain, no headaches and no shortness of breath. Patient presents for an office visit. Patient was referred back here for preoperative cardiac risk stratification prior to undergoing a lumbar decompression surgery which is scheduled for next week. Patient was found to have nonspecific ST segment changes on his preoperative EKG. The patient has a history of hypertension, dyslipidemia, aortic insufficiency and possibly a bicuspid aortic valve with moderate aortic insufficiency. The patient was hospitalized at [facility] in 2016 for an episode of acute chest pain, which was apparently relieved by sublingual nitroglycerin in the ambulance. The patient states he had been under increased amount of stress at his job. He was ruled out for myocardial infarction, but underwent a nuclear stress test which was felt to be intermediate risk with a reversible inferior perfusion defect. This led to a cardiac catheterization on March 2016  which showed normal coronary anatomy. He had preserved LV systolic function. Moderate aortic insufficiency which was unchanged. He was also found to have significantly elevated triglycerides and was started on fenofibrate during his hospital stay. Patient then underwent a follow-up echocardiogram in September 2017 which showed a low normal left ventricular ejection fraction, EF 50-55% with moderate aortic insufficiency, mild aortic root enlargement, overall unchanged compared to the previous study. The patient was last seen in the office approximately 9 months ago.   He is scheduled to have back surgery next week at [facility].  His preoperative EKG done earlier this month showed nonspecific ST abnormalities which are new compared to his prior EKGs. The patient specifically denies any major change in his activity level over the past 6-12 months. No exertional chest pain or shortness of breath. No leg swelling, no orthopnea, PND. Despite his chronic back pain he is still very active doing yard work goes up and down stairs and has not noted any new exertional symptoms. Past Medical History:   Diagnosis Date    Abnormal myocardial perfusion study 2/28/2016    Partially reversible inferior perfusion defect, EF 57%    AI (aortic insufficiency)     Moderate with possible bicuspid aortic valve    Chronic low back pain     Chronic venous insufficiency     Colon polyp 03/2012    Benign serrated polyp    Fracture 05/2016    rib fx's    GERD (gastroesophageal reflux disease)     History of echocardiogram 2/28/2016    EF 55%. Basal inferior hypokinesis. Gr 1 DDfx. Mod AI.  Hyperlipidemia     Hypertension     Osteoarthritis of right knee     Osteopenia     Prediabetes     Prepatellar bursitis of right knee     Recurrent genital herpes 1/16/2013    Right lumbar radiculopathy 10/2016    MRI: prominent right paracentral L4-L5 disc extrusion/impingement of descending right L5 nerve root; moderately severe canal stenosis; prominent right paracentral L5-S1 disc protrusion/ impingement of the descending right S1 nerve root; abutment of the foraminal right L5 nerve root/moderate foramina stenosis at L5-S1; mild canal stenosis at L3-L4 with abutment of the descending left L4 nerve root.     S/P cardiac catheterization 3/1/2016    Normal coronary anatomy      Current Outpatient Prescriptions   Medication Sig Dispense Refill    ibuprofen (MOTRIN) 800 mg tablet 800 mg.      amLODIPine (NORVASC) 10 mg tablet TAKE 1 TABLET DAILY 90 Tab 3    valACYclovir (VALTREX) 500 mg tablet TAKE 1 TABLET DAILY 90 Tab 3    losartan (COZAAR) 100 mg tablet TAKE 1 TABLET DAILY 90 Tab 3    aspirin delayed-release 81 mg tablet 81 mg.     Livia Gonzalez Cholecalciferol, Vitamin D3, 2,000 unit cap Take 2,000 Units by mouth daily.  DOCOSAHEXANOIC ACID/EPA (FISH OIL PO) Take  by mouth daily. Allergies   Allergen Reactions    Tree Nuts Anaphylaxis    Ace Inhibitors Cough    Nuts [Tree Nut] Itching      Social History   Substance Use Topics    Smoking status: Former Smoker     Quit date: 6/28/2000    Smokeless tobacco: Former User    Alcohol use 1.0 oz/week     2 Cans of beer per week      Comment: occasionally               Review of Systems   Constitutional: Negative for chills, fever and weight loss. HENT: Negative for nosebleeds. Eyes: Negative for blurred vision and double vision. Respiratory: Negative for cough, shortness of breath and wheezing. Cardiovascular: Negative for chest pain, palpitations, orthopnea, claudication, leg swelling and PND. Gastrointestinal: Negative for abdominal pain, heartburn, nausea and vomiting. Genitourinary: Negative for dysuria and hematuria. Musculoskeletal: Positive for back pain. Negative for falls and myalgias. Skin: Negative for rash. Neurological: Negative for dizziness, focal weakness and headaches. Endo/Heme/Allergies: Does not bruise/bleed easily. Psychiatric/Behavioral: Negative for substance abuse. Visit Vitals    /80    Pulse 67    Ht 6' (1.829 m)    Wt 103.4 kg (228 lb)    SpO2 98%    BMI 30.92 kg/m2      Physical Exam   Constitutional: He is oriented to person, place, and time. He appears well-developed and well-nourished. HENT:   Head: Normocephalic and atraumatic. Eyes: Conjunctivae are normal.   Neck: Neck supple. No JVD present. Carotid bruit is not present. Cardiovascular: Normal rate, regular rhythm, S1 normal, S2 normal and normal pulses. Exam reveals no gallop and no S3.    Murmur heard.    Midsystolic murmur is present with a grade of 1/6  at the upper right sternal border radiating to the neck   Diastolic murmur is present with a grade of 1/6 Pulmonary/Chest: Breath sounds normal. He has no wheezes. He has no rales. Abdominal: Soft. Bowel sounds are normal. There is no tenderness. Musculoskeletal: He exhibits no edema. Neurological: He is alert and oriented to person, place, and time. Skin: Skin is warm and dry. June 12, 2018 EKG:   Normal sinus rhythm,  Normal axis, normal QTc interval, Nonspecific ST changes, borderline voltage criteria for LVH, compared to the previous EKG from September 2017, the ST changes are new. ASSESSMENT and PLAN     Acceptable risk from a cardiac standpoint to proceed with his lumbar decompression surgery as scheduled next week. He does not have any concerning symptoms for angina or heart failure. I suspect his nonspecific ST segment changes may be more likely related to repolarization abnormalities. He underwent a normal cardiac catheterization 2 years ago. I would continue his antihypertensive agents perioperatively. He can stop his aspirin 7 days prior to the procedure. Aortic insufficiency. This remains moderate in severity by echocardiogram in September 2017. This was unchanged compared to the previous study from 2016. He did have a calcified aortic valve which did appear to be bicuspid, so this will need to be followed up at least every other year, unless new symptoms arise. This can be reevaluated next year. Mildly dilated aortic root. He may have an underlying aortopathy as well given the likelihood that he has a bicuspid aortic valve. At some point he should be evaluated with a  CT of his thorax. Hypertension. This Is now reasonably well controlled on his current regimen, which includes amlodipine and losartan. Both of which we continue    Dyslipidemia. This is primarily secondary to elevated triglycerides. Patient has been managing this with fish oil capsules. This is followed by his PCP.       Followup in 6 months, sooner

## 2018-06-26 NOTE — MR AVS SNAPSHOT
[address] [place] [address] 
[phone] Patient: Mariaelena Lou MRN: [id number] SHAR:[birth year] Visit Information Date & Time Provider Department Dept. Phone Encounter #  
 6/26/2018  8:00 AM Manjinder Izquierdo MD [facility] [address] [id number] Your Appointments 8/21/2018  8:30 AM  
LAB with [facility] NURSE VISIT [facility] [address] [place] [address] ([facility]) Appt Note: lab  
 [address], [address] [place] [address]  
  
   
 [address], [address]  
  
    
 9/5/2018  8:30 AM  
Office Visit with Jr Billy MD  
[facility] Appt Note: 6 month f/u; 6 month f/u  
 [address] [facility] [address], [address] [place] [address]  
  
   
 [address], [address] 9/17/2018  9:20 AM  
Follow Up with Manjinder Izquierdo MD  
[facility] ([facility]) Appt Note: 1 year follow up with an EKG  
 [place] [id number] [phone] [address] Upcoming Health Maintenance Date Due ZOSTER VACCINE AGE 60> 4/1/2018 Influenza Age 5 to Adult 8/1/2018 COLONOSCOPY 3/22/2022 DTaP/Tdap/Td series (3 - Td) 1/16/2024 Allergies as of 6/26/2018  Review Complete On: 6/20/2018 By: HATTIE Augustine Severity Noted Reaction Type Reactions Tree Nuts High 03/08/2016    Anaphylaxis Ace Inhibitors  04/19/2013    Cough Nuts [Tree Nut]  04/29/2015    Itching Current Immunizations  Reviewed on 1/28/2015 Name Date Influenza Vaccine 11/12/2014, 11/1/2013 TDAP Vaccine 1/20/2004 Td 1/1/2004 Tdap 1/16/2014 Not reviewed this visit Vitals BP Pulse Height(growth percentile) Weight(growth percentile) SpO2 BMI  
 142/80 67 6' (1.829 m) 228 lb (103.4 kg) 98% 30.92 kg/m2 Smoking Status Former Smoker Vitals History BMI and BSA Data Body Mass Index Body Surface Area 30.92 kg/m 2 2.29 m 2 Preferred Pharmacy Pharmacy Name Phone [facility], [facility] [address]. Christin Roddy. [phone] Your Updated Medication List  
  
   
This list is accurate as of 6/26/18  8:43 AM.  Always use your most recent med list. amLODIPine 10 mg tablet Commonly known as:  Finn Leandra TAKE 1 TABLET DAILY  
  
 aspirin delayed-release 81 mg tablet 81 mg. Cholecalciferol (Vitamin D3) 2,000 unit Cap capsule Commonly known as:  VITAMIN D3 Take 2,000 Units by mouth daily. FISH OIL PO Take  by mouth daily. ibuprofen 800 mg tablet Commonly known as:  MOTRIN  
800 mg.  
  
 losartan 100 mg tablet Commonly known as:  COZAAR  
TAKE 1 TABLET DAILY  
  
 valACYclovir 500 mg tablet Commonly known as:  VALTREX  
TAKE 1 TABLET DAILY Introducing \[facility] [place] [facility]\"" & HEALTH SERVICES! Daniel Lancaster introduces [email or web address] patient portal. Now you can access parts of your medical record, email your doctor's office, and request medication refills online. 1. In your internet browser, go to [email or web address] 2. Click on the First Time User? Click Here link in the Sign In box. You will see the New Member Sign Up page. 3. Enter your [email or web address] Access Code exactly as it appears below. You will not need to use this code after youve completed the sign-up process. If you do not sign up before the expiration date, you must request a new code. · [email or web address] Access Code: [email or web address] Expires: 9/18/2018 10:35 AM 
 
4. Enter the last four digits of your Social Security Number (xxxx) and Date of Birth (mm/dd/yyyy) as indicated and click Submit.  You will be taken to the next sign-up page. 5. Create a Nveloped ID. This will be your Nveloped login ID and cannot be changed, so think of one that is secure and easy to remember. 6. Create a Nveloped password. You can change your password at any time. 7. Enter your Password Reset Question and Answer. This can be used at a later time if you forget your password. 8. Enter your e-mail address. You will receive e-mail notification when new information is available in [address] 9. Click Sign Up. You can now view and download portions of your medical record. 10. Click the Download Summary menu link to download a portable copy of your medical information. If you have questions, please visit the Frequently Asked Questions section of the Nveloped website. Remember, Nveloped is NOT to be used for urgent needs. For medical emergencies, dial 911. Now available from your iPhone and Android! Please provide this summary of care documentation to your next provider. Your primary care clinician is listed as Pina Hernández. If you have any questions after today's visit, please call [phone].

## 2018-06-26 NOTE — LETTER
6/26/2018 8:43 AM 
 
Mr. Lluvia Gomez [address]  [address] Lluvia Gomez was seen in our office on 6/26/2018 for cardiac evaluation. From a cardiac standpoint he is acceptable risk for lumbar surgery with Dr. Daniel Abreu. Stop Aspirin 7 days prior to procedure. Please feel free to contact our office if you have any questions regarding this patient. Sincerely, Rea Fam MD

## 2018-06-29 RX ORDER — LOSARTAN POTASSIUM 100 MG/1
TABLET ORAL
Qty: 90 TAB | Refills: 3 | Status: SHIPPED | OUTPATIENT
Start: 2018-06-29 | End: 2019-06-13 | Stop reason: SDUPTHER

## 2018-07-11 ENCOUNTER — TELEPHONE (OUTPATIENT)
Dept: INTERNAL MEDICINE CLINIC | Age: 58
End: 2018-07-11

## 2018-07-11 NOTE — TELEPHONE ENCOUNTER
[facility] is calling to speak with a nurse about pts rx Valtrex    Please call [phone] ref # [id number]

## 2018-07-13 NOTE — TELEPHONE ENCOUNTER
Called [facility] and they stated that they have already shipped medication out to patient and for us not to worry about it.

## 2018-08-21 ENCOUNTER — HOSPITAL ENCOUNTER (OUTPATIENT)
Dept: LAB | Age: 58
Discharge: HOME | End: 2018-08-21

## 2018-08-21 PROCEDURE — 99001 SPECIMEN HANDLING PT-LAB: CPT | Performed by: INTERNAL MEDICINE

## 2018-08-21 PROCEDURE — 36415 COLL VENOUS BLD VENIPUNCTURE: CPT | Performed by: INTERNAL MEDICINE

## 2018-08-22 LAB
BUN SERPL-MCNC: 17 MG/DL (ref 8–27)
BUN/CREAT SERPL: 15 (ref 10–24)
CALCIUM SERPL-MCNC: 9.2 MG/DL (ref 8.6–10.2)
CHLORIDE SERPL-SCNC: 102 MMOL/L (ref 96–106)
CHOLEST SERPL-MCNC: 200 MG/DL (ref 100–199)
CO2 SERPL-SCNC: 20 MMOL/L (ref 20–29)
CREAT SERPL-MCNC: 1.1 MG/DL (ref 0.76–1.27)
EST. AVERAGE GLUCOSE BLD GHB EST-MCNC: 117 MG/DL
GLUCOSE SERPL-MCNC: 105 MG/DL (ref 65–99)
HBA1C MFR BLD: 5.7 % (ref 4.8–5.6)
HDLC SERPL-MCNC: 43 MG/DL
INTERPRETATION, 910389: NORMAL
LDLC SERPL CALC-MCNC: 125 MG/DL (ref 0–99)
POTASSIUM SERPL-SCNC: 4.1 MMOL/L (ref 3.5–5.2)
SODIUM SERPL-SCNC: 139 MMOL/L (ref 134–144)
TRIGL SERPL-MCNC: 162 MG/DL (ref 0–149)
VLDLC SERPL CALC-MCNC: 32 MG/DL (ref 5–40)

## 2018-09-05 ENCOUNTER — OFFICE VISIT (OUTPATIENT)
Dept: INTERNAL MEDICINE CLINIC | Age: 58
End: 2018-09-05

## 2018-09-05 VITALS
HEIGHT: 72 IN | OXYGEN SATURATION: 96 % | HEART RATE: 116 BPM | WEIGHT: 223 LBS | TEMPERATURE: 98.4 F | BODY MASS INDEX: 30.2 KG/M2 | SYSTOLIC BLOOD PRESSURE: 124 MMHG | DIASTOLIC BLOOD PRESSURE: 68 MMHG | RESPIRATION RATE: 14 BRPM

## 2018-09-05 DIAGNOSIS — M85.80 OSTEOPENIA DETERMINED BY X-RAY: Chronic | ICD-10-CM

## 2018-09-05 DIAGNOSIS — M79.644 CHRONIC THUMB PAIN, RIGHT: ICD-10-CM

## 2018-09-05 DIAGNOSIS — R73.03 PREDIABETES: ICD-10-CM

## 2018-09-05 DIAGNOSIS — L98.9 SKIN LESION OF RIGHT ARM: ICD-10-CM

## 2018-09-05 DIAGNOSIS — Z12.83 SCREENING EXAM FOR SKIN CANCER: ICD-10-CM

## 2018-09-05 DIAGNOSIS — E78.5 HYPERLIPIDEMIA, UNSPECIFIED HYPERLIPIDEMIA TYPE: ICD-10-CM

## 2018-09-05 DIAGNOSIS — M51.26 LUMBAR HERNIATED DISC: Chronic | ICD-10-CM

## 2018-09-05 DIAGNOSIS — I87.2 CHRONIC VENOUS INSUFFICIENCY: ICD-10-CM

## 2018-09-05 DIAGNOSIS — E66.09 CLASS 1 OBESITY DUE TO EXCESS CALORIES WITHOUT SERIOUS COMORBIDITY WITH BODY MASS INDEX (BMI) OF 30.0 TO 30.9 IN ADULT: ICD-10-CM

## 2018-09-05 DIAGNOSIS — Z98.890 S/P LUMBAR DISCECTOMY: ICD-10-CM

## 2018-09-05 DIAGNOSIS — K21.9 GASTROESOPHAGEAL REFLUX DISEASE, ESOPHAGITIS PRESENCE NOT SPECIFIED: ICD-10-CM

## 2018-09-05 DIAGNOSIS — G89.29 CHRONIC THUMB PAIN, RIGHT: ICD-10-CM

## 2018-09-05 DIAGNOSIS — I35.1 NONRHEUMATIC AORTIC VALVE INSUFFICIENCY: ICD-10-CM

## 2018-09-05 DIAGNOSIS — E55.9 VITAMIN D INSUFFICIENCY: ICD-10-CM

## 2018-09-05 DIAGNOSIS — I10 ESSENTIAL HYPERTENSION: Primary | ICD-10-CM

## 2018-09-05 NOTE — PATIENT INSTRUCTIONS
Learning About Diabetes Food Guidelines Your Care Instructions Meal planning is important to manage diabetes. It helps keep your blood sugar at a target level (which you set with your doctor). You don't have to eat special foods. You can eat what your family eats, including sweets once in a while. But you do have to pay attention to how often you eat and how much you eat of certain foods. You may want to work with a dietitian or a certified diabetes educator (CDE) to help you plan meals and snacks. A dietitian or CDE can also help you lose weight if that is one of your goals. What should you know about eating carbs? Managing the amount of carbohydrate (carbs) you eat is an important part of healthy meals when you have diabetes. Carbohydrate is found in many foods. · Learn which foods have carbs. And learn the amounts of carbs in different foods. ¨ Bread, cereal, pasta, and rice have about 15 grams of carbs in a serving. A serving is 1 slice of bread (1 ounce), ½ cup of cooked cereal, or 1/3 cup of cooked pasta or rice. ¨ Fruits have 15 grams of carbs in a serving. A serving is 1 small fresh fruit, such as an apple or orange; ½ of a banana; ½ cup of cooked or canned fruit; ½ cup of fruit juice; 1 cup of melon or raspberries; or 2 tablespoons of dried fruit. ¨ Milk and no-sugar-added yogurt have 15 grams of carbs in a serving. A serving is 1 cup of milk or 2/3 cup of no-sugar-added yogurt. ¨ Starchy vegetables have 15 grams of carbs in a serving. A serving is ½ cup of mashed potatoes or sweet potato; 1 cup winter squash; ½ of a small baked potato; ½ cup of cooked beans; or ½ cup cooked corn or green peas. · Learn how much carbs to eat each day and at each meal. A dietitian or CDE can teach you how to keep track of the amount of carbs you eat. This is called carbohydrate counting.  
· If you are not sure how to count carbohydrate grams, use the Plate Method to plan meals. It is a good, quick way to make sure that you have a balanced meal. It also helps you spread carbs throughout the day. ¨ Divide your plate by types of foods. Put non-starchy vegetables on half the plate, meat or other protein food on one-quarter of the plate, and a grain or starchy vegetable in the final quarter of the plate. To this you can add a small piece of fruit and 1 cup of milk or yogurt, depending on how many carbs you are supposed to eat at a meal. 
· Try to eat about the same amount of carbs at each meal. Do not \"save up\" your daily allowance of carbs to eat at one meal. 
· Proteins have very little or no carbs per serving. Examples of proteins are beef, chicken, turkey, fish, eggs, tofu, cheese, cottage cheese, and peanut butter. A serving size of meat is 3 ounces, which is about the size of a deck of cards. Examples of meat substitute serving sizes (equal to 1 ounce of meat) are 1/4 cup of cottage cheese, 1 egg, 1 tablespoon of peanut butter, and ½ cup of tofu. How can you eat out and still eat healthy? · Learn to estimate the serving sizes of foods that have carbohydrate. If you measure food at home, it will be easier to estimate the amount in a serving of restaurant food. · If the meal you order has too much carbohydrate (such as potatoes, corn, or baked beans), ask to have a low-carbohydrate food instead. Ask for a salad or green vegetables. · If you use insulin, check your blood sugar before and after eating out to help you plan how much to eat in the future. · If you eat more carbohydrate at a meal than you had planned, take a walk or do other exercise. This will help lower your blood sugar. What else should you know? · Limit saturated fat, such as the fat from meat and dairy products. This is a healthy choice because people who have diabetes are at higher risk of heart disease.  So choose lean cuts of meat and nonfat or low-fat dairy products. Use olive or canola oil instead of butter or shortening when cooking. · Don't skip meals. Your blood sugar may drop too low if you skip meals and take insulin or certain medicines for diabetes. · Check with your doctor before you drink alcohol. Alcohol can cause your blood sugar to drop too low. Alcohol can also cause a bad reaction if you take certain diabetes medicines. Follow-up care is a key part of your treatment and safety. Be sure to make and go to all appointments, and call your doctor if you are having problems. It's also a good idea to know your test results and keep a list of the medicines you take. Where can you learn more? Go to http://pat-cuate.info/. Enter X736 in the search box to learn more about \"Learning About Diabetes Food Guidelines. \" Current as of: December 7, 2017 Content Version: 11.7 © 5037-4442 [employer]. Care instructions adapted under license by [employer] (which disclaims liability or warranty for this information). If you have questions about a medical condition or this instruction, always ask your healthcare professional. [address] any warranty or liability for your use of this information. Hyperlipidemia: After Your Visit Your Care Instructions Hyperlipidemia is too much fat in your blood. The body has several kinds of fat, including cholesterol and triglycerides. Your body needs fat for many things, such as making new cells. But too much fat in your blood increases your chances of having a heart attack or stroke. You may be able to lower your cholesterol and triglycerides with a heart-healthy diet, exercise, and if needed, medicine. Your doctor may want you to try lifestyle changes first to see whether they lower the fat in your blood. You may need to take medicine if lifestyle changes do not lower the fat in your blood enough. Follow-up care is a key part of your treatment and safety. Be sure to make and go to all appointments, and call your doctor if you are having problems. Its also a good idea to know your test results and keep a list of the medicines you take. How can you care for yourself at home? Take your medicines · Take your medicines exactly as prescribed. Call your doctor if you think you are having a problem with your medicine. · If you take medicine to lower your cholesterol, go to follow-up visits. You will need to have blood tests. · Do not take large doses of niacin, which is a B vitamin, while taking medicine called statins. It may increase the chance of muscle pain and liver problems. · Talk to your doctor about avoiding grapefruit juice if you are taking statins. Grapefruit juice can raise the level of this medicine in your blood. This could increase side effects. Eat more fruits, vegetables, and fiber · Fruits and vegetables have lots of nutrients that help protect against heart disease, and they have littleif anyfat. Try to eat at least five servings a day. Dark green, deep orange, or yellow fruits and vegetables are healthy choices. · Keep carrots, celery, and other veggies handy for snacks. Buy fruit that is in season and store it where you can see it so that you will be tempted to eat it. Cook dishes that have a lot of veggies in them, such as stir-fries and soups. · Foods high in fiber may reduce your cholesterol and provide important vitamins and minerals. High-fiber foods include whole-grain cereals and breads, oatmeal, beans, brown rice, citrus fruits, and apples. · Buy whole-grain breads and cereals instead of white bread and pastries. Limit saturated fat · Read food labels and try to avoid saturated fat and trans fat. They increase your risk of heart disease. · Use olive or canola oil when you cook. Try cholesterol-lowering spreads, such as Benecol or Take Control. · Bake, broil, grill, or steam foods instead of frying them. · Limit the amount of high-fat meats you eat, including hot dogs and sausages. Cut out all visible fat when you prepare meat. · Eat fish, skinless poultry, and soy products such as tofu instead of high-fat meats. Soybeans may be especially good for your heart. Eat at least two servings of fish a week. Certain fish, such as salmon, contain omega-3 fatty acids, which may help reduce your risk of heart attack. · Choose low-fat or fat-free milk and dairy products. Get exercise, limit alcohol, and quit smoking · Get more exercise. Work with your doctor to set up an exercise program. Even if you can do only a small amount, exercise will help you get stronger, have more energy, and manage your weight and your stress. Walking is an easy way to get exercise. Gradually increase the amount you walk every day. Aim for at least 30 minutes on most days of the week. You also may want to swim, bike, or do other activities. · Limit alcohol to no more than 2 drinks a day for men and 1 drink a day for women. · Do not smoke. If you need help quitting, talk to your doctor about stop-smoking programs and medicines. These can increase your chances of quitting for good. When should you call for help? Call 911 anytime you think you may need emergency care. For example, call if: 
· You have symptoms of a heart attack. These may include: ¨ Chest pain or pressure, or a strange feeling in the chest. 
¨ Sweating. ¨ Shortness of breath. ¨ Nausea or vomiting. ¨ Pain, pressure, or a strange feeling in the back, neck, jaw, or upper belly or in one or both shoulders or arms. ¨ Lightheadedness or sudden weakness. ¨ A fast or irregular heartbeat. After you call 911, the  may tell you to chew 1 adult-strength or 2 to 4 low-dose aspirin. Wait for an ambulance. Do not try to drive yourself. · You have signs of a stroke. These may include: ¨ Sudden numbness, paralysis, or weakness in your face, arm, or leg, especially on only one side of your body. ¨ New problems with walking or balance. ¨ Sudden vision changes. ¨ Drooling or slurred speech. ¨ New problems speaking or understanding simple statements, or feeling confused. ¨ A sudden, severe headache that is different from past headaches. · You passed out (lost consciousness). Call your doctor now or seek immediate medical care if: 
· You have muscle pain or weakness. Watch closely for changes in your health, and be sure to contact your doctor if: 
· You are very tired. · You have an upset stomach, gas, constipation, or belly pain or cramps. Where can you learn more? Go to LaunchTrack.be Enter C406 in the search box to learn more about \"Hyperlipidemia: After Your Visit. \"  
© 0482-0015 [employer]. Care instructions adapted under license by [facility] [employer] (which disclaims liability or warranty for this information). This care instruction is for use with your licensed healthcare professional. If you have questions about a medical condition or this instruction, always ask your healthcare professional. [place] [address] any warranty or liability for your use of this information. Content Version: 4.9.432809; Last Revised: October 13, 2011

## 2018-09-05 NOTE — MR AVS SNAPSHOT
[address] [facility], [place] 
 
 
 [address], [address] [facility] 
[phone] Patient: Salina Maher MRN: [id number] SMN:[id number] Visit Information Date & Time Provider Department Dept. Phone Encounter #  
 9/5/2018  8:30 AM Ramu Snyder MD [facility] [address] [place] [address] [phone] [id number] Follow-up Instructions Return in about 6 months (around 3/5/2019), or if symptoms worsen or fail to improve. Your Appointments 1/7/2019  8:00 AM  
Follow Up with Dangelo Benjamin MD  
[facility] [place] [address] ([facility]) Appt Note: 6 month f/up [address] [address] [phone] [place] [id number]  
  
    
 2/19/2019  9:15 AM  
LAB with [facility] NURSE VISIT [facility] [address] [place] [address] ([facility]) Appt Note: labs [address], [facility] [address] [address] [place]  
  
   
 [address], [address]  
  
    
 2/26/2019  9:30 AM  
Office Visit with Ramu Snyder MD  
[facility] Appt Note: rpe per sarris [address], [facility] [address] [address] [place]  
  
   
 [address], [address] Upcoming Health Maintenance Date Due ZOSTER VACCINE AGE 60> 4/1/2018 Influenza Age 5 to Adult 8/1/2018 COLONOSCOPY 3/22/2022 DTaP/Tdap/Td series (3 - Td) 1/16/2024 Allergies as of 9/5/2018  Review Complete On: 9/5/2018 By: Janell Mays Severity Noted Reaction Type Reactions Tree Nuts High 03/08/2016    Anaphylaxis Ace Inhibitors  04/19/2013    Cough Nuts [Tree Nut]  04/29/2015    Itching Current Immunizations  Reviewed on 1/28/2015 Name Date Influenza Vaccine 11/12/2014, 11/1/2013 TDAP Vaccine 1/20/2004 Td 1/1/2004 Tdap 1/16/2014 Not reviewed this visit You Were Diagnosed With   
  
 Codes Comments Skin lesion of right arm    -  Primary ICD-10-CM: L98.9 ICD-9-CM: 709.9 Screening exam for skin cancer     ICD-10-CM: Z12.83 ICD-9-CM: V76.43 Vitals BP Pulse Temp Resp Height(growth percentile) Weight(growth percentile) 124/68 (!) 116 98.4 °F (36.9 °C) (Oral) 14 6' (1.829 m) 223 lb (101.2 kg) SpO2 BMI Smoking Status 96% 30.24 kg/m2 Former Smoker Vitals History BMI and BSA Data Body Mass Index Body Surface Area  
 30.24 kg/m 2 2.27 m 2 Preferred Pharmacy Pharmacy Name Phone  [facility] [phone] Your Updated Medication List  
  
   
This list is accurate as of 9/5/18  9:17 AM.  Always use your most recent med list. amLODIPine 10 mg tablet Commonly known as:  Rimrock Tylor TAKE 1 TABLET DAILY  
  
 aspirin delayed-release 81 mg tablet 81 mg. CALCIUM 300 PO Take  by mouth. Cholecalciferol (Vitamin D3) 2,000 unit Cap capsule Commonly known as:  VITAMIN D3 Take 2,000 Units by mouth daily. FISH OIL PO Take  by mouth daily. ibuprofen 800 mg tablet Commonly known as:  MOTRIN  
800 mg.  
  
 losartan 100 mg tablet Commonly known as:  COZAAR  
TAKE 1 TABLET DAILY  
  
 MAG 64 PO Take  by mouth. valACYclovir 500 mg tablet Commonly known as:  VALTREX  
TAKE 1 TABLET DAILY We Performed the Following REFERRAL TO DERMATOLOGY [REF19 Custom] Follow-up Instructions Return in about 6 months (around 3/5/2019), or if symptoms worsen or fail to improve. Referral Information Referral ID Referred By Referred To  
  
 [id number] Makenna Burroughs MD   
   [place] [address] [address] Phone: [phone] Fax: [phone] Visits Status Start Date End Date 1 New Request 9/5/18 9/5/19 If your referral has a status of pending review or denied, additional information will be sent to support the outcome of this decision. Patient Instructions Learning About Diabetes Food Guidelines Your Care Instructions Meal planning is important to manage diabetes. It helps keep your blood sugar at a target level (which you set with your doctor). You don't have to eat special foods. You can eat what your family eats, including sweets once in a while. But you do have to pay attention to how often you eat and how much you eat of certain foods. You may want to work with a dietitian or a certified diabetes educator (CDE) to help you plan meals and snacks. A dietitian or CDE can also help you lose weight if that is one of your goals. What should you know about eating carbs? Managing the amount of carbohydrate (carbs) you eat is an important part of healthy meals when you have diabetes. Carbohydrate is found in many foods. · Learn which foods have carbs. And learn the amounts of carbs in different foods. ¨ Bread, cereal, pasta, and rice have about 15 grams of carbs in a serving. A serving is 1 slice of bread (1 ounce), ½ cup of cooked cereal, or 1/3 cup of cooked pasta or rice. ¨ Fruits have 15 grams of carbs in a serving. A serving is 1 small fresh fruit, such as an apple or orange; ½ of a banana; ½ cup of cooked or canned fruit; ½ cup of fruit juice; 1 cup of melon or raspberries; or 2 tablespoons of dried fruit. ¨ Milk and no-sugar-added yogurt have 15 grams of carbs in a serving. A serving is 1 cup of milk or 2/3 cup of no-sugar-added yogurt. ¨ Starchy vegetables have 15 grams of carbs in a serving. A serving is ½ cup of mashed potatoes or sweet potato; 1 cup winter squash; ½ of a small baked potato; ½ cup of cooked beans; or ½ cup cooked corn or green peas.  
· Learn how much carbs to eat each day and at each meal. A dietitian or CDE can teach you how to keep track of the amount of carbs you eat. This is called carbohydrate counting. · If you are not sure how to count carbohydrate grams, use the Plate Method to plan meals. It is a good, quick way to make sure that you have a balanced meal. It also helps you spread carbs throughout the day. ¨ Divide your plate by types of foods. Put non-starchy vegetables on half the plate, meat or other protein food on one-quarter of the plate, and a grain or starchy vegetable in the final quarter of the plate. To this you can add a small piece of fruit and 1 cup of milk or yogurt, depending on how many carbs you are supposed to eat at a meal. 
· Try to eat about the same amount of carbs at each meal. Do not \"save up\" your daily allowance of carbs to eat at one meal. 
· Proteins have very little or no carbs per serving. Examples of proteins are beef, chicken, turkey, fish, eggs, tofu, cheese, cottage cheese, and peanut butter. A serving size of meat is 3 ounces, which is about the size of a deck of cards. Examples of meat substitute serving sizes (equal to 1 ounce of meat) are 1/4 cup of cottage cheese, 1 egg, 1 tablespoon of peanut butter, and ½ cup of tofu. How can you eat out and still eat healthy? · Learn to estimate the serving sizes of foods that have carbohydrate. If you measure food at home, it will be easier to estimate the amount in a serving of restaurant food. · If the meal you order has too much carbohydrate (such as potatoes, corn, or baked beans), ask to have a low-carbohydrate food instead. Ask for a salad or green vegetables. · If you use insulin, check your blood sugar before and after eating out to help you plan how much to eat in the future. · If you eat more carbohydrate at a meal than you had planned, take a walk or do other exercise. This will help lower your blood sugar. What else should you know? · Limit saturated fat, such as the fat from meat and dairy products.  This is a healthy choice because people who have diabetes are at higher risk of heart disease. So choose lean cuts of meat and nonfat or low-fat dairy products. Use olive or canola oil instead of butter or shortening when cooking. · Don't skip meals. Your blood sugar may drop too low if you skip meals and take insulin or certain medicines for diabetes. · Check with your doctor before you drink alcohol. Alcohol can cause your blood sugar to drop too low. Alcohol can also cause a bad reaction if you take certain diabetes medicines. Follow-up care is a key part of your treatment and safety. Be sure to make and go to all appointments, and call your doctor if you are having problems. It's also a good idea to know your test results and keep a list of the medicines you take. Where can you learn more? Go to http://pat-cuate.info/. Enter S468 in the search box to learn more about \"Learning About Diabetes Food Guidelines. \" Current as of: December 7, 2017 Content Version: 11.7 © 5422-5681 [employer]. Care instructions adapted under license by [employer] (which disclaims liability or warranty for this information). If you have questions about a medical condition or this instruction, always ask your healthcare professional. [place] [address] any warranty or liability for your use of this information. Hyperlipidemia: After Your Visit Your Care Instructions Hyperlipidemia is too much fat in your blood. The body has several kinds of fat, including cholesterol and triglycerides. Your body needs fat for many things, such as making new cells. But too much fat in your blood increases your chances of having a heart attack or stroke. You may be able to lower your cholesterol and triglycerides with a heart-healthy diet, exercise, and if needed, medicine.  Your doctor may want you to try lifestyle changes first to see whether they lower the fat in your blood. You may need to take medicine if lifestyle changes do not lower the fat in your blood enough. Follow-up care is a key part of your treatment and safety. Be sure to make and go to all appointments, and call your doctor if you are having problems. Its also a good idea to know your test results and keep a list of the medicines you take. How can you care for yourself at home? Take your medicines · Take your medicines exactly as prescribed. Call your doctor if you think you are having a problem with your medicine. · If you take medicine to lower your cholesterol, go to follow-up visits. You will need to have blood tests. · Do not take large doses of niacin, which is a B vitamin, while taking medicine called statins. It may increase the chance of muscle pain and liver problems. · Talk to your doctor about avoiding grapefruit juice if you are taking statins. Grapefruit juice can raise the level of this medicine in your blood. This could increase side effects. Eat more fruits, vegetables, and fiber · Fruits and vegetables have lots of nutrients that help protect against heart disease, and they have littleif anyfat. Try to eat at least five servings a day. Dark green, deep orange, or yellow fruits and vegetables are healthy choices. · Keep carrots, celery, and other veggies handy for snacks. Buy fruit that is in season and store it where you can see it so that you will be tempted to eat it. Cook dishes that have a lot of veggies in them, such as stir-fries and soups. · Foods high in fiber may reduce your cholesterol and provide important vitamins and minerals. High-fiber foods include whole-grain cereals and breads, oatmeal, beans, brown rice, citrus fruits, and apples. · Buy whole-grain breads and cereals instead of white bread and pastries. Limit saturated fat · Read food labels and try to avoid saturated fat and trans fat. They increase your risk of heart disease. · Use olive or canola oil when you cook. Try cholesterol-lowering spreads, such as Benecol or Take Control. · Bake, broil, grill, or steam foods instead of frying them. · Limit the amount of high-fat meats you eat, including hot dogs and sausages. Cut out all visible fat when you prepare meat. · Eat fish, skinless poultry, and soy products such as tofu instead of high-fat meats. Soybeans may be especially good for your heart. Eat at least two servings of fish a week. Certain fish, such as salmon, contain omega-3 fatty acids, which may help reduce your risk of heart attack. · Choose low-fat or fat-free milk and dairy products. Get exercise, limit alcohol, and quit smoking · Get more exercise. Work with your doctor to set up an exercise program. Even if you can do only a small amount, exercise will help you get stronger, have more energy, and manage your weight and your stress. Walking is an easy way to get exercise. Gradually increase the amount you walk every day. Aim for at least 30 minutes on most days of the week. You also may want to swim, bike, or do other activities. · Limit alcohol to no more than 2 drinks a day for men and 1 drink a day for women. · Do not smoke. If you need help quitting, talk to your doctor about stop-smoking programs and medicines. These can increase your chances of quitting for good. When should you call for help? Call 911 anytime you think you may need emergency care. For example, call if: 
· You have symptoms of a heart attack. These may include: ¨ Chest pain or pressure, or a strange feeling in the chest. 
¨ Sweating. ¨ Shortness of breath. ¨ Nausea or vomiting. ¨ Pain, pressure, or a strange feeling in the back, neck, jaw, or upper belly or in one or both shoulders or arms. ¨ Lightheadedness or sudden weakness. ¨ A fast or irregular heartbeat.  
After you call 911, the  may tell you to chew 1 adult-strength or 2 to 4 low-dose aspirin. Wait for an ambulance. Do not try to drive yourself. · You have signs of a stroke. These may include: 
¨ Sudden numbness, paralysis, or weakness in your face, arm, or leg, especially on only one side of your body. ¨ New problems with walking or balance. ¨ Sudden vision changes. ¨ Drooling or slurred speech. ¨ New problems speaking or understanding simple statements, or feeling confused. ¨ A sudden, severe headache that is different from past headaches. · You passed out (lost consciousness). Call your doctor now or seek immediate medical care if: 
· You have muscle pain or weakness. Watch closely for changes in your health, and be sure to contact your doctor if: 
· You are very tired. · You have an upset stomach, gas, constipation, or belly pain or cramps. Where can you learn more? Go to LearnSomething.be Enter C406 in the search box to learn more about \"Hyperlipidemia: After Your Visit. \"  
© 9125-3257 [employer]wise[employer]. Care instructions adapted under license by Minh Jimenez (which disclaims liability or warranty for this information). This care instruction is for use with your licensed healthcare professional. If you have questions about a medical condition or this instruction, always ask your healthcare professional. [address] any warranty or liability for your use of this information. Content Version: 2.9.741833; Last Revised: October 13, 2011 Introducing [facility]! Minh Jimenez introduces PolyPid patient portal. Now you can access parts of your medical record, email your doctor's office, and request medication refills online. 1. In your internet browser, go to [email or web address] 2. Click on the First Time User? Click Here link in the Sign In box. You will see the New Member Sign Up page. 3. Enter your PolyPid Access Code exactly as it appears below.  You will not need to use this code after youve completed the sign-up process. If you do not sign up before the expiration date, you must request a new code. · SkyJam Access Code: [email or web address] Expires: 9/18/2018 10:35 AM 
 
4. Enter the last four digits of your Social Security Number (xxxx) and Date of Birth (mm/dd/yyyy) as indicated and click Submit. You will be taken to the next sign-up page. 5. Create a SkyJam ID. This will be your SkyJam login ID and cannot be changed, so think of one that is secure and easy to remember. 6. Create a SkyJam password. You can change your password at any time. 7. Enter your Password Reset Question and Answer. This can be used at a later time if you forget your password. 8. Enter your e-mail address. You will receive e-mail notification when new information is available in [address]. 9. Click Sign Up. You can now view and download portions of your medical record. 10. Click the Download Summary menu link to download a portable copy of your medical information. If you have questions, please visit the Frequently Asked Questions section of the SkyJam website. Remember, SkyJam is NOT to be used for urgent needs. For medical emergencies, dial 911. Now available from your iPhone and Android! Please provide this summary of care documentation to your next provider. Your primary care clinician is listed as Conner Steele. If you have any questions after today's visit, please call [phone].

## 2018-09-05 NOTE — PROGRESS NOTES
Chief Complaint Patient presents with  Hypertension 6 month follow up with lab results. Patient states he has had a spot on his right arm, for several years, that has grown over the last few months. Health Maintenance Due Topic Date Due  
 ZOSTER VACCINE AGE 60>  04/01/2018  Influenza Age 5 to Adult  08/01/2018 1. Have you been to the ER, urgent care clinic or hospitalized since your last visit? YES. [facility] for back surgery in July 2018. 2. Have you seen or consulted any other health care providers outside of the [address] [place] [address] since your last visit (Include any pap smears or colon screening)? YES, Dr. Solomon Carr, Neurosurgeon, last seen July 2018 for back surgery. Do you have an Advanced Directive? NO Would you like information on Advanced Directives?  NO

## 2018-09-08 PROBLEM — K62.5 RECTAL BLEEDING: Status: RESOLVED | Noted: 2018-02-24 | Resolved: 2018-09-08

## 2018-09-08 PROBLEM — Z98.890 S/P LUMBAR DISCECTOMY: Status: ACTIVE | Noted: 2018-09-08

## 2018-09-08 PROBLEM — E66.09 CLASS 1 OBESITY DUE TO EXCESS CALORIES WITHOUT SERIOUS COMORBIDITY IN ADULT: Status: ACTIVE | Noted: 2018-09-08

## 2018-09-08 PROBLEM — R29.898 RIGHT LEG WEAKNESS: Status: RESOLVED | Noted: 2017-04-12 | Resolved: 2018-09-08

## 2018-09-08 NOTE — PROGRESS NOTES
HPI:  
Yefri Granados is a 59y.o. year old male who presents today for evaluation of hypertension, hyperlipidemia, moderate aortic insufficiency, atypical chest pain, prediabetes, GERD, osteoarthritis, lumbar radiculopathy, and chronic venous insufficiency. He reports that he is doing relatively well. He reports that he retired from work in 12/2017. On 7/5/2018, he underwent a right L4-L5 and L5-S1 hemilaminectomy, medial facetectomy, and foraminotomy by Dr. Dayna Myers for removal of herniated disks and decompression of foraminal stenoses at these levels. He reports that his right sided sciatica has resolved and he is no longer requiring any pain medication. He does report some residual numbness on the plantar surface of his right foot. He is otherwise without complaints and doing well. He states that his wife has completed her treatments for Paget's disease of the breast and is doing well. He has a history of hypertension, treated with losartan and amlodipine. He does not check his blood pressure at home regularly. He also does not exercise regularly, although admits to doing yard work and projects around the house. He denies any shortness of breath at rest or with exertion, palpitations, lightheadedness, or edema. He does have a history of substernal chest pain that has no relation to exertion and was thought to be atypical. He presented to [facility] on 2/27/2016 with a complaint of chest pain and diaphoresis, which seemed to be relieved by aspirin and sublingual nitroglycerin in the ambulance. Evaluation included negative ECGs and troponins. He had an exercise nuclear stress test (2/29/2016) which showed normal LV function (EF 57%) and a medium sized partially reversible defect inferiorly (intermediate risk study). On 3/1/2016, he underwent a cardiac catheterization which showed no significant epicardial coronary artery disease.  He also had an echocargiogram (2/28/2016) showing mild LV septal hypertrophy, hypokinetic basal inferior wall with preserved LV function (EF 01%), mild diastolic dysfunction, and moderate aortic regurgitation with questionable dilation of the distal arch of aorta (3.5 cm). He was found to have elevated triglycerides, with lipid panel showing total cholesterol 201/ / HDL 52/ LDL 89. He was started on Fenofibrate prior to discharge on 3/1/2016. He reports that he has had no significant recurrence of chest pain since that time. He had a repeat echocardiogram in 9/2017 which showed low normal LV function (EF 50-55%), no RWMA, calcified aortic valve with bicuspid appearance and moderate regurgitation, and mild dilatation of the aortic root. He does have difficulty with mild lower extremity edema due to chronic venous insufficiency. He is followed by Dr. Luda Bashir. He has a history of prediabetes, with HbA1c ranging from 5.8-6.0 since 2012. He denies any polyuria, polydipsia, nocturia, or blurry vision, and has no history of retinopathy, neuropathy, or nephropathy. He has regular eye exams with Dr. Cb Ken. He has a history of osteoarthritis, involving his right knee, right great toe, and lumbar spine, with recent increasing difficulty with low back pain and right sided sciatica. He underwent a lumbar MRI (12/2016) which showed progression when compared to a prior lumbar MRI from 2010. It showed prominent right paracentral L4-L5 disc extrusion with impingement of the descending right L5 nerve root and moderately severe multifactorial canal stenosis; additional prominent right paracentral L5-S1 disc protrusion with impingement of the descending right S1 nerve root; abutment of the foraminal right L5 nerve root related to moderate foramina stenosis at L5-S1; mild multifactorial canal stenosis at L3-L4 with abutment of the descending left L4 nerve root.  He was evaluated by Dr. Adam Coello, and treated with Medrol dose pack, Norco and Flexeril without significant improvement. He was evaluated by Dr. Nuria Brito in 3/2017 for possible surgical intervention, but this was deferred due to issues regarding his wife's health. He was treated with gabapentin, and was reevaluated by Dr. Aren Matias in 8/2017, at which time he reported significant improvement. He states that he no longer required treatment with gabapentin or Norco, and was continuing to take Voltaren ER with good control of symptoms. However in 3/2018, he developed recurrence of severe pain with right sided sciatica that was unresponsive to conservative therapy. He subsequently consulted Dr. Sharon Reyes as above. He also has had difficulty in the past with increasing pain at the base of his right thumb and was evaluated by Dr. Leandro Medrano at  [address]. He states that x-rays revealed he had severe osteoarthritis, and he received a cortisone injection with some improvement. He has a history of GERD, with symptoms well controlled with pantoprazole as needed. He had a screening colonoscopy by Dr. Antony Gonzales in 3/2012 with removal of a benign serrated polyp (pathology shows hyperplastic and adenomatous features). He had repeat screening colonoscopy in 3/2017 by Dr Melisa Pederson which was normal. Follow-up recommended for five years. He denies any abdominal pain, nausea, vomiting, melena, hematochezia, or change in bowel movements. In 5/2016, he fell at work with his chest striking the edge of a table and he sustained fractures of the anterior right 7th and 8th ribs. He had serial rib x-rays since that time showing slow interval healing, but reports that he no longer has pain related to this. He underwent a bone density scan in 10/2016 which showed evidence of osteopenia with T-scores:  femoral neck left -1.7  /right -1.5 and lumbar -1.2. He was started on calcium and Vitamin D supplementation. Past Medical History:  
Diagnosis Date  Abnormal myocardial perfusion study 2/28/2016 Partially reversible inferior perfusion defect, EF 57%  AI (aortic insufficiency) Moderate with possible bicuspid aortic valve  Chronic low back pain  Chronic venous insufficiency  Colon polyp 03/2012 Benign serrated polyp  Fracture 05/2016  
 rib fx's  GERD (gastroesophageal reflux disease)  History of echocardiogram 2/28/2016 EF 55%. Basal inferior hypokinesis. Gr 1 DDfx. Mod AI.  Hyperlipidemia  Hypertension  Osteoarthritis of right knee  Osteopenia  Prediabetes  Prepatellar bursitis of right knee  Recurrent genital herpes 1/16/2013  Right lumbar radiculopathy 10/2016 MRI: prominent right paracentral L4-L5 disc extrusion/impingement of descending right L5 nerve root; moderately severe canal stenosis; prominent right paracentral L5-S1 disc protrusion/ impingement of the descending right S1 nerve root; abutment of the foraminal right L5 nerve root/moderate foramina stenosis at L5-S1; mild canal stenosis at L3-L4 with abutment of the descending left L4 nerve root.  S/P cardiac catheterization 3/1/2016 Normal coronary anatomy Past Surgical History:  
Procedure Laterality Date  ENDOSCOPY, COLON, DIAGNOSTIC    
 HX BACK SURGERY  07/05/2018  
 2 herniated disc repaired  HX CHOLECYSTECTOMY  HX TONSILLECTOMY Current Outpatient Prescriptions Medication Sig  
 calcium carbonate (CALCIUM 300 PO) Take  by mouth.  magnesium chloride (MAG 64 PO) Take  by mouth.  losartan (COZAAR) 100 mg tablet TAKE 1 TABLET DAILY  amLODIPine (NORVASC) 10 mg tablet TAKE 1 TABLET DAILY  valACYclovir (VALTREX) 500 mg tablet TAKE 1 TABLET DAILY  aspirin delayed-release 81 mg tablet 81 mg.  
 Cholecalciferol, Vitamin D3, 2,000 unit cap Take 2,000 Units by mouth daily.  DOCOSAHEXANOIC ACID/EPA (FISH OIL PO) Take  by mouth daily.  ibuprofen (MOTRIN) 800 mg tablet 800 mg. No current facility-administered medications for this visit. Allergies and Intolerances: Allergies Allergen Reactions  Tree Nuts Anaphylaxis  Ace Inhibitors Cough  Nuts [Tree Nut] Itching Family History: His mother is alive with stage 3 ovarian cancer. His father  from esophageal cancer at age 61. His maternal aunt  at age 48 from colon cancer. He has no family history of prostate cancer. Family History Problem Relation Age of Onset  Cancer Father 61  
  esophageal  
 Hypertension Father  Cancer Mother 80  
  ovarian cancer  Hypertension Brother  Diabetes Other Grandmother  Arthritis-osteo Other  Other Other Stomach problems; Father, grandfather Social History: He  reports that he quit smoking about 18 years ago. He has quit using smokeless tobacco. Reports that he smoked less than 0.5 ppd and stopped in . He is  and they have one son. His wife was recently diagnosed with Paget's disease of the breast, which represents a recurrence of prior breast cancer. He was employed as an  for [employer], but retired in 2017. He drinks about a 12 pack of beer each month. History Alcohol Use  1.0 oz/week  2 Cans of beer per week Comment: occasionally Immunization History: 
Immunization History Administered Date(s) Administered  Influenza Vaccine 2013, 2014  TDAP Vaccine 2004  Td 2004  Tdap 2014 Review of Systems: As above included in HPI. Otherwise 11 point review of systems negative including constitutional, skin, HENT, eyes, respiratory, cardiovascular, gastrointestinal, genitourinary, musculoskeletal, endocrine, hematologic, allergy, and neurologic. Physical:  
Vitals:  
BP: 124/68 HR: (!) 116 WT: 223 lb (101.2 kg) BMI:  30.92 kg/m2 Exam:  
Patient appears in no apparent distress. Affect is appropriate. HEENT: PERRLA, anicteric, oropharynx clear, no JVD, adenopathy or thyromegaly. No carotid bruits or radiated murmur. Lungs: clear to auscultation, no wheezes, rhonchi, or rales. Heart: regular rate and rhythm. II/VI midsystolic murmur at RUSB; no rubs or gallops Abdomen: soft, nontender, nondistended, normal bowel sounds, no hepatosplenomegaly or masses. Extremities: without edema. Pulses 1-2+ bilaterally. Right forearm with 1-2 mm mole with irregular border. Back: well healed 6-7 cm vertical incision over lower back Review of Data: 
Labs: 
No visits with results within 1 Month(s) from this visit. Latest known visit with results is: 
 
Office Visit on 02/21/2018 Component Date Value Ref Range Status  Glucose 08/21/2018 105* 65 - 99 mg/dL Final  
 BUN 08/21/2018 17  8 - 27 mg/dL Final  
 Creatinine 08/21/2018 1.10  0.76 - 1.27 mg/dL Final  
 GFR est non-AA 08/21/2018 73  >59 mL/min/1.73 Final  
 GFR est AA 08/21/2018 84  >59 mL/min/1.73 Final  
 BUN/Creatinine ratio 08/21/2018 15  10 - 24 Final  
 Sodium 08/21/2018 139  134 - 144 mmol/L Final  
 Potassium 08/21/2018 4.1  3.5 - 5.2 mmol/L Final  
 Chloride 08/21/2018 102  96 - 106 mmol/L Final  
 CO2 08/21/2018 20  20 - 29 mmol/L Final  
 Calcium 08/21/2018 9.2  8.6 - 10.2 mg/dL Final  
 Cholesterol, total 08/21/2018 200* 100 - 199 mg/dL Final  
 Triglyceride 08/21/2018 162* 0 - 149 mg/dL Final  
 HDL Cholesterol 08/21/2018 43  >39 mg/dL Final  
 VLDL, calculated 08/21/2018 32  5 - 40 mg/dL Final  
 LDL, calculated 08/21/2018 125* 0 - 99 mg/dL Final  
 Hemoglobin A1c 08/21/2018 5.7* 4.8 - 5.6 % Final  
 Estimated average glucose 08/21/2018 117  mg/dL Final  
 INTERPRETATION 08/21/2018 Note   Final  
 
Calculated 10 year ASCVD risk score:  10.6 % Health Maintenance: 
Screening:  
 Colorectal: colonoscopy (3/2017) normal. Dr. Maria L Kiran. Due 2022. Depression: none DM (HbA1c/FPG): HbA1c 5.7 (8/2018) Hepatitis C: negative (9/2016) Falls: none DEXA: N/A 
 PSA/TONI: PSA 0.5/ TONI (2/2018) Glaucoma: regular eye exams with Dr. Guicho Weeks (last 1/2017) Smoking: none Vitamin D: 38.3 (2/2018) Medicare Wellness: N/A Impression: 
Patient Active Problem List  
Diagnosis Code  Hyperlipidemia E78.5  GERD (gastroesophageal reflux disease) K21.9  Chronic venous insufficiency I87.2  Colon polyp K63.5  Prediabetes R73.03  
 Recurrent genital herpes A60.00  Vitamin D insufficiency E55.9  Aortic insufficiency, moderate I35.1  Essential hypertension I10  
 Primary osteoarthritis of knee M17.10  Closed fracture of multiple ribs of right side with delayed healing S22.41XG  Lumbar herniated disc  right L4/5/S1 chronic M51.26  
 Pain of right sacroiliac joint-episodic M53.3  Osteopenia determined by DEXA 2016, lowest T score -1.7 M85.80  Annular tear of lumbar disc M51.36  
 Chronic right-sided low back pain with sciatica M54.40, G89.29  
 Neuritis of lower extremity G57.90  Right leg weakness R29.898  Chronic thumb pain, right M79.644, G89.29  
 Rectal bleeding K62.5 Plan: 1. Hypertension. Well controlled on current regimen of losartan 100 mg daily and amlodipine 10 mg daily. Renal function normal with creatinine 1.10 / eGFR >60. Continue to follow. 2. Dyslipidemia. Patient no longer taking fenofibrate. Discontinued last visit given lack of data showing that lowering triglycerides decreases the risk of CAD. In addition, first line therapy for treating triglycerides < 500 would be initiation of a statin. Repeat lipid panel today with triglyceride level 162. However, his calculated 10 year ASCVD risk is 10.6 %, which does place him in one of the four statin benefit groups as per new AHA/ACC guidelines (primary prevention: ASCVD risk > 7.5%).  which would also be above goal in this patient.  However, he has been unwilling to start treatment with statin due to concerns regarding possible side effects. Emphasized importance of lifestyle modifications, including diet, exercise, and weight loss. Has lost eight pounds since 3/2018. Will repeat lipid panel at next visit to reassess. Continue to follow. 3. Aortic insufficiency, moderate with mildly dilated aortic root. Probable bicuspid valve. Being monitored on echocardiogram. Also, considering thoracic CT scan if dilation of aortic root appears to progress on echocardiogram.  Repeat echocardiogram to reassess valve in 9/2017 without change. Being followed by Dr. Aki Layton. 4. Prediabetes. HbA1c improved to 5.8 with eight pond weight loss. No evidence of microvascular complications. On ARB but unwilling to begin statin. Continue follow-up with  for annual eye exams. Foot exam normal in 4/2017. Urine microalbumin/ creatinine ratio without evidence of microalbuminuria. Encouraged weight loss and dietary changes, particularly avoiding concentrated sweets. 5. Osteopenia. Last bone density scan 10/2016. Using femoral neck T-scores, calculated FRAX score estimates her 10 year risk of a major osteoporetic fracture at 12 % and hip fracture at 2.0 %, which are not an indication for biphosphonate treatment (>20% and >3%, respectively). Continue calcium and Vitamin D. Encouraged exercise, particularly weight bearing activities. Consider repeat bone density scan in near future. 6. GERD. Continue occasional use of Protonix with good control of symptoms. Follow. 7. Right sciatica. Progression of symptoms and disease on MRI. Responded well to conservative therapy initially, but recurred and unresponsive to medications and injections. Underwent right L4-L5 and L5-S1 hemilaminectomy, medial facetectomy, and foraminotomy by Dr. Wero Bush in 7/2018 with great success. Reports only residual numbness on medial plantar surface of right foot. No longer requiring any pain medication. Continuing physical therapy. Follow. 8. Right thumb osteoarthritis. Improved pain control with cortisone injection. Being followed by Dr. Dionicio Cabrera. 9. Rectal bleeding. No recurrence since last visit. Was guaiac negative on exam at visit. No visible hemorrhoids. Colonoscopy normal 3/2017. Suspect symptoms may be secondary to fissure and resolved with increasing fiber in diet. Follow. 10. Obesity. Emphasized importance of lifestyle modifications, including diet, exercise, and weight loss. Will readdress next visit. 11. Health maintenance. Discussed Shingrix vaccine and will consider. Will readdress next visit. Other immunizations up to date. Colonoscopy due 2022. Prostate cancer screen up to date. Continue regular eye exams. Stressed importance of weight loss. Vitamin D level normal. Continue maintenance dose supplement. Will refer to dermatology for evaluation of lesion on right forearm. Patient states that it seems to be enlarging. Referral placed for Dr. Dave Garzon. Patient understands recommendations and agrees with plan. Follow-up in 6 months.

## 2018-09-27 RX ORDER — VALACYCLOVIR HYDROCHLORIDE 500 MG/1
TABLET, FILM COATED ORAL
Qty: 90 TAB | Refills: 3 | Status: SHIPPED | OUTPATIENT
Start: 2018-09-27 | End: 2019-09-19 | Stop reason: SDUPTHER

## 2019-01-07 ENCOUNTER — OFFICE VISIT (OUTPATIENT)
Dept: CARDIOLOGY CLINIC | Age: 59
End: 2019-01-07

## 2019-01-07 VITALS
HEIGHT: 72 IN | WEIGHT: 226 LBS | HEART RATE: 80 BPM | DIASTOLIC BLOOD PRESSURE: 84 MMHG | BODY MASS INDEX: 30.61 KG/M2 | OXYGEN SATURATION: 98 % | SYSTOLIC BLOOD PRESSURE: 132 MMHG

## 2019-01-07 DIAGNOSIS — I10 ESSENTIAL HYPERTENSION: ICD-10-CM

## 2019-01-07 DIAGNOSIS — I35.1 AORTIC VALVE INSUFFICIENCY, ETIOLOGY OF CARDIAC VALVE DISEASE UNSPECIFIED: Primary | ICD-10-CM

## 2019-01-07 DIAGNOSIS — E78.5 HYPERLIPIDEMIA, UNSPECIFIED HYPERLIPIDEMIA TYPE: ICD-10-CM

## 2019-01-07 DIAGNOSIS — R07.9 CHEST PAIN, UNSPECIFIED TYPE: ICD-10-CM

## 2019-01-07 DIAGNOSIS — I35.1 NONRHEUMATIC AORTIC VALVE INSUFFICIENCY: ICD-10-CM

## 2019-01-07 RX ORDER — NITROGLYCERIN 0.4 MG/1
0.4 TABLET SUBLINGUAL
Qty: 1 BOTTLE | Refills: 6 | Status: SHIPPED | OUTPATIENT
Start: 2019-01-07

## 2019-01-07 NOTE — PROGRESS NOTES
HISTORY OF PRESENT ILLNESS  Evita Shabazz is a 59 y.o. male. Follow-up   Pertinent negatives include no chest pain, no abdominal pain, no headaches and no shortness of breath. Hypertension   Pertinent negatives include no chest pain, no abdominal pain, no headaches and no shortness of breath. Patient presents for a follow-up office visit. The patient has a history of hypertension, dyslipidemia, aortic insufficiency and possibly a bicuspid aortic valve with moderate aortic insufficiency. The patient was hospitalized at [facility] in 2016 for an episode of acute chest pain, which was apparently relieved by sublingual nitroglycerin in the ambulance. The patient states he had been under increased amount of stress at his job. He was ruled out for myocardial infarction, but underwent a nuclear stress test which was felt to be intermediate risk with a reversible inferior perfusion defect. This led to a cardiac catheterization on March 2016  which showed normal coronary anatomy. He had preserved LV systolic function. Moderate aortic insufficiency which was unchanged. He was also found to have significantly elevated triglycerides and was started on fenofibrate during his hospital stay. Patient then underwent a follow-up echocardiogram in September 2017 which showed a low normal left ventricular ejection fraction, EF 50-55% with moderate aortic insufficiency, mild aortic root enlargement, overall unchanged compared to the previous study. The patient was last seen in the office approximately 6 months ago. Since last visit, he underwent low back surgery without any complications. He mentions a single episode of chest pain which occurred over the Thanksgiving weekend which awoke him from sleep. He took a single sublingual nitroglycerin which alleviated his symptoms. He has not had a recurrent episode since that time.   He is very active and is never experienced any exertional chest pain, exertional dyspnea, or significant decrease in his activity level. No new orthopnea, PND or leg swelling. Past Medical History:   Diagnosis Date    Abnormal myocardial perfusion study 2/28/2016    Partially reversible inferior perfusion defect, EF 57%    AI (aortic insufficiency)     Moderate with possible bicuspid aortic valve    Chronic low back pain     Chronic venous insufficiency     Colon polyp 03/2012    Benign serrated polyp    Fracture 05/2016    rib fx's    GERD (gastroesophageal reflux disease)     History of echocardiogram 2/28/2016    EF 55%. Basal inferior hypokinesis. Gr 1 DDfx. Mod AI.  Hyperlipidemia     Hypertension     Osteoarthritis of right knee     Osteopenia     Prediabetes     Prepatellar bursitis of right knee     Recurrent genital herpes 1/16/2013    Right lumbar radiculopathy 10/2016    MRI: prominent right paracentral L4-L5 disc extrusion/impingement of descending right L5 nerve root; moderately severe canal stenosis; prominent right paracentral L5-S1 disc protrusion/ impingement of the descending right S1 nerve root; abutment of the foraminal right L5 nerve root/moderate foramina stenosis at L5-S1; mild canal stenosis at L3-L4 with abutment of the descending left L4 nerve root.  S/P cardiac catheterization 3/1/2016    Normal coronary anatomy      Current Outpatient Medications   Medication Sig Dispense Refill    valACYclovir (VALTREX) 500 mg tablet TAKE 1 TABLET DAILY 90 Tab 3    calcium carbonate (CALCIUM 300 PO) Take  by mouth daily.  magnesium chloride (MAG 64 PO) Take  by mouth daily.  losartan (COZAAR) 100 mg tablet TAKE 1 TABLET DAILY 90 Tab 3    amLODIPine (NORVASC) 10 mg tablet TAKE 1 TABLET DAILY 90 Tab 3    aspirin delayed-release 81 mg tablet Take 81 mg by mouth daily.  Cholecalciferol, Vitamin D3, 2,000 unit cap Take 2,000 Units by mouth daily.  DOCOSAHEXANOIC ACID/EPA (FISH OIL PO) Take  by mouth daily.          Allergies   Allergen Reactions    Tree Nuts Anaphylaxis    Ace Inhibitors Cough    Nuts [Tree Nut] Itching      Social History     Tobacco Use    Smoking status: Former Smoker     Last attempt to quit: 2000     Years since quittin.5    Smokeless tobacco: Former User   Substance Use Topics    Alcohol use: Yes     Alcohol/week: 1.0 oz     Types: 2 Cans of beer per week     Comment: occasionally    Drug use: No               Review of Systems   Constitutional: Negative for chills, fever and weight loss. HENT: Negative for nosebleeds. Eyes: Negative for blurred vision and double vision. Respiratory: Negative for cough, shortness of breath and wheezing. Cardiovascular: Negative for chest pain, palpitations, orthopnea, claudication, leg swelling and PND. Gastrointestinal: Negative for abdominal pain, heartburn, nausea and vomiting. Genitourinary: Negative for dysuria and hematuria. Musculoskeletal: Negative for back pain, falls and myalgias. Skin: Negative for rash. Neurological: Negative for dizziness, focal weakness and headaches. Endo/Heme/Allergies: Does not bruise/bleed easily. Psychiatric/Behavioral: Negative for substance abuse. Visit Vitals  /84   Pulse 80   Ht 6' (1.829 m)   Wt 102.5 kg (226 lb)   SpO2 98%   BMI 30.65 kg/m²      Physical Exam   Constitutional: He is oriented to person, place, and time. He appears well-developed and well-nourished. HENT:   Head: Normocephalic and atraumatic. Eyes: Conjunctivae are normal.   Neck: Neck supple. No JVD present. Carotid bruit is not present. Cardiovascular: Normal rate, regular rhythm, S1 normal, S2 normal and normal pulses. Exam reveals no gallop and no S3.   Murmur heard. Midsystolic murmur is present with a grade of 1/6 at the upper right sternal border radiating to the neck. Diastolic murmur is present with a grade of 1/6. Pulmonary/Chest: Breath sounds normal. He has no wheezes. He has no rales. Abdominal: Soft.  Bowel sounds are normal. There is no tenderness. Musculoskeletal: He exhibits no edema. Neurological: He is alert and oriented to person, place, and time. Skin: Skin is warm and dry. EKG:   Normal sinus rhythm,  Normal axis, normal QTc interval, minimal nonspecific ST changes, borderline voltage criteria for LVH, compared to the previous EKG from June 2018, no significant interval change. ASSESSMENT and PLAN     Aortic insufficiency. This remained moderate in severity by echocardiogram in September 2017. This was unchanged compared to the previous study from 2016. He did have a calcified aortic valve which did appear to be bicuspid, so this will need to be followed up at least every other year, unless new symptoms arise. This will be scheduled for September of this year. Mildly dilated aortic root. He may have an underlying aortopathy as well given the likelihood that he has a bicuspid aortic valve. At some point he should be evaluated with a  CT of his thorax. Hypertension. This Is now reasonably well controlled on his current regimen, which includes amlodipine and losartan. Both of which we continue    Dyslipidemia. Patient's most recent lipid panel looked reasonable from August 2018. Is followed by his PCP. He has been managing this with lifestyle modification. Chest pain. Single episode over the Thanksgiving weekend. Patient may have variant angina since this usually occurs at rest is associated with increased stress and is relieved with sublingual nitroglycerin. I have refilled a prescription for this. No additional cardiac workup needed at this time unless his symptoms become more frequent.     Followup in 8-9 months, sooner

## 2019-01-07 NOTE — PROGRESS NOTES
Ruddy Velazquez presents today for   Chief Complaint   Patient presents with    Follow-up     6 month follow up       Ruddy Velazquez preferred language for health care discussion is english/other. Is someone accompanying this pt? No    Is the patient using any DME equipment during OV? No  Depression Screening:  PHQ over the last two weeks 1/7/2019   Little interest or pleasure in doing things Not at all   Feeling down, depressed, irritable, or hopeless Not at all   Total Score PHQ 2 0   Trouble falling or staying asleep, or sleeping too much -   Feeling tired or having little energy -   Poor appetite, weight loss, or overeating -   Feeling bad about yourself - or that you are a failure or have let yourself or your family down -   Trouble concentrating on things such as school, work, reading, or watching TV -   Moving or speaking so slowly that other people could have noticed; or the opposite being so fidgety that others notice -   Thoughts of being better off dead, or hurting yourself in some way -   How difficult have these problems made it for you to do your work, take care of your home and get along with others -       Learning Assessment:  Learning Assessment 6/26/2018   PRIMARY LEARNER Patient   HIGHEST LEVEL OF EDUCATION - PRIMARY LEARNER  -   BARRIERS PRIMARY LEARNER -   CO-LEARNER CAREGIVER -   PRIMARY LANGUAGE ENGLISH   LEARNER PREFERENCE PRIMARY DEMONSTRATION     -   ANSWERED BY Patient   RELATIONSHIP SELF       Abuse Screening:  Abuse Screening Questionnaire 8/15/2017   Do you ever feel afraid of your partner? N   Are you in a relationship with someone who physically or mentally threatens you? N   Is it safe for you to go home? Y         Pt currently taking Anticoagulant or Antiplatelet therapy? Yes    Coordination of Care:  1. Have you been to the ER, urgent care clinic since your last visit? Hospitalized since your last visit? No    2.  Have you seen or consulted any other health care providers outside of the [address] [place] [address] Loc since your last visit? Include any pap smears or colon screening. No    Has patient had recent lab work or cardiac testing?  No    Patient verified medications verbally

## 2019-02-12 RX ORDER — AMLODIPINE BESYLATE 10 MG/1
TABLET ORAL
Qty: 90 TAB | Refills: 3 | Status: SHIPPED | OUTPATIENT
Start: 2019-02-12 | End: 2020-01-28

## 2019-02-19 ENCOUNTER — APPOINTMENT (OUTPATIENT)
Dept: INTERNAL MEDICINE CLINIC | Age: 59
End: 2019-02-19

## 2019-02-19 ENCOUNTER — HOSPITAL ENCOUNTER (OUTPATIENT)
Dept: LAB | Age: 59
Discharge: HOME | End: 2019-02-19
Payer: COMMERCIAL

## 2019-02-19 DIAGNOSIS — I35.1 NONRHEUMATIC AORTIC VALVE INSUFFICIENCY: ICD-10-CM

## 2019-02-19 DIAGNOSIS — Z12.83 SCREENING EXAM FOR SKIN CANCER: ICD-10-CM

## 2019-02-19 DIAGNOSIS — R73.03 PREDIABETES: ICD-10-CM

## 2019-02-19 DIAGNOSIS — M51.26 LUMBAR HERNIATED DISC: Chronic | ICD-10-CM

## 2019-02-19 DIAGNOSIS — E66.09 CLASS 1 OBESITY DUE TO EXCESS CALORIES WITHOUT SERIOUS COMORBIDITY WITH BODY MASS INDEX (BMI) OF 30.0 TO 30.9 IN ADULT: ICD-10-CM

## 2019-02-19 DIAGNOSIS — L98.9 SKIN LESION OF RIGHT ARM: ICD-10-CM

## 2019-02-19 DIAGNOSIS — G89.29 CHRONIC THUMB PAIN, RIGHT: ICD-10-CM

## 2019-02-19 DIAGNOSIS — E55.9 VITAMIN D INSUFFICIENCY: ICD-10-CM

## 2019-02-19 DIAGNOSIS — E78.5 HYPERLIPIDEMIA, UNSPECIFIED HYPERLIPIDEMIA TYPE: ICD-10-CM

## 2019-02-19 DIAGNOSIS — M85.80 OSTEOPENIA DETERMINED BY X-RAY: Chronic | ICD-10-CM

## 2019-02-19 DIAGNOSIS — I87.2 CHRONIC VENOUS INSUFFICIENCY: ICD-10-CM

## 2019-02-19 DIAGNOSIS — I10 ESSENTIAL HYPERTENSION: ICD-10-CM

## 2019-02-19 DIAGNOSIS — M79.644 CHRONIC THUMB PAIN, RIGHT: ICD-10-CM

## 2019-02-19 DIAGNOSIS — Z98.890 S/P LUMBAR DISCECTOMY: ICD-10-CM

## 2019-02-19 DIAGNOSIS — K21.9 GASTROESOPHAGEAL REFLUX DISEASE, ESOPHAGITIS PRESENCE NOT SPECIFIED: ICD-10-CM

## 2019-02-19 LAB
ALBUMIN SERPL-MCNC: 4.2 G/DL (ref 3.4–5)
ALBUMIN/GLOB SERPL: 1.8 {RATIO} (ref 0.8–1.7)
ALP SERPL-CCNC: 82 U/L (ref 45–117)
ALT SERPL-CCNC: 34 U/L (ref 16–61)
ANION GAP SERPL CALC-SCNC: 7 MMOL/L (ref 3–18)
APPEARANCE UR: CLEAR
AST SERPL-CCNC: 20 U/L (ref 15–37)
BACTERIA URNS QL MICRO: NEGATIVE /HPF
BASOPHILS # BLD: 0.1 K/UL (ref 0–0.1)
BASOPHILS NFR BLD: 1 % (ref 0–2)
BILIRUB SERPL-MCNC: 0.6 MG/DL (ref 0.2–1)
BILIRUB UR QL: NEGATIVE
BUN SERPL-MCNC: 16 MG/DL (ref 7–18)
BUN/CREAT SERPL: 14 (ref 12–20)
CALCIUM SERPL-MCNC: 9.2 MG/DL (ref 8.5–10.1)
CHLORIDE SERPL-SCNC: 105 MMOL/L (ref 100–108)
CHOLEST SERPL-MCNC: 189 MG/DL
CO2 SERPL-SCNC: 27 MMOL/L (ref 21–32)
COLOR UR: YELLOW
CREAT SERPL-MCNC: 1.17 MG/DL (ref 0.6–1.3)
DIFFERENTIAL METHOD BLD: ABNORMAL
EOSINOPHIL # BLD: 0.2 K/UL (ref 0–0.4)
EOSINOPHIL NFR BLD: 3 % (ref 0–5)
EPITH CASTS URNS QL MICRO: NORMAL /LPF (ref 0–5)
ERYTHROCYTE [DISTWIDTH] IN BLOOD BY AUTOMATED COUNT: 13.8 % (ref 11.6–14.5)
EST. AVERAGE GLUCOSE BLD GHB EST-MCNC: 128 MG/DL
GLOBULIN SER CALC-MCNC: 2.4 G/DL (ref 2–4)
GLUCOSE SERPL-MCNC: 104 MG/DL (ref 74–99)
GLUCOSE UR STRIP.AUTO-MCNC: NEGATIVE MG/DL
HBA1C MFR BLD: 6.1 % (ref 4.2–5.6)
HCT VFR BLD AUTO: 49.6 % (ref 36–48)
HDLC SERPL-MCNC: 52 MG/DL (ref 40–60)
HDLC SERPL: 3.6 {RATIO} (ref 0–5)
HGB BLD-MCNC: 16.3 G/DL (ref 13–16)
HGB UR QL STRIP: NEGATIVE
KETONES UR QL STRIP.AUTO: NEGATIVE MG/DL
LDLC SERPL CALC-MCNC: 112.2 MG/DL (ref 0–100)
LEUKOCYTE ESTERASE UR QL STRIP.AUTO: NEGATIVE
LIPID PROFILE,FLP: ABNORMAL
LYMPHOCYTES # BLD: 2.1 K/UL (ref 0.9–3.6)
LYMPHOCYTES NFR BLD: 31 % (ref 21–52)
MCH RBC QN AUTO: 30.5 PG (ref 24–34)
MCHC RBC AUTO-ENTMCNC: 32.9 G/DL (ref 31–37)
MCV RBC AUTO: 92.9 FL (ref 74–97)
MONOCYTES # BLD: 0.6 K/UL (ref 0.05–1.2)
MONOCYTES NFR BLD: 9 % (ref 3–10)
NEUTS SEG # BLD: 3.6 K/UL (ref 1.8–8)
NEUTS SEG NFR BLD: 56 % (ref 40–73)
NITRITE UR QL STRIP.AUTO: NEGATIVE
PH UR STRIP: 6.5 [PH] (ref 5–8)
PLATELET # BLD AUTO: 224 K/UL (ref 135–420)
PMV BLD AUTO: 11.2 FL (ref 9.2–11.8)
POTASSIUM SERPL-SCNC: 4.5 MMOL/L (ref 3.5–5.5)
PROT SERPL-MCNC: 6.6 G/DL (ref 6.4–8.2)
PROT UR STRIP-MCNC: NEGATIVE MG/DL
RBC # BLD AUTO: 5.34 M/UL (ref 4.7–5.5)
RBC #/AREA URNS HPF: 0 /HPF (ref 0–5)
SODIUM SERPL-SCNC: 139 MMOL/L (ref 136–145)
SP GR UR REFRACTOMETRY: 1.02 (ref 1–1.03)
TRIGL SERPL-MCNC: 124 MG/DL (ref ?–150)
TSH SERPL DL<=0.05 MIU/L-ACNC: 1.2 UIU/ML (ref 0.36–3.74)
UROBILINOGEN UR QL STRIP.AUTO: 0.2 EU/DL (ref 0.2–1)
VLDLC SERPL CALC-MCNC: 24.8 MG/DL
WBC # BLD AUTO: 6.6 K/UL (ref 4.6–13.2)
WBC URNS QL MICRO: NORMAL /HPF (ref 0–4)

## 2019-02-19 PROCEDURE — 84443 ASSAY THYROID STIM HORMONE: CPT

## 2019-02-19 PROCEDURE — 80061 LIPID PANEL: CPT

## 2019-02-19 PROCEDURE — 85025 COMPLETE CBC W/AUTO DIFF WBC: CPT

## 2019-02-19 PROCEDURE — 36415 COLL VENOUS BLD VENIPUNCTURE: CPT

## 2019-02-19 PROCEDURE — 83036 HEMOGLOBIN GLYCOSYLATED A1C: CPT

## 2019-02-19 PROCEDURE — 80053 COMPREHEN METABOLIC PANEL: CPT

## 2019-02-19 PROCEDURE — 82306 VITAMIN D 25 HYDROXY: CPT

## 2019-02-19 PROCEDURE — 81001 URINALYSIS AUTO W/SCOPE: CPT

## 2019-02-20 LAB — 25(OH)D3 SERPL-MCNC: 40.4 NG/ML (ref 30–100)

## 2019-02-26 ENCOUNTER — OFFICE VISIT (OUTPATIENT)
Dept: INTERNAL MEDICINE CLINIC | Age: 59
End: 2019-02-26

## 2019-02-26 VITALS
TEMPERATURE: 98.9 F | SYSTOLIC BLOOD PRESSURE: 122 MMHG | WEIGHT: 224 LBS | DIASTOLIC BLOOD PRESSURE: 68 MMHG | HEIGHT: 72 IN | OXYGEN SATURATION: 98 % | HEART RATE: 74 BPM | BODY MASS INDEX: 30.34 KG/M2 | RESPIRATION RATE: 14 BRPM

## 2019-02-26 DIAGNOSIS — G89.29 CHRONIC THUMB PAIN, RIGHT: ICD-10-CM

## 2019-02-26 DIAGNOSIS — E66.09 CLASS 1 OBESITY DUE TO EXCESS CALORIES WITHOUT SERIOUS COMORBIDITY WITH BODY MASS INDEX (BMI) OF 30.0 TO 30.9 IN ADULT: ICD-10-CM

## 2019-02-26 DIAGNOSIS — D75.1 ERYTHROCYTOSIS: ICD-10-CM

## 2019-02-26 DIAGNOSIS — I35.1 NONRHEUMATIC AORTIC VALVE INSUFFICIENCY: ICD-10-CM

## 2019-02-26 DIAGNOSIS — I10 ESSENTIAL HYPERTENSION: ICD-10-CM

## 2019-02-26 DIAGNOSIS — E78.5 HYPERLIPIDEMIA, UNSPECIFIED HYPERLIPIDEMIA TYPE: ICD-10-CM

## 2019-02-26 DIAGNOSIS — Z00.01 ENCOUNTER FOR ROUTINE ADULT PHYSICAL EXAM WITH ABNORMAL FINDINGS: Primary | ICD-10-CM

## 2019-02-26 DIAGNOSIS — M79.644 CHRONIC THUMB PAIN, RIGHT: ICD-10-CM

## 2019-02-26 DIAGNOSIS — M85.80 OSTEOPENIA DETERMINED BY X-RAY: Chronic | ICD-10-CM

## 2019-02-26 DIAGNOSIS — R73.03 PREDIABETES: ICD-10-CM

## 2019-02-26 DIAGNOSIS — G89.29 CHRONIC RIGHT-SIDED LOW BACK PAIN WITH RIGHT-SIDED SCIATICA: ICD-10-CM

## 2019-02-26 DIAGNOSIS — K21.9 GASTROESOPHAGEAL REFLUX DISEASE, ESOPHAGITIS PRESENCE NOT SPECIFIED: ICD-10-CM

## 2019-02-26 DIAGNOSIS — M54.41 CHRONIC RIGHT-SIDED LOW BACK PAIN WITH RIGHT-SIDED SCIATICA: ICD-10-CM

## 2019-02-26 DIAGNOSIS — M51.26 LUMBAR HERNIATED DISC: Chronic | ICD-10-CM

## 2019-02-26 DIAGNOSIS — Z12.5 PROSTATE CANCER SCREENING: ICD-10-CM

## 2019-02-26 NOTE — PATIENT INSTRUCTIONS
Advance Care Planning: Care Instructions Your Care Instructions It can be hard to live with an illness that cannot be cured. But if your health is getting worse, you may want to make decisions about end-of-life care. Planning for the end of your life does not mean that you are giving up. It is a way to make sure that your wishes are met. Clearly stating your wishes can make it easier for your loved ones. Making plans while you are still able may also ease your mind and make your final days less stressful and more meaningful. Follow-up care is a key part of your treatment and safety. Be sure to make and go to all appointments, and call your doctor if you are having problems. It's also a good idea to know your test results and keep a list of the medicines you take. What can you do to plan for the end of life? · You can bring these issues up with your doctor. You do not need to wait until your doctor starts the conversation. You might start with \"I would not be willing to live with . Ashkan Jose \" When you complete this sentence it helps your doctor understand your wishes. · Talk openly and honestly with your doctor. This is the best way to understand the decisions you will need to make as your health changes. Know that you can always change your mind. · Ask your doctor about commonly used life-support measures. These include tube feedings, breathing machines, and fluids given through a vein (IV). Understanding these treatments will help you decide whether you want them. · You may choose to have these life-supporting treatments for a limited time. This allows a trial period to see whether they will help you. You may also decide that you want your doctor to take only certain measures to keep you alive. It is important to spell out these conditions so that your doctor and family understand them. · Talk to your doctor about how long you are likely to live.  He or she may be able to give you an idea of what usually happens with your specific illness. · Think about preparing papers that state your wishes. This way there will not be any confusion about what you want. You can change your instructions at any time. Which papers should you prepare? Advance directives are legal papers that tell doctors how you want to be cared for at the end of your life. You do not need a  to write these papers. Ask your doctor or your state [facility] department for information on how to write your advance directives. They may have the forms for each of these types of papers. Make sure your doctor has a copy of these on file, and give a copy to a family member or close friend. · Consider a do-not-resuscitate order (DNR). This order asks that no extra treatments be done if your heart stops or you stop breathing. Extra treatments may include cardiopulmonary resuscitation (CPR), electrical shock to restart your heart, or a machine to breathe for you. If you decide to have a DNR order, ask your doctor to explain and write it. Place the order in your home where everyone can easily see it. · Consider a living will. A living will explains your wishes about life support and other treatments at the end of your life if you become unable to speak for yourself. Living valencia tell doctors to use or not use treatments that would keep you alive. You must have one or two witnesses or a notary present when you sign this form. · Consider a durable power of  for health care. This allows you to name a person to make decisions about your care if you are not able to. Most people ask a close friend or family member. Talk to this person about the kinds of treatments you want and those that you do not want. Make sure this person understands your wishes. These legal papers are simple to change. Tell your doctor what you want to change, and ask him or her to make a note in your medical file.  Give your family updated copies of the papers. Where can you learn more? Go to http://pat-cuate.info/. Enter P184 in the search box to learn more about \"Advance Care Planning: Care Instructions. \" Current as of: April 18, 2018 Content Version: 11.9 © [id number] [employer]. Care instructions adapted under license by [employer] (which disclaims liability or warranty for this information). If you have questions about a medical condition or this instruction, always ask your healthcare professional. [address] any warranty or liability for your use of this information. Learning About Diabetes Food Guidelines Your Care Instructions Meal planning is important to manage diabetes. It helps keep your blood sugar at a target level (which you set with your doctor). You don't have to eat special foods. You can eat what your family eats, including sweets once in a while. But you do have to pay attention to how often you eat and how much you eat of certain foods. You may want to work with a dietitian or a certified diabetes educator (CDE) to help you plan meals and snacks. A dietitian or CDE can also help you lose weight if that is one of your goals. What should you know about eating carbs? Managing the amount of carbohydrate (carbs) you eat is an important part of healthy meals when you have diabetes. Carbohydrate is found in many foods. · Learn which foods have carbs. And learn the amounts of carbs in different foods. ? Bread, cereal, pasta, and rice have about 15 grams of carbs in a serving. A serving is 1 slice of bread (1 ounce), ½ cup of cooked cereal, or 1/3 cup of cooked pasta or rice. ? Fruits have 15 grams of carbs in a serving. A serving is 1 small fresh fruit, such as an apple or orange; ½ of a banana; ½ cup of cooked or canned fruit; ½ cup of fruit juice; 1 cup of melon or raspberries; or 2 tablespoons of dried fruit. ? Milk and no-sugar-added yogurt have 15 grams of carbs in a serving. A serving is 1 cup of milk or 2/3 cup of no-sugar-added yogurt. ? Starchy vegetables have 15 grams of carbs in a serving. A serving is ½ cup of mashed potatoes or sweet potato; 1 cup winter squash; ½ of a small baked potato; ½ cup of cooked beans; or ½ cup cooked corn or green peas. · Learn how much carbs to eat each day and at each meal. A dietitian or CDE can teach you how to keep track of the amount of carbs you eat. This is called carbohydrate counting. · If you are not sure how to count carbohydrate grams, use the Plate Method to plan meals. It is a good, quick way to make sure that you have a balanced meal. It also helps you spread carbs throughout the day. ? Divide your plate by types of foods. Put non-starchy vegetables on half the plate, meat or other protein food on one-quarter of the plate, and a grain or starchy vegetable in the final quarter of the plate. To this you can add a small piece of fruit and 1 cup of milk or yogurt, depending on how many carbs you are supposed to eat at a meal. 
· Try to eat about the same amount of carbs at each meal. Do not \"save up\" your daily allowance of carbs to eat at one meal. 
· Proteins have very little or no carbs per serving. Examples of proteins are beef, chicken, turkey, fish, eggs, tofu, cheese, cottage cheese, and peanut butter. A serving size of meat is 3 ounces, which is about the size of a deck of cards. Examples of meat substitute serving sizes (equal to 1 ounce of meat) are 1/4 cup of cottage cheese, 1 egg, 1 tablespoon of peanut butter, and ½ cup of tofu. How can you eat out and still eat healthy? · Learn to estimate the serving sizes of foods that have carbohydrate. If you measure food at home, it will be easier to estimate the amount in a serving of restaurant food.  
· If the meal you order has too much carbohydrate (such as potatoes, corn, or baked beans), ask to have a low-carbohydrate food instead. Ask for a salad or green vegetables. · If you use insulin, check your blood sugar before and after eating out to help you plan how much to eat in the future. · If you eat more carbohydrate at a meal than you had planned, take a walk or do other exercise. This will help lower your blood sugar. What else should you know? · Limit saturated fat, such as the fat from meat and dairy products. This is a healthy choice because people who have diabetes are at higher risk of heart disease. So choose lean cuts of meat and nonfat or low-fat dairy products. Use olive or canola oil instead of butter or shortening when cooking. · Don't skip meals. Your blood sugar may drop too low if you skip meals and take insulin or certain medicines for diabetes. · Check with your doctor before you drink alcohol. Alcohol can cause your blood sugar to drop too low. Alcohol can also cause a bad reaction if you take certain diabetes medicines. Follow-up care is a key part of your treatment and safety. Be sure to make and go to all appointments, and call your doctor if you are having problems. It's also a good idea to know your test results and keep a list of the medicines you take. Where can you learn more? Go to http://pat-cuate.info/. Enter H848 in the search box to learn more about \"Learning About Diabetes Food Guidelines. \" Current as of: July 25, 2018 Content Version: 11.9 © 9631-4627 [employer], [place]. Care instructions adapted under license by [employer] (which disclaims liability or warranty for this information). If you have questions about a medical condition or this instruction, always ask your healthcare professional. [place] [address] any warranty or liability for your use of this information.

## 2019-02-26 NOTE — ACP (ADVANCE CARE PLANNING)
Do you have an Advanced Directive? NO    Would you like information on Advanced Directives? YES, patient will receive the information in the after visit statement.

## 2019-02-26 NOTE — PROGRESS NOTES
Chief Complaint Patient presents with  Complete Physical  
  Yearly physical with lab results. Health Maintenance Due Topic Date Due  Shingrix Vaccine Age 50> (1 of 2) 06/01/2008  Influenza Age 5 to Adult  08/01/2018 1. Have you been to the ER, urgent care clinic or hospitalized since your last visit? NO.  
 
2. Have you seen or consulted any other health care providers outside of the [address] [place] [address] since your last visit (Include any pap smears or colon screening)?  NO

## 2019-03-01 PROBLEM — D75.1 ERYTHROCYTOSIS: Status: ACTIVE | Noted: 2019-03-01

## 2019-03-01 NOTE — PROGRESS NOTES
HPI:  
Brynn Davila is a 59y.o. year old male who presents today for a physical exam and for evaluation of hypertension, hyperlipidemia, moderate aortic insufficiency, atypical chest pain, prediabetes, GERD, osteoarthritis, lumbar radiculopathy, and chronic venous insufficiency. He reports that he is doing relatively well. He states that he continues to have slight lower back pain if sitting too long, but reports overall significant improvement since his surgery. He is otherwise without complaints. He has a history of hypertension, treated with losartan and amlodipine. He does not check his blood pressure at home regularly. He also does not exercise regularly, although admits to doing yard work and projects around the house. He denies any shortness of breath at rest or with exertion, palpitations, lightheadedness, or edema. He does have a history of substernal chest pain that has no relation to exertion and was thought to be atypical. He presented to [facility] on 2/27/2016 with a complaint of chest pain and diaphoresis, which seemed to be relieved by aspirin and sublingual nitroglycerin in the ambulance. Evaluation included negative ECG and troponins. He had an exercise nuclear stress test (2/29/2016) which showed normal LV function (EF 57%) and a medium sized partially reversible defect inferiorly (intermediate risk study). On 3/1/2016, he underwent a cardiac catheterization which showed no significant epicardial coronary artery disease. He also had an echocargiogram (2/28/2016) showing mild LV septal hypertrophy, hypokinetic basal inferior wall with preserved LV function (EF 03%), mild diastolic dysfunction, and moderate aortic regurgitation with questionable dilation of the distal arch of aorta (3.5 cm). He was found to have elevated triglycerides, with lipid panel showing total cholesterol 201/ / HDL 52/ LDL 89.  He was started on Fenofibrate prior to discharge on 3/1/2016. He reports that he has had no significant recurrence of chest pain since that time. He had a repeat echocardiogram in 9/2017 which showed low normal LV function (EF 50-55%), no RWMA, calcified aortic valve with bicuspid appearance and moderate regurgitation, and mild dilatation of the aortic root. He does have difficulty with mild lower extremity edema due to chronic venous insufficiency. He is followed by Dr. Tucker Samuel. He has a history of prediabetes, with HbA1c ranging from 5.8-6.0 since 2012. He denies any polyuria, polydipsia, nocturia, or blurry vision, and has no history of retinopathy, neuropathy, or nephropathy. He has regular eye exams with Dr. Julio Simmons. He has a history of osteoarthritis, involving his right knee, right great toe, and lumbar spine. He has had difficulty in the past with increasing pain at the base of his right thumb and was evaluated by Dr. Andre Mendez at [address]. He states that x-rays revealed he had severe osteoarthritis, and he received a cortisone injection with some improvement. In 12/2016, he noted increasing difficulty with low back pain and right sided sciatica. He underwent a lumbar MRI (12/2016) which showed progression when compared to a prior lumbar MRI from 2010. It showed prominent right paracentral L4-L5 disc extrusion with impingement of the descending right L5 nerve root and moderately severe multifactorial canal stenosis; additional prominent right paracentral L5-S1 disc protrusion with impingement of the descending right S1 nerve root; abutment of the foraminal right L5 nerve root related to moderate foramina stenosis at L5-S1; mild multifactorial canal stenosis at L3-L4 with abutment of the descending left L4 nerve root. He was evaluated by Dr. Khadra Snyder, and treated with Medrol dose pack, Norco and Flexeril without significant improvement.  He was evaluated by Dr. Daron Diaz in 3/2017 for possible surgical intervention, but this was deferred due to issues regarding his wife's health. He was treated with gabapentin, and was reevaluated by Dr. Ana Luque in 8/2017, at which time he reported significant improvement. He states that he no longer required treatment with gabapentin or Norco, and was continuing to take Voltaren ER with good control of symptoms. However in 3/2018, he developed recurrence of severe pain with right sided sciatica that was unresponsive to conservative therapy. He subsequently consulted Dr. Miky Benedict and on 7/5/2018, he underwent a right L4-L5 and L5-S1 hemilaminectomy, medial facetectomy, and foraminotomy by Dr. Miky Benedict for removal of herniated disks and decompression of foraminal stenoses at these levels. He reports that his right sided sciatica has resolved and he is no longer requiring any pain medication. He does report some residual numbness on the plantar surface of his right foot. He has a history of GERD, with symptoms well controlled with pantoprazole as needed. He had a screening colonoscopy by Dr. Ishan Marcos in 3/2012 with removal of a benign serrated polyp (pathology shows hyperplastic and adenomatous features). He had repeat screening colonoscopy in 3/2017 by Dr Driss Gan which was normal. Follow-up recommended for five years. He denies any abdominal pain, nausea, vomiting, melena, hematochezia, or change in bowel movements. In 5/2016, he fell at work with his chest striking the edge of a table and he sustained fractures of the anterior right 7th and 8th ribs. He had serial rib x-rays since that time showing slow interval healing, but reports that he no longer has pain related to this. He underwent a bone density scan in 10/2016 which showed evidence of osteopenia with T-scores:  femoral neck left -1.7  /right -1.5 and lumbar -1.2. He was started on calcium and Vitamin D supplementation. Past Medical History:  
Diagnosis Date  Abnormal myocardial perfusion study 2/28/2016 Partially reversible inferior perfusion defect, EF 57%  AI (aortic insufficiency) Moderate with possible bicuspid aortic valve  Chronic low back pain  Chronic venous insufficiency  Colon polyp 03/2012 Benign serrated polyp  Fracture 05/2016  
 rib fx's  GERD (gastroesophageal reflux disease)  History of echocardiogram 2/28/2016 EF 55%. Basal inferior hypokinesis. Gr 1 DDfx. Mod AI.  Hyperlipidemia  Hypertension  Osteoarthritis of right knee  Osteopenia  Prediabetes  Prepatellar bursitis of right knee  Recurrent genital herpes 1/16/2013  Right lumbar radiculopathy 10/2016 MRI: prominent right paracentral L4-L5 disc extrusion/impingement of descending right L5 nerve root; moderately severe canal stenosis; prominent right paracentral L5-S1 disc protrusion/ impingement of the descending right S1 nerve root; abutment of the foraminal right L5 nerve root/moderate foramina stenosis at L5-S1; mild canal stenosis at L3-L4 with abutment of the descending left L4 nerve root.  S/P cardiac catheterization 3/1/2016 Normal coronary anatomy Past Surgical History:  
Procedure Laterality Date  ENDOSCOPY, COLON, DIAGNOSTIC    
 HX BACK SURGERY  07/05/2018  
 2 herniated disc repaired  HX CHOLECYSTECTOMY  HX TONSILLECTOMY Current Outpatient Medications Medication Sig  
 amLODIPine (NORVASC) 10 mg tablet TAKE 1 TABLET DAILY  nitroglycerin (NITROSTAT) 0.4 mg SL tablet 1 Tab by SubLINGual route every five (5) minutes as needed for Chest Pain. Up to 3 doses.  valACYclovir (VALTREX) 500 mg tablet TAKE 1 TABLET DAILY  calcium carbonate (CALCIUM 300 PO) Take  by mouth daily.  magnesium chloride (MAG 64 PO) Take  by mouth daily.  losartan (COZAAR) 100 mg tablet TAKE 1 TABLET DAILY  aspirin delayed-release 81 mg tablet Take 81 mg by mouth daily.   
 Cholecalciferol, Vitamin D3, 2,000 unit cap Take 2,000 Units by mouth daily.  
 DOCOSAHEXANOIC ACID/EPA (FISH OIL PO) Take  by mouth daily. No current facility-administered medications for this visit. Allergies and Intolerances: Allergies Allergen Reactions  Tree Nuts Anaphylaxis  Ace Inhibitors Cough  Nuts [Tree Nut] Itching Family History: His mother is alive with stage 3 ovarian cancer. His father  from esophageal cancer at age 61. His maternal aunt  at age 48 from colon cancer. He has no family history of prostate cancer. Family History Problem Relation Age of Onset  Cancer Father 61  
     esophageal  
 Hypertension Father  Cancer Mother 80  
     ovarian cancer  Hypertension Brother  Diabetes Other Grandmother  Arthritis-osteo Other  Other Other Stomach problems; Father, grandfather Social History: He  reports that he quit smoking about 18 years ago. He has quit using smokeless tobacco. Reports that he smoked less than 0.5 ppd and stopped in . He is  and they have one son. His wife was recently diagnosed with Paget's disease of the breast, which represents a recurrence of prior breast cancer. He was employed as an  for [employer], but retired in 2017. He drinks about a 12 pack of beer each month. Social History Substance and Sexual Activity Alcohol Use Yes  Alcohol/week: 1.0 oz  Types: 2 Cans of beer per week Immunization History: 
Immunization History Administered Date(s) Administered  Influenza Vaccine 2013, 2014  TDAP Vaccine 2004  Td 2004  Tdap 2014 Review of Systems: As above included in HPI. Otherwise 11 point review of systems negative including constitutional, skin, HENT, eyes, respiratory, cardiovascular, gastrointestinal, genitourinary, musculoskeletal, endocrine, hematologic, allergy, and neurologic. Physical:  
Vitals:  
BP: 122/68 HR: 74 
WT: 224 lb (101.6 kg) BMI:  30.38 kg/m2 Exam:  
Patient appears in no apparent distress. Affect is appropriate. HEENT --Anicteric sclerae, tympanic membranes normal,  ear canals normal. 
PERRL, EOMI, conjunctiva and lids normal.  
Sinuses were nontender, turbinates normal, hearing normal.  Oropharynx without 
erythema, normal tongue, oral mucosa and tonsils. No cervical lymphadenopathy. No thyromegaly, JVD, or bruits. Carotid pulses 2+ with normal upstroke. Lungs --Clear to auscultation. No wheezing or rales. Heart --Regular rate and rhythm, II/VI midsystolic murmur at RUSB; no rubs or gallops Chest wall --Nontender to palpation. PMI normal. 
Abdomen -- Soft and nontender, no hepatosplenomegaly or masses. Prostate  -- no asymmetry, nodularity, tenderness or enlargement Rectal  -- normal tone, guaiac negative brown stool Extremities -- Without cyanosis, clubbing, edema. 2+ pulses equally and bilaterally. Normal looking digits, ROM intact Neuro -- CN 2-12 intact, strength 5/5 with intact soft touch in all extremities Derm  no obvious abnormalities noted, no rash Review of Data: 
Labs: Hospital Outpatient Visit on 02/19/2019 Component Date Value Ref Range Status  WBC 02/19/2019 6.6  4.6 - 13.2 K/uL Final  
 RBC 02/19/2019 5.34  4.70 - 5.50 M/uL Final  
 HGB 02/19/2019 16.3* 13.0 - 16.0 g/dL Final  
 HCT 02/19/2019 49.6* 36.0 - 48.0 % Final  
 MCV 02/19/2019 92.9  74.0 - 97.0 FL Final  
 MCH 02/19/2019 30.5  24.0 - 34.0 PG Final  
 MCHC 02/19/2019 32.9  31.0 - 37.0 g/dL Final  
 RDW 02/19/2019 13.8  11.6 - 14.5 % Final  
 PLATELET 01/08/5245 042  135 - 420 K/uL Final  
 MPV 02/19/2019 11.2  9.2 - 11.8 FL Final  
 NEUTROPHILS 02/19/2019 56  40 - 73 % Final  
 LYMPHOCYTES 02/19/2019 31  21 - 52 % Final  
 MONOCYTES 02/19/2019 9  3 - 10 % Final  
 EOSINOPHILS 02/19/2019 3  0 - 5 % Final  
 BASOPHILS 02/19/2019 1  0 - 2 % Final  
 ABS.  NEUTROPHILS 02/19/2019 3.6  1.8 - 8.0 K/UL Final  
  ABS. LYMPHOCYTES 02/19/2019 2.1  0.9 - 3.6 K/UL Final  
 ABS. MONOCYTES 02/19/2019 0.6  0.05 - 1.2 K/UL Final  
 ABS. EOSINOPHILS 02/19/2019 0.2  0.0 - 0.4 K/UL Final  
 ABS. BASOPHILS 02/19/2019 0.1  0.0 - 0.1 K/UL Final  
 DF 02/19/2019 AUTOMATED    Final  
 Hemoglobin A1c 02/19/2019 6.1* 4.2 - 5.6 % Final  
 Est. average glucose 02/19/2019 128  mg/dL Final  
 LIPID PROFILE 02/19/2019        Final  
 Cholesterol, total 02/19/2019 189  <200 MG/DL Final  
 Triglyceride 02/19/2019 124  <150 MG/DL Final  
 HDL Cholesterol 02/19/2019 52  40 - 60 MG/DL Final  
 LDL, calculated 02/19/2019 112.2* 0 - 100 MG/DL Final  
 VLDL, calculated 02/19/2019 24.8  MG/DL Final  
 CHOL/HDL Ratio 02/19/2019 3.6  0 - 5.0   Final  
 Sodium 02/19/2019 139  136 - 145 mmol/L Final  
 Potassium 02/19/2019 4.5  3.5 - 5.5 mmol/L Final  
 Chloride 02/19/2019 105  100 - 108 mmol/L Final  
 CO2 02/19/2019 27  21 - 32 mmol/L Final  
 Anion gap 02/19/2019 7  3.0 - 18 mmol/L Final  
 Glucose 02/19/2019 104* 74 - 99 mg/dL Final  
 BUN 02/19/2019 16  7.0 - 18 MG/DL Final  
 Creatinine 02/19/2019 1.17  0.6 - 1.3 MG/DL Final  
 BUN/Creatinine ratio 02/19/2019 14  12 - 20   Final  
 GFR est AA 02/19/2019 >60  >60 ml/min/1.73m2 Final  
 GFR est non-AA 02/19/2019 >60  >60 ml/min/1.73m2 Final  
 Calcium 02/19/2019 9.2  8.5 - 10.1 MG/DL Final  
 Bilirubin, total 02/19/2019 0.6  0.2 - 1.0 MG/DL Final  
 ALT (SGPT) 02/19/2019 34  16 - 61 U/L Final  
 AST (SGOT) 02/19/2019 20  15 - 37 U/L Final  
 Alk.  phosphatase 02/19/2019 82  45 - 117 U/L Final  
 Protein, total 02/19/2019 6.6  6.4 - 8.2 g/dL Final  
 Albumin 02/19/2019 4.2  3.4 - 5.0 g/dL Final  
 Globulin 02/19/2019 2.4  2.0 - 4.0 g/dL Final  
 A-G Ratio 02/19/2019 1.8* 0.8 - 1.7   Final  
 TSH 02/19/2019 1.20  0.36 - 3.74 uIU/mL Final  
 Color 02/19/2019 YELLOW    Final  
 Appearance 02/19/2019 CLEAR    Final  
  Specific gravity 02/19/2019 1.017  1.005 - 1.030   Final  
 pH (UA) 02/19/2019 6.5  5.0 - 8.0   Final  
 Protein 02/19/2019 NEGATIVE   NEG mg/dL Final  
 Glucose 02/19/2019 NEGATIVE   NEG mg/dL Final  
 Ketone 02/19/2019 NEGATIVE   NEG mg/dL Final  
 Bilirubin 02/19/2019 NEGATIVE   NEG   Final  
 Blood 02/19/2019 NEGATIVE   NEG   Final  
 Urobilinogen 02/19/2019 0.2  0.2 - 1.0 EU/dL Final  
 Nitrites 02/19/2019 NEGATIVE   NEG   Final  
 Leukocyte Esterase 02/19/2019 NEGATIVE   NEG   Final  
 WBC 02/19/2019 0 to 1  0 - 4 /hpf Final  
 RBC 02/19/2019 0  0 - 5 /hpf Final  
 Epithelial cells 02/19/2019 FEW  0 - 5 /lpf Final  
 Bacteria 02/19/2019 NEGATIVE   NEG /hpf Final  
 Vitamin D 25-Hydroxy 02/19/2019 40.4  30 - 100 ng/mL Final  
 
Calculated 10 year ASCVD risk score:  9.3 % Health Maintenance: 
Screening:  
 Colorectal: colonoscopy (3/2017) normal. Dr. Karri Doan. Due 2022. Depression: none DM (HbA1c/FPG): HbA1c 6.1 (2/2019) Hepatitis C: negative (9/2016) Falls: none DEXA: N/A 
 PSA/TONI: PSA 0.5 (2/2018)/ TONI (2/2019) Glaucoma: regular eye exams with Dr. Mila Schneider (last 1/2017) Smoking: none Vitamin D: 40.4 (2/2019) Medicare Wellness: N/A Impression: 
Patient Active Problem List  
Diagnosis Code  Hyperlipidemia E78.5  GERD (gastroesophageal reflux disease) K21.9  Chronic venous insufficiency I87.2  Colon polyp K63.5  Prediabetes R73.03  
 Recurrent genital herpes A60.00  Vitamin D insufficiency E55.9  Aortic insufficiency, moderate I35.1  Essential hypertension I10  
 Primary osteoarthritis of knee M17.10  Closed fracture of multiple ribs of right side with delayed healing S22.41XG  Lumbar herniated disc  right L4/5/S1 chronic M51.26  
 Osteopenia determined by DEXA 2016, lowest T score -1.7 M85.80  Annular tear of lumbar disc M51.36  
 Chronic right-sided low back pain with sciatica M54.40, G89.29  
 Neuritis of lower extremity G57.90  
  Chronic thumb pain, right M79.644, G89.29  S/P lumbar discectomy Z98.890  
 Class 1 obesity due to excess calories without serious comorbidity in adult E66.09  
 Erythrocytosis D75.1 Plan: 1. Hypertension. Well controlled on current regimen of losartan 100 mg daily and amlodipine 10 mg daily. Renal function normal with creatinine 1.17 / eGFR >60. Continue to follow. 2. Dyslipidemia. Patient no longer taking fenofibrate. Discontinued last visit given lack of data showing that lowering triglycerides decreases the risk of CAD. In addition, first line therapy for treating triglycerides < 500 would be initiation of a statin. Repeat lipid panel today with triglyceride level 124. However, his calculated 10 year ASCVD risk is 9.3 %, which does place him in one of the four statin benefit groups as per new AHA/ACC guidelines (primary prevention: ASCVD risk > 7.5%).  which would also be above goal in this patient. However, he has been unwilling to start treatment with statin due to concerns regarding possible side effects. Emphasized importance of lifestyle modifications, including diet, exercise, and weight loss. Will repeat lipid panel at next visit to reassess. Continue to follow. 3. Aortic insufficiency, moderate with mildly dilated aortic root. Probable bicuspid valve. Being monitored on echocardiogram. Also, considering thoracic CT scan if dilation of aortic root appears to progress on echocardiogram.  Repeat echocardiogram to reassess valve in 9/2017 without change. Plan for repeat in 9/2019. Being followed by Dr. Rosario Quezada. 4. Prediabetes. HbA1c worsened today to 6.1. Weight essentially unchanged, but admitted to dietary indiscretion. No evidence of microvascular complications. On ARB but unwilling to begin statin. Continue follow-up with  for annual eye exams. Foot exam normal in 4/2017. Urine microalbumin/ creatinine ratio without evidence of microalbuminuria. Encouraged weight loss and dietary changes, particularly avoiding concentrated sweets. 5. Osteopenia. Last bone density scan 10/2016. Using femoral neck T-scores, calculated FRAX score estimates her 10 year risk of a major osteoporetic fracture at 12 % and hip fracture at 2.0 %, which are not an indication for biphosphonate treatment (>20% and >3%, respectively). Continue calcium and Vitamin D. Encouraged exercise, particularly weight bearing activities. Consider repeat bone density scan in near future. 6. GERD. Continue occasional use of Protonix with good control of symptoms. Follow. 7. Right sciatica. Progression of symptoms and disease on MRI. Responded well to conservative therapy initially, but recurred and unresponsive to medications and injections. Underwent right L4-L5 and L5-S1 hemilaminectomy, medial facetectomy, and foraminotomy by Dr. Chandni Moreno in 7/2018 with great success. Reports only residual numbness on medial plantar surface of right foot. No longer requiring any pain medication. Follow. 8. Right thumb osteoarthritis. Improved pain control with cortisone injection. Being followed by Dr. Randi Bernard. 9. Erythrocytosis. Mildly elevated Hb 16.3/ Hct 49.6. Review of record shows similar readings since 2014. Patient admits to daytime drowsiness, snoring, and fatigue. Discussed possible evaluation for sleep apnea, but patient wishing to readdress at next visit. Will also reassess CBC next visit with erythropoietin level. 10. Rectal bleeding. No recurrence. Guaiac negative on exam today. No visible hemorrhoids. Colonoscopy normal 3/2017. Suspect symptoms may be secondary to fissure and resolved with increasing fiber in diet. Follow. 11. Obesity. Emphasized importance of lifestyle modifications, including diet, exercise, and weight loss. Will readdress next visit. 12. Health maintenance. Patient is unwilling to receive influenza vaccine. Discussed Shingrix vaccine and will consider. Will readdress next visit. Other immunizations up to date. Colonoscopy due 2022. TONI performed today for prostate cancer screening. Will check PSA. Continue regular eye exams. Stressed importance of weight loss. Vitamin D level normal. Continue maintenance dose supplement. Patient understands recommendations and agrees with plan. Follow-up in 3 months to reassess prediabetes and erythrocytosis.

## 2019-03-27 RX ORDER — IBUPROFEN 800 MG/1
800 TABLET ORAL
Qty: 30 TAB | Refills: 1 | Status: SHIPPED | OUTPATIENT
Start: 2019-03-27 | End: 2020-03-27

## 2019-03-27 NOTE — TELEPHONE ENCOUNTER
Called and verified patient's full name and date of birth. Asked patient what is going on to where he needs a pain medication. Patient states that he was crawling in his attic trying to fix something a few days ago and is in pain now. He also states that he doesn't want to use any of his Vicodin. He is requesting 800 mg Ibuprofen to be sent to "[facility]. Dr. Anton Armendariz, please advise.

## 2019-03-27 NOTE — TELEPHONE ENCOUNTER
Informed patient that Ibuprofen was sent to [facility]. He verbalized understanding without any questions or concerns.

## 2019-05-02 ENCOUNTER — TELEPHONE (OUTPATIENT)
Dept: INTERNAL MEDICINE CLINIC | Age: 59
End: 2019-05-02

## 2019-05-02 NOTE — TELEPHONE ENCOUNTER
Pt was seen in feb for a physical he is getting a bill for his labs on 02/19/19 of $497.93, his insurance company states it was billed as medical and not a CPE    Can we re code then send to billing?

## 2019-05-20 ENCOUNTER — APPOINTMENT (OUTPATIENT)
Dept: INTERNAL MEDICINE CLINIC | Age: 59
End: 2019-05-20

## 2019-05-20 ENCOUNTER — HOSPITAL ENCOUNTER (OUTPATIENT)
Dept: LAB | Age: 59
Discharge: HOME | End: 2019-05-20
Payer: COMMERCIAL

## 2019-05-20 DIAGNOSIS — R73.03 PREDIABETES: ICD-10-CM

## 2019-05-20 DIAGNOSIS — M51.26 LUMBAR HERNIATED DISC: Chronic | ICD-10-CM

## 2019-05-20 DIAGNOSIS — M79.644 CHRONIC THUMB PAIN, RIGHT: ICD-10-CM

## 2019-05-20 DIAGNOSIS — M54.41 CHRONIC RIGHT-SIDED LOW BACK PAIN WITH RIGHT-SIDED SCIATICA: ICD-10-CM

## 2019-05-20 DIAGNOSIS — I10 ESSENTIAL HYPERTENSION: ICD-10-CM

## 2019-05-20 DIAGNOSIS — I35.1 NONRHEUMATIC AORTIC VALVE INSUFFICIENCY: ICD-10-CM

## 2019-05-20 DIAGNOSIS — K21.9 GASTROESOPHAGEAL REFLUX DISEASE, ESOPHAGITIS PRESENCE NOT SPECIFIED: ICD-10-CM

## 2019-05-20 DIAGNOSIS — G89.29 CHRONIC THUMB PAIN, RIGHT: ICD-10-CM

## 2019-05-20 DIAGNOSIS — E78.5 HYPERLIPIDEMIA, UNSPECIFIED HYPERLIPIDEMIA TYPE: ICD-10-CM

## 2019-05-20 DIAGNOSIS — M85.80 OSTEOPENIA DETERMINED BY X-RAY: Chronic | ICD-10-CM

## 2019-05-20 DIAGNOSIS — E66.09 CLASS 1 OBESITY DUE TO EXCESS CALORIES WITHOUT SERIOUS COMORBIDITY WITH BODY MASS INDEX (BMI) OF 30.0 TO 30.9 IN ADULT: ICD-10-CM

## 2019-05-20 DIAGNOSIS — D75.1 ERYTHROCYTOSIS: ICD-10-CM

## 2019-05-20 DIAGNOSIS — G89.29 CHRONIC RIGHT-SIDED LOW BACK PAIN WITH RIGHT-SIDED SCIATICA: ICD-10-CM

## 2019-05-20 LAB
ANION GAP SERPL CALC-SCNC: 11 MMOL/L (ref 3–18)
BASOPHILS # BLD: 0.1 K/UL (ref 0–0.1)
BASOPHILS NFR BLD: 1 % (ref 0–2)
BUN SERPL-MCNC: 16 MG/DL (ref 7–18)
BUN/CREAT SERPL: 14 (ref 12–20)
CALCIUM SERPL-MCNC: 9 MG/DL (ref 8.5–10.1)
CHLORIDE SERPL-SCNC: 109 MMOL/L (ref 100–108)
CHOLEST SERPL-MCNC: 195 MG/DL
CO2 SERPL-SCNC: 21 MMOL/L (ref 21–32)
CREAT SERPL-MCNC: 1.13 MG/DL (ref 0.6–1.3)
DIFFERENTIAL METHOD BLD: ABNORMAL
EOSINOPHIL # BLD: 0.2 K/UL (ref 0–0.4)
EOSINOPHIL NFR BLD: 3 % (ref 0–5)
ERYTHROCYTE [DISTWIDTH] IN BLOOD BY AUTOMATED COUNT: 13.7 % (ref 11.6–14.5)
EST. AVERAGE GLUCOSE BLD GHB EST-MCNC: 120 MG/DL
GLUCOSE SERPL-MCNC: 104 MG/DL (ref 74–99)
HBA1C MFR BLD: 5.8 % (ref 4.2–5.6)
HCT VFR BLD AUTO: 48.1 % (ref 36–48)
HDLC SERPL-MCNC: 52 MG/DL (ref 40–60)
HDLC SERPL: 3.8 {RATIO} (ref 0–5)
HGB BLD-MCNC: 16.1 G/DL (ref 13–16)
LDLC SERPL CALC-MCNC: 119.4 MG/DL (ref 0–100)
LIPID PROFILE,FLP: ABNORMAL
LYMPHOCYTES # BLD: 2.3 K/UL (ref 0.9–3.6)
LYMPHOCYTES NFR BLD: 38 % (ref 21–52)
MCH RBC QN AUTO: 31.2 PG (ref 24–34)
MCHC RBC AUTO-ENTMCNC: 33.5 G/DL (ref 31–37)
MCV RBC AUTO: 93.2 FL (ref 74–97)
MONOCYTES # BLD: 0.4 K/UL (ref 0.05–1.2)
MONOCYTES NFR BLD: 7 % (ref 3–10)
NEUTS SEG # BLD: 3.2 K/UL (ref 1.8–8)
NEUTS SEG NFR BLD: 51 % (ref 40–73)
PLATELET # BLD AUTO: 206 K/UL (ref 135–420)
PMV BLD AUTO: 10.7 FL (ref 9.2–11.8)
POTASSIUM SERPL-SCNC: 4.4 MMOL/L (ref 3.5–5.5)
PSA SERPL-MCNC: 0.5 NG/ML (ref 0–4)
RBC # BLD AUTO: 5.16 M/UL (ref 4.7–5.5)
SODIUM SERPL-SCNC: 141 MMOL/L (ref 136–145)
TRIGL SERPL-MCNC: 118 MG/DL (ref ?–150)
VLDLC SERPL CALC-MCNC: 23.6 MG/DL
WBC # BLD AUTO: 6.1 K/UL (ref 4.6–13.2)

## 2019-05-20 PROCEDURE — 80048 BASIC METABOLIC PNL TOTAL CA: CPT

## 2019-05-20 PROCEDURE — 84153 ASSAY OF PSA TOTAL: CPT

## 2019-05-20 PROCEDURE — 36415 COLL VENOUS BLD VENIPUNCTURE: CPT

## 2019-05-20 PROCEDURE — 85025 COMPLETE CBC W/AUTO DIFF WBC: CPT

## 2019-05-20 PROCEDURE — 82668 ASSAY OF ERYTHROPOIETIN: CPT

## 2019-05-20 PROCEDURE — 80061 LIPID PANEL: CPT

## 2019-05-20 PROCEDURE — 83036 HEMOGLOBIN GLYCOSYLATED A1C: CPT

## 2019-05-22 LAB — EPO SERPL-ACNC: 8.5 MIU/ML (ref 2.6–18.5)

## 2019-05-29 ENCOUNTER — OFFICE VISIT (OUTPATIENT)
Dept: INTERNAL MEDICINE CLINIC | Age: 59
End: 2019-05-29

## 2019-05-29 VITALS
HEART RATE: 67 BPM | OXYGEN SATURATION: 96 % | DIASTOLIC BLOOD PRESSURE: 74 MMHG | TEMPERATURE: 98.4 F | HEIGHT: 72 IN | SYSTOLIC BLOOD PRESSURE: 128 MMHG | BODY MASS INDEX: 30.34 KG/M2 | WEIGHT: 224 LBS | RESPIRATION RATE: 16 BRPM

## 2019-05-29 DIAGNOSIS — I35.1 AORTIC VALVE INSUFFICIENCY, ETIOLOGY OF CARDIAC VALVE DISEASE UNSPECIFIED: ICD-10-CM

## 2019-05-29 DIAGNOSIS — D75.1 ERYTHROCYTOSIS: ICD-10-CM

## 2019-05-29 DIAGNOSIS — K21.9 GASTROESOPHAGEAL REFLUX DISEASE, ESOPHAGITIS PRESENCE NOT SPECIFIED: ICD-10-CM

## 2019-05-29 DIAGNOSIS — R73.03 PREDIABETES: ICD-10-CM

## 2019-05-29 DIAGNOSIS — I87.2 CHRONIC VENOUS INSUFFICIENCY: ICD-10-CM

## 2019-05-29 DIAGNOSIS — M17.11 PRIMARY OSTEOARTHRITIS OF RIGHT KNEE: ICD-10-CM

## 2019-05-29 DIAGNOSIS — E78.5 HYPERLIPIDEMIA, UNSPECIFIED HYPERLIPIDEMIA TYPE: ICD-10-CM

## 2019-05-29 DIAGNOSIS — I10 ESSENTIAL HYPERTENSION: Primary | ICD-10-CM

## 2019-05-29 DIAGNOSIS — M85.80 OSTEOPENIA DETERMINED BY X-RAY: Chronic | ICD-10-CM

## 2019-05-29 DIAGNOSIS — M54.41 CHRONIC RIGHT-SIDED LOW BACK PAIN WITH RIGHT-SIDED SCIATICA: ICD-10-CM

## 2019-05-29 DIAGNOSIS — A60.00 RECURRENT GENITAL HERPES: ICD-10-CM

## 2019-05-29 DIAGNOSIS — E66.09 CLASS 1 OBESITY DUE TO EXCESS CALORIES WITHOUT SERIOUS COMORBIDITY WITH BODY MASS INDEX (BMI) OF 30.0 TO 30.9 IN ADULT: ICD-10-CM

## 2019-05-29 DIAGNOSIS — G89.29 CHRONIC RIGHT-SIDED LOW BACK PAIN WITH RIGHT-SIDED SCIATICA: ICD-10-CM

## 2019-05-29 NOTE — PATIENT INSTRUCTIONS
High Cholesterol: Care Instructions  Your Care Instructions    Cholesterol is a type of fat in your blood. It is needed for many body functions, such as making new cells. Cholesterol is made by your body. It also comes from food you eat. High cholesterol means that you have too much of the fat in your blood. This raises your risk of a heart attack and stroke. LDL and HDL are part of your total cholesterol. LDL is the \"bad\" cholesterol. High LDL can raise your risk for heart disease, heart attack, and stroke. HDL is the \"good\" cholesterol. It helps clear bad cholesterol from the body. High HDL is linked with a lower risk of heart disease, heart attack, and stroke. Your cholesterol levels help your doctor find out your risk for having a heart attack or stroke. You and your doctor can talk about whether you need to lower your risk and what treatment is best for you. A heart-healthy lifestyle along with medicines can help lower your cholesterol and your risk. The way you choose to lower your risk will depend on how high your risk is for heart attack and stroke. It will also depend on how you feel about taking medicines. Follow-up care is a key part of your treatment and safety. Be sure to make and go to all appointments, and call your doctor if you are having problems. It's also a good idea to know your test results and keep a list of the medicines you take. How can you care for yourself at home? · Eat a variety of foods every day. Good choices include fruits, vegetables, whole grains (like oatmeal), dried beans and peas, nuts and seeds, soy products (like tofu), and fat-free or low-fat dairy products. · Replace butter, margarine, and hydrogenated or partially hydrogenated oils with olive and canola oils. (Canola oil margarine without trans fat is fine.)  · Replace red meat with fish, poultry, and soy protein (like tofu). · Limit processed and packaged foods like chips, crackers, and cookies.   · Bake, broil, or steam foods. Don't dunham them. · Be physically active. Get at least 30 minutes of exercise on most days of the week. Walking is a good choice. You also may want to do other activities, such as running, swimming, cycling, or playing tennis or team sports. · Stay at a healthy weight or lose weight by making the changes in eating and physical activity listed above. Losing just a small amount of weight, even 5 to 10 pounds, can reduce your risk for having a heart attack or stroke. · Do not smoke. When should you call for help? Watch closely for changes in your health, and be sure to contact your doctor if:    · You need help making lifestyle changes.     · You have questions about your medicine. Where can you learn more? Go to http://patQuantum Dielectrricscuate.info/. Enter W899 in the search box to learn more about \"High Cholesterol: Care Instructions. \"  Current as of: July 22, 2018  Content Version: 11.9  © 6156-3605 [employer]. Care instructions adapted under license by [employer] (which disclaims liability or warranty for this information). If you have questions about a medical condition or this instruction, always ask your healthcare professional. [address] any warranty or liability for your use of this information. Prediabetes: Care Instructions  Your Care Instructions    Prediabetes is a warning sign that you are at risk for getting type 2 diabetes. It means that your blood sugar is higher than it should be. The food you eat turns into sugar, which your body uses for energy. Normally, an organ called the pancreas makes insulin, which allows the sugar in your blood to get into your body's cells. But when your body can't use insulin the right way, the sugar doesn't move into cells. It stays in your blood instead. This is called insulin resistance. The buildup of sugar in the blood causes prediabetes.   The good news is that lifestyle changes may help you get your blood sugar back to normal and help you avoid or delay diabetes. Follow-up care is a key part of your treatment and safety. Be sure to make and go to all appointments, and call your doctor if you are having problems. It's also a good idea to know your test results and keep a list of the medicines you take. How can you care for yourself at home? · Watch your weight. A healthy weight helps your body use insulin properly. · Limit the amount of calories, sweets, and unhealthy fat you eat. Ask your doctor if you should see a dietitian. A registered dietitian can help you create meal plans that fit your lifestyle. · Get at least 30 minutes of exercise on most days of the week. Exercise helps control your blood sugar. It also helps you maintain a healthy weight. Walking is a good choice. You also may want to do other activities, such as running, swimming, cycling, or playing tennis or team sports. · Do not smoke. Smoking can make prediabetes worse. If you need help quitting, talk to your doctor about stop-smoking programs and medicines. These can increase your chances of quitting for good. · If your doctor prescribed medicines, take them exactly as prescribed. Call your doctor if you think you are having a problem with your medicine. You will get more details on the specific medicines your doctor prescribes. When should you call for help? Watch closely for changes in your health, and be sure to contact your doctor if:    · You have any symptoms of diabetes. These may include:  ? Being thirsty more often. ? Urinating more. ? Being hungrier. ? Losing weight. ? Being very tired. ? Having blurry vision.     · You have a wound that will not heal.     · You have an infection that will not go away.     · You have problems with your blood pressure.     · You want more information about diabetes and how you can keep from getting it. Where can you learn more?   Go to http://pat-cuate.info/. Enter I222 in the search box to learn more about \"Prediabetes: Care Instructions. \"  Current as of: July 25, 2018  Content Version: 11.9  © 5494-1812 [employer]. Care instructions adapted under license by [employer] (which disclaims liability or warranty for this information). If you have questions about a medical condition or this instruction, always ask your healthcare professional. [address] any warranty or liability for your use of this information. Body Mass Index: Care Instructions  Your Care Instructions    Body mass index (BMI) can help you see if your weight is raising your risk for health problems. It uses a formula to compare how much you weigh with how tall you are. · A BMI lower than 18.5 is considered underweight. · A BMI between 18.5 and 24.9 is considered healthy. · A BMI between 25 and 29.9 is considered overweight. A BMI of 30 or higher is considered obese. If your BMI is in the normal range, it means that you have a lower risk for weight-related health problems. If your BMI is in the overweight or obese range, you may be at increased risk for weight-related health problems, such as high blood pressure, heart disease, stroke, arthritis or joint pain, and diabetes. If your BMI is in the underweight range, you may be at increased risk for health problems such as fatigue, lower protection (immunity) against illness, muscle loss, bone loss, hair loss, and hormone problems. BMI is just one measure of your risk for weight-related health problems. You may be at higher risk for health problems if you are not active, you eat an unhealthy diet, or you drink too much alcohol or use tobacco products. Follow-up care is a key part of your treatment and safety. Be sure to make and go to all appointments, and call your doctor if you are having problems.  It's also a good idea to know your test results and keep a list of the medicines you take. How can you care for yourself at home? · Practice healthy eating habits. This includes eating plenty of fruits, vegetables, whole grains, lean protein, and low-fat dairy. · If your doctor recommends it, get more exercise. Walking is a good choice. Bit by bit, increase the amount you walk every day. Try for at least 30 minutes on most days of the week. · Do not smoke. Smoking can increase your risk for health problems. If you need help quitting, talk to your doctor about stop-smoking programs and medicines. These can increase your chances of quitting for good. · Limit alcohol to 2 drinks a day for men and 1 drink a day for women. Too much alcohol can cause health problems. If you have a BMI higher than 25  · Your doctor may do other tests to check your risk for weight-related health problems. This may include measuring the distance around your waist. A waist measurement of more than 40 inches in men or 35 inches in women can increase the risk of weight-related health problems. · Talk with your doctor about steps you can take to stay healthy or improve your health. You may need to make lifestyle changes to lose weight and stay healthy, such as changing your diet and getting regular exercise. If you have a BMI lower than 18.5  · Your doctor may do other tests to check your risk for health problems. · Talk with your doctor about steps you can take to stay healthy or improve your health. You may need to make lifestyle changes to gain or maintain weight and stay healthy, such as getting more healthy foods in your diet and doing exercises to build muscle. Where can you learn more? Go to http://pat-cuate.info/. Enter S176 in the search box to learn more about \"Body Mass Index: Care Instructions. \"  Current as of: June 25, 2018  Content Version: 11.9  © 9016-0342 [employer].  Care instructions adapted under license by [employer] (which disclaims liability or warranty for this information). If you have questions about a medical condition or this instruction, always ask your healthcare professional. [address] any warranty or liability for your use of this information.

## 2019-05-29 NOTE — PROGRESS NOTES
Chief Complaint   Patient presents with    Hypertension     2 month follow up with lab review. There are no preventive care reminders to display for this patient. 1. Have you been to the ER, urgent care clinic or hospitalized since your last visit? NO.     2. Have you seen or consulted any other health care providers outside of the [address] [place] [address] since your last visit (Include any pap smears or colon screening)?  NO      Learning Assessment 5/29/2019   PRIMARY LEARNER Patient   HIGHEST LEVEL OF EDUCATION - PRIMARY LEARNER  -   BARRIERS PRIMARY LEARNER NONE   CO-LEARNER CAREGIVER No   PRIMARY LANGUAGE ENGLISH    NEED No   LEARNER PREFERENCE PRIMARY DEMONSTRATION     READING     LISTENING   ANSWERED BY patient   RELATIONSHIP SELF

## 2019-06-02 NOTE — PROGRESS NOTES
HPI:   Miguel Ashley is a 62y.o. year old male who presents today for a routine visit and for evaluation of hypertension, hyperlipidemia, moderate aortic insufficiency, atypical chest pain, prediabetes, GERD, osteoarthritis, lumbar radiculopathy, and chronic venous insufficiency. He reports that he is doing relatively well. He states that he continues to be concerned regarding his mother who was recently diagnosed with ovarian cancer. His wife is doing well after treatment for breast cancer. He states that he is also very busy putting an addition on his house. He does complain of bilateral knee pain R>L although denies any recent worsening. He states that he has changed his diet in an attempt to decrease carbohydrates, although he was unable to lose any weight. He is otherwise without complaints. He has a history of hypertension, treated with losartan and amlodipine. He does not check his blood pressure at home regularly. He also does not exercise regularly, although admits to doing yard work and projects around the house. He denies any shortness of breath at rest or with exertion, palpitations, lightheadedness, or edema. He does have a history of substernal chest pain that has no relation to exertion and was thought to be atypical. He presented to [facility] on [facility]/27/2016 with a complaint of chest pain and diaphoresis, which seemed to be relieved by aspirin and sublingual nitroglycerin in the ambulance. Evaluation included negative ECG and troponins. He had an exercise nuclear stress test (2/29/2016) which showed normal LV function (EF 57%) and a medium sized partially reversible defect inferiorly (intermediate risk study). On 3/1/2016, he underwent a cardiac catheterization which showed no significant epicardial coronary artery disease.  He also had an echocargiogram (2/28/2016) showing mild LV septal hypertrophy, hypokinetic basal inferior wall with preserved LV function (EF 39%), mild diastolic dysfunction, and moderate aortic regurgitation with questionable dilation of the distal arch of aorta (3.5 cm). He was found to have elevated triglycerides, with lipid panel showing total cholesterol 201/ / HDL 52/ LDL 89. He was started on Fenofibrate prior to discharge on 3/1/2016. He reports that he has had no significant recurrence of chest pain since that time. He had a repeat echocardiogram in 9/2017 which showed low normal LV function (EF 50-55%), no RWMA, calcified aortic valve with bicuspid appearance and moderate regurgitation, and mild dilatation of the aortic root. He does have difficulty with mild lower extremity edema due to chronic venous insufficiency. He is followed by Dr. Marjan Sears. He has a history of prediabetes, with HbA1c ranging from 5.8-6.1 since 2012. He denies any polyuria, polydipsia, nocturia, or blurry vision, and has no history of retinopathy, neuropathy, or nephropathy. He has regular eye exams with Dr. Sarika Del Cid. He has a history of osteoarthritis, involving his right knee, right great toe, and lumbar spine. He has had difficulty in the past with increasing pain at the base of his right thumb and was evaluated by Dr. Dakotah Kumar at [address]. He states that x-rays revealed he had severe osteoarthritis, and he received a cortisone injection with some improvement. In 12/2016, he noted increasing difficulty with low back pain and right sided sciatica. He underwent a lumbar MRI (12/2016) which showed progression when compared to a prior lumbar MRI from 2010.  It showed prominent right paracentral L4-L5 disc extrusion with impingement of the descending right L5 nerve root and moderately severe multifactorial canal stenosis; additional prominent right paracentral L5-S1 disc protrusion with impingement of the descending right S1 nerve root; abutment of the foraminal right L5 nerve root related to moderate foramina stenosis at L5-S1; mild multifactorial canal stenosis at L3-L4 with abutment of the descending left L4 nerve root. He was evaluated by Dr. Indira Jaramillo, and treated with Medrol dose pack, Norco and Flexeril without significant improvement. He was evaluated by Dr. Christina Massey in 3/2017 for possible surgical intervention, but this was deferred due to issues regarding his wife's health. He was treated with gabapentin, and was reevaluated by Dr. Indira Jaramillo in 8/2017, at which time he reported significant improvement. He states that he no longer required treatment with gabapentin or Norco, and was continuing to take Voltaren ER with good control of symptoms. However in 3/2018, he developed recurrence of severe pain with right sided sciatica that was unresponsive to conservative therapy. He subsequently consulted Dr. Delmy Alexandra and on 7/5/2018, he underwent a right L4-L5 and L5-S1 hemilaminectomy, medial facetectomy, and foraminotomy by Dr. Delmy Alexandra for removal of herniated disks and decompression of foraminal stenoses at these levels. He reports that his right sided sciatica has resolved and he is no longer requiring any pain medication. He does report some residual numbness on the plantar surface of his right foot. He has a history of GERD, with symptoms well controlled with pantoprazole as needed. He had a screening colonoscopy by Dr. Tonio Vasquez in 3/2012 with removal of a benign serrated polyp (pathology shows hyperplastic and adenomatous features). He had repeat screening colonoscopy in 3/2017 by Dr Xander Corbin which was normal. Follow-up recommended for five years. He denies any abdominal pain, nausea, vomiting, melena, hematochezia, or change in bowel movements. In 5/2016, he fell at work with his chest striking the edge of a table and he sustained fractures of the anterior right 7th and 8th ribs. He had serial rib x-rays since that time showing slow interval healing, but reports that he no longer has pain related to this.  He underwent a bone density scan in 10/2016 which showed evidence of osteopenia with T-scores:  femoral neck left -1.7  /right -1.5 and lumbar -1.2. He was started on calcium and Vitamin D supplementation. Past Medical History:   Diagnosis Date    Abnormal myocardial perfusion study 2/28/2016    Partially reversible inferior perfusion defect, EF 57%    AI (aortic insufficiency)     Moderate with possible bicuspid aortic valve    Chronic low back pain     Chronic venous insufficiency     Colon polyp 03/2012    Benign serrated polyp    Fracture 05/2016    rib fx's    GERD (gastroesophageal reflux disease)     History of echocardiogram 2/28/2016    EF 55%. Basal inferior hypokinesis. Gr 1 DDfx. Mod AI.  Hyperlipidemia     Hypertension     Osteoarthritis of right knee     Osteopenia     Prediabetes     Prepatellar bursitis of right knee     Recurrent genital herpes 1/16/2013    Right lumbar radiculopathy 10/2016    MRI: prominent right paracentral L4-L5 disc extrusion/impingement of descending right L5 nerve root; moderately severe canal stenosis; prominent right paracentral L5-S1 disc protrusion/ impingement of the descending right S1 nerve root; abutment of the foraminal right L5 nerve root/moderate foramina stenosis at L5-S1; mild canal stenosis at L3-L4 with abutment of the descending left L4 nerve root.  S/P cardiac catheterization 3/1/2016    Normal coronary anatomy     Past Surgical History:   Procedure Laterality Date    ENDOSCOPY, COLON, DIAGNOSTIC      HX BACK SURGERY  07/05/2018    2 herniated disc repaired     HX CHOLECYSTECTOMY      HX TONSILLECTOMY       Current Outpatient Medications   Medication Sig    amLODIPine (NORVASC) 10 mg tablet TAKE 1 TABLET DAILY    valACYclovir (VALTREX) 500 mg tablet TAKE 1 TABLET DAILY    calcium carbonate (CALCIUM 300 PO) Take  by mouth daily.  magnesium chloride (MAG 64 PO) Take  by mouth daily.     losartan (COZAAR) 100 mg tablet TAKE 1 TABLET DAILY    aspirin delayed-release 81 mg tablet Take 81 mg by mouth daily.  Cholecalciferol, Vitamin D3, 2,000 unit cap Take 2,000 Units by mouth daily.  DOCOSAHEXANOIC ACID/EPA (FISH OIL PO) Take  by mouth daily.  nitroglycerin (NITROSTAT) 0.4 mg SL tablet 1 Tab by SubLINGual route every five (5) minutes as needed for Chest Pain. Up to 3 doses. No current facility-administered medications for this visit. Allergies and Intolerances: Allergies   Allergen Reactions    Tree Nuts Anaphylaxis    Ace Inhibitors Cough    Nuts [Tree Nut] Itching     Family History: His mother is alive with stage 3 ovarian cancer. His father  from esophageal cancer at age 61. His maternal aunt  at age 48 from colon cancer. He has no family history of prostate cancer. Family History   Problem Relation Age of Onset    Cancer Father 61        esophageal    Hypertension Father     Cancer Mother 80        ovarian cancer     Hypertension Brother     Diabetes Other         Grandmother    Arthritis-osteo Other     Other Other         Stomach problems; Father, grandfather     Social History:   He  reports that he quit smoking about 18 years ago. He has quit using smokeless tobacco. Reports that he smoked less than 0.5 ppd and stopped in . He is  and they have one son. His wife was recently diagnosed with Paget's disease of the breast, which represents a recurrence of prior breast cancer. He was employed as an  for [employer], but retired in 2017. He drinks about a 12 pack of beer each month. Social History     Substance and Sexual Activity   Alcohol Use Yes    Alcohol/week: 1.0 oz    Types: 2 Cans of beer per week     Immunization History:  Immunization History   Administered Date(s) Administered    Influenza Vaccine 2013, 2014    TDAP Vaccine 2004    Td 2004    Tdap 2014       Review of Systems:   As above included in HPI.   Otherwise 11 point review of systems negative including constitutional, skin, HENT, eyes, respiratory, cardiovascular, gastrointestinal, genitourinary, musculoskeletal, endocrine, hematologic, allergy, and neurologic. Physical:   Vitals:   BP: 128/74  HR: 67  WT: 224 lb (101.6 kg)  BMI:  30.38 kg/m2    Exam:     Patient appears in no apparent distress. Affect is appropriate. HEENT: PERRLA, anicteric, oropharynx clear, no JVD, adenopathy or thyromegaly. No carotid bruits or radiated murmur. Lungs: clear to auscultation, no wheezes, rhonchi, or rales. Heart --Regular rate and rhythm, II/VI midsystolic murmur at RUSB; no rubs or gallops  Abdomen: soft, nontender, nondistended, normal bowel sounds, no hepatosplenomegaly or masses. Extremities: without edema. Pulses 1-2+ bilaterally.     Review of Data:  Labs:  Hospital Outpatient Visit on 05/20/2019   Component Date Value Ref Range Status    Hemoglobin A1c 05/20/2019 5.8* 4.2 - 5.6 % Final    Est. average glucose 05/20/2019 120  mg/dL Final    LIPID PROFILE 05/20/2019        Final    Cholesterol, total 05/20/2019 195  <200 MG/DL Final    Triglyceride 05/20/2019 118  <150 MG/DL Final    HDL Cholesterol 05/20/2019 52  40 - 60 MG/DL Final    LDL, calculated 05/20/2019 119.4* 0 - 100 MG/DL Final    VLDL, calculated 05/20/2019 23.6  MG/DL Final    CHOL/HDL Ratio 05/20/2019 3.8  0 - 5.0   Final    Sodium 05/20/2019 141  136 - 145 mmol/L Final    Potassium 05/20/2019 4.4  3.5 - 5.5 mmol/L Final    Chloride 05/20/2019 109* 100 - 108 mmol/L Final    CO2 05/20/2019 21  21 - 32 mmol/L Final    Anion gap 05/20/2019 11  3.0 - 18 mmol/L Final    Glucose 05/20/2019 104* 74 - 99 mg/dL Final    BUN 05/20/2019 16  7.0 - 18 MG/DL Final    Creatinine 05/20/2019 1.13  0.6 - 1.3 MG/DL Final    BUN/Creatinine ratio 05/20/2019 14  12 - 20   Final    GFR est AA 05/20/2019 >60  >60 ml/min/1.73m2 Final    GFR est non-AA 05/20/2019 >60  >60 ml/min/1.73m2 Final    Calcium 05/20/2019 9.0  8.5 - 10.1 MG/DL Final    Prostate Specific Ag 05/20/2019 0.5  0.0 - 4.0 ng/mL Final    Erythropoietin 05/20/2019 8.5  2.6 - 18.5 mIU/mL Final    WBC 05/20/2019 6.1  4.6 - 13.2 K/uL Final    RBC 05/20/2019 5.16  4.70 - 5.50 M/uL Final    HGB 05/20/2019 16.1* 13.0 - 16.0 g/dL Final    HCT 05/20/2019 48.1* 36.0 - 48.0 % Final    MCV 05/20/2019 93.2  74.0 - 97.0 FL Final    MCH 05/20/2019 31.2  24.0 - 34.0 PG Final    MCHC 05/20/2019 33.5  31.0 - 37.0 g/dL Final    RDW 05/20/2019 13.7  11.6 - 14.5 % Final    PLATELET 46/65/4231 379  135 - 420 K/uL Final    MPV 05/20/2019 10.7  9.2 - 11.8 FL Final    NEUTROPHILS 05/20/2019 51  40 - 73 % Final    LYMPHOCYTES 05/20/2019 38  21 - 52 % Final    MONOCYTES 05/20/2019 7  3 - 10 % Final    EOSINOPHILS 05/20/2019 3  0 - 5 % Final    BASOPHILS 05/20/2019 1  0 - 2 % Final    ABS. NEUTROPHILS 05/20/2019 3.2  1.8 - 8.0 K/UL Final    ABS. LYMPHOCYTES 05/20/2019 2.3  0.9 - 3.6 K/UL Final    ABS. MONOCYTES 05/20/2019 0.4  0.05 - 1.2 K/UL Final    ABS. EOSINOPHILS 05/20/2019 0.2  0.0 - 0.4 K/UL Final    ABS. BASOPHILS 05/20/2019 0.1  0.0 - 0.1 K/UL Final    DF 05/20/2019 AUTOMATED    Final     Calculated 10 year ASCVD risk score:  9.6 %    Health Maintenance:  Screening:    Colorectal: colonoscopy (3/2017) normal. Dr. Drew Ramirez. Due 2022.    Depression: none   DM (HbA1c/FPG): HbA1c 5.8 (5/2019)   Hepatitis C: negative (9/2016)   Falls: none    DEXA: N/A   PSA/TONI: PSA 0.5 (5/2019)/ TONI (2/2019)   Glaucoma: regular eye exams with Dr. Guicho Weeks (last 1/2017)   Smoking: none   Vitamin D: 40.4 (2/2019)   Medicare Wellness: N/A    Impression:  Patient Active Problem List   Diagnosis Code    Hyperlipidemia E78.5    GERD (gastroesophageal reflux disease) K21.9    Chronic venous insufficiency I87.2    Colon polyp K63.5    Prediabetes R73.03    Recurrent genital herpes A60.00    Vitamin D insufficiency E55.9    Aortic insufficiency, moderate I35.1    Essential hypertension I10    Primary osteoarthritis of knee M17.10    Closed fracture of multiple ribs of right side with delayed healing S22.41XG    Lumbar herniated disc  right L4/5/S1 chronic M51.26    Osteopenia determined by DEXA 2016, lowest T score -1.7 M85.80    Annular tear of lumbar disc M51.36    Chronic right-sided low back pain with sciatica M54.40, G89.29    Neuritis of lower extremity G57.90    Chronic thumb pain, right M79.644, G89.29    S/P lumbar discectomy Z98.890    Class 1 obesity due to excess calories without serious comorbidity in adult E66.09    Erythrocytosis D75.1       Plan:    1. Hypertension. Well controlled on current regimen of losartan 100 mg daily and amlodipine 10 mg daily. Renal function remains normal with creatinine 1.13 / eGFR >60. Continue to follow. 2. Dyslipidemia. Patient no longer taking fenofibrate. Discontinued last visit given lack of data showing that lowering triglycerides decreases the risk of CAD. In addition, first line therapy for treating triglycerides < 500 would be initiation of a statin. Repeat lipid panel today with triglyceride level 118. However, his calculated 10 year ASCVD risk is 9.6 %, which does place him in one of the four statin benefit groups as per new AHA/ACC guidelines (primary prevention: ASCVD risk > 7.5%).  which would also be above goal in this patient. However, he has been unwilling to start treatment with statin due to concerns regarding possible side effects. Emphasized importance of lifestyle modifications, including diet, exercise, and weight loss. Will continue to follow. 3. Aortic insufficiency, moderate with mildly dilated aortic root. Probable bicuspid valve. Being monitored on echocardiogram. Also, considering thoracic CT scan if dilation of aortic root appears to progress on echocardiogram.  Repeat echocardiogram to reassess valve in 9/2017 without change. Plan for repeat in 9/2019. Being followed by Dr. Jake Archer. 4. Prediabetes.  HbA1c improved today from 6.1 to 5.8. Weight essentially unchanged, but improved diet. No evidence of microvascular complications. On ARB but unwilling to begin statin. Continue follow-up with  for annual eye exams. Foot exam normal in 4/2017. Urine microalbumin/ creatinine ratio without evidence of microalbuminuria. Encouraged weight loss and dietary changes, particularly avoiding concentrated sweets. 5. Osteopenia. Last bone density scan 10/2016. Using femoral neck T-scores, calculated FRAX score estimates her 10 year risk of a major osteoporetic fracture at 12 % and hip fracture at 2.0 %, which are not an indication for biphosphonate treatment (>20% and >3%, respectively). Continue calcium and Vitamin D. Encouraged exercise, particularly weight bearing activities. Consider repeat bone density scan in near future. 6. GERD. Continue occasional use of Protonix with good control of symptoms. Follow. 7. Right sciatica. Progression of symptoms and disease on MRI. Responded well to conservative therapy initially, but recurred and unresponsive to medications and injections. Underwent right L4-L5 and L5-S1 hemilaminectomy, medial facetectomy, and foraminotomy by Dr. Cherylynn Boxer in 7/2018 with great success. Reports only residual numbness on medial plantar surface of right foot. No longer requiring any pain medication. Follow. 8. Right thumb osteoarthritis. Improved pain control with cortisone injection. Being followed by Dr. Ryan Gutiérrez. 9. Erythrocytosis. Remains only mildly elevated with Hb 16.1/ Hct 48.1. Review of record shows similar readings since 2014. Erythropoietin level normal today. Patient admits to daytime drowsiness, snoring, and fatigue. Discussed possible evaluation for sleep apnea, but patient not wishing to proceed today. Will readdress at next visit. 10. Rectal bleeding. No recurrence. Guaiac negative on exam at last visit. No visible hemorrhoids. Colonoscopy normal 3/2017.  Suspect symptoms may be secondary to fissure and resolved with increasing fiber in diet. Follow. 11. Obesity. Emphasized importance of lifestyle modifications, including diet, exercise, and weight loss. Will readdress next visit. 12. Health maintenance. Discussed Shingrix vaccine but not wising to proceed currently. Other immunizations up to date. Colonoscopy due 2022. Prostate cancer screening up to date. Continue regular eye exams. Stressed importance of weight loss. Vitamin D level normal. Continue maintenance dose supplement. Patient understands recommendations and agrees with plan. Follow-up in 3 months.

## 2019-06-13 RX ORDER — LOSARTAN POTASSIUM 100 MG/1
TABLET ORAL
Qty: 90 TAB | Refills: 3 | Status: SHIPPED | OUTPATIENT
Start: 2019-06-13 | End: 2020-05-14

## 2019-08-30 ENCOUNTER — TELEPHONE (OUTPATIENT)
Dept: INTERNAL MEDICINE CLINIC | Age: 59
End: 2019-08-30

## 2019-08-30 NOTE — TELEPHONE ENCOUNTER
Pt calling, has labs Tuesday morning. Wants to be sure Dr. Olea Don checks his red blood count. Says his sob is getting worse steadily the last 3 weeks. 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 Says he has to take his mother out of town for a wedding and she is having a change in her cancer treatments and he is hoping he'll be able to keep appt to see the doctor as scheduled. He will call if there is a change. Yessy Morales

## 2019-09-03 ENCOUNTER — APPOINTMENT (OUTPATIENT)
Dept: INTERNAL MEDICINE CLINIC | Age: 59
End: 2019-09-03

## 2019-09-03 ENCOUNTER — TELEPHONE (OUTPATIENT)
Dept: INTERNAL MEDICINE CLINIC | Age: 59
End: 2019-09-03

## 2019-09-03 ENCOUNTER — HOSPITAL ENCOUNTER (OUTPATIENT)
Dept: LAB | Age: 59
Discharge: HOME | End: 2019-09-03
Payer: COMMERCIAL

## 2019-09-03 DIAGNOSIS — I35.1 AORTIC VALVE INSUFFICIENCY, ETIOLOGY OF CARDIAC VALVE DISEASE UNSPECIFIED: ICD-10-CM

## 2019-09-03 DIAGNOSIS — M17.11 PRIMARY OSTEOARTHRITIS OF RIGHT KNEE: ICD-10-CM

## 2019-09-03 DIAGNOSIS — A60.00 RECURRENT GENITAL HERPES: ICD-10-CM

## 2019-09-03 DIAGNOSIS — I87.2 CHRONIC VENOUS INSUFFICIENCY: ICD-10-CM

## 2019-09-03 DIAGNOSIS — D75.1 ERYTHROCYTOSIS: ICD-10-CM

## 2019-09-03 DIAGNOSIS — M54.41 CHRONIC RIGHT-SIDED LOW BACK PAIN WITH RIGHT-SIDED SCIATICA: ICD-10-CM

## 2019-09-03 DIAGNOSIS — M85.80 OSTEOPENIA DETERMINED BY X-RAY: Chronic | ICD-10-CM

## 2019-09-03 DIAGNOSIS — I10 ESSENTIAL HYPERTENSION: ICD-10-CM

## 2019-09-03 DIAGNOSIS — E66.09 CLASS 1 OBESITY DUE TO EXCESS CALORIES WITHOUT SERIOUS COMORBIDITY WITH BODY MASS INDEX (BMI) OF 30.0 TO 30.9 IN ADULT: ICD-10-CM

## 2019-09-03 DIAGNOSIS — G89.29 CHRONIC RIGHT-SIDED LOW BACK PAIN WITH RIGHT-SIDED SCIATICA: ICD-10-CM

## 2019-09-03 DIAGNOSIS — R73.03 PREDIABETES: ICD-10-CM

## 2019-09-03 DIAGNOSIS — K21.9 GASTROESOPHAGEAL REFLUX DISEASE, ESOPHAGITIS PRESENCE NOT SPECIFIED: ICD-10-CM

## 2019-09-03 DIAGNOSIS — E78.5 HYPERLIPIDEMIA, UNSPECIFIED HYPERLIPIDEMIA TYPE: ICD-10-CM

## 2019-09-03 LAB
ANION GAP SERPL CALC-SCNC: 4 MMOL/L (ref 3–18)
BASOPHILS # BLD: 0.1 K/UL (ref 0–0.1)
BASOPHILS NFR BLD: 1 % (ref 0–2)
BUN SERPL-MCNC: 19 MG/DL (ref 7–18)
BUN/CREAT SERPL: 15 (ref 12–20)
CALCIUM SERPL-MCNC: 9.4 MG/DL (ref 8.5–10.1)
CHLORIDE SERPL-SCNC: 104 MMOL/L (ref 100–111)
CO2 SERPL-SCNC: 31 MMOL/L (ref 21–32)
CREAT SERPL-MCNC: 1.27 MG/DL (ref 0.6–1.3)
DIFFERENTIAL METHOD BLD: ABNORMAL
EOSINOPHIL # BLD: 0.3 K/UL (ref 0–0.4)
EOSINOPHIL NFR BLD: 4 % (ref 0–5)
ERYTHROCYTE [DISTWIDTH] IN BLOOD BY AUTOMATED COUNT: 13.7 % (ref 11.6–14.5)
EST. AVERAGE GLUCOSE BLD GHB EST-MCNC: 117 MG/DL
GLUCOSE SERPL-MCNC: 115 MG/DL (ref 74–99)
HBA1C MFR BLD: 5.7 % (ref 4.2–5.6)
HCT VFR BLD AUTO: 48.1 % (ref 36–48)
HGB BLD-MCNC: 16.1 G/DL (ref 13–16)
LYMPHOCYTES # BLD: 2.4 K/UL (ref 0.9–3.6)
LYMPHOCYTES NFR BLD: 37 % (ref 21–52)
MCH RBC QN AUTO: 31.1 PG (ref 24–34)
MCHC RBC AUTO-ENTMCNC: 33.5 G/DL (ref 31–37)
MCV RBC AUTO: 92.9 FL (ref 74–97)
MONOCYTES # BLD: 0.5 K/UL (ref 0.05–1.2)
MONOCYTES NFR BLD: 7 % (ref 3–10)
NEUTS SEG # BLD: 3.4 K/UL (ref 1.8–8)
NEUTS SEG NFR BLD: 51 % (ref 40–73)
PLATELET # BLD AUTO: 186 K/UL (ref 135–420)
PMV BLD AUTO: 10.7 FL (ref 9.2–11.8)
POTASSIUM SERPL-SCNC: 4.4 MMOL/L (ref 3.5–5.5)
RBC # BLD AUTO: 5.18 M/UL (ref 4.7–5.5)
SODIUM SERPL-SCNC: 139 MMOL/L (ref 136–145)
WBC # BLD AUTO: 6.6 K/UL (ref 4.6–13.2)

## 2019-09-03 PROCEDURE — 80061 LIPID PANEL: CPT

## 2019-09-03 PROCEDURE — 83036 HEMOGLOBIN GLYCOSYLATED A1C: CPT

## 2019-09-03 PROCEDURE — 85025 COMPLETE CBC W/AUTO DIFF WBC: CPT

## 2019-09-03 PROCEDURE — 80048 BASIC METABOLIC PNL TOTAL CA: CPT

## 2019-09-03 PROCEDURE — 36415 COLL VENOUS BLD VENIPUNCTURE: CPT

## 2019-09-03 NOTE — TELEPHONE ENCOUNTER
Pt states he is still getting a bill for labs that were done 02/19/19 and were coded as a diagnostic lab . they were done for his cpe he had the following week .  Please advise

## 2019-09-04 LAB
CHOLEST SERPL-MCNC: 217 MG/DL
HDLC SERPL-MCNC: 50 MG/DL (ref 40–60)
HDLC SERPL: 4.3 {RATIO} (ref 0–5)
LDLC SERPL CALC-MCNC: 143.4 MG/DL (ref 0–100)
LIPID PROFILE,FLP: ABNORMAL
TRIGL SERPL-MCNC: 118 MG/DL (ref ?–150)
VLDLC SERPL CALC-MCNC: 23.6 MG/DL

## 2019-09-04 NOTE — TELEPHONE ENCOUNTER
Called patient. This was already sent back to his insurance company on 5/8/2019. New lab scanned into chart. Advised him if they didn't receive to get me correct fax number and I can resend them the new order. I also sent it to [facility] again to ask her to look at it.

## 2019-09-12 ENCOUNTER — HOSPITAL ENCOUNTER (OUTPATIENT)
Dept: GENERAL RADIOLOGY | Age: 59
Discharge: HOME | End: 2019-09-12
Payer: COMMERCIAL

## 2019-09-12 ENCOUNTER — OFFICE VISIT (OUTPATIENT)
Dept: INTERNAL MEDICINE CLINIC | Age: 59
End: 2019-09-12

## 2019-09-12 VITALS
HEIGHT: 72 IN | DIASTOLIC BLOOD PRESSURE: 70 MMHG | SYSTOLIC BLOOD PRESSURE: 126 MMHG | WEIGHT: 226 LBS | TEMPERATURE: 98.5 F | OXYGEN SATURATION: 97 % | RESPIRATION RATE: 16 BRPM | BODY MASS INDEX: 30.61 KG/M2 | HEART RATE: 80 BPM

## 2019-09-12 DIAGNOSIS — Z98.890 S/P LUMBAR DISCECTOMY: ICD-10-CM

## 2019-09-12 DIAGNOSIS — R40.0 HAS DAYTIME DROWSINESS: ICD-10-CM

## 2019-09-12 DIAGNOSIS — D75.1 ERYTHROCYTOSIS: ICD-10-CM

## 2019-09-12 DIAGNOSIS — I35.1 AORTIC VALVE INSUFFICIENCY, ETIOLOGY OF CARDIAC VALVE DISEASE UNSPECIFIED: ICD-10-CM

## 2019-09-12 DIAGNOSIS — R53.83 FATIGUE, UNSPECIFIED TYPE: ICD-10-CM

## 2019-09-12 DIAGNOSIS — K21.9 GASTROESOPHAGEAL REFLUX DISEASE, ESOPHAGITIS PRESENCE NOT SPECIFIED: ICD-10-CM

## 2019-09-12 DIAGNOSIS — R06.02 SHORTNESS OF BREATH: Primary | ICD-10-CM

## 2019-09-12 DIAGNOSIS — M85.80 OSTEOPENIA DETERMINED BY X-RAY: Chronic | ICD-10-CM

## 2019-09-12 DIAGNOSIS — R06.09 DYSPNEA ON EXERTION: ICD-10-CM

## 2019-09-12 DIAGNOSIS — E66.09 CLASS 1 OBESITY DUE TO EXCESS CALORIES WITHOUT SERIOUS COMORBIDITY WITH BODY MASS INDEX (BMI) OF 30.0 TO 30.9 IN ADULT: ICD-10-CM

## 2019-09-12 DIAGNOSIS — G89.29 CHRONIC THUMB PAIN, RIGHT: ICD-10-CM

## 2019-09-12 DIAGNOSIS — M79.644 CHRONIC THUMB PAIN, RIGHT: ICD-10-CM

## 2019-09-12 DIAGNOSIS — R73.03 PREDIABETES: ICD-10-CM

## 2019-09-12 DIAGNOSIS — I87.2 CHRONIC VENOUS INSUFFICIENCY: ICD-10-CM

## 2019-09-12 DIAGNOSIS — G47.30 SLEEP APNEA, UNSPECIFIED TYPE: ICD-10-CM

## 2019-09-12 DIAGNOSIS — E78.5 HYPERLIPIDEMIA, UNSPECIFIED HYPERLIPIDEMIA TYPE: ICD-10-CM

## 2019-09-12 DIAGNOSIS — I10 ESSENTIAL HYPERTENSION: ICD-10-CM

## 2019-09-12 PROCEDURE — 71046 X-RAY EXAM CHEST 2 VIEWS: CPT

## 2019-09-12 RX ORDER — ALBUTEROL SULFATE 90 UG/1
1 AEROSOL, METERED RESPIRATORY (INHALATION)
Qty: 1 INHALER | Refills: 3 | Status: SHIPPED | OUTPATIENT
Start: 2019-09-12 | End: 2022-10-03 | Stop reason: SDUPTHER

## 2019-09-12 NOTE — LETTER
September 12, 2019 Nguyen Medinan [address] [facility] [address] Dear Rachana Chen: 
Thank you for requesting access to [employer]. Please follow the instructions below to securely access and download your online medical record. [employer] allows you to send messages to your doctor, view your test results, renew your prescriptions, schedule appointments, and more. How Do I Sign Up? 1. In your internet browser, go to [email or web address]. 2. Click on the First Time User? Click Here link in the Sign In box. You will see the New Member Sign Up page. 3. Enter your [employer] Access Code exactly as it appears below. You will not need to use this code after youve completed the sign-up process. If you do not sign up before the expiration date, you must request a new code. [employer] Access Code: [email or web address] Expires: 10/8/2019 10:51 AM  
 
4. Enter the last four digits of your Social Security Number (xxxx) and Date of Birth (mm/dd/yyyy) as indicated and click Submit. You will be taken to the next sign-up page. 5. Create a [employer] ID. This will be your [employer] login ID and cannot be changed, so think of one that is secure and easy to remember. 6. Create a [employer] password. You can change your password at any time. 7. Enter your Password Reset Question and Answer. This can be used at a later time if you forget your password. 8. Enter your e-mail address. You will receive e-mail notification when new information is available in [address] 9. Click Sign Up. You can now view and download portions of your medical record. 10. Click the Download Summary menu link to download a portable copy of your medical information. Additional Information If you have questions, please visit the Frequently Asked Questions section of the [employer] website at [email or web address] Remember, [employer] is NOT to be used for urgent needs. For medical emergencies, dial 911. Now available from your iPhone and Android! Sincerely, [employer]

## 2019-09-12 NOTE — PROGRESS NOTES
Chief Complaint   Patient presents with    Hypertension     3 month follow up with lab review. Health Maintenance Due   Topic Date Due    Shingrix Vaccine Age 49> (1 of 2) 06/01/2008    Influenza Age 5 to Adult  08/01/2019     1. Have you been to the ER, urgent care clinic or hospitalized since your last visit? NO.     2. Have you seen or consulted any other health care providers outside of the [address] [place] [address] since your last visit (Include any pap smears or colon screening)? NO          Learning Assessment 5/29/2019   PRIMARY LEARNER Patient   HIGHEST LEVEL OF EDUCATION - PRIMARY LEARNER  -   BARRIERS PRIMARY LEARNER NONE   CO-LEARNER CAREGIVER No   PRIMARY LANGUAGE ENGLISH    NEED No   LEARNER PREFERENCE PRIMARY DEMONSTRATION     READING     LISTENING   ANSWERED BY patient   RELATIONSHIP SELF     Abuse Screening Questionnaire 9/12/2019   Do you ever feel afraid of your partner? N   Are you in a relationship with someone who physically or mentally threatens you? N   Is it safe for you to go home? Y     3 most recent PHQ Screens 9/12/2019   Little interest or pleasure in doing things Not at all   Feeling down, depressed, irritable, or hopeless Not at all   Total Score PHQ 2 0   Trouble falling or staying asleep, or sleeping too much -   Feeling tired or having little energy -   Poor appetite, weight loss, or overeating -   Feeling bad about yourself - or that you are a failure or have let yourself or your family down -   Trouble concentrating on things such as school, work, reading, or watching TV -   Moving or speaking so slowly that other people could have noticed; or the opposite being so fidgety that others notice -   Thoughts of being better off dead, or hurting yourself in some way -   How difficult have these problems made it for you to do your work, take care of your home and get along with others -     Fall Risk Assessment, last 12 mths 9/12/2019   Able to walk?  Yes   Fall in past 12 months?  No

## 2019-09-12 NOTE — PATIENT INSTRUCTIONS
High Cholesterol: Care Instructions  Your Care Instructions    Cholesterol is a type of fat in your blood. It is needed for many body functions, such as making new cells. Cholesterol is made by your body. It also comes from food you eat. High cholesterol means that you have too much of the fat in your blood. This raises your risk of a heart attack and stroke. LDL and HDL are part of your total cholesterol. LDL is the \"bad\" cholesterol. High LDL can raise your risk for heart disease, heart attack, and stroke. HDL is the \"good\" cholesterol. It helps clear bad cholesterol from the body. High HDL is linked with a lower risk of heart disease, heart attack, and stroke. Your cholesterol levels help your doctor find out your risk for having a heart attack or stroke. You and your doctor can talk about whether you need to lower your risk and what treatment is best for you. A heart-healthy lifestyle along with medicines can help lower your cholesterol and your risk. The way you choose to lower your risk will depend on how high your risk is for heart attack and stroke. It will also depend on how you feel about taking medicines. Follow-up care is a key part of your treatment and safety. Be sure to make and go to all appointments, and call your doctor if you are having problems. It's also a good idea to know your test results and keep a list of the medicines you take. How can you care for yourself at home? · Eat a variety of foods every day. Good choices include fruits, vegetables, whole grains (like oatmeal), dried beans and peas, nuts and seeds, soy products (like tofu), and fat-free or low-fat dairy products. · Replace butter, margarine, and hydrogenated or partially hydrogenated oils with olive and canola oils. (Canola oil margarine without trans fat is fine.)  · Replace red meat with fish, poultry, and soy protein (like tofu). · Limit processed and packaged foods like chips, crackers, and cookies.   · Bake, broil, or steam foods. Don't dunham them. · Be physically active. Get at least 30 minutes of exercise on most days of the week. Walking is a good choice. You also may want to do other activities, such as running, swimming, cycling, or playing tennis or team sports. · Stay at a healthy weight or lose weight by making the changes in eating and physical activity listed above. Losing just a small amount of weight, even 5 to 10 pounds, can reduce your risk for having a heart attack or stroke. · Do not smoke. When should you call for help? Watch closely for changes in your health, and be sure to contact your doctor if:    · You need help making lifestyle changes.     · You have questions about your medicine. Where can you learn more? Go to http://patConnectloudcuate.info/. Enter G489 in the search box to learn more about \"High Cholesterol: Care Instructions. \"  Current as of: July 22, 2018  Content Version: 12.1  © 0675-4332 [employer]. Care instructions adapted under license by [employer] (which disclaims liability or warranty for this information). If you have questions about a medical condition or this instruction, always ask your healthcare professional. [place] [address] any warranty or liability for your use of this information. Prediabetes: Care Instructions  Your Care Instructions    Prediabetes is a warning sign that you are at risk for getting type 2 diabetes. It means that your blood sugar is higher than it should be. The food you eat turns into sugar, which your body uses for energy. Normally, an organ called the pancreas makes insulin, which allows the sugar in your blood to get into your body's cells. But when your body can't use insulin the right way, the sugar doesn't move into cells. It stays in your blood instead. This is called insulin resistance. The buildup of sugar in the blood causes prediabetes.   The good news is that lifestyle changes may help you get your blood sugar back to normal and help you avoid or delay diabetes. Follow-up care is a key part of your treatment and safety. Be sure to make and go to all appointments, and call your doctor if you are having problems. It's also a good idea to know your test results and keep a list of the medicines you take. How can you care for yourself at home? · Watch your weight. A healthy weight helps your body use insulin properly. · Limit the amount of calories, sweets, and unhealthy fat you eat. Ask your doctor if you should see a dietitian. A registered dietitian can help you create meal plans that fit your lifestyle. · Get at least 30 minutes of exercise on most days of the week. Exercise helps control your blood sugar. It also helps you maintain a healthy weight. Walking is a good choice. You also may want to do other activities, such as running, swimming, cycling, or playing tennis or team sports. · Do not smoke. Smoking can make prediabetes worse. If you need help quitting, talk to your doctor about stop-smoking programs and medicines. These can increase your chances of quitting for good. · If your doctor prescribed medicines, take them exactly as prescribed. Call your doctor if you think you are having a problem with your medicine. You will get more details on the specific medicines your doctor prescribes. When should you call for help? Watch closely for changes in your health, and be sure to contact your doctor if:    · You have any symptoms of diabetes. These may include:  ? Being thirsty more often. ? Urinating more. ? Being hungrier. ? Losing weight. ? Being very tired. ? Having blurry vision.     · You have a wound that will not heal.     · You have an infection that will not go away.     · You have problems with your blood pressure.     · You want more information about diabetes and how you can keep from getting it. Where can you learn more?   Go to http://pat-cuate.info/. Enter I222 in the search box to learn more about \"Prediabetes: Care Instructions. \"  Current as of: July 25, 2018  Content Version: 12.1  © 0539-9820 [employer]. Care instructions adapted under license by [employer] (which disclaims liability or warranty for this information). If you have questions about a medical condition or this instruction, always ask your healthcare professional. [address] any warranty or liability for your use of this information.

## 2019-09-13 ENCOUNTER — TELEPHONE (OUTPATIENT)
Dept: INTERNAL MEDICINE CLINIC | Age: 59
End: 2019-09-13

## 2019-09-13 NOTE — TELEPHONE ENCOUNTER
XR Results (most recent):  Results from Hospital Encounter encounter on 09/12/19   XR CHEST PA LAT    Narrative TWO VIEW CHEST RADIOGRAPH     CPT CODE: 73230    INDICATION: Dyspnea on exertion with history of smoking and asbestos exposure. COMPARISON: 4/23/2014    FINDINGS:     The trachea is midline. The cardiomediastinal silhouette is within normal  limits. The pulmonary vascular markings are unremarkable. There is no focal  consolidation, pleural effusion or pneumothorax. No acute osseous abnormality. Surgical clips present in the upper abdomen. Impression IMPRESSION:    No acute cardiopulmonary process.        Please let the patient know that his chest x-ray was normal.

## 2019-09-13 NOTE — TELEPHONE ENCOUNTER
Called patient and verified full name and date of birth. Informed patient of xray results per Dr. Demetrius Dobson and patient verbalized understanding.

## 2019-09-16 NOTE — PROGRESS NOTES
HPI:   Marvin Medrano is a 62y.o. year old male who presents today for a routine visit and for evaluation of hypertension, hyperlipidemia, moderate aortic insufficiency, atypical chest pain, prediabetes, GERD, osteoarthritis, lumbar radiculopathy, and chronic venous insufficiency. He reports that he is doing relatively well. He states that he is continuing to assist his mother who is receiving treatment for ovarian cancer. He complains of difficulty with shortness of breath for several weeks. He states that he feels that he cannot take a deep enough breath. He denies any change in his symptoms with exertion, and denies cough, palpitations, lightheadedness, edema, or PND, but does describe some questionable orthopnea. He states that he does experience some left sided sharp chest pain, but states that it is constant pain and will last for several days at a time. He previously smoked approximately 1 ppd for 40 years, stopping in 2000. He also reports a history of asbestos exposure in 1980. He is scheduled for a follow-up echocardiogram next week to reevaluate his aortic valve and LV function. He is otherwise without new complaints. He has a history of hypertension, treated with losartan and amlodipine. He does not check his blood pressure at home regularly. He also does not exercise regularly, although admits to doing yard work and projects around the house. He denies any shortness of breath at rest or with exertion, palpitations, lightheadedness, or edema. He does have a history of substernal chest pain that has no relation to exertion and was thought to be atypical. He presented to [facility] on 2/27/2016 with a complaint of chest pain and diaphoresis, which seemed to be relieved by aspirin and sublingual nitroglycerin in the ambulance. Evaluation included negative ECG and troponins.  He had an exercise nuclear stress test (2/29/2016) which showed normal LV function (EF 57%) and a medium sized partially reversible defect inferiorly (intermediate risk study). On 3/1/2016, he underwent a cardiac catheterization which showed no significant epicardial coronary artery disease. He also had an echocardiogram (2/28/2016) showing mild LV septal hypertrophy, hypokinetic basal inferior wall with preserved LV function (EF 83%), mild diastolic dysfunction, and moderate aortic regurgitation with questionable dilation of the distal arch of aorta (3.5 cm). He was found to have elevated triglycerides, with lipid panel showing total cholesterol 201/ / HDL 52/ LDL 89. He was started on Fenofibrate prior to discharge on 3/1/2016. He reports that he has had no significant recurrence of chest pain since that time. He had a repeat echocardiogram in 9/2017 which showed low normal LV function (EF 50-55%), no RWMA, calcified aortic valve with bicuspid appearance and moderate regurgitation, and mild dilatation of the aortic root. He does have difficulty with mild lower extremity edema due to chronic venous insufficiency. He is followed by Dr. Refugio Davis. He has a history of prediabetes, with HbA1c ranging from 5.8-6.1 since 2012. He denies any polyuria, polydipsia, nocturia, or blurry vision, and has no history of retinopathy, neuropathy, or nephropathy. He has regular eye exams with Dr. Joshua Stanley. He has a history of osteoarthritis, involving his right knee, right great toe, and lumbar spine. He has had difficulty in the past with increasing pain at the base of his right thumb and was evaluated by Dr. Lety Mora at  [facility]. He states that x-rays revealed he had severe osteoarthritis, and he received a cortisone injection with some improvement. In 12/2016, he noted increasing difficulty with low back pain and right sided sciatica. He underwent a lumbar MRI (12/2016) which showed progression when compared to a prior lumbar MRI from 2010.  It showed prominent right paracentral L4-L5 disc extrusion with impingement of the descending right L5 nerve root and moderately severe multifactorial canal stenosis; additional prominent right paracentral L5-S1 disc protrusion with impingement of the descending right S1 nerve root; abutment of the foraminal right L5 nerve root related to moderate foramina stenosis at L5-S1; mild multifactorial canal stenosis at L3-L4 with abutment of the descending left L4 nerve root. He was evaluated by Dr. Luis Felipe Montana, and treated with Medrol dose pack, Norco and Flexeril without significant improvement. He was evaluated by Dr. Tavares Bonilla in 3/2017 for possible surgical intervention, but this was deferred due to issues regarding his wife's health. He was treated with gabapentin, and was reevaluated by Dr. Luis Felipe Montana in 8/2017, at which time he reported significant improvement. He states that he no longer required treatment with gabapentin or Norco, and was continuing to take Voltaren ER with good control of symptoms. However in 3/2018, he developed recurrence of severe pain with right sided sciatica that was unresponsive to conservative therapy. He subsequently consulted Dr. Drew Diallo and on 7/5/2018, he underwent a right L4-L5 and L5-S1 hemilaminectomy, medial facetectomy, and foraminotomy by Dr. Drew Diallo for removal of herniated disks and decompression of foraminal stenoses at these levels. He reports that his right sided sciatica has resolved and he is no longer requiring any pain medication. He does report some residual numbness on the plantar surface of his right foot. He has a history of GERD, with symptoms well controlled with pantoprazole as needed. He had a screening colonoscopy by Dr. Aixa Washington in 3/2012 with removal of a benign serrated polyp (pathology shows hyperplastic and adenomatous features). He had repeat screening colonoscopy in 3/2017 by Dr Rosa Elizabeth which was normal. Follow-up recommended for five years.  He denies any abdominal pain, nausea, vomiting, melena, hematochezia, or change in bowel movements. In 5/2016, he fell at work with his chest striking the edge of a table and he sustained fractures of the anterior right 7th and 8th ribs. He had serial rib x-rays since that time showing slow interval healing, but reports that he no longer has pain related to this. He underwent a bone density scan in 10/2016 which showed evidence of osteopenia with T-scores:  femoral neck left -1.7  /right -1.5 and lumbar -1.2. He was started on calcium and Vitamin D supplementation. Past Medical History:   Diagnosis Date    Abnormal myocardial perfusion study 2/28/2016    Partially reversible inferior perfusion defect, EF 57%    AI (aortic insufficiency)     Moderate with possible bicuspid aortic valve    Chronic low back pain     Chronic venous insufficiency     Colon polyp 03/2012    Benign serrated polyp    Fracture 05/2016    rib fx's    GERD (gastroesophageal reflux disease)     History of echocardiogram 2/28/2016    EF 55%. Basal inferior hypokinesis. Gr 1 DDfx. Mod AI.  Hyperlipidemia     Hypertension     Osteoarthritis of right knee     Osteopenia     Prediabetes     Prepatellar bursitis of right knee     Recurrent genital herpes 1/16/2013    Right lumbar radiculopathy 10/2016    MRI: prominent right paracentral L4-L5 disc extrusion/impingement of descending right L5 nerve root; moderately severe canal stenosis; prominent right paracentral L5-S1 disc protrusion/ impingement of the descending right S1 nerve root; abutment of the foraminal right L5 nerve root/moderate foramina stenosis at L5-S1; mild canal stenosis at L3-L4 with abutment of the descending left L4 nerve root.     S/P cardiac catheterization 3/1/2016    Normal coronary anatomy     Past Surgical History:   Procedure Laterality Date    ENDOSCOPY, COLON, DIAGNOSTIC      HX BACK SURGERY  07/05/2018    2 herniated disc repaired     HX CHOLECYSTECTOMY      HX TONSILLECTOMY       Current Outpatient Medications   Medication Sig    albuterol (PROVENTIL HFA, VENTOLIN HFA, PROAIR HFA) 90 mcg/actuation inhaler Take 1 Puff by inhalation every six (6) hours as needed for Wheezing.  inhalational spacing device 1 Each by Does Not Apply route as needed (shortness of breath).  losartan (COZAAR) 100 mg tablet TAKE 1 TABLET DAILY    amLODIPine (NORVASC) 10 mg tablet TAKE 1 TABLET DAILY    valACYclovir (VALTREX) 500 mg tablet TAKE 1 TABLET DAILY    calcium carbonate (CALCIUM 300 PO) Take  by mouth daily.  magnesium chloride (MAG 64 PO) Take  by mouth daily.  aspirin delayed-release 81 mg tablet Take 81 mg by mouth daily.  Cholecalciferol, Vitamin D3, 2,000 unit cap Take 2,000 Units by mouth daily.  DOCOSAHEXANOIC ACID/EPA (FISH OIL PO) Take  by mouth daily.  nitroglycerin (NITROSTAT) 0.4 mg SL tablet 1 Tab by SubLINGual route every five (5) minutes as needed for Chest Pain. Up to 3 doses. No current facility-administered medications for this visit. Allergies and Intolerances: Allergies   Allergen Reactions    Tree Nuts Anaphylaxis    Ace Inhibitors Cough    Nuts [Tree Nut] Itching     Family History: His mother is alive with stage 3 ovarian cancer. His father  from esophageal cancer at age 61. His maternal aunt  at age 48 from colon cancer. He has no family history of prostate cancer. Family History   Problem Relation Age of Onset    Cancer Father 61        esophageal    Hypertension Father     Cancer Mother 80        ovarian cancer     Hypertension Brother     Diabetes Other         Grandmother    Arthritis-osteo Other     Other Other         Stomach problems; Father, grandfather     Social History:   He  reports that he quit smoking about 19 years ago. He has quit using smokeless tobacco. Reports that he smoked less than 1 ppd for 40 years, and stopped in . He is  and they have one son.  His wife was recently diagnosed with Paget's disease of the breast, which represents a recurrence of prior breast cancer. He was employed as an  for [employer], but retired in 12/2017. He drinks about a 12 pack of beer each month. Social History     Substance and Sexual Activity   Alcohol Use Yes    Alcohol/week: 1.7 standard drinks    Types: 2 Cans of beer per week     Immunization History:  Immunization History   Administered Date(s) Administered    Influenza Vaccine 11/01/2013, 11/12/2014    TDAP Vaccine 01/20/2004    Td 01/01/2004    Tdap 01/16/2014       Review of Systems:   As above included in HPI. Otherwise 11 point review of systems negative including constitutional, skin, HENT, eyes, respiratory, cardiovascular, gastrointestinal, genitourinary, musculoskeletal, endocrine, hematologic, allergy, and neurologic. Physical:   Vitals:   BP: 126/70; repeat 114/60 left arm  HR: 80  WT: 226 lb (102.5 kg)  BMI:  30.65 kg/m2    Exam:     Patient appears in no apparent distress. Affect is appropriate. HEENT: PERRLA, anicteric, oropharynx clear, no JVD, adenopathy or thyromegaly. No carotid bruits or radiated murmur. Lungs: clear to auscultation, no wheezes, rhonchi, or rales. Heart --Regular rate and rhythm, II/VI midsystolic murmur at RUSB; no rubs or gallops  Abdomen: soft, nontender, nondistended, normal bowel sounds, no hepatosplenomegaly or masses. Extremities: without edema. Pulses 1-2+ bilaterally.     Review of Data:  Labs:  Hospital Outpatient Visit on 09/03/2019   Component Date Value Ref Range Status    WBC 09/03/2019 6.6  4.6 - 13.2 K/uL Final    RBC 09/03/2019 5.18  4.70 - 5.50 M/uL Final    HGB 09/03/2019 16.1* 13.0 - 16.0 g/dL Final    HCT 09/03/2019 48.1* 36.0 - 48.0 % Final    MCV 09/03/2019 92.9  74.0 - 97.0 FL Final    MCH 09/03/2019 31.1  24.0 - 34.0 PG Final    MCHC 09/03/2019 33.5  31.0 - 37.0 g/dL Final    RDW 09/03/2019 13.7  11.6 - 14.5 % Final    PLATELET 55/04/1787 511  135 - 420 K/uL Final    MPV 09/03/2019 10.7  9.2 - 11.8 FL Final    NEUTROPHILS 09/03/2019 51  40 - 73 % Final    LYMPHOCYTES 09/03/2019 37  21 - 52 % Final    MONOCYTES 09/03/2019 7  3 - 10 % Final    EOSINOPHILS 09/03/2019 4  0 - 5 % Final    BASOPHILS 09/03/2019 1  0 - 2 % Final    ABS. NEUTROPHILS 09/03/2019 3.4  1.8 - 8.0 K/UL Final    ABS. LYMPHOCYTES 09/03/2019 2.4  0.9 - 3.6 K/UL Final    ABS. MONOCYTES 09/03/2019 0.5  0.05 - 1.2 K/UL Final    ABS. EOSINOPHILS 09/03/2019 0.3  0.0 - 0.4 K/UL Final    ABS. BASOPHILS 09/03/2019 0.1  0.0 - 0.1 K/UL Final    DF 09/03/2019 AUTOMATED    Final    Hemoglobin A1c 09/03/2019 5.7* 4.2 - 5.6 % Final    Est. average glucose 09/03/2019 117  mg/dL Final    LIPID PROFILE 09/03/2019        Final    Cholesterol, total 09/03/2019 217* <200 MG/DL Final    Triglyceride 09/03/2019 118  <150 MG/DL Final    HDL Cholesterol 09/03/2019 50  40 - 60 MG/DL Final    LDL, calculated 09/03/2019 143.4* 0 - 100 MG/DL Final    VLDL, calculated 09/03/2019 23.6  MG/DL Final    CHOL/HDL Ratio 09/03/2019 4.3  0 - 5.0   Final    Sodium 09/03/2019 139  136 - 145 mmol/L Final    Potassium 09/03/2019 4.4  3.5 - 5.5 mmol/L Final    Chloride 09/03/2019 104  100 - 111 mmol/L Final    CO2 09/03/2019 31  21 - 32 mmol/L Final    Anion gap 09/03/2019 4  3.0 - 18 mmol/L Final    Glucose 09/03/2019 115* 74 - 99 mg/dL Final    BUN 09/03/2019 19* 7.0 - 18 MG/DL Final    Creatinine 09/03/2019 1.27  0.6 - 1.3 MG/DL Final    BUN/Creatinine ratio 09/03/2019 15  12 - 20   Final    GFR est AA 09/03/2019 >60  >60 ml/min/1.73m2 Final    GFR est non-AA 09/03/2019 58* >60 ml/min/1.73m2 Final    Calcium 09/03/2019 9.4  8.5 - 10.1 MG/DL Final     EKG (9/12/2019) sinus rhythm at 77 bpm, no ischemic changes and unchanged from last on 1/7/2019    Calculated 10 year ASCVD risk score:  9.6 %    Health Maintenance:  Screening:    Colorectal: colonoscopy (3/2017) normal. Dr. Everardo Montesinos. Due 3/2022.    Depression: none   DM (HbA1c/FPG): HbA1c 5.7 (9/2019)   Hepatitis C: negative (9/2016)   Falls: none    DEXA: N/A   PSA/TONI: PSA 0.5 (5/2019)/ TONI (2/2019)   Glaucoma: regular eye exams with Dr. Miki Smith (last 1/2017)   Smoking: none   Vitamin D: 40.4 (2/2019)   Medicare Wellness: N/A    Impression:  Patient Active Problem List   Diagnosis Code    Hyperlipidemia E78.5    GERD (gastroesophageal reflux disease) K21.9    Chronic venous insufficiency I87.2    Colon polyp K63.5    Prediabetes R73.03    Recurrent genital herpes A60.00    Vitamin D insufficiency E55.9    Aortic insufficiency, moderate I35.1    Essential hypertension I10    Primary osteoarthritis of knee M17.10    Lumbar herniated disc  right L4/5/S1 chronic M51.26    Osteopenia determined by DEXA 2016, lowest T score -1.7 M85.80    Annular tear of lumbar disc M51.36    Chronic right-sided low back pain with sciatica M54.40, G89.29    Neuritis of lower extremity G57.90    Chronic thumb pain, right M79.644, G89.29    S/P lumbar discectomy Z98.890    Class 1 obesity due to excess calories without serious comorbidity in adult E66.09    Erythrocytosis D75.1       Plan:    1. Shortness of breath. Patient describing increasing breathlessness, although stating that no association with exertion. Describes that he is unable to take a deep breath. Denies associated symptoms, although describes constant left sided chest wall pain that lasts for 1-2 days and questionable orthopnea. He states that he feels like he is choking at times when he lies down. EKG unchanged from baseline. Will check chest x-ray. Also has a repeat echocardiogram previously scheduled next week to reevaluate his AV. Discussed at length the importance of proceeding with sleep evaluation given known daytime drowsiness, snoring, and fatigue, and evidence of erythrocytosis. Agreeable today for referral to Dr. Sonia Samson. Given positive smoking history, will also give trial of albuterol inhaler to see if will help with symptoms. 2. Hypertension. Well controlled on current regimen of losartan 100 mg daily and amlodipine 10 mg daily. Renal function remains normal with creatinine 1.27 / eGFR 58. Continue to follow. 3. Dyslipidemia. Patient no longer taking fenofibrate. Discontinued last visit given lack of data showing that lowering triglycerides decreases the risk of CAD. In addition, first line therapy for treating triglycerides < 500 would be initiation of a statin. Repeat lipid panel today with triglyceride level 118. However, his calculated 10 year ASCVD risk is 9.6 %, which does place him in one of the four statin benefit groups as per new AHA/ACC guidelines (primary prevention: ASCVD risk > 7.5%).  today which represents significant worsening compared to last visit. He remains unwilling to start treatment with statin due to concerns regarding possible side effects. Will reevaluate at next visit and readdress. Emphasized importance of lifestyle modifications, including diet, exercise, and weight loss. Will continue to follow. 4. Aortic insufficiency, moderate with mildly dilated aortic root. Probable bicuspid valve. Being monitored on echocardiogram. Also, considering thoracic CT scan if dilation of aortic root appears to progress on echocardiogram.  Repeat echocardiogram to reassess valve in 9/2017 without change. Plan for repeat in 9/2019 and scheduled next week. Being followed by Dr. Tye Guillen. 5. Prediabetes. HbA1c stable today at 5.7. Weight essentially unchanged, but improved diet. No evidence of microvascular complications. On ARB but unwilling to begin statin. Continue follow-up with  for annual eye exams. Foot exam normal in 4/2017. Urine microalbumin/ creatinine ratio without evidence of microalbuminuria. Encouraged weight loss and dietary changes, particularly avoiding concentrated sweets. 6. Osteopenia. Last bone density scan 10/2016.  Using femoral neck T-scores, calculated FRAX score estimates her 10 year risk of a major osteoporetic fracture at 12 % and hip fracture at 2.0 %, which are not an indication for biphosphonate treatment (>20% and >3%, respectively). Continue calcium and Vitamin D. Encouraged exercise, particularly weight bearing activities. Consider repeat bone density scan in near future. 7. GERD. Continue occasional use of Protonix with good control of symptoms. Follow. 8. Right sciatica. Progression of symptoms and disease on MRI. Responded well to conservative therapy initially, but recurred and unresponsive to medications and injections. Underwent right L4-L5 and L5-S1 hemilaminectomy, medial facetectomy, and foraminotomy by Dr. Gabino Barlow in 7/2018 with great success. Reports only residual numbness on medial plantar surface of right foot. No longer requiring any pain medication. Follow. 9. Right thumb osteoarthritis. Improved pain control with cortisone injection. Being followed by Dr. Katherine De La Torre. 10. Erythrocytosis. Remains only mildly elevated with Hb 16.1/ Hct 48.1. Review of record shows similar readings since 2014. Erythropoietin level normal today. Patient admits to daytime drowsiness, snoring, and fatigue. Discussed possible evaluation for sleep apnea, and now willing to proceed as discussed. Referral placed for Dr. Dulce Maria Toledo. Will readdress at next visit. 11. Rectal bleeding. No recurrence. Guaiac negative on exam at last visit. No visible hemorrhoids. Colonoscopy normal 3/2017. Suspect symptoms may be secondary to fissure and resolved with increasing fiber in diet. Follow. 12. Obesity. Emphasized importance of lifestyle modifications, including diet, exercise, and weight loss. Will readdress next visit. 13. Health maintenance. Discussed Shingrix vaccine but not wishing to proceed currently. Other immunizations up to date. Colonoscopy due 2022. Prostate cancer screening up to date. Continue regular eye exams. Stressed importance of weight loss.  Vitamin D level normal. Continue maintenance dose supplement. Total time: 40 minutes spent with the patient in face-to-face consultation of which greater than 50% was spent on counseling, answering questions and/or coordination of care. Complex medical review and management performed. Patient understands recommendations and agrees with plan. Follow-up in 3 months.

## 2019-09-19 RX ORDER — VALACYCLOVIR HYDROCHLORIDE 500 MG/1
TABLET, FILM COATED ORAL
Qty: 90 TAB | Refills: 3 | Status: SHIPPED | OUTPATIENT
Start: 2019-09-19 | End: 2020-09-13

## 2020-01-28 RX ORDER — AMLODIPINE BESYLATE 10 MG/1
TABLET ORAL
Qty: 90 TAB | Refills: 3 | Status: SHIPPED | OUTPATIENT
Start: 2020-01-28 | End: 2021-09-05

## 2020-02-20 ENCOUNTER — APPOINTMENT (OUTPATIENT)
Dept: INTERNAL MEDICINE CLINIC | Age: 60
End: 2020-02-20

## 2020-02-20 ENCOUNTER — HOSPITAL ENCOUNTER (OUTPATIENT)
Dept: LAB | Age: 60
Discharge: HOME | End: 2020-02-20
Payer: COMMERCIAL

## 2020-02-20 DIAGNOSIS — G89.29 CHRONIC THUMB PAIN, RIGHT: ICD-10-CM

## 2020-02-20 DIAGNOSIS — G47.30 SLEEP APNEA, UNSPECIFIED TYPE: ICD-10-CM

## 2020-02-20 DIAGNOSIS — E78.5 HYPERLIPIDEMIA, UNSPECIFIED HYPERLIPIDEMIA TYPE: ICD-10-CM

## 2020-02-20 DIAGNOSIS — R73.03 PREDIABETES: ICD-10-CM

## 2020-02-20 DIAGNOSIS — D75.1 ERYTHROCYTOSIS: ICD-10-CM

## 2020-02-20 DIAGNOSIS — M79.644 CHRONIC THUMB PAIN, RIGHT: ICD-10-CM

## 2020-02-20 DIAGNOSIS — I35.1 AORTIC VALVE INSUFFICIENCY, ETIOLOGY OF CARDIAC VALVE DISEASE UNSPECIFIED: ICD-10-CM

## 2020-02-20 DIAGNOSIS — M85.80 OSTEOPENIA DETERMINED BY X-RAY: Chronic | ICD-10-CM

## 2020-02-20 DIAGNOSIS — I10 ESSENTIAL HYPERTENSION: ICD-10-CM

## 2020-02-20 DIAGNOSIS — R53.83 FATIGUE, UNSPECIFIED TYPE: ICD-10-CM

## 2020-02-20 DIAGNOSIS — Z98.890 S/P LUMBAR DISCECTOMY: ICD-10-CM

## 2020-02-20 DIAGNOSIS — K21.9 GASTROESOPHAGEAL REFLUX DISEASE, ESOPHAGITIS PRESENCE NOT SPECIFIED: ICD-10-CM

## 2020-02-20 DIAGNOSIS — R40.0 HAS DAYTIME DROWSINESS: ICD-10-CM

## 2020-02-20 DIAGNOSIS — R06.02 SHORTNESS OF BREATH: ICD-10-CM

## 2020-02-20 DIAGNOSIS — E66.09 CLASS 1 OBESITY DUE TO EXCESS CALORIES WITHOUT SERIOUS COMORBIDITY WITH BODY MASS INDEX (BMI) OF 30.0 TO 30.9 IN ADULT: ICD-10-CM

## 2020-02-20 DIAGNOSIS — I87.2 CHRONIC VENOUS INSUFFICIENCY: ICD-10-CM

## 2020-02-20 LAB
ALBUMIN SERPL-MCNC: 4.1 G/DL (ref 3.4–5)
ALBUMIN/GLOB SERPL: 1.5 {RATIO} (ref 0.8–1.7)
ALP SERPL-CCNC: 79 U/L (ref 45–117)
ALT SERPL-CCNC: 42 U/L (ref 16–61)
ANION GAP SERPL CALC-SCNC: 6 MMOL/L (ref 3–18)
AST SERPL-CCNC: 24 U/L (ref 10–38)
BASOPHILS # BLD: 0.1 K/UL (ref 0–0.1)
BASOPHILS NFR BLD: 1 % (ref 0–2)
BILIRUB SERPL-MCNC: 0.7 MG/DL (ref 0.2–1)
BUN SERPL-MCNC: 21 MG/DL (ref 7–18)
BUN/CREAT SERPL: 18 (ref 12–20)
CALCIUM SERPL-MCNC: 9 MG/DL (ref 8.5–10.1)
CHLORIDE SERPL-SCNC: 109 MMOL/L (ref 100–111)
CHOLEST SERPL-MCNC: 204 MG/DL
CO2 SERPL-SCNC: 26 MMOL/L (ref 21–32)
CREAT SERPL-MCNC: 1.2 MG/DL (ref 0.6–1.3)
DIFFERENTIAL METHOD BLD: ABNORMAL
EOSINOPHIL # BLD: 0.2 K/UL (ref 0–0.4)
EOSINOPHIL NFR BLD: 3 % (ref 0–5)
ERYTHROCYTE [DISTWIDTH] IN BLOOD BY AUTOMATED COUNT: 13.4 % (ref 11.6–14.5)
EST. AVERAGE GLUCOSE BLD GHB EST-MCNC: 131 MG/DL
GLOBULIN SER CALC-MCNC: 2.8 G/DL (ref 2–4)
GLUCOSE SERPL-MCNC: 106 MG/DL (ref 74–99)
HBA1C MFR BLD: 6.2 % (ref 4.2–5.6)
HCT VFR BLD AUTO: 47.2 % (ref 36–48)
HDLC SERPL-MCNC: 48 MG/DL (ref 40–60)
HDLC SERPL: 4.3 {RATIO} (ref 0–5)
HGB BLD-MCNC: 16.5 G/DL (ref 13–16)
LDLC SERPL CALC-MCNC: 123.4 MG/DL (ref 0–100)
LIPID PROFILE,FLP: ABNORMAL
LYMPHOCYTES # BLD: 2.4 K/UL (ref 0.9–3.6)
LYMPHOCYTES NFR BLD: 32 % (ref 21–52)
MCH RBC QN AUTO: 32.1 PG (ref 24–34)
MCHC RBC AUTO-ENTMCNC: 35 G/DL (ref 31–37)
MCV RBC AUTO: 91.8 FL (ref 74–97)
MONOCYTES # BLD: 0.7 K/UL (ref 0.05–1.2)
MONOCYTES NFR BLD: 10 % (ref 3–10)
NEUTS SEG # BLD: 3.9 K/UL (ref 1.8–8)
NEUTS SEG NFR BLD: 54 % (ref 40–73)
PLATELET # BLD AUTO: 211 K/UL (ref 135–420)
PMV BLD AUTO: 10.8 FL (ref 9.2–11.8)
POTASSIUM SERPL-SCNC: 4.3 MMOL/L (ref 3.5–5.5)
PROT SERPL-MCNC: 6.9 G/DL (ref 6.4–8.2)
RBC # BLD AUTO: 5.14 M/UL (ref 4.7–5.5)
SODIUM SERPL-SCNC: 141 MMOL/L (ref 136–145)
TRIGL SERPL-MCNC: 163 MG/DL (ref ?–150)
VLDLC SERPL CALC-MCNC: 32.6 MG/DL
WBC # BLD AUTO: 7.3 K/UL (ref 4.6–13.2)

## 2020-02-20 PROCEDURE — 83036 HEMOGLOBIN GLYCOSYLATED A1C: CPT

## 2020-02-20 PROCEDURE — 80053 COMPREHEN METABOLIC PANEL: CPT

## 2020-02-20 PROCEDURE — 85025 COMPLETE CBC W/AUTO DIFF WBC: CPT

## 2020-02-20 PROCEDURE — 36415 COLL VENOUS BLD VENIPUNCTURE: CPT

## 2020-02-20 PROCEDURE — 80061 LIPID PANEL: CPT

## 2020-02-27 ENCOUNTER — OFFICE VISIT (OUTPATIENT)
Dept: INTERNAL MEDICINE CLINIC | Age: 60
End: 2020-02-27

## 2020-02-27 VITALS
SYSTOLIC BLOOD PRESSURE: 120 MMHG | DIASTOLIC BLOOD PRESSURE: 68 MMHG | OXYGEN SATURATION: 98 % | HEIGHT: 72 IN | BODY MASS INDEX: 31.02 KG/M2 | WEIGHT: 229 LBS | HEART RATE: 68 BPM | RESPIRATION RATE: 16 BRPM | TEMPERATURE: 98.2 F

## 2020-02-27 DIAGNOSIS — E55.9 VITAMIN D INSUFFICIENCY: ICD-10-CM

## 2020-02-27 DIAGNOSIS — M54.41 CHRONIC RIGHT-SIDED LOW BACK PAIN WITH RIGHT-SIDED SCIATICA: ICD-10-CM

## 2020-02-27 DIAGNOSIS — G89.29 CHRONIC PAIN OF RIGHT KNEE: ICD-10-CM

## 2020-02-27 DIAGNOSIS — G89.29 CHRONIC RIGHT-SIDED LOW BACK PAIN WITH RIGHT-SIDED SCIATICA: ICD-10-CM

## 2020-02-27 DIAGNOSIS — K21.9 GASTROESOPHAGEAL REFLUX DISEASE, ESOPHAGITIS PRESENCE NOT SPECIFIED: ICD-10-CM

## 2020-02-27 DIAGNOSIS — A60.00 RECURRENT GENITAL HERPES: ICD-10-CM

## 2020-02-27 DIAGNOSIS — Z00.00 LABORATORY TESTS ORDERED AS PART OF A COMPLETE PHYSICAL EXAM (CPE): ICD-10-CM

## 2020-02-27 DIAGNOSIS — M17.11 PRIMARY OSTEOARTHRITIS OF RIGHT KNEE: ICD-10-CM

## 2020-02-27 DIAGNOSIS — R73.03 PREDIABETES: ICD-10-CM

## 2020-02-27 DIAGNOSIS — M25.561 CHRONIC PAIN OF RIGHT KNEE: ICD-10-CM

## 2020-02-27 DIAGNOSIS — D75.1 ERYTHROCYTOSIS: ICD-10-CM

## 2020-02-27 DIAGNOSIS — I87.2 CHRONIC VENOUS INSUFFICIENCY: ICD-10-CM

## 2020-02-27 DIAGNOSIS — E66.09 CLASS 1 OBESITY DUE TO EXCESS CALORIES WITHOUT SERIOUS COMORBIDITY WITH BODY MASS INDEX (BMI) OF 31.0 TO 31.9 IN ADULT: ICD-10-CM

## 2020-02-27 DIAGNOSIS — I35.1 AORTIC VALVE INSUFFICIENCY, ETIOLOGY OF CARDIAC VALVE DISEASE UNSPECIFIED: ICD-10-CM

## 2020-02-27 DIAGNOSIS — I10 ESSENTIAL HYPERTENSION: Primary | ICD-10-CM

## 2020-02-27 DIAGNOSIS — E78.5 HYPERLIPIDEMIA, UNSPECIFIED HYPERLIPIDEMIA TYPE: ICD-10-CM

## 2020-02-27 NOTE — PROGRESS NOTES
Chief Complaint   Patient presents with    Complete Physical     Yearly physical with lab reveiw. Health Maintenance Due   Topic Date Due    Shingrix Vaccine Age 49> (1 of 2) 06/01/2008    Influenza Age 5 to Adult  08/01/2019     Patient refused influenza vaccine this time . 1. Have you been to the ER, urgent care clinic or hospitalized since your last visit? YES. Urgent Care in November 2019 for a fall with an injury to the right eye and got a cut.     2. Have you seen or consulted any other health care providers outside of the [address] [place] [address] since your last visit (Include any pap smears or colon screening)? NO     Do you have an Advanced Directive? NO    Would you like information on Advanced Directives? NO    Learning Assessment 2/27/2020   PRIMARY LEARNER Patient   HIGHEST LEVEL OF EDUCATION - PRIMARY LEARNER  -   BARRIERS PRIMARY LEARNER NONE   CO-LEARNER CAREGIVER No   PRIMARY LANGUAGE ENGLISH    NEED -   LEARNER PREFERENCE PRIMARY DEMONSTRATION     READING     LISTENING   ANSWERED BY patient   RELATIONSHIP SELF     Abuse Screening Questionnaire 2/27/2020   Do you ever feel afraid of your partner? N   Are you in a relationship with someone who physically or mentally threatens you? N   Is it safe for you to go home?  Y     3 most recent PHQ Screens 2/27/2020   Little interest or pleasure in doing things Not at all   Feeling down, depressed, irritable, or hopeless Not at all   Total Score PHQ 2 0   Trouble falling or staying asleep, or sleeping too much -   Feeling tired or having little energy -   Poor appetite, weight loss, or overeating -   Feeling bad about yourself - or that you are a failure or have let yourself or your family down -   Trouble concentrating on things such as school, work, reading, or watching TV -   Moving or speaking so slowly that other people could have noticed; or the opposite being so fidgety that others notice -   Thoughts of being better off dead, or hurting yourself in some way -   How difficult have these problems made it for you to do your work, take care of your home and get along with others -     Fall Risk Assessment, last 12 mths 2/27/2020   Able to walk? Yes   Fall in past 12 months? Yes   Fall with injury?  Yes   Number of falls in past 12 months 1   Fall Risk Score 2

## 2020-02-27 NOTE — PATIENT INSTRUCTIONS
High Cholesterol: Care Instructions  Your Care Instructions    Cholesterol is a type of fat in your blood. It is needed for many body functions, such as making new cells. Cholesterol is made by your body. It also comes from food you eat. High cholesterol means that you have too much of the fat in your blood. This raises your risk of a heart attack and stroke. LDL and HDL are part of your total cholesterol. LDL is the \"bad\" cholesterol. High LDL can raise your risk for heart disease, heart attack, and stroke. HDL is the \"good\" cholesterol. It helps clear bad cholesterol from the body. High HDL is linked with a lower risk of heart disease, heart attack, and stroke. Your cholesterol levels help your doctor find out your risk for having a heart attack or stroke. You and your doctor can talk about whether you need to lower your risk and what treatment is best for you. A heart-healthy lifestyle along with medicines can help lower your cholesterol and your risk. The way you choose to lower your risk will depend on how high your risk is for heart attack and stroke. It will also depend on how you feel about taking medicines. Follow-up care is a key part of your treatment and safety. Be sure to make and go to all appointments, and call your doctor if you are having problems. It's also a good idea to know your test results and keep a list of the medicines you take. How can you care for yourself at home? · Eat a variety of foods every day. Good choices include fruits, vegetables, whole grains (like oatmeal), dried beans and peas, nuts and seeds, soy products (like tofu), and fat-free or low-fat dairy products. · Replace butter, margarine, and hydrogenated or partially hydrogenated oils with olive and canola oils. (Canola oil margarine without trans fat is fine.)  · Replace red meat with fish, poultry, and soy protein (like tofu). · Limit processed and packaged foods like chips, crackers, and cookies.   · Bake, broil, or steam foods. Don't dunham them. · Be physically active. Get at least 30 minutes of exercise on most days of the week. Walking is a good choice. You also may want to do other activities, such as running, swimming, cycling, or playing tennis or team sports. · Stay at a healthy weight or lose weight by making the changes in eating and physical activity listed above. Losing just a small amount of weight, even 5 to 10 pounds, can reduce your risk for having a heart attack or stroke. · Do not smoke. When should you call for help? Watch closely for changes in your health, and be sure to contact your doctor if:    · You need help making lifestyle changes.     · You have questions about your medicine. Where can you learn more? Go to http://patSpero Therapeuticscuate.info/. Enter W914 in the search box to learn more about \"High Cholesterol: Care Instructions. \"  Current as of: April 9, 2019  Content Version: 12.2  © 2521-5200 [employer]. Care instructions adapted under license by [employer] (which disclaims liability or warranty for this information). If you have questions about a medical condition or this instruction, always ask your healthcare professional. [address] any warranty or liability for your use of this information. Prediabetes: Care Instructions  Your Care Instructions    Prediabetes is a warning sign that you are at risk for getting type 2 diabetes. It means that your blood sugar is higher than it should be. The food you eat turns into sugar, which your body uses for energy. Normally, an organ called the pancreas makes insulin, which allows the sugar in your blood to get into your body's cells. But when your body can't use insulin the right way, the sugar doesn't move into cells. It stays in your blood instead. This is called insulin resistance. The buildup of sugar in the blood causes prediabetes.   The good news is that lifestyle changes may help you get your blood sugar back to normal and help you avoid or delay diabetes. Follow-up care is a key part of your treatment and safety. Be sure to make and go to all appointments, and call your doctor if you are having problems. It's also a good idea to know your test results and keep a list of the medicines you take. How can you care for yourself at home? · Watch your weight. A healthy weight helps your body use insulin properly. · Limit the amount of calories, sweets, and unhealthy fat you eat. Ask your doctor if you should see a dietitian. A registered dietitian can help you create meal plans that fit your lifestyle. · Get at least 30 minutes of exercise on most days of the week. Exercise helps control your blood sugar. It also helps you maintain a healthy weight. Walking is a good choice. You also may want to do other activities, such as running, swimming, cycling, or playing tennis or team sports. · Do not smoke. Smoking can make prediabetes worse. If you need help quitting, talk to your doctor about stop-smoking programs and medicines. These can increase your chances of quitting for good. · If your doctor prescribed medicines, take them exactly as prescribed. Call your doctor if you think you are having a problem with your medicine. You will get more details on the specific medicines your doctor prescribes. When should you call for help? Watch closely for changes in your health, and be sure to contact your doctor if:    · You have any symptoms of diabetes. These may include:  ? Being thirsty more often. ? Urinating more. ? Being hungrier. ? Losing weight. ? Being very tired. ? Having blurry vision.     · You have a wound that will not heal.     · You have an infection that will not go away.     · You have problems with your blood pressure.     · You want more information about diabetes and how you can keep from getting it. Where can you learn more?   Go to http://pat-cuate.info/. Enter I222 in the search box to learn more about \"Prediabetes: Care Instructions. \"  Current as of: April 16, 2019  Content Version: 12.2  © 7396-0252 [employer]. Care instructions adapted under license by [employer] (which disclaims liability or warranty for this information). If you have questions about a medical condition or this instruction, always ask your healthcare professional. [address] any warranty or liability for your use of this information. Learning About Meal Planning for Diabetes  Why plan your meals? Meal planning can be a key part of managing diabetes. Planning meals and snacks with the right balance of carbohydrate, protein, and fat can help you keep your blood sugar at the target level you set with your doctor. You don't have to eat special foods. You can eat what your family eats, including sweets once in a while. But you do have to pay attention to how often you eat and how much you eat of certain foods. You may want to work with a dietitian or a certified diabetes educator. He or she can give you tips and meal ideas and can answer your questions about meal planning. This health professional can also help you reach a healthy weight if that is one of your goals. What plan is right for you? Your dietitian or diabetes educator may suggest that you start with the plate format or carbohydrate counting. The plate format  The plate format is a simple way to help you manage how you eat. You plan meals by learning how much space each food should take on a plate. Using the plate format helps you spread carbohydrate throughout the day. It can make it easier to keep your blood sugar level within your target range. It also helps you see if you're eating healthy portion sizes. To use the plate format, you put non-starchy vegetables on half your plate.  Add meat or meat substitutes on one-quarter of the plate. Put a grain or starchy vegetable (such as brown rice or a potato) on the final quarter of the plate. You can add a small piece of fruit and some low-fat or fat-free milk or yogurt, depending on your carbohydrate goal for each meal.  Here are some tips for using the plate format:  · Make sure that you are not using an oversized plate. A 9-inch plate is best. Many restaurants use larger plates. · Get used to using the plate format at home. Then you can use it when you eat out. · Write down your questions about using the plate format. Talk to your doctor, a dietitian, or a diabetes educator about your concerns. Carbohydrate counting  With carbohydrate counting, you plan meals based on the amount of carbohydrate in each food. Carbohydrate raises blood sugar higher and more quickly than any other nutrient. It is found in desserts, breads and cereals, and fruit. It's also found in starchy vegetables such as potatoes and corn, grains such as rice and pasta, and milk and yogurt. Spreading carbohydrate throughout the day helps keep your blood sugar levels within your target range. Your daily amount depends on several things, including your weight, how active you are, which diabetes medicines you take, and what your goals are for your blood sugar levels. A registered dietitian or diabetes educator can help you plan how much carbohydrate to include in each meal and snack. A guideline for your daily amount of carbohydrate is:  · 45 to 60 grams at each meal. That's about the same as 3 to 4 carbohydrate servings. · 15 to 20 grams at each snack. That's about the same as 1 carbohydrate serving. The Nutrition Facts label on packaged foods tells you how much carbohydrate is in a serving of the food. First, look at the serving size on the food label. Is that the amount you eat in a serving? All of the nutrition information on a food label is based on that serving size.  So if you eat more or less than that, you'll need to adjust the other numbers. Total carbohydrate is the next thing you need to look for on the label. If you count carbohydrate servings, one serving of carbohydrate is 15 grams. For foods that don't come with labels, such as fresh fruits and vegetables, you'll need a guide that lists carbohydrate in these foods. Ask your doctor, dietitian, or diabetes educator about books or other nutrition guides you can use. If you take insulin, you need to know how many grams of carbohydrate are in a meal. This lets you know how much rapid-acting insulin to take before you eat. If you use an insulin pump, you get a constant rate of insulin during the day. So the pump must be programmed at meals to give you extra insulin to cover the rise in blood sugar after meals. When you know how much carbohydrate you will eat, you can take the right amount of insulin. Or, if you always use the same amount of insulin, you need to make sure that you eat the same amount of carbohydrate at meals. If you need more help to understand carbohydrate counting and food labels, ask your doctor, dietitian, or diabetes educator. How do you get started with meal planning? Here are some tips to get started:  · Plan your meals a week at a time. Don't forget to include snacks too. · Use cookbooks or online recipes to plan several main meals. Plan some quick meals for busy nights. You also can double some recipes that freeze well. Then you can save half for other busy nights when you don't have time to cook. · Make sure you have the ingredients you need for your recipes. If you're running low on basic items, put these items on your shopping list too. · List foods that you use to make breakfasts, lunches, and snacks. List plenty of fruits and vegetables. · Post this list on the refrigerator. Add to it as you think of more things you need. · Take the list to the store to do your weekly shopping.   Follow-up care is a key part of your treatment and safety. Be sure to make and go to all appointments, and call your doctor if you are having problems. It's also a good idea to know your test results and keep a list of the medicines you take. Where can you learn more? Go to http://pat-cuate.info/. Earney Gottron in the search box to learn more about \"Learning About Meal Planning for Diabetes. \"  Current as of: April 16, 2019  Content Version: 12.2  © 7302-0932 [employer]. Care instructions adapted under license by [employer] (which disclaims liability or warranty for this information). If you have questions about a medical condition or this instruction, always ask your healthcare professional. [address] any warranty or liability for your use of this information.

## 2020-03-04 PROBLEM — M25.561 CHRONIC PAIN OF RIGHT KNEE: Status: ACTIVE | Noted: 2020-03-04

## 2020-03-04 PROBLEM — G89.29 CHRONIC PAIN OF RIGHT KNEE: Status: ACTIVE | Noted: 2020-03-04

## 2020-03-04 NOTE — PROGRESS NOTES
HPI:   Yoni Posadas is a 62y.o. year old male who presents today for a routine visit. He has a history of hypertension, hyperlipidemia, moderate aortic insufficiency, atypical chest pain, prediabetes, GERD, osteoarthritis, lumbar radiculopathy, and chronic venous insufficiency. He reports that he is doing relatively well. He states that he is continuing to assist his mother who is receiving treatment for ovarian cancer. He complains of difficulty with right knee pain with ambulation and standing. He states that the pain is now severe and he is no longer able to exercise due to the pain. He states that he has known osteoarthritis is his knee and feels that he is ready for surgery. He requests referral to Dr. Rolando Perez since a family member recommended him. He reports that he established with a new cardiologist, Dr. Sin Simeon, and is scheduled for a follow-up echocardiogram. He also reports that he is scheduled to see Dr. Beavers Monday in 3/2020. He is otherwise without new complaints. He has a history of hypertension, treated with losartan and amlodipine. He does not check his blood pressure at home regularly. He also does not exercise regularly, although admits to doing yard work and projects around the house. He denies any shortness of breath at rest or with exertion, palpitations, lightheadedness, or edema. He does have a history of substernal chest pain that has no relation to exertion and was thought to be atypical. He presented to [facility] on 2/27/2016 with a complaint of chest pain and diaphoresis, which seemed to be relieved by aspirin and sublingual nitroglycerin in the ambulance. Evaluation included negative ECG and troponins. He had an exercise nuclear stress test (2/29/2016) which showed normal LV function (EF 57%) and a medium sized partially reversible defect inferiorly (intermediate risk study).  On 3/1/2016, he underwent a cardiac catheterization which showed no significant epicardial coronary artery disease. He also had an echocardiogram (2/28/2016) showing mild LV septal hypertrophy, hypokinetic basal inferior wall with preserved LV function (EF 15%), mild diastolic dysfunction, and moderate aortic regurgitation with questionable dilation of the distal arch of aorta (3.5 cm). He was found to have elevated triglycerides, with lipid panel showing total cholesterol 201/ / HDL 52/ LDL 89. He was started on Fenofibrate prior to discharge on 3/1/2016. He reports that he has had no significant recurrence of chest pain since that time. He had a repeat echocardiogram in 9/2017 which showed low normal LV function (EF 50-55%), no RWMA, calcified aortic valve with bicuspid appearance and moderate regurgitation, and mild dilatation of the aortic root. He does have difficulty with mild lower extremity edema due to chronic venous insufficiency. He is followed by Dr. Luis Felipe Hoyt. He has a history of prediabetes, with HbA1c ranging from 5.8-6.1 since 2012. He denies any polyuria, polydipsia, nocturia, or blurry vision, and has no history of retinopathy, neuropathy, or nephropathy. He has regular eye exams with Dr. Masoud Marroquin. He has a history of osteoarthritis, involving his right knee, right great toe, and lumbar spine. He has had difficulty in the past with increasing pain at the base of his right thumb and was evaluated by Dr. Chloe Mcdonald at [address]. He states that x-rays revealed he had severe osteoarthritis, and he received a cortisone injection with some improvement. In 12/2016, he noted increasing difficulty with low back pain and right sided sciatica. He underwent a lumbar MRI (12/2016) which showed progression when compared to a prior lumbar MRI from 2010.  It showed prominent right paracentral L4-L5 disc extrusion with impingement of the descending right L5 nerve root and moderately severe multifactorial canal stenosis; additional prominent right paracentral L5-S1 disc protrusion with impingement of the descending right S1 nerve root; abutment of the foraminal right L5 nerve root related to moderate foramina stenosis at L5-S1; mild multifactorial canal stenosis at L3-L4 with abutment of the descending left L4 nerve root. He was evaluated by Dr. Edy Sepulveda, and treated with Medrol dose pack, Norco and Flexeril without significant improvement. He was evaluated by Dr. Natacha Charles in 3/2017 for possible surgical intervention, but this was deferred due to issues regarding his wife's health. He was treated with gabapentin, and was reevaluated by Dr. Edy Sepulveda in 8/2017, at which time he reported significant improvement. He states that he no longer required treatment with gabapentin or Norco, and was continuing to take Voltaren ER with good control of symptoms. However in 3/2018, he developed recurrence of severe pain with right sided sciatica that was unresponsive to conservative therapy. He subsequently consulted Dr. Rajeev Brunson and on 7/5/2018, he underwent a right L4-L5 and L5-S1 hemilaminectomy, medial facetectomy, and foraminotomy by Dr. Rajeev Brunson for removal of herniated disks and decompression of foraminal stenoses at these levels. He reports that his right sided sciatica has resolved and he is no longer requiring any pain medication. He does report some residual numbness on the plantar surface of his right foot. He has a history of GERD, with symptoms well controlled with pantoprazole as needed. He had a screening colonoscopy by Dr. Becka Garza in 3/2012 with removal of a benign serrated polyp (pathology shows hyperplastic and adenomatous features). He had repeat screening colonoscopy in 3/2017 by Dr Emmie Stern which was normal. Follow-up recommended for five years. He denies any abdominal pain, nausea, vomiting, melena, hematochezia, or change in bowel movements. In 5/2016, he fell at work with his chest striking the edge of a table and he sustained fractures of the anterior right 7th and 8th ribs.  He had serial rib x-rays since that time showing slow interval healing, but reports that he no longer has pain related to this. He underwent a bone density scan in 10/2016 which showed evidence of osteopenia with T-scores:  femoral neck left -1.7  /right -1.5 and lumbar -1.2. He was started on calcium and Vitamin D supplementation. He previously smoked approximately 1 ppd for 40 years, stopping in 2000. He also reports a history of asbestos exposure in 1980. Past Medical History:   Diagnosis Date    Abnormal myocardial perfusion study 2/28/2016    Partially reversible inferior perfusion defect, EF 57%    AI (aortic insufficiency)     Moderate with possible bicuspid aortic valve    Chronic low back pain     Chronic venous insufficiency     Colon polyp 03/2012    Benign serrated polyp    Fracture 05/2016    rib fx's    GERD (gastroesophageal reflux disease)     History of echocardiogram 2/28/2016    EF 55%. Basal inferior hypokinesis. Gr 1 DDfx. Mod AI.  Hyperlipidemia     Hypertension     Osteoarthritis of right knee     Osteopenia     Prediabetes     Prepatellar bursitis of right knee     Recurrent genital herpes 1/16/2013    Right lumbar radiculopathy 10/2016    MRI: prominent right paracentral L4-L5 disc extrusion/impingement of descending right L5 nerve root; moderately severe canal stenosis; prominent right paracentral L5-S1 disc protrusion/ impingement of the descending right S1 nerve root; abutment of the foraminal right L5 nerve root/moderate foramina stenosis at L5-S1; mild canal stenosis at L3-L4 with abutment of the descending left L4 nerve root.     S/P cardiac catheterization 3/1/2016    Normal coronary anatomy     Past Surgical History:   Procedure Laterality Date    ENDOSCOPY, COLON, DIAGNOSTIC      HX BACK SURGERY  07/05/2018    2 herniated disc repaired     HX CHOLECYSTECTOMY      HX TONSILLECTOMY       Current Outpatient Medications   Medication Sig    amLODIPine (NORVASC) 10 mg tablet TAKE 1 TABLET DAILY    valACYclovir (VALTREX) 500 mg tablet TAKE 1 TABLET DAILY    albuterol (PROVENTIL HFA, VENTOLIN HFA, PROAIR HFA) 90 mcg/actuation inhaler Take 1 Puff by inhalation every six (6) hours as needed for Wheezing.  inhalational spacing device 1 Each by Does Not Apply route as needed (shortness of breath).  losartan (COZAAR) 100 mg tablet TAKE 1 TABLET DAILY    calcium carbonate (CALCIUM 300 PO) Take  by mouth daily.  magnesium chloride (MAG 64 PO) Take  by mouth daily.  aspirin delayed-release 81 mg tablet Take 81 mg by mouth daily.  Cholecalciferol, Vitamin D3, 2,000 unit cap Take 2,000 Units by mouth daily.  DOCOSAHEXANOIC ACID/EPA (FISH OIL PO) Take  by mouth daily.  nitroglycerin (NITROSTAT) 0.4 mg SL tablet 1 Tab by SubLINGual route every five (5) minutes as needed for Chest Pain. Up to 3 doses. No current facility-administered medications for this visit. Allergies and Intolerances: Allergies   Allergen Reactions    Tree Nuts Anaphylaxis    Ace Inhibitors Cough    Nuts [Tree Nut] Itching     Family History: His mother is alive with stage 3 ovarian cancer. His father  from esophageal cancer at age 61. His maternal aunt  at age 48 from colon cancer. He has no family history of prostate cancer. Family History   Problem Relation Age of Onset    Cancer Father 61        esophageal    Hypertension Father     Cancer Mother 80        ovarian cancer     Hypertension Brother     Diabetes Other         Grandmother    Arthritis-osteo Other     Other Other         Stomach problems; Father, grandfather     Social History:   He  reports that he quit smoking about 19 years ago. He has a 12.50 pack-year smoking history. He quit smokeless tobacco use about 19 years ago. Reports that he smoked less than 1 ppd for 40 years, and stopped in . He is  and they have one son.  His wife was recently diagnosed with Paget's disease of the breast, which represents a recurrence of prior breast cancer. He was employed as an  for [employer], but retired in 12/2017. He drinks about a 12 pack of beer each month. Social History     Substance and Sexual Activity   Alcohol Use Yes    Alcohol/week: 1.7 standard drinks    Types: 2 Cans of beer per week     Immunization History:  Immunization History   Administered Date(s) Administered    Influenza Vaccine 11/01/2013, 11/12/2014    TDAP Vaccine 01/20/2004    Td 01/01/2004    Tdap 01/16/2014       Review of Systems:   As above included in HPI. Otherwise 11 point review of systems negative including constitutional, skin, HENT, eyes, respiratory, cardiovascular, gastrointestinal, genitourinary, musculoskeletal, endocrine, hematologic, allergy, and neurologic. Physical:   Vitals:   BP: 120/68  HR: 68  WT: 229 lb (103.9 kg)  BMI:  31.06 kg/m2    Exam:     Patient appears in no apparent distress. Affect is appropriate. HEENT: PERRLA, anicteric, oropharynx clear, no JVD, adenopathy or thyromegaly. No carotid bruits or radiated murmur. Lungs: clear to auscultation, no wheezes, rhonchi, or rales. Heart --Regular rate and rhythm, II/VI midsystolic murmur at RUSB; no rubs or gallops  Abdomen: soft, nontender, nondistended, normal bowel sounds, no hepatosplenomegaly or masses. Extremities: without edema. Pulses 1-2+ bilaterally.     Review of Data:  Labs:  Hospital Outpatient Visit on 02/20/2020   Component Date Value Ref Range Status    WBC 02/20/2020 7.3  4.6 - 13.2 K/uL Final    RBC 02/20/2020 5.14  4.70 - 5.50 M/uL Final    HGB 02/20/2020 16.5* 13.0 - 16.0 g/dL Final    HCT 02/20/2020 47.2  36.0 - 48.0 % Final    MCV 02/20/2020 91.8  74.0 - 97.0 FL Final    MCH 02/20/2020 32.1  24.0 - 34.0 PG Final    MCHC 02/20/2020 35.0  31.0 - 37.0 g/dL Final    RDW 02/20/2020 13.4  11.6 - 14.5 % Final    PLATELET 70/37/6904 236  135 - 420 K/uL Final    MPV 02/20/2020 10.8 9.2 - 11.8 FL Final    NEUTROPHILS 02/20/2020 54  40 - 73 % Final    LYMPHOCYTES 02/20/2020 32  21 - 52 % Final    MONOCYTES 02/20/2020 10  3 - 10 % Final    EOSINOPHILS 02/20/2020 3  0 - 5 % Final    BASOPHILS 02/20/2020 1  0 - 2 % Final    ABS. NEUTROPHILS 02/20/2020 3.9  1.8 - 8.0 K/UL Final    ABS. LYMPHOCYTES 02/20/2020 2.4  0.9 - 3.6 K/UL Final    ABS. MONOCYTES 02/20/2020 0.7  0.05 - 1.2 K/UL Final    ABS. EOSINOPHILS 02/20/2020 0.2  0.0 - 0.4 K/UL Final    ABS. BASOPHILS 02/20/2020 0.1  0.0 - 0.1 K/UL Final    DF 02/20/2020 AUTOMATED    Final    Hemoglobin A1c 02/20/2020 6.2* 4.2 - 5.6 % Final    Est. average glucose 02/20/2020 131  mg/dL Final    LIPID PROFILE 02/20/2020        Final    Cholesterol, total 02/20/2020 204* <200 MG/DL Final    Triglyceride 02/20/2020 163* <150 MG/DL Final    HDL Cholesterol 02/20/2020 48  40 - 60 MG/DL Final    LDL, calculated 02/20/2020 123.4* 0 - 100 MG/DL Final    VLDL, calculated 02/20/2020 32.6  MG/DL Final    CHOL/HDL Ratio 02/20/2020 4.3  0 - 5.0   Final    Sodium 02/20/2020 141  136 - 145 mmol/L Final    Potassium 02/20/2020 4.3  3.5 - 5.5 mmol/L Final    Chloride 02/20/2020 109  100 - 111 mmol/L Final    CO2 02/20/2020 26  21 - 32 mmol/L Final    Anion gap 02/20/2020 6  3.0 - 18 mmol/L Final    Glucose 02/20/2020 106* 74 - 99 mg/dL Final    BUN 02/20/2020 21* 7.0 - 18 MG/DL Final    Creatinine 02/20/2020 1.20  0.6 - 1.3 MG/DL Final    BUN/Creatinine ratio 02/20/2020 18  12 - 20   Final    GFR est AA 02/20/2020 >60  >60 ml/min/1.73m2 Final    GFR est non-AA 02/20/2020 >60  >60 ml/min/1.73m2 Final    Calcium 02/20/2020 9.0  8.5 - 10.1 MG/DL Final    Bilirubin, total 02/20/2020 0.7  0.2 - 1.0 MG/DL Final    ALT (SGPT) 02/20/2020 42  16 - 61 U/L Final    AST (SGOT) 02/20/2020 24  10 - 38 U/L Final    Alk.  phosphatase 02/20/2020 79  45 - 117 U/L Final    Protein, total 02/20/2020 6.9  6.4 - 8.2 g/dL Final    Albumin 02/20/2020 4.1  3.4 - 5.0 g/dL Final    Globulin 02/20/2020 2.8  2.0 - 4.0 g/dL Final    A-G Ratio 02/20/2020 1.5  0.8 - 1.7   Final     EKG (9/12/2019) sinus rhythm at 77 bpm, no ischemic changes and unchanged from last on 1/7/2019    Calculated 10 year ASCVD risk score:  10.2 %    Health Maintenance:  Screening:    Colorectal: colonoscopy (3/2017) normal. Dr. Elise Contreras. Due 3/2022. Depression: none   DM (HbA1c/FPG): / HbA1c 6.2 (2/2020)   Hepatitis C: negative (9/2016)   Falls: none    DEXA: N/A   PSA/TONI: PSA 0.5 (5/2019)/ TONI (2/2019)   Glaucoma: regular eye exams with Dr. Maosud Marroquin (last 1/2017)   Smoking: none   Vitamin D: 40.4 (2/2019)   Medicare Wellness: N/A    Impression:  Patient Active Problem List   Diagnosis Code    Hyperlipidemia E78.5    GERD (gastroesophageal reflux disease) K21.9    Chronic venous insufficiency I87.2    Colon polyp K63.5    Prediabetes R73.03    Recurrent genital herpes A60.00    Vitamin D insufficiency E55.9    Aortic insufficiency, moderate I35.1    Essential hypertension I10    Primary osteoarthritis of knee M17.10    Lumbar herniated disc  right L4/5/S1 chronic M51.26    Osteopenia determined by DEXA 2016, lowest T score -1.7 M85.80    Annular tear of lumbar disc M51.36    Chronic right-sided low back pain with sciatica M54.40, G89.29    Neuritis of lower extremity G57.90    Chronic thumb pain, right M79.644, G89.29    S/P lumbar discectomy Z98.890    Class 1 obesity due to excess calories without serious comorbidity in adult E66.09    Erythrocytosis D75.1    Chronic pain of right knee M25.561, G89.29       Plan:    1. Shortness of breath. Patient reports some improvement in breathlessness today. However, he continues to describe a choking feeling at times when he lies down. EKG unchanged from baseline. Chest x-ray negative. Established care with new cardiologist and felt to be stable.  Again stressed importance of proceeding with sleep evaluation given known daytime drowsiness, snoring, and fatigue, and evidence of erythrocytosis. States appointment scheduled with Dr. Beavers Monday for next month. Given positive smoking history, prescribed albuterol inhaler to see if will help with symptoms but patient did not fill. Continue to follow. 2. Hypertension. Well controlled on current regimen of losartan 100 mg daily and amlodipine 10 mg daily. Renal function remains normal with creatinine 1.20 / eGFR >60. Continue to follow. 3. Dyslipidemia. Patient no longer taking fenofibrate. Discontinued last visit given lack of data showing that lowering triglycerides decreases the risk of CAD. In addition, first line therapy for treating triglycerides < 500 would be initiation of a statin. Repeat lipid panel today with triglyceride level 118. However, his calculated 10 year ASCVD risk is 10.2 %, which does place him in one of the four statin benefit groups as per new AHA/ACC guidelines (primary prevention: ASCVD risk > 7.5%).  today with HDL 48 which remains elevated. He remains unwilling to start treatment with statin due to concerns regarding possible side effects. Will reevaluate at next visit and readdress. Emphasized importance of lifestyle modifications, including diet, exercise, and weight loss. Will continue to follow. 4. Aortic insufficiency, moderate with mildly dilated aortic root. Probable bicuspid valve. Being monitored on echocardiogram. Also, considering thoracic CT scan if dilation of aortic root appears to progress on echocardiogram.  Repeat echocardiogram to reassess valve in 9/2017 without change. Established with new cardiologist, Dr. Sin Simeon, and plan to repeat echocardiogram. Follow. 5. Prediabetes. HbA1c increased to 6.2 today. Weight increased 5 pounds since last visit and patient states that he is not exercising due to knee pain. No evidence of microvascular complications. On ARB but unwilling to begin statin.  Continue follow-up with  for annual eye exams. Foot exam normal in 4/2017. Urine microalbumin/ creatinine ratio without evidence of microalbuminuria. Encouraged weight loss and dietary changes, particularly avoiding concentrated sweets. 6. Osteopenia. Last bone density scan 10/2016. Using femoral neck T-scores, calculated FRAX score estimates her 10 year risk of a major osteoporetic fracture at 12 % and hip fracture at 2.0 %, which are not an indication for biphosphonate treatment (>20% and >3%, respectively). Continue calcium and Vitamin D. Encouraged exercise, particularly weight bearing activities. Consider repeat bone density scan in near future. 7. GERD. Continue occasional use of Protonix with good control of symptoms. Follow. 8. Right knee pain. Patient reports increasing pain with ambulation and standing. He reports not exercising due to pain. Chronic problem which has now progressed. Requesting referral to Dr. Ameena Nava for evaluation given recommendation of a family member. Referral placed. 9. Right sciatica. Progression of symptoms and disease on MRI. Responded well to conservative therapy initially, but recurred and unresponsive to medications and injections. Underwent right L4-L5 and L5-S1 hemilaminectomy, medial facetectomy, and foraminotomy by Dr. Jesica Hines in 7/2018 with great success. Reports only residual numbness on medial plantar surface of right foot. No longer requiring any pain medication. Follow. 10. Right thumb osteoarthritis. Improved pain control with cortisone injection. Being followed by Dr. Christopher Childs. 11. Erythrocytosis. Remains only mildly elevated with Hb 16.5/ Hct 47.2. Review of record shows similar readings since 2014. Erythropoietin level normal today. Patient with daytime drowsiness, snoring, and fatigue. Scheduled for evaluation by Dr. Brianna Pillai next month. 12. Rectal bleeding. No recurrence. Guaiac negative on exam at last visit. No visible hemorrhoids. Colonoscopy normal 3/2017.  Suspect symptoms may be secondary to fissure and resolved with increasing fiber in diet. Follow. 13. Obesity. Emphasized importance of lifestyle modifications, including diet, exercise, and weight loss. Will readdress next visit. 14. Health maintenance. Unwilling to receive influenza vaccine. Discussed Shingrix vaccine but not wishing to proceed currently. Other immunizations up to date. Colonoscopy due 2022. Prostate cancer screening up to date. Continue regular eye exams. Stressed importance of weight loss. Vitamin D level normal. Continue maintenance dose supplement. Patient understands recommendations and agrees with plan.   Follow-up in 4 months for physical.

## 2020-03-06 RX ORDER — LANOLIN ALCOHOL/MO/W.PET/CERES
400 CREAM (GRAM) TOPICAL
COMMUNITY

## 2020-03-06 RX ORDER — ASPIRIN 325 MG
500 TABLET, DELAYED RELEASE (ENTERIC COATED) ORAL
COMMUNITY
Start: 2020-03-11 | End: 2020-03-11

## 2020-03-06 RX ORDER — ASCORBIC ACID 500 MG
500 TABLET ORAL DAILY
COMMUNITY

## 2020-03-06 RX ORDER — CYCLOBENZAPRINE HCL 10 MG
10 TABLET ORAL DAILY
COMMUNITY
Start: 2018-07-06 | End: 2021-02-25 | Stop reason: SDUPTHER

## 2020-03-06 RX ORDER — DOCUSATE SODIUM 100 MG/1
100 CAPSULE, LIQUID FILLED ORAL
COMMUNITY
Start: 2018-07-06 | End: 2020-03-11

## 2020-03-06 RX ORDER — OXYCODONE AND ACETAMINOPHEN 5; 325 MG/1; MG/1
1 TABLET ORAL
COMMUNITY
Start: 2018-07-06 | End: 2020-08-30

## 2020-03-11 ENCOUNTER — OFFICE VISIT (OUTPATIENT)
Dept: PULMONOLOGY | Age: 60
End: 2020-03-11

## 2020-03-11 VITALS
DIASTOLIC BLOOD PRESSURE: 64 MMHG | TEMPERATURE: 97.8 F | SYSTOLIC BLOOD PRESSURE: 110 MMHG | RESPIRATION RATE: 19 BRPM | BODY MASS INDEX: 31.15 KG/M2 | WEIGHT: 230 LBS | HEIGHT: 72 IN | HEART RATE: 68 BPM | OXYGEN SATURATION: 96 %

## 2020-03-11 DIAGNOSIS — G89.29 OTHER CHRONIC PAIN: ICD-10-CM

## 2020-03-11 DIAGNOSIS — D75.1 ERYTHROCYTOSIS: ICD-10-CM

## 2020-03-11 DIAGNOSIS — I35.9 AORTIC VALVE DISEASE: ICD-10-CM

## 2020-03-11 DIAGNOSIS — F45.8 BRUXISM: ICD-10-CM

## 2020-03-11 DIAGNOSIS — Z87.09 HISTORY OF ASTHMA: ICD-10-CM

## 2020-03-11 DIAGNOSIS — G47.19 EXCESSIVE DAYTIME SLEEPINESS: ICD-10-CM

## 2020-03-11 DIAGNOSIS — R06.83 SNORING: ICD-10-CM

## 2020-03-11 DIAGNOSIS — R06.02 SOB (SHORTNESS OF BREATH): Primary | ICD-10-CM

## 2020-03-11 NOTE — LETTER
3/11/20 Patient: Bharat Valdes YOB: 1960 Date of Visit: 3/11/2020 MD [facility] [id number] [place] [address] VIA In Basket Dear Joe Valdez MD, Thank you for referring Mr. Souleymane Bui to [address] [place] [address] for evaluation. My notes for this consultation are attached. If you have questions, please do not hesitate to call me. I look forward to following your patient along with you.  
 
 
Sincerely, 
 
Marek Damico, DO

## 2020-03-11 NOTE — PROGRESS NOTES
Matt Serrano presents today for   Chief Complaint   Patient presents with    Sleep Apnea     referred by Dr. Vi Yeboah of Breath    Fatigue    Other     daytime sleepiness    Results     CXR 9/12/2019       Is someone accompanying this pt? No    Is the patient using any DME equipment during OV? No    -DME Company N/A    Depression Screening:  3 most recent PHQ Screens 3/11/2020   Little interest or pleasure in doing things Not at all   Feeling down, depressed, irritable, or hopeless Not at all   Total Score PHQ 2 0   Trouble falling or staying asleep, or sleeping too much -   Feeling tired or having little energy -   Poor appetite, weight loss, or overeating -   Feeling bad about yourself - or that you are a failure or have let yourself or your family down -   Trouble concentrating on things such as school, work, reading, or watching TV -   Moving or speaking so slowly that other people could have noticed; or the opposite being so fidgety that others notice -   Thoughts of being better off dead, or hurting yourself in some way -   How difficult have these problems made it for you to do your work, take care of your home and get along with others -       Learning Assessment:  Learning Assessment 2/27/2020   PRIMARY LEARNER Patient   HIGHEST LEVEL OF EDUCATION - PRIMARY LEARNER  -   BARRIERS PRIMARY LEARNER NONE   CO-LEARNER CAREGIVER No   PRIMARY LANGUAGE ENGLISH    NEED -   LEARNER PREFERENCE PRIMARY DEMONSTRATION     READING     LISTENING   ANSWERED BY patient   RELATIONSHIP SELF       Abuse Screening:  Abuse Screening Questionnaire 2/27/2020   Do you ever feel afraid of your partner? N   Are you in a relationship with someone who physically or mentally threatens you? N   Is it safe for you to go home? Y       Fall Risk  Fall Risk Assessment, last 12 mths 2/27/2020   Able to walk? Yes   Fall in past 12 months? Yes   Fall with injury?  Yes   Number of falls in past 12 months 1 Fall Risk Score 2         Coordination of Care:  1. Have you been to the ER, urgent care clinic since your last visit? Hospitalized since your last visit? No    2. Have you seen or consulted any other health care providers outside of the [address] [place] [address] since your last visit? Include any pap smears or colon screening. Yes. Dr. Alanis Walton, cardiologist    Influenza vaccine offered but declined at this time per patient.

## 2020-03-11 NOTE — PROGRESS NOTES
Verbal Order with read back per Dr.Michelle Sylvester DO  For PFT smart panel. AMB POC PFT complete w/ bronchodilator  AMB POC PFT complete w/o bronchodilator    Dr. Favio Bailey DO will co-sign the orders.

## 2020-03-11 NOTE — PROGRESS NOTES
[facility]  Pulmonary, Critical Care, and Sleep Medicine    Office Progress Note- Initial Evaluation      Primary Care Physician: Mike Castillo MD     Reason for Visit:  Evaluation for Dyspnea, Snoring and EDS    Assessment:  1. Snoring: Patient has symptoms and exam findings highly suggestive of a sleep breathing disorder, such as LICHA. Given severity of symptoms and comorbidities additional in-lab sleep testing is indicated. 2. Excessive Daytime Sleepiness (EDS): there could be multiple reasons for this condition: possible LICHA, insufficient sleep time (wife disturbs his sleep due to untreated LICHA- she could not tolerate PAP therapy), circadian rhythm disorder, erythrocytosis, Bruxism, pain and/or psychological (wife treated for breast CA, mother with advanced stage ovarian CA)  3. Possible Circadian Rhythm disorder: Patient used to work 12 hour night shifts x 6 yeas- Patient reports his sleep pattern never returned to normal since that time. 4. Erythrocytosis: unclear etiology at this time. It could be do to untreated LICHA vs primary hematologic process  5. Aortic Valve disease: last echo in 2017 notable for mild sclerosis and moderate MR- need to get copy of most recent study  6. Bruxism: May benefit form dental evaluation  7. H/O nasal fracture: with persistent nasal congestion  8. Dyspnea- broad differential at this time: he has a reported history of childhood asthma and Atopy with report of anaphylaxis to tree nuts. Spirometry today unremarkable. He does appear to have a persistently elevated absolute eosinophile count as well- these findings may suggest reactivation of asthma vs aortic valve disease, LICHA could be a contributing factor, occupational lung disease, deconditioning, etc.  9. Report of childhood asthma- spirometry today normal but absolute eosinophile count is elevated.   10. Increased Family Stressors: Patient caring for mother with advanced ovarian CA and wife previously treated for breast CA- currently cancer free. 11. Chronic pain: joint pain does disturb sleep at times  12. Health maintanance: Declines flu shot or pneumovax. Especially with his health issues and his mother and wife- I did encourage patient to update his immunizations       Plan:  · Order BNP, IgE and Allergy Zone 2  · Christianne Santiago Hb/HCT- may require further hematologic evaluation  · Obtain copy of most recent cardiac echo  · Discussed obtaining an adjustable bed that may help wife's untreated LICHA (CPAP intolerance) which is also disturbing his sleep. · Schedule patient for Split sleep study for further evaluation. · May benefit from dental evaluation for bruxism. If patient does have LICHA  · Potential consequences of untreated sleep apnea, and/or excessive daytime sleepiness were discussed with the patient. · Educational materials provided. · Treatment options including CPAP, dental appliance, weight reduction measures, positional therapy, surgeries etc were discussed. · Healthy lifestyle changes to include weight loss and exercise discussed. · Healthy sleep habits were reviewed and encouraged. ·  and workplace safety reviewed and discussed as appropriate. Drowsy and/or inattentive driving should be avoided. · Follow-up in after sleep testing , sooner should new symptoms or problems arise. History of Present Illness: Mr. Roxana Hall is a 62 y.o. male patient who presents for evaluation of dyspnea and EDS. The history was provided by the patient. 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 Prior construction     Patient used to work 12 hour night shifts x 6 yeas- Patient reports his sleep pattern never returned to normal since that time. Driving:  yes  Drowsy Driving: is not reported. He tries to avoid night time driving.   Motor vehicle accident(s) associated with drowsy driving have not occurred. Snoring: This is a Chronic problem which has been ongoing for years. Improved but did not resolve when his tonsils were removed about 20 years ago. Witnessed apneas are not reported. Fatigue: This is a Chronic problem which has been ongoing for years. Dental: Teeth clenching or grinding is reported. He does not use an oral guard. Naps: are not reported. If he does nod off- his snoring wakes him up and he finds something to do. Leg Symptoms/Pain: He does not have unpleasant or crawling sensation in legs or strong urge to move when inactive. He does have some PVD. GERD: is reported but not typical.    Aortic Valve Disease:  Echo in 2017 notable for mild sclerosis with moderate MR. He had repeat echo at [facility] last week. At this time I do not have that report available for review. Mood: Normal.    Sleep-Wake History:     Estimates sleeping approximately 4 hours per night/day. Reports sleeping in a Bed with 1 pillows under their head. He gets into bed at approximately 4401-9263 (mostly before 2400). Once in bed,he will sometimes just fall asleep, sometimes he has noticed dyspnea and other times his wife's snoring keeps him awake. It usually    Reports waking up to use the bathroom 1-4 times nightly. Pain, typically does disturb their sleep. - Knee and left shoulder pain. Vivid dreams are reported but rare    He normally awakens without an alarm to start their day at 0700. He  typically gets out of bed [id number] . Waking up with a morning headache is not reported. Awakening with a dry mouth is not  reported. Symptom(s) suggestive of cataplexy are not reported. Sleep paralysis is not  reported. Hypnagogic and/or hypnopompic hallucinations are not reported. Sleep walking is not  reported. Sleep talking is reported. Other unusual and/or parasomnia behaviors are not reported.     Family Sleep History:  - Mother- has never slept well        [employer] Prayer 3/11/2020   Does the patient snore loudly (louder than talking or loud enough to be heard through closed doors)? 1   Does the patient often feel tired, fatigued, or sleepy during the daytime, even after a \"good\" night's sleep? 1   Has anyone ever observed the patient stop breathing during their sleep?  0   Does the patient have or are they being treated for high blood pressure? 1   Is the patient's BMI greater than 35? 0   Is your neck circumference greater than 17 inches (Male) or 16 inches (Female)? 1   Is the patient older than 48? 1   Is the patient male? 1   LICHA Score 6       3 most recent PHQ Screens 3/11/2020   Little interest or pleasure in doing things Not at all   Feeling down, depressed, irritable, or hopeless Not at all   Total Score PHQ 2 0   Trouble falling or staying asleep, or sleeping too much -   Feeling tired or having little energy -   Poor appetite, weight loss, or overeating -   Feeling bad about yourself - or that you are a failure or have let yourself or your family down -   Trouble concentrating on things such as school, work, reading, or watching TV -   Moving or speaking so slowly that other people could have noticed; or the opposite being so fidgety that others notice -   Thoughts of being better off dead, or hurting yourself in some way -   How difficult have these problems made it for you to do your work, take care of your home and get along with others -       Hutto Scale 3/11/2020   Sitting and Reading 2   Watching TV 3   Sitting, inactive in a public place (e.g. a movie theater or meeting) 0   As a passenger in a car for an hour, without a break 0   Lying down to rest in the afternoon, when circumstances permit 1   Sitting and talking to someone 0   Sitting quietly after lunch without alcohol 1   In a car, while stopped for a few minutes in traffic 0   Hutto Sleepiness Score 7        Neck circ.  in \"inches\": 18         Past Medical History:  Past Medical History:   Diagnosis Date    Abnormal myocardial perfusion study 2/28/2016    Partially reversible inferior perfusion defect, EF 57%    AI (aortic insufficiency)     Moderate with possible bicuspid aortic valve    Chronic low back pain     Chronic venous insufficiency     Colon polyp 03/2012    Benign serrated polyp    Fracture 05/2016    rib fx's    GERD (gastroesophageal reflux disease)     History of echocardiogram 2/28/2016    EF 55%. Basal inferior hypokinesis. Gr 1 DDfx. Mod AI.  Hyperlipidemia     Hypertension     Osteoarthritis of right knee     Osteopenia     Prediabetes     Prepatellar bursitis of right knee     Recurrent genital herpes 1/16/2013    Right lumbar radiculopathy 10/2016    MRI: prominent right paracentral L4-L5 disc extrusion/impingement of descending right L5 nerve root; moderately severe canal stenosis; prominent right paracentral L5-S1 disc protrusion/ impingement of the descending right S1 nerve root; abutment of the foraminal right L5 nerve root/moderate foramina stenosis at L5-S1; mild canal stenosis at L3-L4 with abutment of the descending left L4 nerve root.  S/P cardiac catheterization 3/1/2016    Normal coronary anatomy       Past Surgical History:  Past Surgical History:   Procedure Laterality Date    ENDOSCOPY, COLON, DIAGNOSTIC      HX BACK SURGERY  07/05/2018    2 herniated disc repaired     HX CHOLECYSTECTOMY      HX TONSILLECTOMY         Family History:  Family History   Problem Relation Age of Onset    Cancer Father 61        esophageal    Hypertension Father     Cancer Mother 80        ovarian cancer     Hypertension Brother     Diabetes Other         Grandmother    Arthritis-osteo Other     Other Other         Stomach problems;  Father, grandfather       Social History:    Caffeine Amount Time of last Intake Comments   Coffee 3 C/day morning    Soda Rare     Tea Rare     Energy Drinks None     Over- the - counter stimulant pills None     Other Substances      Alcohol sporadic no more than 4 beers at a time- usually  Will drink 2 at a sitting   Gave up beer for [employer] use   Tobacco None  Quit smoking 2000; chewing 1995   Drugs None     Other:          Medications:  Current Outpatient Medications on File Prior to Visit   Medication Sig Dispense Refill    ascorbic acid, vitamin C, (VITAMIN C) 500 mg tablet Take 500 mg by mouth daily.  cyclobenzaprine (FLEXERIL) 10 mg tablet Take 10 mg by mouth daily.  oxyCODONE-acetaminophen (PERCOCET) 5-325 mg per tablet Take 1 Tab by mouth every eight (8) hours as needed.  amLODIPine (NORVASC) 10 mg tablet TAKE 1 TABLET DAILY (Patient taking differently: Take 10 mg by mouth daily.) 90 Tab 3    valACYclovir (VALTREX) 500 mg tablet TAKE 1 TABLET DAILY 90 Tab 3    albuterol (PROVENTIL HFA, VENTOLIN HFA, PROAIR HFA) 90 mcg/actuation inhaler Take 1 Puff by inhalation every six (6) hours as needed for Wheezing. 1 Inhaler 3    inhalational spacing device 1 Each by Does Not Apply route as needed (shortness of breath). 1 Device 0    losartan (COZAAR) 100 mg tablet TAKE 1 TABLET DAILY (Patient taking differently: Take 100 mg by mouth daily.) 90 Tab 3    nitroglycerin (NITROSTAT) 0.4 mg SL tablet 1 Tab by SubLINGual route every five (5) minutes as needed for Chest Pain. Up to 3 doses. 1 Bottle 6    calcium carbonate (CALCIUM 300 PO) Take 1 Tab by mouth daily.  magnesium chloride (MAG 64 PO) Take 1 Tab by mouth daily.  aspirin delayed-release 81 mg tablet Take 81 mg by mouth daily.  Cholecalciferol, Vitamin D3, 2,000 unit cap Take 2,000 Units by mouth daily as needed.  DOCOSAHEXANOIC ACID/EPA (FISH OIL PO) Take 1 Tab by mouth daily.  docusate sodium (COLACE) 100 mg capsule Take 100 mg by mouth two (2) times daily as needed.  magnesium oxide (MAG-OX) 400 mg tablet Take 400 mg by mouth.  omega 3-dha-epa-fish oil 60- mg cap Take 500 mg by mouth. No current facility-administered medications on file prior to visit. Allergy:  Allergies   Allergen Reactions    Tree Nuts Anaphylaxis    Ace Inhibitors Cough    Nuts [Tree Nut] Itching       Review of Systems  General ROS: positive for  - fatigue and sleep disturbance  negative for - chills, fever, hot flashes, malaise or night sweats  ENT ROS: negative for - epistaxis, hearing change, nasal discharge, nasal polyps, oral lesions, sinus pain, sneezing, sore throat, tinnitus, vertigo, visual changes or vocal changes, positive for nasal congestion and history of nasal fracture- age 10  Hematological and Lymphatic ROS: negative for - bleeding problems, blood clots, bruising, jaundice, pallor or swollen lymph nodes  Endocrine ROS: negative for - polydipsia/polyuria, skin changes, temperature intolerance or unexpected weight changes  Respiratory ROS: positive for - shortness of breath  negative for - cough, hemoptysis, orthopnea, sputum changes, stridor, tachypnea or wheezing  Cardiovascular ROS: positive for - dyspnea on exertion, negative for chest pain  Gastrointestinal ROS: no abdominal pain, change in bowel habits, or black or bloody stools  Genito-Urinary ROS: no dysuria, trouble voiding, or hematuria  Musculoskeletal ROS: as above  Neurological ROS: no TIA or stroke symptoms  Dermatological ROS: negative for - pruritus, rash or skin lesion changes   Psychological ROS: as above   Otherwise negative. Physical Exam:  Blood pressure 110/64, pulse 68, temperature 97.8 °F (36.6 °C), temperature source Oral, resp. rate 19, height 6' (1.829 m), weight 104.3 kg (230 lb), SpO2 96 %. on RA, Body mass index is 31.19 kg/m².      General: No distress, acyanotic, appears stated age, cooperative, pleasant  HEENT: PERRL, EOMI, throat without erythema or exudate, Tongue- dental indention on tongue, Mallampati's score 3+, Uvula- midline, Tonsils- not seen  Neck: Supple,  no abnormally enlarged lymph nodes, thyroid is not enlarged, non-tender, No JVD, no carotid bruits  Chest: Normal.  Lungs: Moderate air entry, clear to auscultation bilaterally,   Heart: Regular rate and rhythm, S1S2 present, without murmur. Abdomen: Protuberant, bowel sounds normoactive, abdomen is soft without significant tenderness, or guarding. Extremity: Negative for cyanosis, edema, or clubbing. Skin: Skin color, texture, turgor normal. No rashes or lesions. Neurological: CN 2-12 grossly intact, normal muscle tone. Data Reviewed:  CBC:   Lab Results   Component Value Date/Time    WBC 7.3 02/20/2020 08:48 AM    HGB 16.5 (H) 02/20/2020 08:48 AM    HCT 47.2 02/20/2020 08:48 AM    PLATELET 972 60/54/8105 08:48 AM    MCV 91.8 02/20/2020 08:48 AM   Results for Burton Daugherty (MRN [id number]) as of 3/11/2020 11:58   Ref. Range 2/16/2018 08:10 2/19/2019 09:06 5/20/2019 09:50 9/3/2019 09:27 2/20/2020 08:48   HGB Latest Ref Range: 13.0 - 16.0 g/dL 16.3 (H) 16.3 (H) 16.1 (H) 16.1 (H) 16.5 (H)     Results for Burton Daugherty (MRN [id number]) as of 3/11/2020 12:22   Ref. Range 2/16/2018 08:10 2/19/2019 09:06 5/20/2019 09:50 9/3/2019 09:27 2/20/2020 08:48   ABS.  EOSINOPHILS Latest Ref Range: 0.0 - 0.4 K/UL 0.2 0.2 0.2 0.3 0.2       BMP:   Lab Results   Component Value Date/Time    Sodium 141 02/20/2020 08:48 AM    Potassium 4.3 02/20/2020 08:48 AM    Chloride 109 02/20/2020 08:48 AM    CO2 26 02/20/2020 08:48 AM    Anion gap 6 02/20/2020 08:48 AM    Glucose 106 (H) 02/20/2020 08:48 AM    BUN 21 (H) 02/20/2020 08:48 AM    Creatinine 1.20 02/20/2020 08:48 AM    BUN/Creatinine ratio 18 02/20/2020 08:48 AM    GFR est AA >60 02/20/2020 08:48 AM    GFR est non-AA >60 02/20/2020 08:48 AM    Calcium 9.0 02/20/2020 08:48 AM        TSH:  Lab Results   Component Value Date/Time    TSH 1.20 02/19/2019 09:06 AM    TSH 1.91 02/16/2018 08:10 AM    TSH 1.08 02/08/2017 09:54 AM    TSH 1.29 02/02/2016 03:00 PM    TSH 1.100 01/14/2015 08:44 AM    TSH 1.510 01/07/2014 08:04 AM TSH 2.030 07/10/2012 08:00 AM     Lab Results   Component Value Date/Time    Hemoglobin A1c 6.2 (H) 02/20/2020 08:48 AM     No results found for: BNP, BNPP, BNPPPOC, XBNPT, BNPNT        Imaging:  [x]I have personally reviewed the patients radiographs section   Results from [place] encounter on 09/12/19   XR CHEST PA LAT    Narrative TWO VIEW CHEST RADIOGRAPH     CPT CODE: 04147    INDICATION: Dyspnea on exertion with history of smoking and asbestos exposure. COMPARISON: 4/23/2014    FINDINGS:     The trachea is midline. The cardiomediastinal silhouette is within normal  limits. The pulmonary vascular markings are unremarkable. There is no focal  consolidation, pleural effusion or pneumothorax. No acute osseous abnormality. Surgical clips present in the upper abdomen. Impression IMPRESSION:    No acute cardiopulmonary process. No results found for this or any previous visit. Cardiac Echo:      9/7/17: SUMMARY:  Left ventricle: Systolic function was at the lower limits of normal by EF   (biplane method of disks). Ejection fraction  was estimated in the range of 50 % to 55 %. No obvious wall motion   abnormalities identified in the views obtained. Wall  thickness was mildly increased.     Aortic valve: Leaflets exhibited mildly increased thickness, mild   calcification, normal cuspal separation, and  sclerosis. There was moderate regurgitation.     Aorta, systemic arteries: The root exhibited mild dilatation.     COMPARISONS:  There has been no significant change. Comparison was made with the previous   study of 07-Aug-2014. Historical Sleep Testing Data: No Testing To Date.           Triston Gonzalez DO, [facility]  Pulmonary, Sleep and Critical Care Medicine

## 2020-03-27 RX ORDER — IBUPROFEN 800 MG/1
TABLET ORAL
Qty: 30 TAB | Refills: 1 | Status: SHIPPED | OUTPATIENT
Start: 2020-03-27 | End: 2020-12-16

## 2020-04-07 ENCOUNTER — TELEPHONE (OUTPATIENT)
Dept: INTERNAL MEDICINE CLINIC | Age: 60
End: 2020-04-07

## 2020-04-07 NOTE — TELEPHONE ENCOUNTER
Called [facility] back gave diagnosis of herpes for previous order. Explained that patient has been on this medication and dosage for years. No further questions or concerns at this time.

## 2020-05-14 RX ORDER — LOSARTAN POTASSIUM 100 MG/1
TABLET ORAL
Qty: 90 TAB | Refills: 3 | Status: SHIPPED | OUTPATIENT
Start: 2020-05-14 | End: 2022-01-17 | Stop reason: SDUPTHER

## 2020-07-01 ENCOUNTER — TELEPHONE (OUTPATIENT)
Dept: INTERNAL MEDICINE CLINIC | Age: 60
End: 2020-07-01

## 2020-07-01 ENCOUNTER — OFFICE VISIT (OUTPATIENT)
Dept: INTERNAL MEDICINE CLINIC | Age: 60
End: 2020-07-01

## 2020-07-01 VITALS
HEIGHT: 72 IN | RESPIRATION RATE: 16 BRPM | BODY MASS INDEX: 30.75 KG/M2 | TEMPERATURE: 97.3 F | DIASTOLIC BLOOD PRESSURE: 72 MMHG | WEIGHT: 227 LBS | HEART RATE: 98 BPM | SYSTOLIC BLOOD PRESSURE: 132 MMHG | OXYGEN SATURATION: 97 %

## 2020-07-01 DIAGNOSIS — R58 ECCHYMOSIS ON EXAMINATION: Primary | ICD-10-CM

## 2020-07-01 DIAGNOSIS — M25.561 CHRONIC PAIN OF RIGHT KNEE: ICD-10-CM

## 2020-07-01 DIAGNOSIS — D75.1 ERYTHROCYTOSIS: ICD-10-CM

## 2020-07-01 DIAGNOSIS — I87.2 CHRONIC VENOUS INSUFFICIENCY: ICD-10-CM

## 2020-07-01 DIAGNOSIS — I10 ESSENTIAL HYPERTENSION: ICD-10-CM

## 2020-07-01 DIAGNOSIS — I35.1 AORTIC VALVE INSUFFICIENCY, ETIOLOGY OF CARDIAC VALVE DISEASE UNSPECIFIED: ICD-10-CM

## 2020-07-01 DIAGNOSIS — R73.03 PREDIABETES: ICD-10-CM

## 2020-07-01 DIAGNOSIS — G89.29 CHRONIC PAIN OF RIGHT KNEE: ICD-10-CM

## 2020-07-01 DIAGNOSIS — M17.11 PRIMARY OSTEOARTHRITIS OF RIGHT KNEE: ICD-10-CM

## 2020-07-01 DIAGNOSIS — E78.5 HYPERLIPIDEMIA, UNSPECIFIED HYPERLIPIDEMIA TYPE: ICD-10-CM

## 2020-07-01 DIAGNOSIS — E66.09 CLASS 1 OBESITY DUE TO EXCESS CALORIES WITHOUT SERIOUS COMORBIDITY WITH BODY MASS INDEX (BMI) OF 31.0 TO 31.9 IN ADULT: ICD-10-CM

## 2020-07-01 NOTE — TELEPHONE ENCOUNTER
Patient stating he had a bad fall. His leg/knee is black and blue. Does not have the ability to do a virtual appt. Wants to know if he can be seen in the office.

## 2020-07-01 NOTE — PATIENT INSTRUCTIONS
Bruises: Care Instructions  Your Care Instructions     Bruises occur when small blood vessels under the skin tear or rupture, most often from a twist, bump, or fall. Blood leaks into tissues under the skin and causes a black-and-blue spot that often turns colors, including purplish black, reddish blue, or yellowish green, as the bruise heals. Bruises hurt, but most are not serious and will go away on their own within 2 to 4 weeks. Sometimes, gravity causes them to spread down the body. A leg bruise usually will take longer to heal than a bruise on the face or arms. Follow-up care is a key part of your treatment and safety. Be sure to make and go to all appointments, and call your doctor if you are having problems. It's also a good idea to know your test results and keep a list of the medicines you take. How can you care for yourself at home? · Take pain medicines exactly as directed. ? If the doctor gave you a prescription medicine for pain, take it as prescribed. ? If you are not taking a prescription pain medicine, ask your doctor if you can take an over-the-counter medicine. · Put ice or a cold pack on the area for 10 to 20 minutes at a time. Put a thin cloth between the ice and your skin. · If you can, prop up the bruised area on pillows as much as possible for the next few days. Try to keep the bruise above the level of your heart. When should you call for help? Call your doctor now or seek immediate medical care if:  · You have signs of infection, such as:  ? Increased pain, swelling, warmth, or redness. ? Red streaks leading from the bruise. ? Pus draining from the bruise. ? A fever. · You have a bruise on your leg and signs of a blood clot, such as:  ? Increasing redness and swelling along with warmth, tenderness, and pain in the bruised area. ? Pain in your calf, back of the knee, thigh, or groin. ? Redness and swelling in your leg or groin. · Your pain gets worse.   Watch closely for changes in your health, and be sure to contact your doctor if:  · You do not get better as expected. Where can you learn more? Go to http://www.gray.com/  Enter T177 in the search box to learn more about \"Bruises: Care Instructions. \"  Current as of: June 26, 2019               Content Version: 12.5  © 9412-9041 [employer]. Care instructions adapted under license by [employer] (which disclaims liability or warranty for this information). If you have questions about a medical condition or this instruction, always ask your healthcare professional. [address] any warranty or liability for your use of this information.

## 2020-07-04 NOTE — PROGRESS NOTES
HPI:   Scarlett Ferguson is a 56y.o. year old male who presents today for an acute visit. He has a history of hypertension, hyperlipidemia, moderate aortic insufficiency, atypical chest pain, prediabetes, GERD, osteoarthritis, lumbar radiculopathy, and chronic venous insufficiency. He reports that his mother is not doing well and has entered hospice for assistance in managing her advanced ovarian cancer. He reports that he underwent evaluation by Dr. Maron Osgood for his right knee pain and has begun wearing a right knee brace since he is not yet able to undergo surgery while his mother is in hospice. He underwent evaluation by Dr. Kayy Arroyo in 3/2020 and she recommended in-lab sleep testing given the severity of his symptoms. He has not yet had it performed due to his mother's illness. He reports that 6 days ago, while working in his garage, he inadvertantly disturbed a wasp nest. He states that as he turned to run, he developed severe pain in his left thigh and he was unable to move his leg which caused him to fall. He states that he did not sustain injury from the fall, but he has had difficulty with pain in his posterior thigh. He states that he has had some difficulty walking and has been applying heat with some improvement. However, two days ago, his wife noticed large areas of purplish discoloration posterior to his right knee. He denies any loss of range of motion, palpable tenderness, leg swelling, or shortness of breath. . He stated he was concerned regarding the appearance of the discoloration and decided to schedule an appointment. He is otherwise without new complaints. He has a history of hypertension, treated with losartan and amlodipine. He does not check his blood pressure at home regularly. He also does not exercise regularly, although admits to doing yard work and projects around the house. He denies any shortness of breath at rest or with exertion, palpitations, lightheadedness, or edema.  He does have a history of substernal chest pain that has no relation to exertion and was thought to be atypical. He presented to [facility] on 2/27/2016 with a complaint of chest pain and diaphoresis, which seemed to be relieved by aspirin and sublingual nitroglycerin in the ambulance. Evaluation included negative ECG and troponins. He had an exercise nuclear stress test (2/29/2016) which showed normal LV function (EF 57%) and a medium sized partially reversible defect inferiorly (intermediate risk study). On 3/1/2016, he underwent a cardiac catheterization which showed no significant epicardial coronary artery disease. He also had an echocardiogram (2/28/2016) showing mild LV septal hypertrophy, hypokinetic basal inferior wall with preserved LV function (EF 95%), mild diastolic dysfunction, and moderate aortic regurgitation with questionable dilation of the distal arch of aorta (3.5 cm). He was found to have elevated triglycerides, with lipid panel showing total cholesterol 201/ / HDL 52/ LDL 89. He was started on Fenofibrate prior to discharge on 3/1/2016. He reports that he has had no significant recurrence of chest pain since that time. He had a repeat echocardiogram in 9/2017 which showed low normal LV function (EF 50-55%), no RWMA, calcified aortic valve with bicuspid appearance and moderate regurgitation, and mild dilatation of the aortic root. He does have difficulty with mild lower extremity edema due to chronic venous insufficiency. He is followed by Dr. Mani Mckinney. He has a history of prediabetes, with HbA1c ranging from 5.8-6.1 since 2012. He denies any polyuria, polydipsia, nocturia, or blurry vision, and has no history of retinopathy, neuropathy, or nephropathy. He has regular eye exams with Dr. Rj Lehman. He has a history of osteoarthritis, involving his right knee, right great toe, and lumbar spine.  He has had difficulty in the past with increasing pain at the base of his right thumb and was evaluated by Dr. Kavitha Solis at [address] [place] [address]. He states that x-rays revealed he had severe osteoarthritis, and he received a cortisone injection with some improvement. In 12/2016, he noted increasing difficulty with low back pain and right sided sciatica. He underwent a lumbar MRI (12/2016) which showed progression when compared to a prior lumbar MRI from 2010. It showed prominent right paracentral L4-L5 disc extrusion with impingement of the descending right L5 nerve root and moderately severe multifactorial canal stenosis; additional prominent right paracentral L5-S1 disc protrusion with impingement of the descending right S1 nerve root; abutment of the foraminal right L5 nerve root related to moderate foramina stenosis at L5-S1; mild multifactorial canal stenosis at L3-L4 with abutment of the descending left L4 nerve root. He was evaluated by Dr. Jeremy Alejandra, and treated with Medrol dose pack, Norco and Flexeril without significant improvement. He was evaluated by Dr. Nidhi Simon in 3/2017 for possible surgical intervention, but this was deferred due to issues regarding his wife's health. He was treated with gabapentin, and was reevaluated by Dr. Jeremy Alejanrda in 8/2017, at which time he reported significant improvement. He states that he no longer required treatment with gabapentin or Norco, and was continuing to take Voltaren ER with good control of symptoms. However in 3/2018, he developed recurrence of severe pain with right sided sciatica that was unresponsive to conservative therapy. He subsequently consulted Dr. Kevin Noyola and on 7/5/2018, he underwent a right L4-L5 and L5-S1 hemilaminectomy, medial facetectomy, and foraminotomy by Dr. Kevin Noyola for removal of herniated disks and decompression of foraminal stenoses at these levels. He reports that his right sided sciatica has resolved and he is no longer requiring any pain medication. He does report some residual numbness on the plantar surface of his right foot.      He has a history of GERD, with symptoms well controlled with pantoprazole as needed. He had a screening colonoscopy by Dr. Lio Kingston in 3/2012 with removal of a benign serrated polyp (pathology shows hyperplastic and adenomatous features). He had repeat screening colonoscopy in 3/2017 by Dr Kriss Metz which was normal. Follow-up recommended for five years. He denies any abdominal pain, nausea, vomiting, melena, hematochezia, or change in bowel movements. In 5/2016, he fell at work with his chest striking the edge of a table and he sustained fractures of the anterior right 7th and 8th ribs. He had serial rib x-rays since that time showing slow interval healing, but reports that he no longer has pain related to this. He underwent a bone density scan in 10/2016 which showed evidence of osteopenia with T-scores:  femoral neck left -1.7  /right -1.5 and lumbar -1.2. He was started on calcium and Vitamin D supplementation. He previously smoked approximately 1 ppd for 40 years, stopping in 2000. He also reports a history of asbestos exposure in 1980. Past Medical History:   Diagnosis Date    Abnormal myocardial perfusion study 2/28/2016    Partially reversible inferior perfusion defect, EF 57%    AI (aortic insufficiency)     Moderate with possible bicuspid aortic valve    Chronic low back pain     Chronic venous insufficiency     Colon polyp 03/2012    Benign serrated polyp    Fracture 05/2016    rib fx's    GERD (gastroesophageal reflux disease)     History of echocardiogram 2/28/2016    EF 55%. Basal inferior hypokinesis. Gr 1 DDfx. Mod AI.       Hyperlipidemia     Hypertension     Osteoarthritis of right knee     Osteopenia     Prediabetes     Prepatellar bursitis of right knee     Recurrent genital herpes 1/16/2013    Right lumbar radiculopathy 10/2016    MRI: prominent right paracentral L4-L5 disc extrusion/impingement of descending right L5 nerve root; moderately severe canal stenosis; prominent right paracentral L5-S1 disc protrusion/ impingement of the descending right S1 nerve root; abutment of the foraminal right L5 nerve root/moderate foramina stenosis at L5-S1; mild canal stenosis at L3-L4 with abutment of the descending left L4 nerve root.  S/P cardiac catheterization 3/1/2016    Normal coronary anatomy     Past Surgical History:   Procedure Laterality Date    ENDOSCOPY, COLON, DIAGNOSTIC      HX BACK SURGERY  07/05/2018    2 herniated disc repaired     HX CHOLECYSTECTOMY      HX TONSILLECTOMY       Current Outpatient Medications   Medication Sig    losartan (COZAAR) 100 mg tablet TAKE 1 TABLET DAILY    ibuprofen (MOTRIN) 800 mg tablet TAKE 1 TABLET BY MOUTH EVERY 8 HOURS AS NEEDED FOR PAIN FOR UP TO 10 DAYS. TAKE WITH FOOD    ascorbic acid, vitamin C, (VITAMIN C) 500 mg tablet Take 500 mg by mouth daily.  cyclobenzaprine (FLEXERIL) 10 mg tablet Take 10 mg by mouth daily.  magnesium oxide (MAG-OX) 400 mg tablet Take 400 mg by mouth.  oxyCODONE-acetaminophen (PERCOCET) 5-325 mg per tablet Take 1 Tab by mouth every eight (8) hours as needed.  amLODIPine (NORVASC) 10 mg tablet TAKE 1 TABLET DAILY (Patient taking differently: Take 10 mg by mouth daily.)    valACYclovir (VALTREX) 500 mg tablet TAKE 1 TABLET DAILY    albuterol (PROVENTIL HFA, VENTOLIN HFA, PROAIR HFA) 90 mcg/actuation inhaler Take 1 Puff by inhalation every six (6) hours as needed for Wheezing.  inhalational spacing device 1 Each by Does Not Apply route as needed (shortness of breath).  nitroglycerin (NITROSTAT) 0.4 mg SL tablet 1 Tab by SubLINGual route every five (5) minutes as needed for Chest Pain. Up to 3 doses.  calcium carbonate (CALCIUM 300 PO) Take 1 Tab by mouth daily.  aspirin delayed-release 81 mg tablet Take 81 mg by mouth daily.  Cholecalciferol, Vitamin D3, 2,000 unit cap Take 2,000 Units by mouth daily as needed.  DOCOSAHEXANOIC ACID/EPA (FISH OIL PO) Take 1 Tab by mouth daily. No current facility-administered medications for this visit. Allergies and Intolerances: Allergies   Allergen Reactions    Tree Nuts Anaphylaxis    Ace Inhibitors Cough    Nuts [Tree Nut] Itching     Family History: His mother is alive with stage 3 ovarian cancer. His father  from esophageal cancer at age 61. His maternal aunt  at age 48 from colon cancer. He has no family history of prostate cancer. Family History   Problem Relation Age of Onset    Cancer Father 61        esophageal    Hypertension Father     Cancer Mother 80        ovarian cancer     Hypertension Brother     Diabetes Other         Grandmother    Arthritis-osteo Other     Other Other         Stomach problems; Father, grandfather     Social History:   He  reports that he quit smoking about 20 years ago. He has a 12.50 pack-year smoking history. He quit smokeless tobacco use about 20 years ago. Reports that he smoked less than 1 ppd for 40 years, and stopped in . He is  and they have one son. His wife was recently diagnosed with Paget's disease of the breast, which represents a recurrence of prior breast cancer. He was employed as an  for [employer], but retired in 2017. He drinks about a 12 pack of beer each month. Social History     Substance and Sexual Activity   Alcohol Use Yes    Alcohol/week: 1.7 standard drinks    Types: 2 Cans of beer per week     Immunization History:  Immunization History   Administered Date(s) Administered    Influenza Vaccine 2013, 2014, 10/01/2015    TDAP Vaccine 2004    Td 2004    Tdap 2014       Review of Systems:   As above included in HPI. Otherwise 11 point review of systems negative including constitutional, skin, HENT, eyes, respiratory, cardiovascular, gastrointestinal, genitourinary, musculoskeletal, endocrine, hematologic, allergy, and neurologic.     Physical:   Vitals:   BP: 132/72  HR: 98  WT: 227 lb (103 kg)  BMI:  30.79 kg/m2    Exam:     Patient appears in no apparent distress. Affect is appropriate. HEENT: PERRLA, anicteric, oropharynx clear, no JVD, adenopathy or thyromegaly. No carotid bruits or radiated murmur. Lungs: clear to auscultation, no wheezes, rhonchi, or rales. Heart --Regular rate and rhythm, II/VI midsystolic murmur at RUSB; no rubs or gallops  Abdomen: soft, nontender, nondistended, normal bowel sounds, no hepatosplenomegaly or masses. Extremities: right leg with large area of bright purple ecchymoses posterior to the knee and extending into the thigh; Palpable superficial veins present on both legs and are without change from baseline; no tenderness to palpation; no edema of right leg    Review of Data:  Labs:  No visits with results within 1 Month(s) from this visit. Latest known visit with results is:   Hospital Outpatient Visit on 02/20/2020   Component Date Value Ref Range Status    WBC 02/20/2020 7.3  4.6 - 13.2 K/uL Final    RBC 02/20/2020 5.14  4.70 - 5.50 M/uL Final    HGB 02/20/2020 16.5* 13.0 - 16.0 g/dL Final    HCT 02/20/2020 47.2  36.0 - 48.0 % Final    MCV 02/20/2020 91.8  74.0 - 97.0 FL Final    MCH 02/20/2020 32.1  24.0 - 34.0 PG Final    MCHC 02/20/2020 35.0  31.0 - 37.0 g/dL Final    RDW 02/20/2020 13.4  11.6 - 14.5 % Final    PLATELET 11/14/3752 712  135 - 420 K/uL Final    MPV 02/20/2020 10.8  9.2 - 11.8 FL Final    NEUTROPHILS 02/20/2020 54  40 - 73 % Final    LYMPHOCYTES 02/20/2020 32  21 - 52 % Final    MONOCYTES 02/20/2020 10  3 - 10 % Final    EOSINOPHILS 02/20/2020 3  0 - 5 % Final    BASOPHILS 02/20/2020 1  0 - 2 % Final    ABS. NEUTROPHILS 02/20/2020 3.9  1.8 - 8.0 K/UL Final    ABS. LYMPHOCYTES 02/20/2020 2.4  0.9 - 3.6 K/UL Final    ABS. MONOCYTES 02/20/2020 0.7  0.05 - 1.2 K/UL Final    ABS. EOSINOPHILS 02/20/2020 0.2  0.0 - 0.4 K/UL Final    ABS.  BASOPHILS 02/20/2020 0.1  0.0 - 0.1 K/UL Final    DF 02/20/2020 AUTOMATED    Final  Hemoglobin A1c 02/20/2020 6.2* 4.2 - 5.6 % Final    Est. average glucose 02/20/2020 131  mg/dL Final    LIPID PROFILE 02/20/2020        Final    Cholesterol, total 02/20/2020 204* <200 MG/DL Final    Triglyceride 02/20/2020 163* <150 MG/DL Final    HDL Cholesterol 02/20/2020 48  40 - 60 MG/DL Final    LDL, calculated 02/20/2020 123.4* 0 - 100 MG/DL Final    VLDL, calculated 02/20/2020 32.6  MG/DL Final    CHOL/HDL Ratio 02/20/2020 4.3  0 - 5.0   Final    Sodium 02/20/2020 141  136 - 145 mmol/L Final    Potassium 02/20/2020 4.3  3.5 - 5.5 mmol/L Final    Chloride 02/20/2020 109  100 - 111 mmol/L Final    CO2 02/20/2020 26  21 - 32 mmol/L Final    Anion gap 02/20/2020 6  3.0 - 18 mmol/L Final    Glucose 02/20/2020 106* 74 - 99 mg/dL Final    BUN 02/20/2020 21* 7.0 - 18 MG/DL Final    Creatinine 02/20/2020 1.20  0.6 - 1.3 MG/DL Final    BUN/Creatinine ratio 02/20/2020 18  12 - 20   Final    GFR est AA 02/20/2020 >60  >60 ml/min/1.73m2 Final    GFR est non-AA 02/20/2020 >60  >60 ml/min/1.73m2 Final    Calcium 02/20/2020 9.0  8.5 - 10.1 MG/DL Final    Bilirubin, total 02/20/2020 0.7  0.2 - 1.0 MG/DL Final    ALT (SGPT) 02/20/2020 42  16 - 61 U/L Final    AST (SGOT) 02/20/2020 24  10 - 38 U/L Final    Alk. phosphatase 02/20/2020 79  45 - 117 U/L Final    Protein, total 02/20/2020 6.9  6.4 - 8.2 g/dL Final    Albumin 02/20/2020 4.1  3.4 - 5.0 g/dL Final    Globulin 02/20/2020 2.8  2.0 - 4.0 g/dL Final    A-G Ratio 02/20/2020 1.5  0.8 - 1.7   Final     EKG (9/12/2019) sinus rhythm at 77 bpm, no ischemic changes and unchanged from last on 1/7/2019    Calculated 10 year ASCVD risk score:  10.2 %    Health Maintenance:  Screening:    Colorectal: colonoscopy (3/2017) normal. Dr. Elmer Maher. Due 3/2022.    Depression: none   DM (HbA1c/FPG): / HbA1c 6.2 (2/2020)   Hepatitis C: negative (9/2016)   Falls: none    DEXA: N/A   PSA/TONI: PSA 0.5 (5/2019)/ TONI (2/2019)   Glaucoma: regular eye exams with Dr. Kendal Peraza (last 1/2017)   Smoking: none   Vitamin D: 40.4 (2/2019)   Medicare Wellness: N/A    Impression:  Patient Active Problem List   Diagnosis Code    Hyperlipidemia E78.5    GERD (gastroesophageal reflux disease) K21.9    Chronic venous insufficiency I87.2    Colon polyp K63.5    Prediabetes R73.03    Recurrent genital herpes A60.00    Vitamin D insufficiency E55.9    Aortic insufficiency, moderate I35.1    Essential hypertension I10    Primary osteoarthritis of knee M17.10    Lumbar herniated disc  right L4/5/S1 chronic M51.26    Osteopenia determined by DEXA 2016, lowest T score -1.7 M85.80    Annular tear of lumbar disc M51.36    Chronic right-sided low back pain with sciatica M54.40, G89.29    Neuritis of lower extremity G57.90    Chronic thumb pain, right M79.644, G89.29    S/P lumbar discectomy Z98.890    Class 1 obesity due to excess calories without serious comorbidity in adult E66.09    Erythrocytosis D75.1    Chronic pain of right knee M25.561, G89.29       Plan:   Left leg ecchymoses. Patient reports turning abruptly to run away from a wasp nest that had been disturbed. He states that he felt a sharp severe pain in his left leg and fell to the ground. He noted the development of a large area of ecchymoses in the posterior left knee extending into his left thigh. Discussed that likely bruising related to injury, and that it extended along facial plane of posterior leg which explains appearance. Advised to continue with conservative management, especially with application of heat to muscle in area of tightness and pain. Injury not severe enough to interfere with activities or ambulation, so do not feel need to proceed with any imaging at this time. Has previously scheduled appointment next month and will reevaluate at that time. Advised to call prior to visit for any worsening. Other issues:   1. Shortness of breath.  Patient reported some improvement in breathlessness today. However, he continues to describe a choking feeling at times when he lies down. EKG unchanged from baseline. Chest x-ray negative. Established care with new cardiologist and felt to be stable. Again stressed importance of proceeding with sleep evaluation given known daytime drowsiness, snoring, and fatigue, and evidence of erythrocytosis. Evaluated by Dr. Mona Martínez and recommended in-lab sleep study. PFTs normal, although elevated absolute eosinophil count. Does have history of childhood asthma. Continue to follow. 2. Hypertension. Well controlled on current regimen of losartan 100 mg daily and amlodipine 10 mg daily. Renal function remains normal with creatinine 1.20 / eGFR >60. Continue to follow. 3. Dyslipidemia. Patient no longer taking fenofibrate. Discontinued last visit given lack of data showing that lowering triglycerides decreases the risk of CAD. In addition, first line therapy for treating triglycerides < 500 would be initiation of a statin. Repeat lipid panel with triglyceride level 118. However, his calculated 10 year ASCVD risk is 10.2 %, which does place him in one of the four statin benefit groups as per new AHA/ACC guidelines (primary prevention: ASCVD risk > 7.5%).  with HDL 48 which remains elevated. He remains unwilling to start treatment with statin due to concerns regarding possible side effects. Will reevaluate at next visit and readdress. Emphasized importance of lifestyle modifications, including diet, exercise, and weight loss. Will continue to follow. 4. Aortic insufficiency, moderate with mildly dilated aortic root. Probable bicuspid valve. Being monitored on echocardiogram. Also, considering thoracic CT scan if dilation of aortic root appears to progress on echocardiogram.  Repeat echocardiogram to reassess valve in 9/2017 without change. Established with new cardiologist, Dr. Kecia Padilla, and plan to repeat echocardiogram. Will request report. Follow. 5. Prediabetes. HbA1c increased to 6.2 in 2/2020. Weight increased 5 pounds since last visit and patient stated that he was not exercising due to knee pain. No evidence of microvascular complications. On ARB but unwilling to begin statin. Continue follow-up with  for annual eye exams. Foot exam normal in 4/2017. Urine microalbumin/ creatinine ratio without evidence of microalbuminuria. Encouraged weight loss and dietary changes, particularly avoiding concentrated sweets. 6. Osteopenia. Last bone density scan 10/2016. Using femoral neck T-scores, calculated FRAX score estimates her 10 year risk of a major osteoporetic fracture at 12 % and hip fracture at 2.0 %, which are not an indication for biphosphonate treatment (>20% and >3%, respectively). Continue calcium and Vitamin D. Encouraged exercise, particularly weight bearing activities. Consider repeat bone density scan in near future. 7. GERD. Continue occasional use of Protonix with good control of symptoms. Follow. 8. Right knee pain. Patient reports increasing pain with ambulation and standing. He reports not exercising due to pain. Chronic problem which has now progressed. Requesting referral to Dr. Manoj Lind for evaluation given recommendation of a family member. Referral placed. 9. Right sciatica. Progression of symptoms and disease on MRI. Responded well to conservative therapy initially, but recurred and unresponsive to medications and injections. Underwent right L4-L5 and L5-S1 hemilaminectomy, medial facetectomy, and foraminotomy by Dr. Kunal Aguirre in 7/2018 with great success. Reports only residual numbness on medial plantar surface of right foot. No longer requiring any pain medication. Follow. 10. Right thumb osteoarthritis. Improved pain control with cortisone injection. Being followed by Dr. Mckayla Pathak. 11. Erythrocytosis. Remains only mildly elevated in 2/2020 with Hb 16.5/ Hct 47.2. Review of record shows similar readings since 2014.  May be due to LICHA vs hematologic disorder. However, erythropoietin level normal in 2/2020. 12. Excessive daytime sleepiness/ snoring. Patient with daytime drowsiness, snoring, and fatigue. Underwent evaluation by Dr. Chalino Thompson in 3/2020 and recommended in-lab sleep study due to severity of symptoms. However, he has not yet proceeded due to his mother's advanced cancer and recent enrollment in hospice. 13. Rectal bleeding. No recurrence. Guaiac negative on exam at last visit. No visible hemorrhoids. Colonoscopy normal 3/2017. Suspect symptoms may have been secondary to fissure and resolved with increasing fiber in diet. Follow. 14. Obesity. Emphasized importance of lifestyle modifications, including diet, exercise, and weight loss. Will readdress next visit. 15. Health maintenance. Discussed Shingrix vaccine but not wishing to proceed currently. Other immunizations up to date. Colonoscopy due 2022. Prostate cancer screening due next visit. Continue regular eye exams. Stressed importance of weight loss. Vitamin D level normal. Continue maintenance dose supplement. Patient understands recommendations and agrees with plan.   Follow-up as previously scheduled for physical.

## 2020-08-27 ENCOUNTER — OFFICE VISIT (OUTPATIENT)
Dept: INTERNAL MEDICINE CLINIC | Age: 60
End: 2020-08-27

## 2020-08-27 VITALS
SYSTOLIC BLOOD PRESSURE: 136 MMHG | DIASTOLIC BLOOD PRESSURE: 76 MMHG | BODY MASS INDEX: 30.61 KG/M2 | TEMPERATURE: 97.7 F | HEIGHT: 72 IN | OXYGEN SATURATION: 98 % | RESPIRATION RATE: 16 BRPM | HEART RATE: 68 BPM | WEIGHT: 226 LBS

## 2020-08-27 DIAGNOSIS — Z98.890 S/P LUMBAR DISCECTOMY: ICD-10-CM

## 2020-08-27 DIAGNOSIS — Z00.01 ENCOUNTER FOR ROUTINE ADULT PHYSICAL EXAM WITH ABNORMAL FINDINGS: Primary | ICD-10-CM

## 2020-08-27 DIAGNOSIS — M25.561 CHRONIC PAIN OF RIGHT KNEE: ICD-10-CM

## 2020-08-27 DIAGNOSIS — M17.11 PRIMARY OSTEOARTHRITIS OF RIGHT KNEE: ICD-10-CM

## 2020-08-27 DIAGNOSIS — E66.09 CLASS 1 OBESITY DUE TO EXCESS CALORIES WITHOUT SERIOUS COMORBIDITY WITH BODY MASS INDEX (BMI) OF 30.0 TO 30.9 IN ADULT: ICD-10-CM

## 2020-08-27 DIAGNOSIS — R73.03 PREDIABETES: ICD-10-CM

## 2020-08-27 DIAGNOSIS — G89.29 CHRONIC RIGHT-SIDED LOW BACK PAIN WITH RIGHT-SIDED SCIATICA: ICD-10-CM

## 2020-08-27 DIAGNOSIS — K21.9 GASTROESOPHAGEAL REFLUX DISEASE, ESOPHAGITIS PRESENCE NOT SPECIFIED: ICD-10-CM

## 2020-08-27 DIAGNOSIS — I35.1 AORTIC VALVE INSUFFICIENCY, ETIOLOGY OF CARDIAC VALVE DISEASE UNSPECIFIED: ICD-10-CM

## 2020-08-27 DIAGNOSIS — A60.00 RECURRENT GENITAL HERPES: ICD-10-CM

## 2020-08-27 DIAGNOSIS — G89.29 CHRONIC PAIN OF RIGHT KNEE: ICD-10-CM

## 2020-08-27 DIAGNOSIS — M85.80 OSTEOPENIA DETERMINED BY X-RAY: Chronic | ICD-10-CM

## 2020-08-27 DIAGNOSIS — M54.41 CHRONIC RIGHT-SIDED LOW BACK PAIN WITH RIGHT-SIDED SCIATICA: ICD-10-CM

## 2020-08-27 DIAGNOSIS — I10 ESSENTIAL HYPERTENSION: ICD-10-CM

## 2020-08-27 DIAGNOSIS — E78.5 HYPERLIPIDEMIA, UNSPECIFIED HYPERLIPIDEMIA TYPE: ICD-10-CM

## 2020-08-27 DIAGNOSIS — D75.1 ERYTHROCYTOSIS: ICD-10-CM

## 2020-08-27 NOTE — PATIENT INSTRUCTIONS
Learning About Meal Planning for Diabetes  Why plan your meals? Meal planning can be a key part of managing diabetes. Planning meals and snacks with the right balance of carbohydrate, protein, and fat can help you keep your blood sugar at the target level you set with your doctor. You don't have to eat special foods. You can eat what your family eats, including sweets once in a while. But you do have to pay attention to how often you eat and how much you eat of certain foods. You may want to work with a dietitian or a certified diabetes educator. He or she can give you tips and meal ideas and can answer your questions about meal planning. This health professional can also help you reach a healthy weight if that is one of your goals. What plan is right for you? Your dietitian or diabetes educator may suggest that you start with the plate format or carbohydrate counting. The plate format  The plate format is a simple way to help you manage how you eat. You plan meals by learning how much space each food should take on a plate. Using the plate format helps you spread carbohydrate throughout the day. It can make it easier to keep your blood sugar level within your target range. It also helps you see if you're eating healthy portion sizes. To use the plate format, you put non-starchy vegetables on half your plate. Add meat or meat substitutes on one-quarter of the plate. Put a grain or starchy vegetable (such as brown rice or a potato) on the final quarter of the plate. You can add a small piece of fruit and some low-fat or fat-free milk or yogurt, depending on your carbohydrate goal for each meal.  Here are some tips for using the plate format:  · Make sure that you are not using an oversized plate. A 9-inch plate is best. Many restaurants use larger plates. · Get used to using the plate format at home. Then you can use it when you eat out. · Write down your questions about using the plate format.  Talk to your doctor, a dietitian, or a diabetes educator about your concerns. Carbohydrate counting  With carbohydrate counting, you plan meals based on the amount of carbohydrate in each food. Carbohydrate raises blood sugar higher and more quickly than any other nutrient. It is found in desserts, breads and cereals, and fruit. It's also found in starchy vegetables such as potatoes and corn, grains such as rice and pasta, and milk and yogurt. Spreading carbohydrate throughout the day helps keep your blood sugar levels within your target range. Your daily amount depends on several things, including your weight, how active you are, which diabetes medicines you take, and what your goals are for your blood sugar levels. A registered dietitian or diabetes educator can help you plan how much carbohydrate to include in each meal and snack. A guideline for your daily amount of carbohydrate is:  · 45 to 60 grams at each meal. That's about the same as 3 to 4 carbohydrate servings. · 15 to 20 grams at each snack. That's about the same as 1 carbohydrate serving. The Nutrition Facts label on packaged foods tells you how much carbohydrate is in a serving of the food. First, look at the serving size on the food label. Is that the amount you eat in a serving? All of the nutrition information on a food label is based on that serving size. So if you eat more or less than that, you'll need to adjust the other numbers. Total carbohydrate is the next thing you need to look for on the label. If you count carbohydrate servings, one serving of carbohydrate is 15 grams. For foods that don't come with labels, such as fresh fruits and vegetables, you'll need a guide that lists carbohydrate in these foods. Ask your doctor, dietitian, or diabetes educator about books or other nutrition guides you can use.   If you take insulin, you need to know how many grams of carbohydrate are in a meal. This lets you know how much rapid-acting insulin to take before you eat. If you use an insulin pump, you get a constant rate of insulin during the day. So the pump must be programmed at meals to give you extra insulin to cover the rise in blood sugar after meals. When you know how much carbohydrate you will eat, you can take the right amount of insulin. Or, if you always use the same amount of insulin, you need to make sure that you eat the same amount of carbohydrate at meals. If you need more help to understand carbohydrate counting and food labels, ask your doctor, dietitian, or diabetes educator. How do you get started with meal planning? Here are some tips to get started:  · Plan your meals a week at a time. Don't forget to include snacks too. · Use cookbooks or online recipes to plan several main meals. Plan some quick meals for busy nights. You also can double some recipes that freeze well. Then you can save half for other busy nights when you don't have time to cook. · Make sure you have the ingredients you need for your recipes. If you're running low on basic items, put these items on your shopping list too. · List foods that you use to make breakfasts, lunches, and snacks. List plenty of fruits and vegetables. · Post this list on the refrigerator. Add to it as you think of more things you need. · Take the list to the store to do your weekly shopping. Follow-up care is a key part of your treatment and safety. Be sure to make and go to all appointments, and call your doctor if you are having problems. It's also a good idea to know your test results and keep a list of the medicines you take. Where can you learn more? Go to http://www.gray.com/  Enter K859 in the search box to learn more about \"Learning About Meal Planning for Diabetes. \"  Current as of: December 20, 2019               Content Version: 12.5  © 2563-5123 [employer].    Care instructions adapted under license by [employer] (which disclaims liability or warranty for this information). If you have questions about a medical condition or this instruction, always ask your healthcare professional. [address] any warranty or liability for your use of this information. Learning About Diabetes Food Guidelines  Your Care Instructions     Meal planning is important to manage diabetes. It helps keep your blood sugar at a target level (which you set with your doctor). You don't have to eat special foods. You can eat what your family eats, including sweets once in a while. But you do have to pay attention to how often you eat and how much you eat of certain foods. You may want to work with a dietitian or a certified diabetes educator (CDE) to help you plan meals and snacks. A dietitian or CDE can also help you lose weight if that is one of your goals. What should you know about eating carbs? Managing the amount of carbohydrate (carbs) you eat is an important part of healthy meals when you have diabetes. Carbohydrate is found in many foods. · Learn which foods have carbs. And learn the amounts of carbs in different foods. ? Bread, cereal, pasta, and rice have about 15 grams of carbs in a serving. A serving is 1 slice of bread (1 ounce), ½ cup of cooked cereal, or 1/3 cup of cooked pasta or rice. ? Fruits have 15 grams of carbs in a serving. A serving is 1 small fresh fruit, such as an apple or orange; ½ of a banana; ½ cup of cooked or canned fruit; ½ cup of fruit juice; 1 cup of melon or raspberries; or 2 tablespoons of dried fruit. ? Milk and no-sugar-added yogurt have 15 grams of carbs in a serving. A serving is 1 cup of milk or 2/3 cup of no-sugar-added yogurt. ? Starchy vegetables have 15 grams of carbs in a serving. A serving is ½ cup of mashed potatoes or sweet potato; 1 cup winter squash; ½ of a small baked potato; ½ cup of cooked beans; or ½ cup cooked corn or green peas.   · Learn how much carbs to eat each day and at each meal. A dietitian or CDE can teach you how to keep track of the amount of carbs you eat. This is called carbohydrate counting. · If you are not sure how to count carbohydrate grams, use the Plate Method to plan meals. It is a good, quick way to make sure that you have a balanced meal. It also helps you spread carbs throughout the day. ? Divide your plate by types of foods. Put non-starchy vegetables on half the plate, meat or other protein food on one-quarter of the plate, and a grain or starchy vegetable in the final quarter of the plate. To this you can add a small piece of fruit and 1 cup of milk or yogurt, depending on how many carbs you are supposed to eat at a meal.  · Try to eat about the same amount of carbs at each meal. Do not \"save up\" your daily allowance of carbs to eat at one meal.  · Proteins have very little or no carbs per serving. Examples of proteins are beef, chicken, turkey, fish, eggs, tofu, cheese, cottage cheese, and peanut butter. A serving size of meat is 3 ounces, which is about the size of a deck of cards. Examples of meat substitute serving sizes (equal to 1 ounce of meat) are 1/4 cup of cottage cheese, 1 egg, 1 tablespoon of peanut butter, and ½ cup of tofu. How can you eat out and still eat healthy? · Learn to estimate the serving sizes of foods that have carbohydrate. If you measure food at home, it will be easier to estimate the amount in a serving of restaurant food. · If the meal you order has too much carbohydrate (such as potatoes, corn, or baked beans), ask to have a low-carbohydrate food instead. Ask for a salad or green vegetables. · If you use insulin, check your blood sugar before and after eating out to help you plan how much to eat in the future. · If you eat more carbohydrate at a meal than you had planned, take a walk or do other exercise. This will help lower your blood sugar. What else should you know?   · Limit saturated fat, such as the fat from meat and dairy products. This is a healthy choice because people who have diabetes are at higher risk of heart disease. So choose lean cuts of meat and nonfat or low-fat dairy products. Use olive or canola oil instead of butter or shortening when cooking. · Don't skip meals. Your blood sugar may drop too low if you skip meals and take insulin or certain medicines for diabetes. · Check with your doctor before you drink alcohol. Alcohol can cause your blood sugar to drop too low. Alcohol can also cause a bad reaction if you take certain diabetes medicines. Follow-up care is a key part of your treatment and safety. Be sure to make and go to all appointments, and call your doctor if you are having problems. It's also a good idea to know your test results and keep a list of the medicines you take. Where can you learn more? Go to http://www.perdomo.com/  Enter I147 in the search box to learn more about \"Learning About Diabetes Food Guidelines. \"  Current as of: December 20, 2019               Content Version: 12.5  © 1562-3285 [employer], [employer]. Care instructions adapted under license by [employer] (which disclaims liability or warranty for this information). If you have questions about a medical condition or this instruction, always ask your healthcare professional. [address] any warranty or liability for your use of this information.

## 2020-08-30 ENCOUNTER — TELEPHONE (OUTPATIENT)
Dept: INTERNAL MEDICINE CLINIC | Age: 60
End: 2020-08-30

## 2020-08-30 NOTE — TELEPHONE ENCOUNTER
Please request echocardiogram report from Dr. Jose M Carmen at [facility]. Thanks.

## 2020-08-30 NOTE — PROGRESS NOTES
HPI:   Rc Alvarez is a 56y.o. year old male who presents today for a physical exam. He has a history of hypertension, hyperlipidemia, moderate aortic insufficiency, atypical chest pain, prediabetes, GERD, osteoarthritis, lumbar radiculopathy, and chronic venous insufficiency. He reports that he is doing reasonably well. He states that his mother is not doing well and remains in hospice for her advanced ovarian cancer. He reports that his right knee pain improved after a cortisone injection from  Dr. Mary Rosario, and he continues to wear a right knee brace, which also seems to be helping. He still has not proceeded with sleep evaluation as recommended by Dr. Chely Peters in 3/2020. He states that his blood pressure has been controlled at home, ranging 124-126/ 68-70. He is otherwise without new complaints. He has a history of hypertension, treated with losartan and amlodipine. He does not check his blood pressure at home regularly. He also does not exercise regularly, although admits to doing yard work and projects around the house. He denies any shortness of breath at rest or with exertion, palpitations, lightheadedness, or edema. He does have a history of substernal chest pain that has no relation to exertion and was thought to be atypical. He presented to [facility] on 2/27/2016 with a complaint of chest pain and diaphoresis, which seemed to be relieved by aspirin and sublingual nitroglycerin in the ambulance. Evaluation included negative ECG and troponins. He had an exercise nuclear stress test (2/29/2016) which showed normal LV function (EF 57%) and a medium sized partially reversible defect inferiorly (intermediate risk study). On 3/1/2016, he underwent a cardiac catheterization which showed no significant epicardial coronary artery disease.  He also had an echocardiogram (2/28/2016) showing mild LV septal hypertrophy, hypokinetic basal inferior wall with preserved LV function (EF 29%), mild diastolic dysfunction, and moderate aortic regurgitation with questionable dilation of the distal arch of aorta (3.5 cm). He was found to have elevated triglycerides, with lipid panel showing total cholesterol 201/ / HDL 52/ LDL 89. He was started on Fenofibrate prior to discharge on 3/1/2016. He reports that he has had no significant recurrence of chest pain since that time. He had a repeat echocardiogram in 9/2017 which showed low normal LV function (EF 50-55%), no RWMA, calcified aortic valve with bicuspid appearance and moderate regurgitation, and mild dilatation of the aortic root. He does have difficulty with mild lower extremity edema due to chronic venous insufficiency. He is followed by Dr. Jalyn Bennett. He has a history of prediabetes, with HbA1c ranging from 5.8-6.1 since 2012. He denies any polyuria, polydipsia, nocturia, or blurry vision, and has no history of retinopathy, neuropathy, or nephropathy. He has regular eye exams with Dr. Cookie Morejon. He has a history of osteoarthritis, involving his right knee, right great toe, and lumbar spine. He has had difficulty in the past with increasing pain at the base of his right thumb and was evaluated by Dr. Lionel Ma at [address]. He states that x-rays revealed he had severe osteoarthritis, and he received a cortisone injection with some improvement. In 12/2016, he noted increasing difficulty with low back pain and right sided sciatica. He underwent a lumbar MRI (12/2016) which showed progression when compared to a prior lumbar MRI from 2010.  It showed prominent right paracentral L4-L5 disc extrusion with impingement of the descending right L5 nerve root and moderately severe multifactorial canal stenosis; additional prominent right paracentral L5-S1 disc protrusion with impingement of the descending right S1 nerve root; abutment of the foraminal right L5 nerve root related to moderate foramina stenosis at L5-S1; mild multifactorial canal stenosis at L3-L4 with abutment of the descending left L4 nerve root. He was evaluated by Dr. Frankie Sampson, and treated with Medrol dose pack, Norco and Flexeril without significant improvement. He was evaluated by Dr. Miley Amaya in 3/2017 for possible surgical intervention, but this was deferred due to issues regarding his wife's health. He was treated with gabapentin, and was reevaluated by Dr. Frankie Sampson in 8/2017, at which time he reported significant improvement. He states that he no longer required treatment with gabapentin or Norco, and was continuing to take Voltaren ER with good control of symptoms. However in 3/2018, he developed recurrence of severe pain with right sided sciatica that was unresponsive to conservative therapy. He subsequently consulted Dr. Meek Bailey and on 7/5/2018, he underwent a right L4-L5 and L5-S1 hemilaminectomy, medial facetectomy, and foraminotomy by Dr. Meek Bailey for removal of herniated disks and decompression of foraminal stenoses at these levels. He reports that his right sided sciatica has resolved and he is no longer requiring any pain medication. He does report some residual numbness on the plantar surface of his right foot. He has a history of GERD, with symptoms well controlled with pantoprazole as needed. He had a screening colonoscopy by Dr. Lio Kingston in 3/2012 with removal of a benign serrated polyp (pathology shows hyperplastic and adenomatous features). He had repeat screening colonoscopy in 3/2017 by Dr Kriss Metz which was normal. Follow-up recommended for five years. He denies any abdominal pain, nausea, vomiting, melena, hematochezia, or change in bowel movements. In 5/2016, he fell at work with his chest striking the edge of a table and he sustained fractures of the anterior right 7th and 8th ribs. He had serial rib x-rays since that time showing slow interval healing, but reports that he no longer has pain related to this.  He underwent a bone density scan in 10/2016 which showed evidence of osteopenia with T-scores:  femoral neck left -1.7  /right -1.5 and lumbar -1.2. He was started on calcium and Vitamin D supplementation. He previously smoked approximately 1 ppd for 40 years, stopping in 2000. He also reports a history of asbestos exposure in 1980. Past Medical History:   Diagnosis Date    Abnormal myocardial perfusion study 2/28/2016    Partially reversible inferior perfusion defect, EF 57%    AI (aortic insufficiency)     Moderate with possible bicuspid aortic valve    Chronic low back pain     Chronic venous insufficiency     Colon polyp 03/2012    Benign serrated polyp    Fracture 05/2016    rib fx's    GERD (gastroesophageal reflux disease)     History of echocardiogram 2/28/2016    EF 55%. Basal inferior hypokinesis. Gr 1 DDfx. Mod AI.  Hyperlipidemia     Hypertension     Osteoarthritis of right knee     Osteopenia     Prediabetes     Prepatellar bursitis of right knee     Recurrent genital herpes 1/16/2013    Right lumbar radiculopathy 10/2016    MRI: prominent right paracentral L4-L5 disc extrusion/impingement of descending right L5 nerve root; moderately severe canal stenosis; prominent right paracentral L5-S1 disc protrusion/ impingement of the descending right S1 nerve root; abutment of the foraminal right L5 nerve root/moderate foramina stenosis at L5-S1; mild canal stenosis at L3-L4 with abutment of the descending left L4 nerve root.  S/P cardiac catheterization 3/1/2016    Normal coronary anatomy     Past Surgical History:   Procedure Laterality Date    ENDOSCOPY, COLON, DIAGNOSTIC      HX BACK SURGERY  07/05/2018    2 herniated disc repaired     HX CHOLECYSTECTOMY      HX TONSILLECTOMY       Current Outpatient Medications   Medication Sig    losartan (COZAAR) 100 mg tablet TAKE 1 TABLET DAILY    ibuprofen (MOTRIN) 800 mg tablet TAKE 1 TABLET BY MOUTH EVERY 8 HOURS AS NEEDED FOR PAIN FOR UP TO 10 DAYS.  TAKE WITH FOOD    ascorbic acid, vitamin C, (VITAMIN C) 500 mg tablet Take 500 mg by mouth daily.  cyclobenzaprine (FLEXERIL) 10 mg tablet Take 10 mg by mouth daily.  magnesium oxide (MAG-OX) 400 mg tablet Take 400 mg by mouth.  amLODIPine (NORVASC) 10 mg tablet TAKE 1 TABLET DAILY (Patient taking differently: Take 10 mg by mouth daily.)    valACYclovir (VALTREX) 500 mg tablet TAKE 1 TABLET DAILY    albuterol (PROVENTIL HFA, VENTOLIN HFA, PROAIR HFA) 90 mcg/actuation inhaler Take 1 Puff by inhalation every six (6) hours as needed for Wheezing.  inhalational spacing device 1 Each by Does Not Apply route as needed (shortness of breath).  nitroglycerin (NITROSTAT) 0.4 mg SL tablet 1 Tab by SubLINGual route every five (5) minutes as needed for Chest Pain. Up to 3 doses.  calcium carbonate (CALCIUM 300 PO) Take 1 Tab by mouth daily.  aspirin delayed-release 81 mg tablet Take 81 mg by mouth daily.  Cholecalciferol, Vitamin D3, 2,000 unit cap Take 2,000 Units by mouth daily as needed.  DOCOSAHEXANOIC ACID/EPA (FISH OIL PO) Take 1 Tab by mouth daily. No current facility-administered medications for this visit. Allergies and Intolerances: Allergies   Allergen Reactions    Tree Nuts Anaphylaxis    Ace Inhibitors Cough    Nuts [Tree Nut] Itching     Family History: His mother is alive with stage 3 ovarian cancer. His father  from esophageal cancer at age 61. His maternal aunt  at age 48 from colon cancer. He has no family history of prostate cancer. Family History   Problem Relation Age of Onset    Cancer Father 61        esophageal    Hypertension Father     Cancer Mother 80        ovarian cancer     Hypertension Brother     Diabetes Other         Grandmother    Arthritis-osteo Other     Other Other         Stomach problems; Father, grandfather     Social History:   He  reports that he quit smoking about 20 years ago. He has a 12.50 pack-year smoking history.  He quit smokeless tobacco use about 20 years ago. Reports that he smoked less than 1 ppd for 40 years, and stopped in 2000. He is  and they have one son. His wife was recently diagnosed with Paget's disease of the breast, which represents a recurrence of prior breast cancer. He was employed as an  for [employer], but retired in 12/2017. He drinks about a 12 pack of beer each month. Social History     Substance and Sexual Activity   Alcohol Use Yes    Alcohol/week: 1.7 standard drinks    Types: 2 Cans of beer per week     Immunization History:  Immunization History   Administered Date(s) Administered    Influenza Vaccine 11/01/2013, 11/12/2014, 10/01/2015    TDAP Vaccine 01/20/2004    Td 01/01/2004    Tdap 01/16/2014       Review of Systems:   As above included in HPI. Otherwise 11 point review of systems negative including constitutional, skin, HENT, eyes, respiratory, cardiovascular, gastrointestinal, genitourinary, musculoskeletal, endocrine, hematologic, allergy, and neurologic. Physical:   Vitals:   BP: 136/76  HR: 68  WT: 226 lb (102.5 kg)  BMI:  30.65 kg/m2    Exam:     Patient appears in no apparent distress. Affect is appropriate. HEENT --Anicteric sclerae, tympanic membranes normal,  ear canals normal.  PERRL, EOMI, conjunctiva and lids normal.   Sinuses were nontender, turbinates normal, hearing normal.  Oropharynx without  erythema, normal tongue, oral mucosa and tonsils. No cervical lymphadenopathy. No thyromegaly, JVD, or bruits. Carotid pulses 2+ with normal upstroke. Lungs --Clear to auscultation. No wheezing or rales. Heart --Regular rate and rhythm, II/VI mid-systolic murmur at RUSB; no rubs, gallops, or clicks. Chest wall --Nontender to palpation. PMI normal.  Abdomen -- Soft and nontender, no hepatosplenomegaly or masses. Extremities -- Without cyanosis, clubbing, edema. 2+ pulses equally and bilaterally.   Normal looking digits, ROM intact  Neuro -- CN 2-12 intact, strength 5/5 with intact soft touch in all extremities  Derm - no obvious abnormalities noted, no rash      Review of Data:  Labs:  Appointment on 08/20/2020   Component Date Value Ref Range Status    VITAMIN D, 25-HYDROXY 08/20/2020 43.2  30.0 - 100.0 ng/mL Final    Prostate Specific Ag 08/20/2020 0.5  0.0 - 4.0 ng/mL Final    Specific Gravity 08/20/2020 1.020  1.005 - 1.030 Final    pH (UA) 08/20/2020 7.0  5.0 - 7.5 Final    Color 08/20/2020 Yellow  Yellow Final    Appearance 08/20/2020 Clear  Clear Final    Leukocyte Esterase 08/20/2020 Negative  Negative Final    Protein 08/20/2020 Negative  Negative/Trace Final    Glucose 08/20/2020 Negative  Negative Final    Ketone 08/20/2020 Negative  Negative Final    Blood 08/20/2020 Negative  Negative Final    Bilirubin 08/20/2020 Negative  Negative Final    Urobilinogen 08/20/2020 0.2  0.2 - 1.0 mg/dL Final    Nitrites 08/20/2020 Negative  Negative Final    Microscopic Examination 08/20/2020 Comment   Final    Microscopic exam 08/20/2020 See additional order   Final    TSH 08/20/2020 1.600  0.450 - 4.500 uIU/mL Final    Creatinine, urine 08/20/2020 145.1  Not Estab. mg/dL Final    Microalbumin, urine 08/20/2020 5.8  Not Estab. ug/mL Final    Microalb/Creat ratio (ug/mg creat.) 08/20/2020 4  0 - 29 mg/g creat Final    Glucose 08/20/2020 111* 65 - 99 mg/dL Final    BUN 08/20/2020 20  8 - 27 mg/dL Final    Creatinine 08/20/2020 1.21  0.76 - 1.27 mg/dL Final    GFR est non-AA 08/20/2020 64  >59 mL/min/1.73 Final    GFR est AA 08/20/2020 74  >59 mL/min/1.73 Final    BUN/Creatinine ratio 08/20/2020 17  10 - 24 Final    Sodium 08/20/2020 141  134 - 144 mmol/L Final    Potassium 08/20/2020 4.3  3.5 - 5.2 mmol/L Final    Chloride 08/20/2020 103  96 - 106 mmol/L Final    CO2 08/20/2020 22  20 - 29 mmol/L Final    Calcium 08/20/2020 9.2  8.6 - 10.2 mg/dL Final    Protein, total 08/20/2020 6.4  6.0 - 8.5 g/dL Final    Albumin 08/20/2020 4.4  3.8 - 4.8 g/dL Final    GLOBULIN, TOTAL 08/20/2020 2.0  1.5 - 4.5 g/dL Final    A-G Ratio 08/20/2020 2.2  1.2 - 2.2 Final    Bilirubin, total 08/20/2020 0.4  0.0 - 1.2 mg/dL Final    Alk. phosphatase 08/20/2020 78  39 - 117 IU/L Final    AST (SGOT) 08/20/2020 25  0 - 40 IU/L Final    ALT (SGPT) 08/20/2020 29  0 - 44 IU/L Final    Magnesium 08/20/2020 2.3  1.6 - 2.3 mg/dL Final    Cholesterol, total 08/20/2020 199  100 - 199 mg/dL Final    Triglyceride 08/20/2020 248* 0 - 149 mg/dL Final    HDL Cholesterol 08/20/2020 40  >39 mg/dL Final    VLDL, calculated 08/20/2020 50* 5 - 40 mg/dL Final    LDL, calculated 08/20/2020 109* 0 - 99 mg/dL Final    Hemoglobin A1c 08/20/2020 5.7* 4.8 - 5.6 % Final    Estimated average glucose 08/20/2020 117  mg/dL Final    WBC 08/20/2020 6.0  3.4 - 10.8 x10E3/uL Final    RBC 08/20/2020 5.07  4. 14 - 5.80 x10E6/uL Final    HGB 08/20/2020 16.3  13.0 - 17.7 g/dL Final    HCT 08/20/2020 47.4  37.5 - 51.0 % Final    MCV 08/20/2020 94  79 - 97 fL Final    MCH 08/20/2020 32.1  26.6 - 33.0 pg Final    MCHC 08/20/2020 34.4  31.5 - 35.7 g/dL Final    RDW 08/20/2020 13.0  11.6 - 15.4 % Final    PLATELET 36/58/1585 031  150 - 450 x10E3/uL Final    NEUTROPHILS 08/20/2020 53  Not Estab. % Final    Lymphocytes 08/20/2020 34  Not Estab. % Final    MONOCYTES 08/20/2020 9  Not Estab. % Final    EOSINOPHILS 08/20/2020 3  Not Estab. % Final    BASOPHILS 08/20/2020 1  Not Estab. % Final    ABS. NEUTROPHILS 08/20/2020 3.1  1.4 - 7.0 x10E3/uL Final    Abs Lymphocytes 08/20/2020 2.0  0.7 - 3.1 x10E3/uL Final    ABS. MONOCYTES 08/20/2020 0.5  0.1 - 0.9 x10E3/uL Final    ABS. EOSINOPHILS 08/20/2020 0.2  0.0 - 0.4 x10E3/uL Final    ABS. BASOPHILS 08/20/2020 0.1  0.0 - 0.2 x10E3/uL Final    IMMATURE GRANULOCYTES 08/20/2020 0  Not Estab. % Final    ABS. IMM.  GRANS. 08/20/2020 0.0  0.0 - 0.1 x10E3/uL Final    WBC 08/20/2020 0-5  0 - 5 /hpf Final    RBC 08/20/2020 0-2  0 - 2 /hpf Final    Epithelial cells 08/20/2020 0-10  0 - 10 /hpf Final    Casts 08/20/2020 None seen  None seen /lpf Final    Crystals 08/20/2020 Present* N/A Final    Crystal type 08/20/2020 Calcium Oxalate  N/A Final    Mucus 08/20/2020 Present  Not Estab. Final    Bacteria 08/20/2020 None seen  None seen/Few Final    INTERPRETATION 08/20/2020 Note   Final     EKG (9/12/2019) sinus rhythm at 77 bpm, no ischemic changes and unchanged from last on 1/7/2019    Calculated 10 year ASCVD risk score:  14.4 %    Health Maintenance:  Screening:    Colorectal: colonoscopy (3/2017) normal. Dr. Kriss Metz. Due 3/2022. Depression: none   DM (HbA1c/FPG):  HbA1c 5.7 (8/2020)   Hepatitis C: negative (9/2016)   Falls: none    DEXA: N/A   PSA/TONI: PSA 0.5 (8/2020)/ TONI (2/2019)   Glaucoma: regular eye exams with Dr. Rj Lehman (last 1/2017)   Smoking: none   Vitamin D: 43.2 (8/2020)   Medicare Wellness: N/A    Impression:  Patient Active Problem List   Diagnosis Code    Hyperlipidemia E78.5    GERD (gastroesophageal reflux disease) K21.9    Chronic venous insufficiency I87.2    Colon polyp K63.5    Prediabetes R73.03    Recurrent genital herpes A60.00    Vitamin D insufficiency E55.9    Aortic insufficiency, moderate I35.1    Essential hypertension I10    Primary osteoarthritis of knee M17.10    Lumbar herniated disc  right L4/5/S1 chronic M51.26    Osteopenia determined by DEXA 2016, lowest T score -1.7 M85.80    Annular tear of lumbar disc M51.36    Chronic right-sided low back pain with sciatica M54.40, G89.29    Neuritis of lower extremity G57.90    Chronic thumb pain, right M79.644, G89.29    S/P lumbar discectomy Z98.890    Class 1 obesity due to excess calories without serious comorbidity in adult E66.09    Erythrocytosis D75.1    Chronic pain of right knee M25.561, G89.29       Plan:   1. Shortness of breath. Patient reports improvement today. He continues to describe a choking feeling at times when he lies down.  EKG unchanged from baseline. Chest x-ray negative. Established care with new cardiologist and felt to be stable. Again stressed importance of proceeding with sleep evaluation given known daytime drowsiness, snoring, and fatigue, and evidence of erythrocytosis. Evaluated by Dr. Humaira Porter and recommended in-lab sleep study. PFTs normal, although elevated absolute eosinophil count. Does have history of childhood asthma. Continue to follow. 2. Hypertension. Well controlled on current regimen of losartan 100 mg daily and amlodipine 10 mg daily. Renal function remains normal with creatinine 1.21 / eGFR 64. Continue to follow. 3. Dyslipidemia. Patient no longer taking fenofibrate. Discontinued given lack of data showing that lowering triglycerides decreases the risk of CAD. In addition, first line therapy for treating triglycerides < 500 would be initiation of a statin. Repeat lipid panel with triglyceride level 118. However, his calculated 10 year ASCVD risk is 14.4 %, which does place him in one of the four statin benefit groups as per new AHA/ACC guidelines (primary prevention: ASCVD risk > 7.5%). LDL improved today to 109 with HDL 50, which is reasonable control likely due to weight loss. Emphasized importance of continued lifestyle modifications, including diet, exercise, and weight loss. Will continue to follow. 4. Aortic insufficiency, moderate with mildly dilated aortic root. Probable bicuspid valve. Being monitored on echocardiogram. Also, considering thoracic CT scan if dilation of aortic root appears to progress on echocardiogram.  Repeat echocardiogram to reassess valve in 9/2017 without change. Established with new cardiologist, Dr. Bell Ferreira, and underwent repeat echocardiogram. Will request report. Follow. 5. Prediabetes. HbA1c improved to 5.7 today. Weight decreased 4 pounds since last visit. Reports improved diet. No evidence of microvascular complications. On ARB but unwilling to begin statin.  Continue follow-up with  for annual eye exams. Foot exam normal in 4/2017. Urine microalbumin/ creatinine ratio without evidence of microalbuminuria. Encouraged continued weight loss and dietary changes, particularly avoiding concentrated sweets. 6. Osteopenia. Last bone density scan 10/2016. Using femoral neck T-scores, calculated FRAX score estimates her 10 year risk of a major osteoporetic fracture at 12 % and hip fracture at 2.0 %, which are not an indication for biphosphonate treatment (>20% and >3%, respectively). Continue calcium and Vitamin D. Encouraged exercise, particularly weight bearing activities. Consider repeat bone density scan in near future. 7. GERD. Continue occasional use of Protonix with good control of symptoms. Follow. 8. Right knee pain. Patient reports increasing pain with ambulation and standing. Chronic problem which has now progressed. Underwent evaluation by Dr. Eve Peterson and received a cortisone injection with improvement. Wearing a right knee brace. Wishing to avoid surgery as long as possible. 9. Right sciatica. Progression of symptoms and disease on MRI. Responded well to conservative therapy initially, but recurred and unresponsive to medications and injections. Underwent right L4-L5 and L5-S1 hemilaminectomy, medial facetectomy, and foraminotomy by Dr. Temo Azar in 7/2018 with great success. Reports only residual numbness on medial plantar surface of right foot. No longer requiring any pain medication. Continuing to do well. 10. Right thumb osteoarthritis. Improved pain control with cortisone injection. Being followed by Dr. Viviana Gray. 11. Erythrocytosis. Remains only mildly elevated in 2/2020 with Hb 16.5/ Hct 47.2. Review of record shows similar readings since 2014. May be due to LICHA vs hematologic disorder. Erythropoietin level normal in 2/2020. In normal range today. Will continue to monitor. 12. Excessive daytime sleepiness/ snoring.  Patient with daytime drowsiness, snoring, and fatigue. Underwent evaluation by Dr. Kayy Arroyo in 3/2020 and recommended in-lab sleep study due to severity of symptoms. However, he has not yet proceeded due to his mother's advanced cancer and recent enrollment in hospice. Encouraged to schedule. 13. Rectal bleeding. No recurrence. Guaiac negative on exam at last visit. No visible hemorrhoids. Colonoscopy normal 3/2017. Suspect symptoms may have been secondary to fissure and resolved with increasing fiber in diet. Follow. 14. Left leg ecchymosis. Resolved. No further pain or difficulty walking. 15. Obesity. Weight decreased 4 pounds since last visit. Emphasized importance of lifestyle modifications, including diet, exercise, and weight loss. Will readdress next visit. 16. Health maintenance. Urged to obtain influenza vaccine this fall. Discussed Shingrix vaccine but not wishing to proceed. Other immunizations up to date. Colonoscopy due 2022. Prostate cancer screening up to date. Continue regular eye exams. Stressed importance of weight loss. Vitamin D level normal. Continue maintenance dose supplement. Patient understands recommendations and agrees with plan. Follow-up in 6 months.

## 2020-09-13 RX ORDER — VALACYCLOVIR HYDROCHLORIDE 500 MG/1
TABLET, FILM COATED ORAL
Qty: 90 TAB | Refills: 3 | Status: SHIPPED | OUTPATIENT
Start: 2020-09-13 | End: 2021-11-04

## 2020-10-06 NOTE — PATIENT INSTRUCTIONS

## 2020-12-16 ENCOUNTER — TELEPHONE (OUTPATIENT)
Dept: INTERNAL MEDICINE CLINIC | Age: 60
End: 2020-12-16

## 2020-12-16 DIAGNOSIS — R20.0 LEFT ARM NUMBNESS: ICD-10-CM

## 2020-12-16 DIAGNOSIS — M54.2 NECK PAIN: Primary | ICD-10-CM

## 2020-12-16 RX ORDER — IBUPROFEN 800 MG/1
TABLET ORAL
Qty: 30 TAB | Refills: 1 | Status: SHIPPED | OUTPATIENT
Start: 2020-12-16 | End: 2021-09-14 | Stop reason: SDUPTHER

## 2020-12-16 NOTE — TELEPHONE ENCOUNTER
Called and spoke with patient. Reports having persistent upper left chest and shoulder pain and left arm pain with numbness for the last 2 months. Does admit to chronic neck pain and was told in the past that he had \"bone spurs\" on x-ray. Discussed options and wishing to follow-up with Dr. Donovan Bishop for evaluation. Wishing to defer imaging to be performed in her office. Referral placed.

## 2020-12-16 NOTE — TELEPHONE ENCOUNTER
Patient calling about pain in upper left chest. He reports he has discussed the pain with  before and was sent for an Echo and everything was fine. Pain was intermittent at the time. Now for the last 1.5months pain is pretty constant and radiates from shoulder down to his elbow. Patient reports folding his arms yesterday and the left arm went completely numb. He denies any cardiac pain/SOB. Patient would like further recommendations.

## 2021-01-28 ENCOUNTER — OFFICE VISIT (OUTPATIENT)
Dept: ORTHOPEDIC SURGERY | Age: 61
End: 2021-01-28
Payer: COMMERCIAL

## 2021-01-28 VITALS
RESPIRATION RATE: 12 BRPM | BODY MASS INDEX: 30.92 KG/M2 | DIASTOLIC BLOOD PRESSURE: 76 MMHG | SYSTOLIC BLOOD PRESSURE: 148 MMHG | HEART RATE: 75 BPM | TEMPERATURE: 97.7 F | WEIGHT: 228 LBS

## 2021-01-28 DIAGNOSIS — M25.512 CHRONIC LEFT SHOULDER PAIN: Primary | ICD-10-CM

## 2021-01-28 DIAGNOSIS — G89.29 CHRONIC LEFT SHOULDER PAIN: Primary | ICD-10-CM

## 2021-01-28 DIAGNOSIS — M54.2 NECK PAIN: ICD-10-CM

## 2021-01-28 DIAGNOSIS — M47.812 CERVICAL SPONDYLOSIS: ICD-10-CM

## 2021-01-28 PROCEDURE — 72040 X-RAY EXAM NECK SPINE 2-3 VW: CPT | Performed by: PHYSICAL MEDICINE & REHABILITATION

## 2021-01-28 PROCEDURE — 99204 OFFICE O/P NEW MOD 45 MIN: CPT | Performed by: PHYSICAL MEDICINE & REHABILITATION

## 2021-01-28 NOTE — PATIENT INSTRUCTIONS
Shoulder Pain: Care Instructions Your Care Instructions You can hurt your shoulder by using it too much during an activity, such as fishing or baseball. It can also happen as part of the everyday wear and tear of getting older. Shoulder injuries can be slow to heal, but your shoulder should get better with time. Your doctor may recommend a sling to rest your shoulder. If you have injured your shoulder, you may need testing and treatment. Follow-up care is a key part of your treatment and safety. Be sure to make and go to all appointments, and call your doctor if you are having problems. It's also a good idea to know your test results and keep a list of the medicines you take. How can you care for yourself at home? · Take pain medicines exactly as directed. ? If the doctor gave you a prescription medicine for pain, take it as prescribed. ? If you are not taking a prescription pain medicine, ask your doctor if you can take an over-the-counter medicine. ? Do not take two or more pain medicines at the same time unless the doctor told you to. Many pain medicines contain acetaminophen, which is Tylenol. Too much acetaminophen (Tylenol) can be harmful. · If your doctor recommends that you wear a sling, use it as directed. Do not take it off before your doctor tells you to. · Put ice or a cold pack on the sore area for 10 to 20 minutes at a time. Put a thin cloth between the ice and your skin. · If there is no swelling, you can put moist heat, a heating pad, or a warm cloth on your shoulder. Some doctors suggest alternating between hot and cold. · Rest your shoulder for a few days. If your doctor recommends it, you can then begin gentle exercise of the shoulder, but do not lift anything heavy. When should you call for help? Call 911 anytime you think you may need emergency care. For example, call if: 
  · You have chest pain or pressure. This may occur with: ? Sweating. ? Shortness of breath. ? Nausea or vomiting. ? Pain that spreads from the chest to the neck, jaw, or one or both shoulders or arms. ? Dizziness or lightheadedness. ? A fast or uneven pulse. After calling 911, chew 1 adult-strength aspirin. Wait for an ambulance. Do not try to drive yourself.  
  · Your arm or hand is cool or pale or changes color. Call your doctor now or seek immediate medical care if: 
  · You have signs of infection, such as: 
? Increased pain, swelling, warmth, or redness in your shoulder. ? Red streaks leading from a place on your shoulder. ? Pus draining from an area of your shoulder. ? Swollen lymph nodes in your neck, armpits, or groin. ? A fever. Watch closely for changes in your health, and be sure to contact your doctor if: 
  · You cannot use your shoulder.  
  · Your shoulder does not get better as expected. Where can you learn more? Go to http://www.gray.com/ Enter E775 in the search box to learn more about \"Shoulder Pain: Care Instructions. \" Current as of: March 2, 2020               Content Version: 12.6 © 8078-6906 [employer]. Care instructions adapted under license by [employer] (which disclaims liability or warranty for this information). If you have questions about a medical condition or this instruction, always ask your healthcare professional. [place] [address] any warranty or liability for your use of this information.

## 2021-02-18 ENCOUNTER — APPOINTMENT (OUTPATIENT)
Dept: INTERNAL MEDICINE CLINIC | Age: 61
End: 2021-02-18

## 2021-02-18 ENCOUNTER — TELEPHONE (OUTPATIENT)
Dept: INTERNAL MEDICINE CLINIC | Age: 61
End: 2021-02-18

## 2021-02-18 DIAGNOSIS — G89.29 CHRONIC RIGHT-SIDED LOW BACK PAIN WITH RIGHT-SIDED SCIATICA: ICD-10-CM

## 2021-02-18 DIAGNOSIS — M85.80 OSTEOPENIA DETERMINED BY X-RAY: Chronic | ICD-10-CM

## 2021-02-18 DIAGNOSIS — I35.1 AORTIC VALVE INSUFFICIENCY, ETIOLOGY OF CARDIAC VALVE DISEASE UNSPECIFIED: ICD-10-CM

## 2021-02-18 DIAGNOSIS — D75.1 ERYTHROCYTOSIS: ICD-10-CM

## 2021-02-18 DIAGNOSIS — E66.09 CLASS 1 OBESITY DUE TO EXCESS CALORIES WITHOUT SERIOUS COMORBIDITY WITH BODY MASS INDEX (BMI) OF 30.0 TO 30.9 IN ADULT: ICD-10-CM

## 2021-02-18 DIAGNOSIS — R73.03 PREDIABETES: ICD-10-CM

## 2021-02-18 DIAGNOSIS — G89.29 CHRONIC PAIN OF RIGHT KNEE: ICD-10-CM

## 2021-02-18 DIAGNOSIS — M25.561 CHRONIC PAIN OF RIGHT KNEE: ICD-10-CM

## 2021-02-18 DIAGNOSIS — I10 ESSENTIAL HYPERTENSION: ICD-10-CM

## 2021-02-18 DIAGNOSIS — E78.5 HYPERLIPIDEMIA, UNSPECIFIED HYPERLIPIDEMIA TYPE: ICD-10-CM

## 2021-02-18 DIAGNOSIS — Z98.890 S/P LUMBAR DISCECTOMY: ICD-10-CM

## 2021-02-18 DIAGNOSIS — A60.00 RECURRENT GENITAL HERPES: ICD-10-CM

## 2021-02-18 DIAGNOSIS — Z00.01 ENCOUNTER FOR ROUTINE ADULT PHYSICAL EXAM WITH ABNORMAL FINDINGS: ICD-10-CM

## 2021-02-18 DIAGNOSIS — M54.41 CHRONIC RIGHT-SIDED LOW BACK PAIN WITH RIGHT-SIDED SCIATICA: ICD-10-CM

## 2021-02-18 DIAGNOSIS — K21.9 GASTROESOPHAGEAL REFLUX DISEASE: ICD-10-CM

## 2021-02-18 DIAGNOSIS — M17.11 PRIMARY OSTEOARTHRITIS OF RIGHT KNEE: ICD-10-CM

## 2021-02-18 RX ORDER — CLINDAMYCIN PHOSPHATE 11.9 MG/ML
SOLUTION TOPICAL
Qty: 60 ML | Refills: 3 | Status: SHIPPED | OUTPATIENT
Start: 2021-02-18

## 2021-02-18 NOTE — TELEPHONE ENCOUNTER
Pt asking if you will prescribe the following medication.  He got it from a dermatologist that he is no seeing     It is for scalp acne  Clindamycin Phosphate topical solution 1%    His pharmacy [address]

## 2021-02-19 LAB
ALBUMIN SERPL-MCNC: 4.5 G/DL (ref 3.8–4.8)
ALBUMIN/GLOB SERPL: 2.3 {RATIO} (ref 1.2–2.2)
ALP SERPL-CCNC: 80 IU/L (ref 39–117)
ALT SERPL-CCNC: 29 IU/L (ref 0–44)
AST SERPL-CCNC: 26 IU/L (ref 0–40)
BASOPHILS # BLD AUTO: 0.1 X10E3/UL (ref 0–0.2)
BASOPHILS NFR BLD AUTO: 1 %
BILIRUB SERPL-MCNC: 0.7 MG/DL (ref 0–1.2)
BUN SERPL-MCNC: 17 MG/DL (ref 8–27)
BUN/CREAT SERPL: 14 (ref 10–24)
CALCIUM SERPL-MCNC: 9.5 MG/DL (ref 8.6–10.2)
CHLORIDE SERPL-SCNC: 103 MMOL/L (ref 96–106)
CHOLEST SERPL-MCNC: 192 MG/DL (ref 100–199)
CO2 SERPL-SCNC: 26 MMOL/L (ref 20–29)
CREAT SERPL-MCNC: 1.24 MG/DL (ref 0.76–1.27)
EOSINOPHIL # BLD AUTO: 0.2 X10E3/UL (ref 0–0.4)
EOSINOPHIL NFR BLD AUTO: 3 %
ERYTHROCYTE [DISTWIDTH] IN BLOOD BY AUTOMATED COUNT: 13.4 % (ref 11.6–15.4)
EST. AVERAGE GLUCOSE BLD GHB EST-MCNC: 131 MG/DL
GLOBULIN SER CALC-MCNC: 2 G/DL (ref 1.5–4.5)
GLUCOSE SERPL-MCNC: 128 MG/DL (ref 65–99)
HBA1C MFR BLD: 6.2 % (ref 4.8–5.6)
HCT VFR BLD AUTO: 49.5 % (ref 37.5–51)
HDLC SERPL-MCNC: 49 MG/DL
HGB BLD-MCNC: 17.3 G/DL (ref 13–17.7)
IMM GRANULOCYTES # BLD AUTO: 0 X10E3/UL (ref 0–0.1)
IMM GRANULOCYTES NFR BLD AUTO: 1 %
INTERPRETATION, 910389: NORMAL
LDLC SERPL CALC-MCNC: 116 MG/DL (ref 0–99)
LYMPHOCYTES # BLD AUTO: 2.5 X10E3/UL (ref 0.7–3.1)
LYMPHOCYTES NFR BLD AUTO: 33 %
MCH RBC QN AUTO: 31.9 PG (ref 26.6–33)
MCHC RBC AUTO-ENTMCNC: 34.9 G/DL (ref 31.5–35.7)
MCV RBC AUTO: 91 FL (ref 79–97)
MONOCYTES # BLD AUTO: 0.7 X10E3/UL (ref 0.1–0.9)
MONOCYTES NFR BLD AUTO: 9 %
NEUTROPHILS # BLD AUTO: 4.1 X10E3/UL (ref 1.4–7)
NEUTROPHILS NFR BLD AUTO: 53 %
PLATELET # BLD AUTO: 196 X10E3/UL (ref 150–450)
POTASSIUM SERPL-SCNC: 4.2 MMOL/L (ref 3.5–5.2)
PROT SERPL-MCNC: 6.5 G/DL (ref 6–8.5)
RBC # BLD AUTO: 5.42 X10E6/UL (ref 4.14–5.8)
SODIUM SERPL-SCNC: 142 MMOL/L (ref 134–144)
TRIGL SERPL-MCNC: 155 MG/DL (ref 0–149)
VLDLC SERPL CALC-MCNC: 27 MG/DL (ref 5–40)
WBC # BLD AUTO: 7.5 X10E3/UL (ref 3.4–10.8)

## 2021-02-25 ENCOUNTER — OFFICE VISIT (OUTPATIENT)
Dept: INTERNAL MEDICINE CLINIC | Age: 61
End: 2021-02-25
Payer: COMMERCIAL

## 2021-02-25 VITALS
DIASTOLIC BLOOD PRESSURE: 74 MMHG | HEART RATE: 74 BPM | BODY MASS INDEX: 31.15 KG/M2 | WEIGHT: 230 LBS | TEMPERATURE: 98.1 F | OXYGEN SATURATION: 98 % | RESPIRATION RATE: 16 BRPM | HEIGHT: 72 IN | SYSTOLIC BLOOD PRESSURE: 132 MMHG

## 2021-02-25 DIAGNOSIS — E78.5 HYPERLIPIDEMIA, UNSPECIFIED HYPERLIPIDEMIA TYPE: ICD-10-CM

## 2021-02-25 DIAGNOSIS — R73.03 PREDIABETES: ICD-10-CM

## 2021-02-25 DIAGNOSIS — Z00.00 LABORATORY TESTS ORDERED AS PART OF A COMPLETE PHYSICAL EXAM (CPE): ICD-10-CM

## 2021-02-25 DIAGNOSIS — M17.11 PRIMARY OSTEOARTHRITIS OF RIGHT KNEE: ICD-10-CM

## 2021-02-25 DIAGNOSIS — K62.5 RECTAL BLEEDING: ICD-10-CM

## 2021-02-25 DIAGNOSIS — Z12.5 PROSTATE CANCER SCREENING: ICD-10-CM

## 2021-02-25 DIAGNOSIS — K62.89 RECTAL PAIN: ICD-10-CM

## 2021-02-25 DIAGNOSIS — E66.09 CLASS 1 OBESITY DUE TO EXCESS CALORIES WITHOUT SERIOUS COMORBIDITY WITH BODY MASS INDEX (BMI) OF 31.0 TO 31.9 IN ADULT: ICD-10-CM

## 2021-02-25 DIAGNOSIS — I35.1 AORTIC VALVE INSUFFICIENCY, ETIOLOGY OF CARDIAC VALVE DISEASE UNSPECIFIED: ICD-10-CM

## 2021-02-25 DIAGNOSIS — M54.2 NECK PAIN: ICD-10-CM

## 2021-02-25 DIAGNOSIS — G89.29 CHRONIC PAIN OF RIGHT KNEE: ICD-10-CM

## 2021-02-25 DIAGNOSIS — M25.561 CHRONIC PAIN OF RIGHT KNEE: ICD-10-CM

## 2021-02-25 DIAGNOSIS — I10 ESSENTIAL HYPERTENSION: Primary | ICD-10-CM

## 2021-02-25 DIAGNOSIS — E55.9 VITAMIN D INSUFFICIENCY: ICD-10-CM

## 2021-02-25 PROCEDURE — 99214 OFFICE O/P EST MOD 30 MIN: CPT | Performed by: INTERNAL MEDICINE

## 2021-02-25 RX ORDER — CYCLOBENZAPRINE HCL 10 MG
10 TABLET ORAL
Qty: 30 TAB | Refills: 0 | Status: SHIPPED | OUTPATIENT
Start: 2021-02-25 | End: 2021-09-05 | Stop reason: ALTCHOICE

## 2021-02-25 NOTE — PROGRESS NOTES
1. Have you been to the ER, urgent care clinic or hospitalized since your last visit? NO.     2. Have you seen or consulted any other health care providers outside of the [address] [place] [address] since your last visit (Include any pap smears or colon screening)?  NO

## 2021-02-25 NOTE — PATIENT INSTRUCTIONS
Heart-Healthy Diet: Care Instructions Your Care Instructions A heart-healthy diet has lots of vegetables, fruits, nuts, beans, and whole grains, and is low in salt. It limits foods that are high in saturated fat, such as meats, cheeses, and fried foods. It may be hard to change your diet, but even small changes can lower your risk of heart attack and heart disease. Follow-up care is a key part of your treatment and safety. Be sure to make and go to all appointments, and call your doctor if you are having problems. It's also a good idea to know your test results and keep a list of the medicines you take. How can you care for yourself at home? Watch your portions · Learn what a serving is. A \"serving\" and a \"portion\" are not always the same thing. Make sure that you are not eating larger portions than are recommended. For example, a serving of pasta is ½ cup. A serving size of meat is 2 to 3 ounces. A 3-ounce serving is about the size of a deck of cards. Measure serving sizes until you are good at [place]" them. Keep in mind that restaurants often serve portions that are 2 or 3 times the size of one serving. · To keep your energy level up and keep you from feeling hungry, eat often but in smaller portions. · Eat only the number of calories you need to stay at a healthy weight. If you need to lose weight, eat fewer calories than your body burns (through exercise and other physical activity). Eat more fruits and vegetables · Eat a variety of fruit and vegetables every day. Dark green, deep orange, red, or yellow fruits and vegetables are especially good for you. Examples include spinach, carrots, peaches, and berries. · Keep carrots, celery, and other veggies handy for snacks. Buy fruit that is in season and store it where you can see it so that you will be tempted to eat it. · Cook dishes that have a lot of veggies in them, such as stir-fries and soups. Limit saturated and trans fat · Read food labels, and try to avoid saturated and trans fats. They increase your risk of heart disease. · Use olive or canola oil when you cook. · Bake, broil, grill, or steam foods instead of frying them. · Choose lean meats instead of high-fat meats such as hot dogs and sausages. Cut off all visible fat when you prepare meat. · Eat fish, skinless poultry, and meat alternatives such as soy products instead of high-fat meats. Soy products, such as tofu, may be especially good for your heart. · Choose low-fat or fat-free milk and dairy products. Eat foods high in fiber · Eat a variety of grain products every day. Include whole-grain foods that have lots of fiber and nutrients. Examples of whole-grain foods include oats, whole wheat bread, and brown rice. · Buy whole-grain breads and cereals, instead of white bread or pastries. Limit salt and sodium · Limit how much salt and sodium you eat to help lower your blood pressure. · Taste food before you salt it. Add only a little salt when you think you need it. With time, your taste buds will adjust to less salt. · Eat fewer snack items, fast foods, and other high-salt, processed foods. Check food labels for the amount of sodium in packaged foods. · Choose low-sodium versions of canned goods (such as soups, vegetables, and beans). Limit sugar · Limit drinks and foods with added sugar. These include candy, desserts, and soda pop. Limit alcohol · Limit alcohol to no more than 2 drinks a day for men and 1 drink a day for women. Too much alcohol can cause health problems. When should you call for help? Watch closely for changes in your health, and be sure to contact your doctor if: 
  · You would like help planning heart-healthy meals. Where can you learn more? Go to http://www.[employer].com/ Enter V137 in the search box to learn more about \"Heart-Healthy Diet: Care Instructions. \" 
 Current as of: August 22, 2019               Content Version: 12.6 © 9751-7766 [employer], [employer]. Care instructions adapted under license by [employer] (which disclaims liability or warranty for this information). If you have questions about a medical condition or this instruction, always ask your healthcare professional. [address] any warranty or liability for your use of this information. Learning About Diabetes Food Guidelines Your Care Instructions Meal planning is important to manage diabetes. It helps keep your blood sugar at a target level (which you set with your doctor). You don't have to eat special foods. You can eat what your family eats, including sweets once in a while. But you do have to pay attention to how often you eat and how much you eat of certain foods. You may want to work with a dietitian or a certified diabetes educator (CDE) to help you plan meals and snacks. A dietitian or CDE can also help you lose weight if that is one of your goals. What should you know about eating carbs? Managing the amount of carbohydrate (carbs) you eat is an important part of healthy meals when you have diabetes. Carbohydrate is found in many foods. · Learn which foods have carbs. And learn the amounts of carbs in different foods. ? Bread, cereal, pasta, and rice have about 15 grams of carbs in a serving. A serving is 1 slice of bread (1 ounce), ½ cup of cooked cereal, or 1/3 cup of cooked pasta or rice. ? Fruits have 15 grams of carbs in a serving. A serving is 1 small fresh fruit, such as an apple or orange; ½ of a banana; ½ cup of cooked or canned fruit; ½ cup of fruit juice; 1 cup of melon or raspberries; or 2 tablespoons of dried fruit. ? Milk and no-sugar-added yogurt have 15 grams of carbs in a serving. A serving is 1 cup of milk or 2/3 cup of no-sugar-added yogurt. ? Starchy vegetables have 15 grams of carbs in a serving. A serving is ½ cup of mashed potatoes or sweet potato; 1 cup winter squash; ½ of a small baked potato; ½ cup of cooked beans; or ½ cup cooked corn or green peas. · Learn how much carbs to eat each day and at each meal. A dietitian or CDE can teach you how to keep track of the amount of carbs you eat. This is called carbohydrate counting. · If you are not sure how to count carbohydrate grams, use the Plate Method to plan meals. It is a good, quick way to make sure that you have a balanced meal. It also helps you spread carbs throughout the day. ? Divide your plate by types of foods. Put non-starchy vegetables on half the plate, meat or other protein food on one-quarter of the plate, and a grain or starchy vegetable in the final quarter of the plate. To this you can add a small piece of fruit and 1 cup of milk or yogurt, depending on how many carbs you are supposed to eat at a meal. 
· Try to eat about the same amount of carbs at each meal. Do not \"save up\" your daily allowance of carbs to eat at one meal. 
· Proteins have very little or no carbs per serving. Examples of proteins are beef, chicken, turkey, fish, eggs, tofu, cheese, cottage cheese, and peanut butter. A serving size of meat is 3 ounces, which is about the size of a deck of cards. Examples of meat substitute serving sizes (equal to 1 ounce of meat) are 1/4 cup of cottage cheese, 1 egg, 1 tablespoon of peanut butter, and ½ cup of tofu. How can you eat out and still eat healthy? · Learn to estimate the serving sizes of foods that have carbohydrate. If you measure food at home, it will be easier to estimate the amount in a serving of restaurant food. · If the meal you order has too much carbohydrate (such as potatoes, corn, or baked beans), ask to have a low-carbohydrate food instead. Ask for a salad or green vegetables. · If you use insulin, check your blood sugar before and after eating out to help you plan how much to eat in the future. · If you eat more carbohydrate at a meal than you had planned, take a walk or do other exercise. This will help lower your blood sugar. What else should you know? · Limit saturated fat, such as the fat from meat and dairy products. This is a healthy choice because people who have diabetes are at higher risk of heart disease. So choose lean cuts of meat and nonfat or low-fat dairy products. Use olive or canola oil instead of butter or shortening when cooking. · Don't skip meals. Your blood sugar may drop too low if you skip meals and take insulin or certain medicines for diabetes. · Check with your doctor before you drink alcohol. Alcohol can cause your blood sugar to drop too low. Alcohol can also cause a bad reaction if you take certain diabetes medicines. Follow-up care is a key part of your treatment and safety. Be sure to make and go to all appointments, and call your doctor if you are having problems. It's also a good idea to know your test results and keep a list of the medicines you take. Where can you learn more? Go to http://www.gray.com/ Enter U044 in the search box to learn more about \"Learning About Diabetes Food Guidelines. \" Current as of: December 20, 2019               Content Version: 12.6 © 5319-4972 [employer]. Care instructions adapted under license by [employer] (which disclaims liability or warranty for this information). If you have questions about a medical condition or this instruction, always ask your healthcare professional. [address] any warranty or liability for your use of this information.

## 2021-02-28 PROBLEM — G57.90 NEURITIS OF LOWER EXTREMITY: Status: RESOLVED | Noted: 2017-04-12 | Resolved: 2021-02-28

## 2021-02-28 NOTE — PROGRESS NOTES
HPI:   Diego Giang is a 56y.o. year old male who presents today for a routine visit. He has a history of hypertension, hyperlipidemia, moderate aortic insufficiency, atypical chest pain, prediabetes, GERD, osteoarthritis, lumbar radiculopathy, and chronic venous insufficiency. He reports that he is doing reasonably well. He states that his mother passed away from advanced ovarian cancer in 11/2020. He is working with his brothers currently to clean out and sell her home. He reports that his right knee pain is still an issue and he did not have sustained improvement after cortisone injection from Dr. Lore Shell. He continues to wear a right knee brace. He underwent evaluation by Dr. Nader Becerra in 1/2021 for left shoulder and neck pain, and was referred to physical therapy. He states that this is gradually improving. He reports several episodes of rectal bleeding with bowel movements, and is concerned since he has not had a colonoscopy in several years. He is otherwise without new complaints. He has a history of hypertension, treated with losartan and amlodipine. He does not check his blood pressure at home regularly. He also does not exercise regularly, although admits to doing yard work and projects around the house. He denies any shortness of breath at rest or with exertion, palpitations, lightheadedness, or edema. He does have a history of substernal chest pain that has no relation to exertion and was thought to be atypical. He presented to [facility] on 2/27/2016 with a complaint of chest pain and diaphoresis, which seemed to be relieved by aspirin and sublingual nitroglycerin in the ambulance. Evaluation included negative ECG and troponins. He had an exercise nuclear stress test (2/29/2016) which showed normal LV function (EF 57%) and a medium sized partially reversible defect inferiorly (intermediate risk study).  On 3/1/2016, he underwent a cardiac catheterization which showed no significant epicardial coronary artery disease. He also had an echocardiogram (2/28/2016) showing mild LV septal hypertrophy, hypokinetic basal inferior wall with preserved LV function (EF 27%), mild diastolic dysfunction, and moderate aortic regurgitation with questionable dilation of the distal arch of aorta (3.5 cm). He was found to have elevated triglycerides, with lipid panel showing total cholesterol 201/ / HDL 52/ LDL 89. He was started on Fenofibrate prior to discharge on 3/1/2016. He reports that he has had no significant recurrence of chest pain since that time. He had a repeat echocardiogram in 9/2017 which showed low normal LV function (EF 50-55%), no RWMA, calcified aortic valve with bicuspid appearance and moderate regurgitation, and mild dilatation of the aortic root. He had a repeat echocardiogram in 3/2020 which showed upper normal LV size and low normal LV function (EF 50%), moderate AI and mildly dilated ascending aorta at 4.1 cm. He does have difficulty with mild lower extremity edema due to chronic venous insufficiency. He is now being followed by Dr. Lily Trejo. He has a history of prediabetes, with HbA1c ranging from 5.8-6.1 since 2012. He denies any polyuria, polydipsia, nocturia, or blurry vision, and has no history of retinopathy, neuropathy, or nephropathy. He has regular eye exams with Dr. Ed Dooley. He has a history of osteoarthritis, involving his right knee, right great toe, and lumbar spine. He has had difficulty in the past with increasing pain at the base of his right thumb and was evaluated by Dr. Katja Norwood at [address]. He states that x-rays revealed he had severe osteoarthritis, and he received a cortisone injection with some improvement. In 12/2016, he noted increasing difficulty with low back pain and right sided sciatica. He underwent a lumbar MRI (12/2016) which showed progression when compared to a prior lumbar MRI from 2010.  It showed prominent right paracentral L4-L5 disc extrusion with impingement of the descending right L5 nerve root and moderately severe multifactorial canal stenosis; additional prominent right paracentral L5-S1 disc protrusion with impingement of the descending right S1 nerve root; abutment of the foraminal right L5 nerve root related to moderate foramina stenosis at L5-S1; mild multifactorial canal stenosis at L3-L4 with abutment of the descending left L4 nerve root. He was evaluated by Dr. Parish Cleaning, and treated with Medrol dose pack, Norco and Flexeril without significant improvement. He was evaluated by Dr. Austin Almaraz in 3/2017 for possible surgical intervention, but this was deferred due to issues regarding his wife's health. He was treated with gabapentin, and was reevaluated by Dr. Parish Cleaning in 8/2017, at which time he reported significant improvement. He states that he no longer required treatment with gabapentin or Norco, and was continuing to take Voltaren ER with good control of symptoms. However in 3/2018, he developed recurrence of severe pain with right sided sciatica that was unresponsive to conservative therapy. He subsequently consulted Dr. Niru Alfredo and on 7/5/2018, he underwent a right L4-L5 and L5-S1 hemilaminectomy, medial facetectomy, and foraminotomy by Dr. Niru Alfredo for removal of herniated disks and decompression of foraminal stenoses at these levels. He reports that his right sided sciatica has resolved and he is no longer requiring any pain medication. He does report some residual numbness on the plantar surface of his right foot. He has a history of GERD, with symptoms well controlled with pantoprazole as needed. He had a screening colonoscopy by Dr. Marisela Hager in 3/2012 with removal of a benign serrated polyp (pathology shows hyperplastic and adenomatous features). He had repeat screening colonoscopy in 3/2017 by Dr Harry Galvan which was normal. Follow-up recommended for five years.  He denies any abdominal pain, nausea, vomiting, melena, hematochezia, or change in bowel movements. In 5/2016, he fell at work with his chest striking the edge of a table and he sustained fractures of the anterior right 7th and 8th ribs. He had serial rib x-rays since that time showing slow interval healing, but reports that he no longer has pain related to this. He underwent a bone density scan in 10/2016 which showed evidence of osteopenia with T-scores:  femoral neck left -1.7  /right -1.5 and lumbar -1.2. He was started on calcium and Vitamin D supplementation. He previously smoked approximately 1 ppd for 40 years, stopping in 2000. He also reports a history of asbestos exposure in 1980. Past Medical History:   Diagnosis Date    Abnormal myocardial perfusion study 2/28/2016    Partially reversible inferior perfusion defect, EF 57%    AI (aortic insufficiency)     Moderate with possible bicuspid aortic valve    Chronic low back pain     Chronic venous insufficiency     Colon polyp 03/2012    Benign serrated polyp    Fracture 05/2016    rib fx's    GERD (gastroesophageal reflux disease)     History of echocardiogram 2/28/2016    EF 55%. Basal inferior hypokinesis. Gr 1 DDfx. Mod AI.  Hyperlipidemia     Hypertension     Osteoarthritis of right knee     Osteopenia     Prediabetes     Prepatellar bursitis of right knee     Recurrent genital herpes 1/16/2013    Right lumbar radiculopathy 10/2016    MRI: prominent right paracentral L4-L5 disc extrusion/impingement of descending right L5 nerve root; moderately severe canal stenosis; prominent right paracentral L5-S1 disc protrusion/ impingement of the descending right S1 nerve root; abutment of the foraminal right L5 nerve root/moderate foramina stenosis at L5-S1; mild canal stenosis at L3-L4 with abutment of the descending left L4 nerve root.     S/P cardiac catheterization 3/1/2016    Normal coronary anatomy     Past Surgical History:   Procedure Laterality Date    ENDOSCOPY, COLON, DIAGNOSTIC      HX CHOLECYSTECTOMY      HX LUMBAR LAMINECTOMY  2018    R L4/5/S1 Dr. Jonas De La O HX TONSILLECTOMY       Current Outpatient Medications   Medication Sig    cyclobenzaprine (FLEXERIL) 10 mg tablet Take 1 Tab by mouth three (3) times daily as needed for Muscle Spasm(s).  clindamycin (CLEOCIN T) 1 % external solution Use thin film on affected area of scalp every 12 hours  Indications: acne    ibuprofen (MOTRIN) 800 mg tablet TAKE 1 TABLET BY MOUTH EVERY 8 HOURS AS NEEDED FOR PAIN FOR UP TO 10 DAYS. TAKE WITH FOOD    valACYclovir (VALTREX) 500 mg tablet TAKE 1 TABLET DAILY    losartan (COZAAR) 100 mg tablet TAKE 1 TABLET DAILY    ascorbic acid, vitamin C, (VITAMIN C) 500 mg tablet Take 500 mg by mouth daily.  magnesium oxide (MAG-OX) 400 mg tablet Take 400 mg by mouth.  amLODIPine (NORVASC) 10 mg tablet TAKE 1 TABLET DAILY (Patient taking differently: Take 5 mg by mouth daily.)    albuterol (PROVENTIL HFA, VENTOLIN HFA, PROAIR HFA) 90 mcg/actuation inhaler Take 1 Puff by inhalation every six (6) hours as needed for Wheezing.  inhalational spacing device 1 Each by Does Not Apply route as needed (shortness of breath).  nitroglycerin (NITROSTAT) 0.4 mg SL tablet 1 Tab by SubLINGual route every five (5) minutes as needed for Chest Pain. Up to 3 doses.  calcium carbonate (CALCIUM 300 PO) Take 1 Tab by mouth daily.  aspirin delayed-release 81 mg tablet Take 81 mg by mouth daily.  Cholecalciferol, Vitamin D3, 2,000 unit cap Take 2,000 Units by mouth daily as needed.  DOCOSAHEXANOIC ACID/EPA (FISH OIL PO) Take 1 Tab by mouth daily. No current facility-administered medications for this visit. Allergies and Intolerances: Allergies   Allergen Reactions    Tree Nuts Anaphylaxis    Ace Inhibitors Cough    Nuts [Tree Nut] Itching     Family History: His mother is alive with stage 3 ovarian cancer. His father  from esophageal cancer at age 61.  His maternal aunt  at age 48 from colon cancer. He has no family history of prostate cancer. Family History   Problem Relation Age of Onset    Cancer Father 61        esophageal    Hypertension Father     Cancer Mother 80        ovarian cancer     Hypertension Brother     Diabetes Other         Grandmother    Arthritis-osteo Other     Other Other         Stomach problems; Father, grandfather     Social History:   He  reports that he quit smoking about 20 years ago. He has a 12.50 pack-year smoking history. He quit smokeless tobacco use about 20 years ago. Reports that he smoked less than 1 ppd for 40 years, and stopped in . He is  and they have one son. His wife was recently diagnosed with Paget's disease of the breast, which represents a recurrence of prior breast cancer. He was employed as an  for [employer], but retired in 2017. He drinks about a 12 pack of beer each month. Social History     Substance and Sexual Activity   Alcohol Use Yes    Alcohol/week: 1.7 standard drinks    Types: 2 Cans of beer per week     Immunization History:  Immunization History   Administered Date(s) Administered    Influenza Vaccine 2013, 2014, 10/01/2015    TDAP Vaccine 2004    Td 2004    Tdap 2014       Review of Systems:   As above included in HPI. Otherwise 11 point review of systems negative including constitutional, skin, HENT, eyes, respiratory, cardiovascular, gastrointestinal, genitourinary, musculoskeletal, endocrine, hematologic, allergy, and neurologic. Physical:   Vitals:   BP: 132/74  HR: 74  WT: 230 lb (104.3 kg)  BMI:  31.19 kg/m2    Exam:     Patient appears in no apparent distress. Affect is appropriate. HEENT: PERRLA, anicteric, oropharynx clear, no JVD, adenopathy or thyromegaly. No carotid bruits or radiated murmur. Lungs: clear to auscultation, no wheezes, rhonchi, or rales. Heart: regular rate and rhythm.  Diastolic murmur; no rubs or gallops  Abdomen: soft, nontender, nondistended, normal bowel sounds, no hepatosplenomegaly or masses. Extremities: without edema. Pulses 1-2+ bilaterally. Review of Data:  Labs:  Appointment on 02/18/2021   Component Date Value Ref Range Status    Cholesterol, total 02/18/2021 192  100 - 199 mg/dL Final    Triglyceride 02/18/2021 155* 0 - 149 mg/dL Final    HDL Cholesterol 02/18/2021 49  >39 mg/dL Final    VLDL, calculated 02/18/2021 27  5 - 40 mg/dL Final    LDL, calculated 02/18/2021 116* 0 - 99 mg/dL Final    Glucose 02/18/2021 128* 65 - 99 mg/dL Final    BUN 02/18/2021 17  8 - 27 mg/dL Final    Creatinine 02/18/2021 1.24  0.76 - 1.27 mg/dL Final    GFR est non-AA 02/18/2021 62  >59 mL/min/1.73 Final    GFR est AA 02/18/2021 72  >59 mL/min/1.73 Final    BUN/Creatinine ratio 02/18/2021 14  10 - 24 Final    Sodium 02/18/2021 142  134 - 144 mmol/L Final    Potassium 02/18/2021 4.2  3.5 - 5.2 mmol/L Final    Chloride 02/18/2021 103  96 - 106 mmol/L Final    CO2 02/18/2021 26  20 - 29 mmol/L Final    Calcium 02/18/2021 9.5  8.6 - 10.2 mg/dL Final    Protein, total 02/18/2021 6.5  6.0 - 8.5 g/dL Final    Albumin 02/18/2021 4.5  3.8 - 4.8 g/dL Final    GLOBULIN, TOTAL 02/18/2021 2.0  1.5 - 4.5 g/dL Final    A-G Ratio 02/18/2021 2.3* 1.2 - 2.2 Final    Bilirubin, total 02/18/2021 0.7  0.0 - 1.2 mg/dL Final    Alk.  phosphatase 02/18/2021 80  39 - 117 IU/L Final    AST (SGOT) 02/18/2021 26  0 - 40 IU/L Final    ALT (SGPT) 02/18/2021 29  0 - 44 IU/L Final    Hemoglobin A1c 02/18/2021 6.2* 4.8 - 5.6 % Final    Estimated average glucose 02/18/2021 131  mg/dL Final    WBC 02/18/2021 7.5  3.4 - 10.8 x10E3/uL Final    RBC 02/18/2021 5.42  4.14 - 5.80 x10E6/uL Final    HGB 02/18/2021 17.3  13.0 - 17.7 g/dL Final    HCT 02/18/2021 49.5  37.5 - 51.0 % Final    MCV 02/18/2021 91  79 - 97 fL Final    MCH 02/18/2021 31.9  26.6 - 33.0 pg Final    MCHC 02/18/2021 34.9  31.5 - 35.7 g/dL Final    RDW 02/18/2021 13.4  11.6 - 15.4 % Final    PLATELET 13/32/8815 703  150 - 450 x10E3/uL Final    NEUTROPHILS 02/18/2021 53  Not Estab. % Final    Lymphocytes 02/18/2021 33  Not Estab. % Final    MONOCYTES 02/18/2021 9  Not Estab. % Final    EOSINOPHILS 02/18/2021 3  Not Estab. % Final    BASOPHILS 02/18/2021 1  Not Estab. % Final    ABS. NEUTROPHILS 02/18/2021 4.1  1.4 - 7.0 x10E3/uL Final    Abs Lymphocytes 02/18/2021 2.5  0.7 - 3.1 x10E3/uL Final    ABS. MONOCYTES 02/18/2021 0.7  0.1 - 0.9 x10E3/uL Final    ABS. EOSINOPHILS 02/18/2021 0.2  0.0 - 0.4 x10E3/uL Final    ABS. BASOPHILS 02/18/2021 0.1  0.0 - 0.2 x10E3/uL Final    IMMATURE GRANULOCYTES 02/18/2021 1  Not Estab. % Final    ABS. IMM. GRANS. 02/18/2021 0.0  0.0 - 0.1 x10E3/uL Final    INTERPRETATION 02/18/2021 Note   Final       Calculated 10 year ASCVD risk score:  12.2 %    Health Maintenance:  Screening:    Colorectal: colonoscopy (3/2017) normal. Dr. Claudene Flakes. Due 3/2022.    Depression: none   DM (HbA1c/FPG): / HbA1c 6.2 (2/2021)   Hepatitis C: negative (9/2016)   Falls: none    DEXA: N/A   PSA/TONI: PSA 0.5 (8/2020)/ TONI (2/2019)   Glaucoma: regular eye exams with [facility] (last 2/2021)   Smoking: none   Vitamin D: 43.2 (8/2020)   Medicare Wellness: N/A      Impression:  Patient Active Problem List   Diagnosis Code    Hyperlipidemia E78.5    GERD (gastroesophageal reflux disease) K21.9    Chronic venous insufficiency I87.2    Colon polyp K63.5    Prediabetes R73.03    Recurrent genital herpes A60.00    Vitamin D insufficiency E55.9    Aortic insufficiency, moderate I35.1    Essential hypertension I10    Primary osteoarthritis of knee M17.10    Lumbar herniated disc  right L4/5/S1 chronic M51.26    Osteopenia determined by DEXA 2016, lowest T score -1.7 M85.80    Annular tear of lumbar disc M51.36    Chronic right-sided low back pain with sciatica M54.40, G89.29    Neuritis of lower extremity G57.90    Chronic thumb pain, right M79.644, G89.29    S/P lumbar discectomy Z98.890    Class 1 obesity due to excess calories without serious comorbidity in adult E66.09    Erythrocytosis D75.1    Chronic pain of right knee M25.561, G89.29       Plan:   1. Hypertension. Well controlled on current regimen of losartan 100 mg daily and amlodipine 5 mg daily. Renal function remains normal with creatinine 1.24 / eGFR 62. Continue to follow. 2. Dyslipidemia. Patient no longer taking fenofibrate. Discontinued given lack of data showing that lowering triglycerides decreases the risk of CAD. In addition, first line therapy for treating triglycerides < 500 would be initiation of a statin. Repeat lipid panel with triglyceride level 118. However, his calculated 10 year ASCVD risk is 12.2 %, which does place him in one of the four statin benefit groups as per new AHA/ACC guidelines (primary prevention: ASCVD risk > 7.5%). LDL stable at 116 with HDL 49, Emphasized importance of continued lifestyle modifications, including diet, exercise, and weight loss. Will continue to follow. 3. Aortic insufficiency, moderate with mildly dilated aortic root. Probable bicuspid valve. Being monitored on echocardiogram. Also, considering thoracic CT scan if dilation of aortic root appears to progress on echocardiogram.  Repeat echocardiogram to reassess valve in 9/2017 without change. Established with new cardiologist, Dr. Luiz Pearce, and underwent repeat echocardiogram (3/2020) showing moderate AI with mildly dilated aortic root at 4.1 cm. Low normal LV function (EF 50%). Essentially unchanged by report when compared to prior echocardiogram in 2017. He continues to be followed by Dr. Wen Muñoz with last visit in 1/2021.   4. Shortness of breath. Patient reports improvement today. EKG unchanged from baseline. Chest x-ray negative. Again stressed importance of proceeding with sleep evaluation given known daytime drowsiness, snoring, and fatigue. Evaluated by Dr. Aurelia Daley and recommended in-lab sleep study. PFTs normal, although elevated absolute eosinophil count. Does have history of childhood asthma. Continue to follow. 5. Prediabetes. HbA1c worsened to 6.2 today likely related to weight gain and noncompliance with  diet. No evidence of microvascular complications. On ARB but unwilling to begin statin. Continue follow-up for annual eye exams. Foot exam normal in 4/2017. Urine microalbumin/ creatinine ratio without evidence of microalbuminuria. Encouraged continued weight loss and dietary changes, particularly avoiding concentrated sweets. 6. Osteopenia. Last bone density scan 10/2016. Using femoral neck T-scores, calculated FRAX score estimates her 10 year risk of a major osteoporetic fracture at 12 % and hip fracture at 2.0 %, which are not an indication for biphosphonate treatment (>20% and >3%, respectively). Continue calcium and Vitamin D. Encouraged exercise, particularly weight bearing activities. Consider repeat bone density scan in near future. 7. Neck pain and left arm pain. Evaluated by Dr. Sybil Oshea on 1/28/2021 and referred to physical therapy. Finding it to be helpful. Requesting refill of Flexeril to help with spasms. 8. Right knee pain. Patient reports increasing pain with ambulation and standing. Chronic problem which has now progressed. Underwent evaluation by Dr. Tracey Leone and received a cortisone injection. Reports persistent pain and wearing a right knee brace. Wishing to avoid surgery as long as possible. 9. Right sciatica. Progression of symptoms and disease on MRI. Responded well to conservative therapy initially, but recurred and unresponsive to medications and injections. Underwent right L4-L5 and L5-S1 hemilaminectomy, medial facetectomy, and foraminotomy by Dr. Bipin Finney in 7/2018 with great success. Reports only residual numbness on medial plantar surface of right foot. No longer requiring any pain medication.  Continuing to do well. 10. Right thumb osteoarthritis. Improved pain control with cortisone injection. Being followed by Dr. Corazon Chandler. 11. Erythrocytosis. Review of record shows similar readings since 2014. Erythropoietin level normal in 2/2020. In normal range today. Will continue to monitor. 12. Excessive daytime sleepiness/ snoring. Patient with daytime drowsiness, snoring, and fatigue. Underwent evaluation by Dr. Marisela Andrea in 3/2020 and recommended in-lab sleep study due to severity of symptoms. However, he has not yet proceeded and not currently wishing to do so. Will readdress at next visit  13. Rectal bleeding. Reports episodes of bleeding with bowel movements. Found when wiping only. Concerned and requesting referral to GI. Last colonoscopy normal 3/2017. No evidence of anemia which is reassuring. Will place referral to Dr. Oliverio Baeza. 14. GERD. Continue occasional use of Protonix with good control of symptoms. Follow. 15. Obesity. Weight increased since last visit. Emphasized importance of lifestyle modifications, including diet, exercise, and weight loss. Will readdress next visit. 16. Health maintenance. Did not receive influenza vaccine. Discussed Shingrix vaccine but not wishing to proceed. Also currently unwilling to obtain COVID 19 vaccine. Other immunizations up to date. Colonoscopy due 2022. Prostate cancer screening up to date. Continue regular eye exams. Stressed importance of weight loss. Vitamin D level normal. Continue maintenance dose supplement. Counseled patient regarding strict mitigation measures for COVID-19, including wearing a mask, social distancing and diligent hand washing, as recommended by the CDC. Discussed importance of proceeding with COVID 19 vaccine when available. Urged to obtain, but currently unwilling. Patient understands recommendations and agrees with plan.   Follow-up in 6 months for physical.

## 2021-08-24 ENCOUNTER — APPOINTMENT (OUTPATIENT)
Dept: INTERNAL MEDICINE CLINIC | Age: 61
End: 2021-08-24

## 2021-08-24 DIAGNOSIS — E66.09 CLASS 1 OBESITY DUE TO EXCESS CALORIES WITHOUT SERIOUS COMORBIDITY WITH BODY MASS INDEX (BMI) OF 31.0 TO 31.9 IN ADULT: ICD-10-CM

## 2021-08-24 DIAGNOSIS — M25.561 CHRONIC PAIN OF RIGHT KNEE: ICD-10-CM

## 2021-08-24 DIAGNOSIS — E55.9 VITAMIN D INSUFFICIENCY: ICD-10-CM

## 2021-08-24 DIAGNOSIS — I35.1 AORTIC VALVE INSUFFICIENCY, ETIOLOGY OF CARDIAC VALVE DISEASE UNSPECIFIED: ICD-10-CM

## 2021-08-24 DIAGNOSIS — K62.89 RECTAL PAIN: ICD-10-CM

## 2021-08-24 DIAGNOSIS — M54.2 NECK PAIN: ICD-10-CM

## 2021-08-24 DIAGNOSIS — M17.11 PRIMARY OSTEOARTHRITIS OF RIGHT KNEE: ICD-10-CM

## 2021-08-24 DIAGNOSIS — K62.5 RECTAL BLEEDING: ICD-10-CM

## 2021-08-24 DIAGNOSIS — Z12.5 PROSTATE CANCER SCREENING: ICD-10-CM

## 2021-08-24 DIAGNOSIS — I10 ESSENTIAL HYPERTENSION: ICD-10-CM

## 2021-08-24 DIAGNOSIS — R73.03 PREDIABETES: ICD-10-CM

## 2021-08-24 DIAGNOSIS — E78.5 HYPERLIPIDEMIA, UNSPECIFIED HYPERLIPIDEMIA TYPE: ICD-10-CM

## 2021-08-24 DIAGNOSIS — G89.29 CHRONIC PAIN OF RIGHT KNEE: ICD-10-CM

## 2021-08-24 DIAGNOSIS — Z00.00 LABORATORY TESTS ORDERED AS PART OF A COMPLETE PHYSICAL EXAM (CPE): ICD-10-CM

## 2021-08-25 LAB
25(OH)D3+25(OH)D2 SERPL-MCNC: 47.3 NG/ML (ref 30–100)
ALBUMIN SERPL-MCNC: 4.7 G/DL (ref 3.8–4.8)
ALBUMIN/CREAT UR: <4 MG/G CREAT (ref 0–29)
ALBUMIN/GLOB SERPL: 2 {RATIO} (ref 1.2–2.2)
ALP SERPL-CCNC: 82 IU/L (ref 48–121)
ALT SERPL-CCNC: 36 IU/L (ref 0–44)
APPEARANCE UR: CLEAR
AST SERPL-CCNC: 27 IU/L (ref 0–40)
BACTERIA #/AREA URNS HPF: NORMAL /[HPF]
BASOPHILS # BLD AUTO: 0.1 X10E3/UL (ref 0–0.2)
BASOPHILS NFR BLD AUTO: 1 %
BILIRUB SERPL-MCNC: 0.6 MG/DL (ref 0–1.2)
BILIRUB UR QL STRIP: NEGATIVE
BUN SERPL-MCNC: 12 MG/DL (ref 8–27)
BUN/CREAT SERPL: 11 (ref 10–24)
CALCIUM SERPL-MCNC: 10.1 MG/DL (ref 8.6–10.2)
CASTS URNS QL MICRO: NORMAL /LPF
CHLORIDE SERPL-SCNC: 104 MMOL/L (ref 96–106)
CHOLEST SERPL-MCNC: 218 MG/DL (ref 100–199)
CO2 SERPL-SCNC: 24 MMOL/L (ref 20–29)
COLOR UR: YELLOW
CREAT SERPL-MCNC: 1.09 MG/DL (ref 0.76–1.27)
CREAT UR-MCNC: 69.4 MG/DL
EOSINOPHIL # BLD AUTO: 0.2 X10E3/UL (ref 0–0.4)
EOSINOPHIL NFR BLD AUTO: 3 %
EPI CELLS #/AREA URNS HPF: NORMAL /HPF (ref 0–10)
ERYTHROCYTE [DISTWIDTH] IN BLOOD BY AUTOMATED COUNT: 13.4 % (ref 11.6–15.4)
EST. AVERAGE GLUCOSE BLD GHB EST-MCNC: 131 MG/DL
GLOBULIN SER CALC-MCNC: 2.3 G/DL (ref 1.5–4.5)
GLUCOSE SERPL-MCNC: 115 MG/DL (ref 65–99)
GLUCOSE UR QL: NEGATIVE
HBA1C MFR BLD: 6.2 % (ref 4.8–5.6)
HCT VFR BLD AUTO: 49.5 % (ref 37.5–51)
HDLC SERPL-MCNC: 51 MG/DL
HGB BLD-MCNC: 16.9 G/DL (ref 13–17.7)
HGB UR QL STRIP: NEGATIVE
IMM GRANULOCYTES # BLD AUTO: 0.1 X10E3/UL (ref 0–0.1)
IMM GRANULOCYTES NFR BLD AUTO: 1 %
IMP & REVIEW OF LAB RESULTS: NORMAL
KETONES UR QL STRIP: NEGATIVE
LDLC SERPL CALC-MCNC: 143 MG/DL (ref 0–99)
LEUKOCYTE ESTERASE UR QL STRIP: NEGATIVE
LYMPHOCYTES # BLD AUTO: 2.3 X10E3/UL (ref 0.7–3.1)
LYMPHOCYTES NFR BLD AUTO: 33 %
MAGNESIUM SERPL-MCNC: 2.3 MG/DL (ref 1.6–2.3)
MCH RBC QN AUTO: 31 PG (ref 26.6–33)
MCHC RBC AUTO-ENTMCNC: 34.1 G/DL (ref 31.5–35.7)
MCV RBC AUTO: 91 FL (ref 79–97)
MICRO URNS: NORMAL
MICRO URNS: NORMAL
MICROALBUMIN UR-MCNC: <3 UG/ML
MONOCYTES # BLD AUTO: 0.6 X10E3/UL (ref 0.1–0.9)
MONOCYTES NFR BLD AUTO: 8 %
NEUTROPHILS # BLD AUTO: 3.8 X10E3/UL (ref 1.4–7)
NEUTROPHILS NFR BLD AUTO: 54 %
NITRITE UR QL STRIP: NEGATIVE
PH UR STRIP: 7 [PH] (ref 5–7.5)
PLATELET # BLD AUTO: 220 X10E3/UL (ref 150–450)
POTASSIUM SERPL-SCNC: 4.6 MMOL/L (ref 3.5–5.2)
PROT SERPL-MCNC: 7 G/DL (ref 6–8.5)
PROT UR QL STRIP: NEGATIVE
PSA SERPL-MCNC: 0.6 NG/ML (ref 0–4)
RBC # BLD AUTO: 5.46 X10E6/UL (ref 4.14–5.8)
RBC #/AREA URNS HPF: NORMAL /HPF (ref 0–2)
SODIUM SERPL-SCNC: 142 MMOL/L (ref 134–144)
SP GR UR: 1.01 (ref 1–1.03)
TRIGL SERPL-MCNC: 136 MG/DL (ref 0–149)
TSH SERPL DL<=0.005 MIU/L-ACNC: 1.94 UIU/ML (ref 0.45–4.5)
UROBILINOGEN UR STRIP-MCNC: 0.2 MG/DL (ref 0.2–1)
VLDLC SERPL CALC-MCNC: 24 MG/DL (ref 5–40)
WBC # BLD AUTO: 7.1 X10E3/UL (ref 3.4–10.8)
WBC #/AREA URNS HPF: NORMAL /HPF (ref 0–5)

## 2021-08-31 ENCOUNTER — OFFICE VISIT (OUTPATIENT)
Dept: INTERNAL MEDICINE CLINIC | Age: 61
End: 2021-08-31
Payer: COMMERCIAL

## 2021-08-31 VITALS
HEART RATE: 74 BPM | HEIGHT: 72 IN | TEMPERATURE: 97.7 F | DIASTOLIC BLOOD PRESSURE: 68 MMHG | SYSTOLIC BLOOD PRESSURE: 116 MMHG | WEIGHT: 225 LBS | OXYGEN SATURATION: 97 % | BODY MASS INDEX: 30.48 KG/M2

## 2021-08-31 DIAGNOSIS — M25.512 CHRONIC LEFT SHOULDER PAIN: ICD-10-CM

## 2021-08-31 DIAGNOSIS — M47.812 CERVICAL SPONDYLOSIS: ICD-10-CM

## 2021-08-31 DIAGNOSIS — E78.5 HYPERLIPIDEMIA, UNSPECIFIED HYPERLIPIDEMIA TYPE: ICD-10-CM

## 2021-08-31 DIAGNOSIS — I35.1 AORTIC VALVE INSUFFICIENCY, ETIOLOGY OF CARDIAC VALVE DISEASE UNSPECIFIED: ICD-10-CM

## 2021-08-31 DIAGNOSIS — M75.22 BICIPITAL TENDINITIS OF LEFT SHOULDER: ICD-10-CM

## 2021-08-31 DIAGNOSIS — M25.561 CHRONIC PAIN OF RIGHT KNEE: ICD-10-CM

## 2021-08-31 DIAGNOSIS — M17.11 PRIMARY OSTEOARTHRITIS OF RIGHT KNEE: ICD-10-CM

## 2021-08-31 DIAGNOSIS — Z00.00 ENCOUNTER FOR ROUTINE HISTORY AND PHYSICAL EXAM FOR MALE: Primary | ICD-10-CM

## 2021-08-31 DIAGNOSIS — K21.9 GASTROESOPHAGEAL REFLUX DISEASE, UNSPECIFIED WHETHER ESOPHAGITIS PRESENT: ICD-10-CM

## 2021-08-31 DIAGNOSIS — G89.29 CHRONIC PAIN OF RIGHT KNEE: ICD-10-CM

## 2021-08-31 DIAGNOSIS — I10 ESSENTIAL HYPERTENSION: ICD-10-CM

## 2021-08-31 DIAGNOSIS — G89.29 CHRONIC LEFT SHOULDER PAIN: ICD-10-CM

## 2021-08-31 DIAGNOSIS — R73.03 PREDIABETES: ICD-10-CM

## 2021-08-31 PROCEDURE — 99396 PREV VISIT EST AGE 40-64: CPT | Performed by: INTERNAL MEDICINE

## 2021-08-31 NOTE — PATIENT INSTRUCTIONS
Heart-Healthy Diet: Care Instructions  Your Care Instructions     A heart-healthy diet has lots of vegetables, fruits, nuts, beans, and whole grains, and is low in salt. It limits foods that are high in saturated fat, such as meats, cheeses, and fried foods. It may be hard to change your diet, but even small changes can lower your risk of heart attack and heart disease. Follow-up care is a key part of your treatment and safety. Be sure to make and go to all appointments, and call your doctor if you are having problems. It's also a good idea to know your test results and keep a list of the medicines you take. How can you care for yourself at home? Watch your portions  · Learn what a serving is. A \"serving\" and a \"portion\" are not always the same thing. Make sure that you are not eating larger portions than are recommended. For example, a serving of pasta is ½ cup. A serving size of meat is 2 to 3 ounces. A 3-ounce serving is about the size of a deck of cards. Measure serving sizes until you are good at [place]" them. Keep in mind that restaurants often serve portions that are 2 or 3 times the size of one serving. · To keep your energy level up and keep you from feeling hungry, eat often but in smaller portions. · Eat only the number of calories you need to stay at a healthy weight. If you need to lose weight, eat fewer calories than your body burns (through exercise and other physical activity). Eat more fruits and vegetables  · Eat a variety of fruit and vegetables every day. Dark green, deep orange, red, or yellow fruits and vegetables are especially good for you. Examples include spinach, carrots, peaches, and berries. · Keep carrots, celery, and other veggies handy for snacks. Buy fruit that is in season and store it where you can see it so that you will be tempted to eat it. · Cook dishes that have a lot of veggies in them, such as stir-fries and soups.   Limit saturated and trans fat  · Read food labels, and try to avoid saturated and trans fats. They increase your risk of heart disease. · Use olive or canola oil when you cook. · Bake, broil, grill, or steam foods instead of frying them. · Choose lean meats instead of high-fat meats such as hot dogs and sausages. Cut off all visible fat when you prepare meat. · Eat fish, skinless poultry, and meat alternatives such as soy products instead of high-fat meats. Soy products, such as tofu, may be especially good for your heart. · Choose low-fat or fat-free milk and dairy products. Eat foods high in fiber  · Eat a variety of grain products every day. Include whole-grain foods that have lots of fiber and nutrients. Examples of whole-grain foods include oats, whole wheat bread, and brown rice. · Buy whole-grain breads and cereals, instead of white bread or pastries. Limit salt and sodium  · Limit how much salt and sodium you eat to help lower your blood pressure. · Taste food before you salt it. Add only a little salt when you think you need it. With time, your taste buds will adjust to less salt. · Eat fewer snack items, fast foods, and other high-salt, processed foods. Check food labels for the amount of sodium in packaged foods. · Choose low-sodium versions of canned goods (such as soups, vegetables, and beans). Limit sugar  · Limit drinks and foods with added sugar. These include candy, desserts, and soda pop. Limit alcohol  · Limit alcohol to no more than 2 drinks a day for men and 1 drink a day for women. Too much alcohol can cause health problems. When should you call for help? Watch closely for changes in your health, and be sure to contact your doctor if:    · You would like help planning heart-healthy meals. Where can you learn more? Go to http://www.[employer].com/  Enter V137 in the search box to learn more about \"Heart-Healthy Diet: Care Instructions. \"  Current as of: August 22, 2019               Content Version: 12.6  © 6318-3752 [employer]. Care instructions adapted under license by [employer] (which disclaims liability or warranty for this information). If you have questions about a medical condition or this instruction, always ask your healthcare professional. [address] any warranty or liability for your use of this information. Learning About Low-Sodium Foods  What foods are low in sodium? The foods you eat contain nutrients, such as vitamins and minerals. Sodium is a nutrient. Your body needs the right amount to stay healthy and work as it should. You can use the list below to help you make choices about which foods to eat. Fruits  · Fresh, frozen, canned, or dried fruit  Vegetables  · Fresh or frozen vegetables, with no added salt  · Canned vegetables, low-sodium or with no added salt  Grains  · Bagels without salted tops  · Cereal with no added salt  · Corn tortillas  · Crackers with no added salt  · Oatmeal, cooked without salt  · Popcorn with no salt  · Pasta and noodles, cooked without salt  · Rice, cooked without salt  · Unsalted pretzels  Dairy and dairy alternatives  · Butter, unsalted  · Cream cheese  · Ice cream  · Milk  · Soy milk  Meats and other protein foods  · Beans and peas, canned with no salt  · Eggs  · Fresh fish (not smoked)  · Fresh meats (not smoked or cured)  · Nuts and nut butter, prepared without salt  · Poultry, not packaged with sodium solution  · Tofu, unseasoned  · Tuna, canned without salt  Seasonings  · Garlic  · Herbs and spices  · Lemon juice  · Mustard  · Olive oil  · Salt-free seasoning mixes  · Vinegar  Work with your doctor to find out how much of this nutrient you need. Depending on your health, you may need more or less of it in your diet. Where can you learn more?   Go to http://www.gray.com/  Enter S460 in the search box to learn more about \"Learning About Low-Sodium Foods.\"  Current as of: August 22, 2019               Content Version: 12.6  © 7253-7265 [employer]. Care instructions adapted under license by [employer] (which disclaims liability or warranty for this information). If you have questions about a medical condition or this instruction, always ask your healthcare professional. [address] any warranty or liability for your use of this information. Low Sodium Diet (2,000 Milligram): Care Instructions  Your Care Instructions     Too much sodium causes your body to hold on to extra water. This can raise your blood pressure and force your heart and kidneys to work harder. In very serious cases, this could cause you to be put in the hospital. It might even be life-threatening. By limiting sodium, you will feel better and lower your risk of serious problems. The most common source of sodium is salt. People get most of the salt in their diet from canned, prepared, and packaged foods. Fast food and restaurant meals also are very high in sodium. Your doctor will probably limit your sodium to less than 2,000 milligrams (mg) a day. This limit counts all the sodium in prepared and packaged foods and any salt you add to your food. Follow-up care is a key part of your treatment and safety. Be sure to make and go to all appointments, and call your doctor if you are having problems. It's also a good idea to know your test results and keep a list of the medicines you take. How can you care for yourself at home? Read food labels  · Read labels on cans and food packages. The labels tell you how much sodium is in each serving. Make sure that you look at the serving size. If you eat more than the serving size, you have eaten more sodium. · Food labels also tell you the Percent Daily Value for sodium. Choose products with low Percent Daily Values for sodium.   · Be aware that sodium can come in forms other than salt, including monosodium glutamate (MSG), sodium citrate, and sodium bicarbonate (baking soda). MSG is often added to Asian food. When you eat out, you can sometimes ask for food without MSG or added salt. Buy low-sodium foods  · Buy foods that are labeled \"unsalted\" (no salt added), \"sodium-free\" (less than 5 mg of sodium per serving), or \"low-sodium\" (less than 140 mg of sodium per serving). Foods labeled \"reduced-sodium\" and \"light sodium\" may still have too much sodium. Be sure to read the label to see how much sodium you are getting. · Buy fresh vegetables, or frozen vegetables without added sauces. Buy low-sodium versions of canned vegetables, soups, and other canned goods. Prepare low-sodium meals  · Cut back on the amount of salt you use in cooking. This will help you adjust to the taste. Do not add salt after cooking. One teaspoon of salt has about 2,300 mg of sodium. · Take the salt shaker off the table. · Flavor your food with garlic, lemon juice, onion, vinegar, herbs, and spices. Do not use soy sauce, lite soy sauce, steak sauce, onion salt, garlic salt, celery salt, mustard, or ketchup on your food. · Use low-sodium salad dressings, sauces, and ketchup. Or make your own salad dressings and sauces without adding salt. · Use less salt (or none) when recipes call for it. You can often use half the salt a recipe calls for without losing flavor. Other foods such as rice, pasta, and grains do not need added salt. · Rinse canned vegetables, and cook them in fresh water. This removes somebut not allof the salt. · Avoid water that is naturally high in sodium or that has been treated with water softeners, which add sodium. Call your local water company to find out the sodium content of your water supply. If you buy bottled water, read the label and choose a sodium-free brand. Avoid high-sodium foods  · Avoid eating:  ? Smoked, cured, salted, and canned meat, fish, and poultry.   ? Ham, velázquez, hot dogs, and luncheon meats.  ? Regular, hard, and processed cheese and regular peanut butter. ? Crackers with salted tops, and other salted snack foods such as pretzels, chips, and salted popcorn. ? Frozen prepared meals, unless labeled low-sodium. ? Canned and dried soups, broths, and bouillon, unless labeled sodium-free or low-sodium. ? Canned vegetables, unless labeled sodium-free or low-sodium. ? [place] fries, pizza, tacos, and other fast foods. ? Pickles, olives, ketchup, and other condiments, especially soy sauce, unless labeled sodium-free or low-sodium. Where can you learn more? Go to http://www.gray.com/  Enter V843 in the search box to learn more about \"Low Sodium Diet (2,000 Milligram): Care Instructions. \"  Current as of: August 22, 2019               Content Version: 12.6  © 2100-7105 [employer]. Care instructions adapted under license by [employer] (which disclaims liability or warranty for this information). If you have questions about a medical condition or this instruction, always ask your healthcare professional. [place] [address] any warranty or liability for your use of this information. Learning About Diabetes Food Guidelines  Your Care Instructions     Meal planning is important to manage diabetes. It helps keep your blood sugar at a target level (which you set with your doctor). You don't have to eat special foods. You can eat what your family eats, including sweets once in a while. But you do have to pay attention to how often you eat and how much you eat of certain foods. You may want to work with a dietitian or a certified diabetes educator (CDE) to help you plan meals and snacks. A dietitian or CDE can also help you lose weight if that is one of your goals. What should you know about eating carbs? Managing the amount of carbohydrate (carbs) you eat is an important part of healthy meals when you have diabetes. Carbohydrate is found in many foods. · Learn which foods have carbs. And learn the amounts of carbs in different foods. ? Bread, cereal, pasta, and rice have about 15 grams of carbs in a serving. A serving is 1 slice of bread (1 ounce), ½ cup of cooked cereal, or 1/3 cup of cooked pasta or rice. ? Fruits have 15 grams of carbs in a serving. A serving is 1 small fresh fruit, such as an apple or orange; ½ of a banana; ½ cup of cooked or canned fruit; ½ cup of fruit juice; 1 cup of melon or raspberries; or 2 tablespoons of dried fruit. ? Milk and no-sugar-added yogurt have 15 grams of carbs in a serving. A serving is 1 cup of milk or 2/3 cup of no-sugar-added yogurt. ? Starchy vegetables have 15 grams of carbs in a serving. A serving is ½ cup of mashed potatoes or sweet potato; 1 cup winter squash; ½ of a small baked potato; ½ cup of cooked beans; or ½ cup cooked corn or green peas. · Learn how much carbs to eat each day and at each meal. A dietitian or CDE can teach you how to keep track of the amount of carbs you eat. This is called carbohydrate counting. · If you are not sure how to count carbohydrate grams, use the Plate Method to plan meals. It is a good, quick way to make sure that you have a balanced meal. It also helps you spread carbs throughout the day. ? Divide your plate by types of foods. Put non-starchy vegetables on half the plate, meat or other protein food on one-quarter of the plate, and a grain or starchy vegetable in the final quarter of the plate. To this you can add a small piece of fruit and 1 cup of milk or yogurt, depending on how many carbs you are supposed to eat at a meal.  · Try to eat about the same amount of carbs at each meal. Do not \"save up\" your daily allowance of carbs to eat at one meal.  · Proteins have very little or no carbs per serving. Examples of proteins are beef, chicken, turkey, fish, eggs, tofu, cheese, cottage cheese, and peanut butter.  A serving size of meat is 3 ounces, which is about the size of a deck of cards. Examples of meat substitute serving sizes (equal to 1 ounce of meat) are 1/4 cup of cottage cheese, 1 egg, 1 tablespoon of peanut butter, and ½ cup of tofu. How can you eat out and still eat healthy? · Learn to estimate the serving sizes of foods that have carbohydrate. If you measure food at home, it will be easier to estimate the amount in a serving of restaurant food. · If the meal you order has too much carbohydrate (such as potatoes, corn, or baked beans), ask to have a low-carbohydrate food instead. Ask for a salad or green vegetables. · If you use insulin, check your blood sugar before and after eating out to help you plan how much to eat in the future. · If you eat more carbohydrate at a meal than you had planned, take a walk or do other exercise. This will help lower your blood sugar. What else should you know? · Limit saturated fat, such as the fat from meat and dairy products. This is a healthy choice because people who have diabetes are at higher risk of heart disease. So choose lean cuts of meat and nonfat or low-fat dairy products. Use olive or canola oil instead of butter or shortening when cooking. · Don't skip meals. Your blood sugar may drop too low if you skip meals and take insulin or certain medicines for diabetes. · Check with your doctor before you drink alcohol. Alcohol can cause your blood sugar to drop too low. Alcohol can also cause a bad reaction if you take certain diabetes medicines. Follow-up care is a key part of your treatment and safety. Be sure to make and go to all appointments, and call your doctor if you are having problems. It's also a good idea to know your test results and keep a list of the medicines you take. Where can you learn more? Go to http://www.[employer].com/  Enter I147 in the search box to learn more about \"Learning About Diabetes Food Guidelines. \"  Current as of: December 20, 2019 Content Version: 12.6  © 2276-8297 [employer]. Care instructions adapted under license by [employer] (which disclaims liability or warranty for this information). If you have questions about a medical condition or this instruction, always ask your healthcare professional. [address] any warranty or liability for your use of this information. High Cholesterol: Care Instructions  Your Care Instructions     Cholesterol is a type of fat in your blood. It is needed for many body functions, such as making new cells. Cholesterol is made by your body. It also comes from food you eat. High cholesterol means that you have too much of the fat in your blood. This raises your risk of a heart attack and stroke. LDL and HDL are part of your total cholesterol. LDL is the \"bad\" cholesterol. High LDL can raise your risk for heart disease, heart attack, and stroke. HDL is the \"good\" cholesterol. It helps clear bad cholesterol from the body. High HDL is linked with a lower risk of heart disease, heart attack, and stroke. Your cholesterol levels help your doctor find out your risk for having a heart attack or stroke. You and your doctor can talk about whether you need to lower your risk and what treatment is best for you. A heart-healthy lifestyle along with medicines can help lower your cholesterol and your risk. The way you choose to lower your risk will depend on how high your risk is for heart attack and stroke. It will also depend on how you feel about taking medicines. Follow-up care is a key part of your treatment and safety. Be sure to make and go to all appointments, and call your doctor if you are having problems. It's also a good idea to know your test results and keep a list of the medicines you take. How can you care for yourself at home? · Eat a variety of foods every day.  Good choices include fruits, vegetables, whole grains (like oatmeal), dried beans and peas, nuts and seeds, soy products (like tofu), and fat-free or low-fat dairy products. · Replace butter, margarine, and hydrogenated or partially hydrogenated oils with olive and canola oils. (Canola oil margarine without trans fat is fine.)  · Replace red meat with fish, poultry, and soy protein (like tofu). · Limit processed and packaged foods like chips, crackers, and cookies. · Bake, broil, or steam foods. Don't dunham them. · Be physically active. Get at least 30 minutes of exercise on most days of the week. Walking is a good choice. You also may want to do other activities, such as running, swimming, cycling, or playing tennis or team sports. · Stay at a healthy weight or lose weight by making the changes in eating and physical activity listed above. Losing just a small amount of weight, even 5 to 10 pounds, can reduce your risk for having a heart attack or stroke. · Do not smoke. When should you call for help? Watch closely for changes in your health, and be sure to contact your doctor if:    · You need help making lifestyle changes. · You have questions about your medicine. Where can you learn more? Go to http://www.gray.com/  Enter Y252 in the search box to learn more about \"High Cholesterol: Care Instructions. \"  Current as of: December 16, 2019               Content Version: 12.6  © 3882-8874 [employer]. Care instructions adapted under license by [employer] (which disclaims liability or warranty for this information). If you have questions about a medical condition or this instruction, always ask your healthcare professional. [place] [address] any warranty or liability for your use of this information. Learning About the COVID-19 Vaccine  Overview     The COVID-19 vaccine can help you avoid getting COVID-19, a disease caused by a new type of coronavirus.  COVID-19 can cause pneumonia and even death.  Moo Patterson may need two doses of the vaccine. And you might need \"booster\" doses later on to help you stay protected. The vaccine prevents most cases of COVID-19. But if you do still catch COVID-19, your symptoms will probably be less severe than if you hadn't gotten the vaccine. You can't get COVID-19 from the vaccine. The risk of serious problems from the vaccine is very low. And you might not have any side effects from the vaccine at all. If you do, they will probably be a lot like the common side effects of other vaccines. They include things like a slight fever, muscle aches, and soreness. These side effects don't last too long, and they can be treated if they bother you. Why is it a good idea to get the COVID-19 vaccine? The COVID-19 vaccine is one of the best ways to help stop the pandemic. Getting vaccinated as soon as you can will help protect you from the virus. It will also help you protect people around you from the viruspeople who could really be hurt. The COVID-19 vaccine is safe and effective. In fact, the risk of serious problems from COVID-19 is much higher than the risk of serious problems from the vaccine. So it's safer to get the vaccine than it is to catch COVID-19. Who should get the COVID-19 vaccine? Everyone who is able to get the vaccine should get it as soon as possible. The more people who get vaccinated, the better we'll be able to stop the spread of the virus. The vaccine is extra important for people who are at high risk. This includes people who may be exposed to COVID-19 more often because of their jobs. It also includes people who are at high risk for complications from [id number] if they catch it. Some examples of people at high risk include those who:  · Work in health care. · Are considered essential workers. · Have certain health conditions. · Are older than age 72. If you've already had COVID-19, you may still be able to catch it again.  Getting the vaccine may provide extra protection. Where can you learn more? Go to http://www.gray.com/  Enter C124 in the search box to learn more about \"Learning About the COVID-19 Vaccine. \"  Current as of: December 18, 2020               Content Version: 12.8  © 1625-8792 [employer], [facility]. Care instructions adapted under license by [employer] (which disclaims liability or warranty for this information). If you have questions about a medical condition or this instruction, always ask your healthcare professional. [place] [address] any warranty or liability for your use of this information.

## 2021-08-31 NOTE — PROGRESS NOTES
1. Have you been to an emergency room or urgent care clinic since your last visit? NO    Hospitalized since your last visit? If yes, where, when, and reason for visit? NO  2. Have you seen or consulted any other health care providers outside of the [facility] since your last visit including any procedures, health maintenance items. If yes, where, when and reason for visit?  Seen [facility] for shoulder

## 2021-09-05 PROBLEM — M47.812 CERVICAL SPONDYLOSIS: Status: ACTIVE | Noted: 2021-09-05

## 2021-09-05 PROBLEM — M75.22 BICIPITAL TENDINITIS OF LEFT SHOULDER: Status: ACTIVE | Noted: 2021-09-05

## 2021-09-05 PROBLEM — G89.29 CHRONIC LEFT SHOULDER PAIN: Status: ACTIVE | Noted: 2021-09-05

## 2021-09-05 PROBLEM — D75.1 ERYTHROCYTOSIS: Status: RESOLVED | Noted: 2019-03-01 | Resolved: 2021-09-05

## 2021-09-05 PROBLEM — M25.512 CHRONIC LEFT SHOULDER PAIN: Status: ACTIVE | Noted: 2021-09-05

## 2021-09-05 RX ORDER — AMLODIPINE BESYLATE 10 MG/1
5 TABLET ORAL DAILY
Qty: 1 TABLET | Refills: 0
Start: 2021-09-05 | End: 2021-11-30

## 2021-09-05 NOTE — PROGRESS NOTES
HPI:   Shun Bennett is a 59y.o. year old male who presents today for a physical exam. He has a history of hypertension, hyperlipidemia, moderate aortic insufficiency, atypical chest pain, prediabetes, GERD, osteoarthritis, lumbar radiculopathy, and chronic venous insufficiency. He has not completed the COVID-19 vaccine series. He reports that he is doing reasonably well. He reports that he was continuing to have difficulty with left shoulder pain after completing physical therapy as recommended by Dr. Jennifer Valdivia in 1/2021. He underwent evaluation by Dr. Ector Marshall in 6/2021 and it was felt that his left shoulder pain was secondary to cervical radiculopathy and bicipital tendinitis. He was again referred to physical therapy and reports improvement. He is continuing to have difficulty with right knee pain but continues to postpone consideration of surgery. He is otherwise without new complaints and feeling generally well. Summary of prior hospitalizations and medical history:  He has a history of hypertension, treated with losartan and amlodipine. He does not check his blood pressure at home regularly. He also does not exercise regularly, although admits to doing yard work and projects around the house. He denies any shortness of breath at rest or with exertion, palpitations, lightheadedness, or edema. He does have a history of substernal chest pain that has no relation to exertion and was thought to be atypical. He presented to [facility] on 2/27/2016 with a complaint of chest pain and diaphoresis, which seemed to be relieved by aspirin and sublingual nitroglycerin in the ambulance. Evaluation included negative ECG and troponins. He had an exercise nuclear stress test (2/29/2016) which showed normal LV function (EF 57%) and a medium sized partially reversible defect inferiorly (intermediate risk study).  On 3/1/2016, he underwent a cardiac catheterization which showed no significant epicardial coronary artery disease. He also had an echocardiogram (2/28/2016) showing mild LV septal hypertrophy, hypokinetic basal inferior wall with preserved LV function (EF 20%), mild diastolic dysfunction, and moderate aortic regurgitation with questionable dilation of the distal arch of aorta (3.5 cm). He was found to have elevated triglycerides, with lipid panel showing total cholesterol 201/ / HDL 52/ LDL 89. He was started on Fenofibrate prior to discharge on 3/1/2016. He reports that he has had no significant recurrence of chest pain since that time. He had a repeat echocardiogram in 9/2017 which showed low normal LV function (EF 50-55%), no RWMA, calcified aortic valve with bicuspid appearance and moderate regurgitation, and mild dilatation of the aortic root. He had a repeat echocardiogram in 3/2020 which showed upper normal LV size and low normal LV function (EF 50%), moderate AI and mildly dilated ascending aorta at 4.1 cm. He does have difficulty with mild lower extremity edema due to chronic venous insufficiency. He is now being followed by Dr. Mary Pandey. He has a history of prediabetes, with HbA1c ranging from 5.8-6.1 since 2012. He denies any polyuria, polydipsia, nocturia, or blurry vision, and has no history of retinopathy, neuropathy, or nephropathy. He has regular eye exams with Dr. Dasha Gore. He has a history of osteoarthritis, involving his right knee, right great toe, and lumbar spine. He has had difficulty in the past with increasing pain at the base of his right thumb and was evaluated by Dr. Thaddeus Bermudez at  [address]. He states that x-rays revealed he had severe osteoarthritis, and he received a cortisone injection with some improvement. In 12/2016, he noted increasing difficulty with low back pain and right sided sciatica. He underwent a lumbar MRI (12/2016) which showed progression when compared to a prior lumbar MRI from 2010.  It showed prominent right paracentral L4-L5 disc extrusion with impingement of the descending right L5 nerve root and moderately severe multifactorial canal stenosis; additional prominent right paracentral L5-S1 disc protrusion with impingement of the descending right S1 nerve root; abutment of the foraminal right L5 nerve root related to moderate foramina stenosis at L5-S1; mild multifactorial canal stenosis at L3-L4 with abutment of the descending left L4 nerve root. He was evaluated by Dr. Claudy Robertson, and treated with Medrol dose pack, Norco and Flexeril without significant improvement. He was evaluated by Dr. Shayla Bosworth in 3/2017 for possible surgical intervention, but this was deferred due to issues regarding his wife's health. He was treated with gabapentin, and was reevaluated by Dr. Claudy Robertson in 8/2017, at which time he reported significant improvement. He states that he no longer required treatment with gabapentin or Norco, and was continuing to take Voltaren ER with good control of symptoms. However in 3/2018, he developed recurrence of severe pain with right sided sciatica that was unresponsive to conservative therapy. He subsequently consulted Dr. Lilliana Young and on 7/5/2018, he underwent a right L4-L5 and L5-S1 hemilaminectomy, medial facetectomy, and foraminotomy by Dr. Lilliana Young for removal of herniated disks and decompression of foraminal stenoses at these levels. He reports that his right sided sciatica has resolved and he is no longer requiring any pain medication. He does report some residual numbness on the plantar surface of his right foot. He has a history of GERD, with symptoms well controlled with pantoprazole as needed. He had a screening colonoscopy by Dr. Jonathon Mike in 3/2012 with removal of a benign serrated polyp (pathology shows hyperplastic and adenomatous features). He had repeat screening colonoscopy in 3/2017 by Dr Belle Mascorro which was normal. Follow-up recommended for five years.  He denies any abdominal pain, nausea, vomiting, melena, hematochezia, or change in bowel movements. In 5/2016, he fell at work with his chest striking the edge of a table and he sustained fractures of the anterior right 7th and 8th ribs. He had serial rib x-rays since that time showing slow interval healing, but reports that he no longer has pain related to this. He underwent a bone density scan in 10/2016 which showed evidence of osteopenia with T-scores:  femoral neck left -1.7  /right -1.5 and lumbar -1.2. He was started on calcium and Vitamin D supplementation. He previously smoked approximately 1 ppd for 40 years, stopping in 2000. He also reports a history of asbestos exposure in 1980. Past Medical History:   Diagnosis Date    Abnormal myocardial perfusion study 2/28/2016    Partially reversible inferior perfusion defect, EF 57%    AI (aortic insufficiency)     Moderate with possible bicuspid aortic valve    Chronic low back pain     Chronic venous insufficiency     Colon polyp 03/2012    Benign serrated polyp    Fracture 05/2016    rib fx's    GERD (gastroesophageal reflux disease)     History of echocardiogram 2/28/2016    EF 55%. Basal inferior hypokinesis. Gr 1 DDfx. Mod AI.  Hyperlipidemia     Hypertension     Osteoarthritis of right knee     Osteopenia     Prediabetes     Prepatellar bursitis of right knee     Recurrent genital herpes 1/16/2013    Right lumbar radiculopathy 10/2016    MRI: prominent right paracentral L4-L5 disc extrusion/impingement of descending right L5 nerve root; moderately severe canal stenosis; prominent right paracentral L5-S1 disc protrusion/ impingement of the descending right S1 nerve root; abutment of the foraminal right L5 nerve root/moderate foramina stenosis at L5-S1; mild canal stenosis at L3-L4 with abutment of the descending left L4 nerve root.     S/P cardiac catheterization 3/1/2016    Normal coronary anatomy     Past Surgical History:   Procedure Laterality Date    ENDOSCOPY, COLON, DIAGNOSTIC      HX CHOLECYSTECTOMY      HX LUMBAR LAMINECTOMY  2018    R L4/5/S1 Dr. Jillian Stern HX TONSILLECTOMY       Current Outpatient Medications   Medication Sig    cyclobenzaprine (FLEXERIL) 10 mg tablet Take 1 Tab by mouth three (3) times daily as needed for Muscle Spasm(s).  clindamycin (CLEOCIN T) 1 % external solution Use thin film on affected area of scalp every 12 hours  Indications: acne    ibuprofen (MOTRIN) 800 mg tablet TAKE 1 TABLET BY MOUTH EVERY 8 HOURS AS NEEDED FOR PAIN FOR UP TO 10 DAYS. TAKE WITH FOOD    valACYclovir (VALTREX) 500 mg tablet TAKE 1 TABLET DAILY    losartan (COZAAR) 100 mg tablet TAKE 1 TABLET DAILY    ascorbic acid, vitamin C, (VITAMIN C) 500 mg tablet Take 500 mg by mouth daily.  magnesium oxide (MAG-OX) 400 mg tablet Take 400 mg by mouth.  amLODIPine (NORVASC) 10 mg tablet TAKE 1 TABLET DAILY (Patient taking differently: Take 5 mg by mouth daily.)    albuterol (PROVENTIL HFA, VENTOLIN HFA, PROAIR HFA) 90 mcg/actuation inhaler Take 1 Puff by inhalation every six (6) hours as needed for Wheezing.  inhalational spacing device 1 Each by Does Not Apply route as needed (shortness of breath).  nitroglycerin (NITROSTAT) 0.4 mg SL tablet 1 Tab by SubLINGual route every five (5) minutes as needed for Chest Pain. Up to 3 doses.  calcium carbonate (CALCIUM 300 PO) Take 1 Tab by mouth daily.  aspirin delayed-release 81 mg tablet Take 81 mg by mouth daily.  Cholecalciferol, Vitamin D3, 2,000 unit cap Take 2,000 Units by mouth daily as needed.  DOCOSAHEXANOIC ACID/EPA (FISH OIL PO) Take 1 Tab by mouth daily. No current facility-administered medications for this visit. Allergies and Intolerances: Allergies   Allergen Reactions    Tree Nuts Anaphylaxis    Ace Inhibitors Cough    Nuts [Tree Nut] Itching     Family History: His mother is alive with stage 3 ovarian cancer. His father  from esophageal cancer at age 61.  His maternal aunt  at age 48 from colon cancer. He has no family history of prostate cancer. Family History   Problem Relation Age of Onset    Cancer Father 61        esophageal    Hypertension Father     Cancer Mother 80        ovarian cancer     Hypertension Brother     Diabetes Other         Grandmother    Arthritis-osteo Other     Other Other         Stomach problems; Father, grandfather     Social History:   He  reports that he quit smoking about 21 years ago. He has a 12.50 pack-year smoking history. He quit smokeless tobacco use about 21 years ago. Reports that he smoked less than 1 ppd for 40 years, and stopped in . He is  and they have one son. His wife was recently diagnosed with Paget's disease of the breast, which represents a recurrence of prior breast cancer. He was employed as an  for [employer], but retired in 2017. He drinks about a 12 pack of beer each month. Social History     Substance and Sexual Activity   Alcohol Use Yes    Alcohol/week: 1.7 standard drinks    Types: 2 Cans of beer per week     Immunization History:  Immunization History   Administered Date(s) Administered    Influenza Vaccine 2013, 2014, 10/01/2015    TDAP Vaccine 2004    Td 2004    Tdap 2014       Review of Systems:   As above included in HPI. Otherwise 11 point review of systems negative including constitutional, skin, HENT, eyes, respiratory, cardiovascular, gastrointestinal, genitourinary, musculoskeletal, endocrine, hematologic, allergy, and neurologic. Physical:   Visit Vitals  /68   Pulse 74   Temp 97.7 °F (36.5 °C)   Ht 6' (1.829 m)   Wt 225 lb (102.1 kg)   SpO2 97%   BMI 30.52 kg/m²         Exam:     Patient appears in no apparent distress. Affect is appropriate. HEENT --Anicteric sclerae, tympanic membranes normal,  ear canals normal.  PERRL, EOMI, conjunctiva and lids normal.  No cervical lymphadenopathy.   No thyromegaly, JVD, or bruits. Carotid pulses 2+ with normal upstroke. Lungs --Clear to auscultation. No wheezing or rales. Heart --Regular rate and rhythm; soft diastolic murmur present; no rubs, gallops, or clicks. Abdomen -- Soft and nontender, no hepatosplenomegaly or masses. Extremities -- Without cyanosis, clubbing, edema.  Normal looking digits, ROM intact  Neuro -- CN 2-12 intact, strength 5/5 with intact soft touch in all extremities  Derm  no obvious abnormalities noted, no rash        Review of Data:  Labs:  Appointment on 08/24/2021   Component Date Value Ref Range Status    VITAMIN D, 25-HYDROXY 08/24/2021 47.3  30.0 - 100.0 ng/mL Final    Specific Gravity 08/24/2021 1.013  1.005 - 1.030 Final    pH (UA) 08/24/2021 7.0  5.0 - 7.5 Final    Color 08/24/2021 Yellow  Yellow Final    Appearance 08/24/2021 Clear  Clear Final    Leukocyte Esterase 08/24/2021 Negative  Negative Final    Protein 08/24/2021 Negative  Negative/Trace Final    Glucose 08/24/2021 Negative  Negative Final    Ketone 08/24/2021 Negative  Negative Final    Blood 08/24/2021 Negative  Negative Final    Bilirubin 08/24/2021 Negative  Negative Final    Urobilinogen 08/24/2021 0.2  0.2 - 1.0 mg/dL Final    Nitrites 08/24/2021 Negative  Negative Final    Microscopic Examination 08/24/2021 Comment   Final    Microscopic exam 08/24/2021 See additional order   Final    TSH 08/24/2021 1.940  0.450 - 4.500 uIU/mL Final    Prostate Specific Ag 08/24/2021 0.6  0.0 - 4.0 ng/mL Final    Creatinine, urine 08/24/2021 69.4  Not Estab. mg/dL Final    Microalbumin, urine 08/24/2021 <3.0  Not Estab. ug/mL Final    Microalb/Creat ratio (ug/mg creat.) 08/24/2021 <4  0 - 29 mg/g creat Final    Glucose 08/24/2021 115* 65 - 99 mg/dL Final    BUN 08/24/2021 12  8 - 27 mg/dL Final    Creatinine 08/24/2021 1.09  0.76 - 1.27 mg/dL Final    GFR est non-AA 08/24/2021 72  >59 mL/min/1.73 Final    GFR est AA 08/24/2021 83  >59 mL/min/1.73 Final    BUN/Creatinine ratio 08/24/2021 11  10 - 24 Final    Sodium 08/24/2021 142  134 - 144 mmol/L Final    Potassium 08/24/2021 4.6  3.5 - 5.2 mmol/L Final    Chloride 08/24/2021 104  96 - 106 mmol/L Final    CO2 08/24/2021 24  20 - 29 mmol/L Final    Calcium 08/24/2021 10.1  8.6 - 10.2 mg/dL Final    Protein, total 08/24/2021 7.0  6.0 - 8.5 g/dL Final    Albumin 08/24/2021 4.7  3.8 - 4.8 g/dL Final    GLOBULIN, TOTAL 08/24/2021 2.3  1.5 - 4.5 g/dL Final    A-G Ratio 08/24/2021 2.0  1.2 - 2.2 Final    Bilirubin, total 08/24/2021 0.6  0.0 - 1.2 mg/dL Final    Alk. phosphatase 08/24/2021 82  48 - 121 IU/L Final    AST (SGOT) 08/24/2021 27  0 - 40 IU/L Final    ALT (SGPT) 08/24/2021 36  0 - 44 IU/L Final    Magnesium 08/24/2021 2.3  1.6 - 2.3 mg/dL Final    Cholesterol, total 08/24/2021 218* 100 - 199 mg/dL Final    Triglyceride 08/24/2021 136  0 - 149 mg/dL Final    HDL Cholesterol 08/24/2021 51  >39 mg/dL Final    VLDL, calculated 08/24/2021 24  5 - 40 mg/dL Final    LDL, calculated 08/24/2021 143* 0 - 99 mg/dL Final    Hemoglobin A1c 08/24/2021 6.2* 4.8 - 5.6 % Final    Estimated average glucose 08/24/2021 131  mg/dL Final    WBC 08/24/2021 7.1  3.4 - 10.8 x10E3/uL Final    RBC 08/24/2021 5.46  4.14 - 5.80 x10E6/uL Final    HGB 08/24/2021 16.9  13.0 - 17.7 g/dL Final    HCT 08/24/2021 49.5  37.5 - 51.0 % Final    MCV 08/24/2021 91  79 - 97 fL Final    MCH 08/24/2021 31.0  26.6 - 33.0 pg Final    MCHC 08/24/2021 34.1  31.5 - 35.7 g/dL Final    RDW 08/24/2021 13.4  11.6 - 15.4 % Final    PLATELET 32/22/0800 277  150 - 450 x10E3/uL Final    NEUTROPHILS 08/24/2021 54  Not Estab. % Final    Lymphocytes 08/24/2021 33  Not Estab. % Final    MONOCYTES 08/24/2021 8  Not Estab. % Final    EOSINOPHILS 08/24/2021 3  Not Estab. % Final    BASOPHILS 08/24/2021 1  Not Estab. % Final    ABS.  NEUTROPHILS 08/24/2021 3.8  1.4 - 7.0 x10E3/uL Final    Abs Lymphocytes 08/24/2021 2.3  0.7 - 3.1 x10E3/uL Final    ABS. MONOCYTES 08/24/2021 0.6  0.1 - 0.9 x10E3/uL Final    ABS. EOSINOPHILS 08/24/2021 0.2  0.0 - 0.4 x10E3/uL Final    ABS. BASOPHILS 08/24/2021 0.1  0.0 - 0.2 x10E3/uL Final    IMMATURE GRANULOCYTES 08/24/2021 1  Not Estab. % Final    ABS. IMM. GRANS. 08/24/2021 0.1  0.0 - 0.1 x10E3/uL Final    WBC 08/24/2021 None seen  0 - 5 /hpf Final    RBC 08/24/2021 None seen  0 - 2 /hpf Final    Epithelial cells 08/24/2021 None seen  0 - 10 /hpf Final    Casts 08/24/2021 None seen  None seen /lpf Final    Bacteria 08/24/2021 None seen  None seen/Few Final    INTERPRETATION 08/24/2021 Note   Final       Calculated 10 year ASCVD risk score: 11.4 %    Health Maintenance:  Screening:    Colorectal: colonoscopy (3/2017) normal. Dr. Alba Brown. Due 3/2022.    Depression: none   DM (HbA1c/FPG): / HbA1c 6.2 (8/2021)   Hepatitis C: negative (9/2016)   Falls: none    DEXA: N/A   PSA/TONI: PSA 0.6 (8/2021)/ TONI (2/2019)   Glaucoma: regular eye exams with [facility] (last 2/2021)   Smoking: none   Vitamin D: 47.3 (8/2021)   Medicare Wellness: N/A      Impression:  Patient Active Problem List   Diagnosis Code    Hyperlipidemia E78.5    GERD (gastroesophageal reflux disease) K21.9    Chronic venous insufficiency I87.2    Colon polyp K63.5    Prediabetes R73.03    Recurrent genital herpes A60.00    Vitamin D insufficiency E55.9    Aortic insufficiency, moderate I35.1    Essential hypertension I10    Primary osteoarthritis of knee M17.10    Lumbar herniated disc  right L4/5/S1 chronic M51.26    Osteopenia determined by DEXA 2016, lowest T score -1.7 M85.80    Annular tear of lumbar disc M51.36    Chronic right-sided low back pain with sciatica M54.40, G89.29    Chronic thumb pain, right M79.644, G89.29    S/P lumbar discectomy Z98.890    Class 1 obesity due to excess calories without serious comorbidity in adult E66.09    Erythrocytosis D75.1    Chronic pain of right knee M25.561, G89.29 Plan:   1. Hypertension. Well controlled on current regimen of losartan 100 mg daily and amlodipine 5 mg daily. Renal function remains normal with creatinine 1.09 / eGFR 72. Continue to follow. 2. Dyslipidemia. Patient no longer taking fenofibrate. First line therapy for treating triglycerides < 500 would be initiation of a statin. Lipid panel with triglyceride level 136 today. Calculated 10 year ASCVD risk is 11.4 %, which does place him in one of the four statin benefit groups as per new AHA/ACC guidelines (primary prevention: ASCVD risk > 7.5%). LDL significantly increased today from 116 to 143 and HDL 51. Emphasized importance of continued lifestyle modifications, including diet, exercise, and weight loss. Will continue to follow. 3. Aortic insufficiency, moderate with mildly dilated aortic root. Probable bicuspid valve. Being monitored on echocardiogram. Also, considering thoracic CT scan if dilation of aortic root appears to progress on echocardiogram.  Repeat echocardiogram to reassess valve in 9/2017 without change. Established with new cardiologist, Dr. Eunice Montero, and underwent repeat echocardiogram (3/2020) showing moderate AI with mildly dilated aortic root at 4.1 cm. Low normal LV function (EF 50%). Essentially unchanged by report when compared to prior echocardiogram in 2017. Requesting to return to Dr. Andrew Weathers and referral placed. 4. Shortness of breath. Patient reports improvement today. EKG (9/2019) unchanged from baseline. Chest x-ray negative. Again stressed importance of proceeding with sleep evaluation given known daytime drowsiness, snoring, and fatigue. Evaluated by Dr. Elizabeth Guardado and recommended in-lab sleep study but has not been willing to obtain. PFTs normal, although elevated absolute eosinophil count. Does have history of childhood asthma. Continue to follow. 5. Prediabetes. HbA1c remaining elevated at 6.2 today, likely related to noncompliance with  diet.  No evidence of microvascular complications. On ARB but unwilling to begin statin. Continue follow-up for annual eye exams. Foot exam normal in 4/2017. Urine microalbumin/ creatinine ratio without evidence of microalbuminuria. Encouraged continued weight loss and dietary changes, particularly avoiding concentrated sweets. 6. Osteopenia. Last bone density scan 10/2016. Using femoral neck T-scores, calculated FRAX score estimates her 10 year risk of a major osteoporetic fracture at 12 % and hip fracture at 2.0 %, which are not an indication for biphosphonate treatment (>20% and >3%, respectively). Continue calcium and Vitamin D. Encouraged exercise, particularly weight bearing activities. Consider repeat bone density scan in near future. 7. Neck pain and left arm pain. Evaluated by Dr. Jeny Gonsales on 1/28/2021 and referred to physical therapy with some improvement. Seen by Dr. Sonya Franco in 6/2021 for left shoulder pain and felt to be secondary to cervical radiculopathy +/-bicipital tendinitis. Completed physical therapy with improvement. 8. Osteoarthritis, right knee. Reported increasing pain with ambulation and standing. Chronic problem which has now progressed. Underwent evaluation by Dr. Sonya Franco and received a cortisone injection. Reports persistent pain and wearing a right knee brace. Wishing to avoid surgery as long as possible. 9. Right sciatica. Progression of symptoms and disease on MRI. Responded well to conservative therapy initially, but recurred and unresponsive to medications and injections. Underwent right L4-L5 and L5-S1 hemilaminectomy, medial facetectomy, and foraminotomy by Dr. Trice Davenport in 7/2018 with great success. Reports only residual numbness on medial plantar surface of right foot. No longer requiring any pain medication. Continuing to do well. 10. Right thumb osteoarthritis. Improved pain control with cortisone injection. Being followed by Dr. David Butler. 11. Erythrocytosis.  Review of record shows similar readings since 2014. Erythropoietin level normal in 2/2020. Remains in normal range today. Will continue to monitor. 12. Excessive daytime sleepiness/ snoring. Patient with daytime drowsiness, snoring, and fatigue. Underwent evaluation by Dr. Argelia Stern in 3/2020 and recommended in-lab sleep study due to severity of symptoms. However, has decided not to proceed. Will continue to address. 13.  History of rectal bleeding. Reported episodes of bleeding with bowel movements at last visit in 2/2021. Most likely hemorrhoidal by description, and no evidence of anemia. Last colonoscopy normal 3/2017. Requested evaluation by GI and seen by Dr. Franc Mancera. Did not recommend early colonoscopy as felt to likely be from perianal source. Will continue to monitor. 14. GERD. Continue occasional use of Protonix with good control of symptoms. Follow. 15. Obesity. Weight essentially stable over the last year. Emphasized importance of lifestyle modifications, including diet, exercise, and weight loss. Will readdress next visit. 16. Health maintenance. Has not received the COVID-19 vaccine series and urged to obtain. Discussed Shingrix vaccine but not wishing to proceed. Other immunizations up to date. Colonoscopy due 3/2022. Will readdress at next visit if not performed. Prostate cancer screening up to date. Continue regular eye exams. Stressed importance of weight loss. Vitamin D level remains normal on maintenance dose supplement. Counseled patient regarding strict mitigation measures for COVID-19, including wearing a mask, social distancing and diligent hand washing, as recommended by the CDC. Discussed importance of proceeding with COVID 19 vaccine and urged to obtain. Patient understands recommendations and agrees with plan. Follow-up in 6 months.

## 2021-09-14 RX ORDER — IBUPROFEN 800 MG/1
800 TABLET ORAL
Qty: 30 TABLET | Refills: 1 | Status: SHIPPED | OUTPATIENT
Start: 2021-09-14 | End: 2022-08-02 | Stop reason: ALTCHOICE

## 2021-09-14 NOTE — TELEPHONE ENCOUNTER
This patient contacted office for the following prescriptions to be filled:    Last office visit: 8/31/21  Follow up appointment: 3/1/22 (if overdue call patient to schedule appointment)  Medication requested :   Requested Prescriptions     Pending Prescriptions Disp Refills    ibuprofen (MOTRIN) 800 mg tablet 30 Tablet 1       PCP: Dr. Tiffany Duarte  Mail order or Local pharmacy name: [facility]

## 2021-10-19 ENCOUNTER — TELEPHONE (OUTPATIENT)
Dept: INTERNAL MEDICINE CLINIC | Age: 61
End: 2021-10-19

## 2021-10-19 DIAGNOSIS — K21.9 GASTROESOPHAGEAL REFLUX DISEASE, UNSPECIFIED WHETHER ESOPHAGITIS PRESENT: Primary | ICD-10-CM

## 2021-10-19 NOTE — TELEPHONE ENCOUNTER
Called and spoke with patient gave him the message below. He verbalized understanding. He states he doesn't like taking the pantoprazole since it takes away the bodies ability to absorb calcium he states he already broke a rib too easily because of this. He still has some at home and does not need a refill. He would like a referral to GI  if possible (who he saw previously).

## 2021-10-19 NOTE — TELEPHONE ENCOUNTER
Patient stating he is have trouble with his esophagus. Forgot to mention at his last physical. Stating he has constant burning. Turms helps for a little while. He is also watching what he eats but that doesn't help. He is concerned because his Dad  from esophageal cancer. Wants to know if you can order an endoscopy.

## 2021-10-19 NOTE — TELEPHONE ENCOUNTER
Please advise patient that he was previously on pantoprazole for his GERD and would recommend restarting to help control his discomfort. Unable to order endoscopy, but can place referral to GI (Dr. Shantal Colon) who would perform an endoscopy if felt to be needed. He is also due for a repeat colonoscopy in 3/2022, and often will perform both procedures at the same time. Please see if he needs a refill of his pantoprazole and if so, where he would like it sent.   Please also find out if he would like me to place the referral now for GI or if he would like to see if the pantoprazole helps with his symptoms first.

## 2021-11-02 ENCOUNTER — TELEPHONE (OUTPATIENT)
Dept: INTERNAL MEDICINE CLINIC | Age: 61
End: 2021-11-02

## 2021-11-02 NOTE — TELEPHONE ENCOUNTER
Pt called and said he just got a bill in the mail for labs that were associated with his annual physical that was on 8/31/21 and he is calling to verify if anything needs to be re-coded? Please advise, thank you!

## 2021-11-02 NOTE — TELEPHONE ENCOUNTER
Reviewed lab orders for 8/24/2021 and one of the diagnoses used was \"Laboratory tests as part of a complete physical exam\". Not sure how else to change the coding. Please advise.

## 2021-11-04 RX ORDER — VALACYCLOVIR HYDROCHLORIDE 500 MG/1
TABLET, FILM COATED ORAL
Qty: 90 TABLET | Refills: 3 | Status: SHIPPED | OUTPATIENT
Start: 2021-11-04 | End: 2022-10-30

## 2021-11-04 NOTE — TELEPHONE ENCOUNTER
Spoke to Ephraim navarro at [phone], she stated that claim was not billed for with Primary Code Z00.00. She stated that she would make that code primary and rebill insurance. The processing will take 30-45 days and patient can disregard statement at this time. Patient voiced understanding.

## 2021-11-10 NOTE — PROGRESS NOTES
[address], [address]  [place] [address]  Phone: [phone]  Fax: [phone]        Malina Michelle  : 1960  PCP: Ondina Collins MD    PROGRESS NOTE      ASSESSMENT AND PLAN    Diagnoses and all orders for this visit:    1. Chronic left shoulder pain  -     REFERRAL TO PHYSICAL THERAPY    2. Cervical spondylosis    3. Neck pain  -     AMB POC XRAY, SPINE, CERVICAL; 2 OR 3      1. 56 y.o. male presenting with mechanical left shoulder pain and intermittent paresthesias. 2. Advised to continue HEP  3. Referred to PT  4. Recommend seeing ortho for shoulder pain  5. Given information on shoulder pain    Follow-up and Dispositions    · Return if symptoms worsen or fail to improve. HISTORY OF PRESENT ILLNESS  Alisa Moura is a 56 y.o. male. Pt was last evaluated 3/2018 for lumbar herniated disc. Since his last visit he did have a laminectomy by Dr. Jaylyn Camp with good results. Pt states that the ball of his L foot has some numbness after the 2018 lumbar surgery, but he reports that his back is feeling much better. Reviewed his op note and his last lumbar MRI. Today, he mentions that his neck and LUE hurt. Describes baseline pain for years, but states it has gotten worse over the last couple of months. Reports that while he was standing with his hands clasped together at a  in 2020, his L shoulder and LUE went completely numb. Denies RUE symptoms. Admits to some positional pain with ROM and certain positions when laying down. He states that the shoulder has been hurting before a recent fall. Also mentions bumping his head at work while wearing a hard hat. Denies any SOB or CP. Denies prior PT with the shoulder.     Pain Assessment  2021   Location of Pain Neck;Arm   Pain Location Comment -   Location Modifiers Left   Severity of Pain 1   Quality of Pain Aching;Grinding   Quality of Pain Comment numbness Rogerio Gilbert is a 78 Screening:     3 most recent PHQ Screens 11/10/Duration of Pain -   Frequency of Pain Intermittent   Aggravating Factors (No Data)   Aggravating Factors Comment turning head   Limiting Behavior Some   Relieving Factors -   Relieving Factors Comment -   Result of Injury -       Does pain radiate into extremities: L shoulder  Does patient have numbness/tingling: LUE  Does patient have weakness: No   Pt denies saddle paresthesias. Denies persistent fevers, chills, weight changes, neurogenic bowel or bladder symptoms. Treatments patient has tried:  Physical therapy:No  Doing HEP: Unknown  Beneficial medications: Gabapentin  Failed medications: Unknown  Spinal injections: No    Spinal surgery- Yes. 7/2018 R L4-5 and L5-S1 lami Dr. Con[facility] surgery consult: No.     C XR AP/lateral 2V 1/28/2021 (reviewed in-office): C5-6 anterior osteophytes  L MRI 2016: mod to severe stenosis at L4-5     reviewed. PMHx of GERD, pre-DM, aortic insufficiency, HTN, OA of the knee, rib fractures. PAST MEDICAL HISTORY   Past Medical History:   Diagnosis Date    Abnormal myocardial perfusion study 2/28/2016    Partially reversible inferior perfusion defect, EF 57%    AI (aortic insufficiency)     Moderate with possible bicuspid aortic valve    Chronic low back pain     Chronic venous insufficiency     Colon polyp 03/2012    Benign serrated polyp    Fracture 05/2016    rib fx's    GERD (gastroesophageal reflux disease)     History of echocardiogram 2/28/2016    EF 55%. Basal inferior hypokinesis. Gr 1 DDfx. Mod AI.       Hyperlipidemia     Hypertension     Osteoarthritis of right knee     Osteopenia     Prediabetes     Prepatellar bursitis of right knee     Recurrent genital herpes 1/16/2013    Right lumbar radiculopathy 10/2016    MRI: prominent right paracentral L4-L5 disc extrusion/impingement of descending right L5 nerve root; moderately severe canal stenosis; prominent right paracentral L5-S1 disc protrusion/ impingement of the descending right S1 nerve root; abutment of the foraminal right L5 nerve root/moderate foramina stenosis at L5-S1; mild canal stenosis at L3-L4 with abutment of the descending left L4 nerve root.  S/P cardiac catheterization 3/1/2016    Normal coronary anatomy        Past Surgical History:   Procedure Laterality Date    ENDOSCOPY, COLON, DIAGNOSTIC      HX CHOLECYSTECTOMY      HX LUMBAR LAMINECTOMY  07/05/2018    R L4/5/S1 Dr. Ede Schreiber HX TONSILLECTOMY           MEDICATIONS      Current Outpatient Medications   Medication Sig Dispense Refill    ibuprofen (MOTRIN) 800 mg tablet TAKE 1 TABLET BY MOUTH EVERY 8 HOURS AS NEEDED FOR PAIN FOR UP TO 10 DAYS. TAKE WITH FOOD 30 Tab 1    losartan (COZAAR) 100 mg tablet TAKE 1 TABLET DAILY 90 Tab 3    ascorbic acid, vitamin C, (VITAMIN C) 500 mg tablet Take 500 mg by mouth daily.  amLODIPine (NORVASC) 10 mg tablet TAKE 1 TABLET DAILY (Patient taking differently: Take 5 mg by mouth daily.) 90 Tab 3    albuterol (PROVENTIL HFA, VENTOLIN HFA, PROAIR HFA) 90 mcg/actuation inhaler Take 1 Puff by inhalation every six (6) hours as needed for Wheezing. 1 Inhaler 3    inhalational spacing device 1 Each by Does Not Apply route as needed (shortness of breath). 1 Device 0    calcium carbonate (CALCIUM 300 PO) Take 1 Tab by mouth daily.  aspirin delayed-release 81 mg tablet Take 81 mg by mouth daily.  Cholecalciferol, Vitamin D3, 2,000 unit cap Take 2,000 Units by mouth daily as needed.  DOCOSAHEXANOIC ACID/EPA (FISH OIL PO) Take 1 Tab by mouth daily.  valACYclovir (VALTREX) 500 mg tablet TAKE 1 TABLET DAILY 90 Tab 3    cyclobenzaprine (FLEXERIL) 10 mg tablet Take 10 mg by mouth daily.  magnesium oxide (MAG-OX) 400 mg tablet Take 400 mg by mouth.  nitroglycerin (NITROSTAT) 0.4 mg SL tablet 1 Tab by SubLINGual route every five (5) minutes as needed for Chest Pain. Up to 3 doses. 1 Bottle 6       Controlled Substance Monitoring:    No flowsheet data found. ALLERGIES    Allergies   Allergen Reactions    Tree Nuts Anaphylaxis    Ace Inhibitors Cough    Nuts [Tree Nut] Itching          PHYSICAL EXAMINATION  Visit Vitals  BP (!) 148/76 (BP 1 Location: Left upper arm, BP Patient Position: Sitting)   Pulse 75   Temp 97.7 °F (36.5 °C) (Temporal)   Resp 12   Wt 228 lb (103.4 kg)   BMI 30.92 kg/m²         Accompanied by self. Constitutional:  Well developed, well nourished, in no acute distress. Psychiatric: Affect and mood are appropriate. Integumentary: No rashes or abrasions noted on exposed areas. Cardiovascular/Peripheral Vascular: No peripheral edema is noted BLE. SPINE/MUSCULOSKELETAL EXAM    Cervical spine:  Neck is midline. Normal muscle tone. No focal atrophy is noted. L shoulder pain with abduction, ER and IR. Tenderness to palpation C7. Trigger point L medial scapular border. Negative Tinel's sign. Negative Galaviz's sign. Positive L drop arm test.         MOTOR:      Biceps  Triceps Deltoids Wrist Ext Wrist Flex Hand Intrin   Right +4/5 +4/5 +4/5 +4/5 +4/5 +4/5   Left +4/5 +4/5 +4/5 +4/5 +4/5 +4/5   DTR +1 biceps, triceps, brachioradialis. 2+ L patella    Tandem gait intact. Ambulation without assistive device. FWB. Written by Aga Arboleda, as dictated by Chapincito Mejias MD.    I, Dr. Chapincito Mejias MD, confirm that all documentation is accurate. Mr. Shavon Mckeon may have a reminder for a \"due or due soon\" health maintenance. I have asked that he contact his primary care provider for follow-up on this health maintenance.

## 2021-11-30 ENCOUNTER — OFFICE VISIT (OUTPATIENT)
Dept: CARDIOLOGY CLINIC | Age: 61
End: 2021-11-30
Payer: COMMERCIAL

## 2021-11-30 VITALS
DIASTOLIC BLOOD PRESSURE: 84 MMHG | WEIGHT: 231 LBS | HEART RATE: 66 BPM | BODY MASS INDEX: 31.29 KG/M2 | SYSTOLIC BLOOD PRESSURE: 132 MMHG | OXYGEN SATURATION: 99 % | HEIGHT: 72 IN

## 2021-11-30 DIAGNOSIS — I10 ESSENTIAL HYPERTENSION: ICD-10-CM

## 2021-11-30 DIAGNOSIS — E78.5 HYPERLIPIDEMIA, UNSPECIFIED HYPERLIPIDEMIA TYPE: ICD-10-CM

## 2021-11-30 DIAGNOSIS — I77.810 DILATED AORTIC ROOT (HCC): ICD-10-CM

## 2021-11-30 DIAGNOSIS — I35.1 AORTIC VALVE INSUFFICIENCY, ETIOLOGY OF CARDIAC VALVE DISEASE UNSPECIFIED: Primary | ICD-10-CM

## 2021-11-30 PROCEDURE — 93000 ELECTROCARDIOGRAM COMPLETE: CPT | Performed by: INTERNAL MEDICINE

## 2021-11-30 PROCEDURE — 99214 OFFICE O/P EST MOD 30 MIN: CPT | Performed by: INTERNAL MEDICINE

## 2021-11-30 RX ORDER — AMLODIPINE BESYLATE 5 MG/1
5 TABLET ORAL DAILY
COMMUNITY
End: 2022-02-07 | Stop reason: SDUPTHER

## 2021-11-30 NOTE — PROGRESS NOTES
HISTORY OF PRESENT ILLNESS  Nahum Swenson is a 59 y.o. male. Follow-up  Pertinent negatives include no chest pain, no abdominal pain, no headaches and no shortness of breath. Hypertension  Pertinent negatives include no chest pain, no abdominal pain, no headaches and no shortness of breath. Patient presents for a long overdue follow-up office visit. The patient has a history of hypertension, dyslipidemia, aortic insufficiency and possibly a bicuspid aortic valve with moderate aortic insufficiency. The patient was hospitalized at [facility] in 2016 for an episode of acute chest pain, which was apparently relieved by sublingual nitroglycerin in the ambulance. The patient states he had been under increased amount of stress at his job. He was ruled out for myocardial infarction, but underwent a nuclear stress test which was felt to be intermediate risk with a reversible inferior perfusion defect. This led to a cardiac catheterization on March 2016  which showed normal coronary anatomy. He had preserved LV systolic function. Moderate aortic insufficiency which was unchanged. He was also found to have significantly elevated triglycerides and was started on fenofibrate during his hospital stay. Patient then underwent a follow-up echocardiogram in September 2017 which showed a low normal left ventricular ejection fraction, EF 50-55% with moderate aortic insufficiency, mild aortic root enlargement, overall unchanged compared to the previous study. More recently, he underwent another echocardiogram at an outside cardiologist office in March 2020 which was unchanged compared to his previous studies. He continued to have a low normal LVEF of 50 to 55% with moderate aortic insufficiency and a mild to moderately dilated aortic root measuring 41 mm in diameter. He has not been seen in our office in over 2-1/2 years. Since last visit, he states his been faring fairly well.   He has not gained any significant weight nor is he lost any weight. He denies any new chest pain, shortness of breath, dizzy spells or heart palpitations. No major change in his activity tolerance. He states his amlodipine dose was decreased to 5 mg daily. Past Medical History:   Diagnosis Date    Abnormal myocardial perfusion study 2/28/2016    Partially reversible inferior perfusion defect, EF 57%    AI (aortic insufficiency)     Moderate with possible bicuspid aortic valve    Chronic low back pain     Chronic venous insufficiency     Colon polyp 03/2012    Benign serrated polyp    Fracture 05/2016    rib fx's    GERD (gastroesophageal reflux disease)     History of echocardiogram 2/28/2016    EF 55%. Basal inferior hypokinesis. Gr 1 DDfx. Mod AI.  Hyperlipidemia     Hypertension     Osteoarthritis of right knee     Osteopenia     Prediabetes     Prepatellar bursitis of right knee     Recurrent genital herpes 1/16/2013    Right lumbar radiculopathy 10/2016    MRI: prominent right paracentral L4-L5 disc extrusion/impingement of descending right L5 nerve root; moderately severe canal stenosis; prominent right paracentral L5-S1 disc protrusion/ impingement of the descending right S1 nerve root; abutment of the foraminal right L5 nerve root/moderate foramina stenosis at L5-S1; mild canal stenosis at L3-L4 with abutment of the descending left L4 nerve root.  S/P cardiac catheterization 3/1/2016    Normal coronary anatomy      Current Outpatient Medications   Medication Sig Dispense Refill    amLODIPine (NORVASC) 5 mg tablet Take 5 mg by mouth daily.  valACYclovir (VALTREX) 500 mg tablet TAKE 1 TABLET DAILY 90 Tablet 3    ibuprofen (MOTRIN) 800 mg tablet Take 1 Tablet by mouth every eight (8) hours as needed for Pain (take with food).  30 Tablet 1    clindamycin (CLEOCIN T) 1 % external solution Use thin film on affected area of scalp every 12 hours  Indications: acne 60 mL 3    losartan (COZAAR) 100 mg tablet TAKE 1 TABLET DAILY 90 Tab 3    ascorbic acid, vitamin C, (VITAMIN C) 500 mg tablet Take 500 mg by mouth daily.  magnesium oxide (MAG-OX) 400 mg tablet Take 400 mg by mouth.  albuterol (PROVENTIL HFA, VENTOLIN HFA, PROAIR HFA) 90 mcg/actuation inhaler Take 1 Puff by inhalation every six (6) hours as needed for Wheezing. 1 Inhaler 3    inhalational spacing device 1 Each by Does Not Apply route as needed (shortness of breath). 1 Device 0    nitroglycerin (NITROSTAT) 0.4 mg SL tablet 1 Tab by SubLINGual route every five (5) minutes as needed for Chest Pain. Up to 3 doses. 1 Bottle 6    calcium carbonate (CALCIUM 300 PO) Take 1 Tab by mouth daily.  aspirin delayed-release 81 mg tablet Take 81 mg by mouth daily.  Cholecalciferol, Vitamin D3, 2,000 unit cap Take 2,000 Units by mouth daily as needed.  DOCOSAHEXANOIC ACID/EPA (FISH OIL PO) Take 1 Tab by mouth daily. Allergies   Allergen Reactions    Tree Nuts Anaphylaxis    Ace Inhibitors Cough    Nuts [Tree Nut] Itching      Social History     Tobacco Use    Smoking status: Former Smoker     Packs/day: 0.50     Years: 25.00     Pack years: 12.50     Quit date: 2000     Years since quittin.4    Smokeless tobacco: Former User     Quit date: 2000   Substance Use Topics    Alcohol use: Yes     Alcohol/week: 1.7 standard drinks     Types: 2 Cans of beer per week    Drug use: No               Review of Systems   Constitutional: Negative for chills, fever and weight loss. HENT: Negative for nosebleeds. Eyes: Negative for blurred vision and double vision. Respiratory: Negative for cough, shortness of breath and wheezing. Cardiovascular: Negative for chest pain, palpitations, orthopnea, claudication, leg swelling and PND. Gastrointestinal: Negative for abdominal pain, heartburn, nausea and vomiting. Genitourinary: Negative for dysuria and hematuria.    Musculoskeletal: Negative for back pain, falls and myalgias. Skin: Negative for rash. Neurological: Negative for dizziness, focal weakness and headaches. Endo/Heme/Allergies: Does not bruise/bleed easily. Psychiatric/Behavioral: Negative for substance abuse. Visit Vitals  /84 (BP 1 Location: Left upper arm, BP Patient Position: Sitting, BP Cuff Size: Small adult)   Pulse 66   Ht 6' (1.829 m)   Wt 104.8 kg (231 lb)   SpO2 99%   BMI 31.33 kg/m²      Physical Exam  Constitutional:       Appearance: He is well-developed. HENT:      Head: Normocephalic and atraumatic. Eyes:      Conjunctiva/sclera: Conjunctivae normal.   Neck:      Vascular: No carotid bruit or JVD. Cardiovascular:      Rate and Rhythm: Normal rate and regular rhythm. Pulses: Normal pulses. Heart sounds: S1 normal and S2 normal. Murmur heard. Midsystolic murmur is present with a grade of 1/6 at the upper right sternal border radiating to the neck. Diastolic murmur is present with a grade of 1/4. No gallop. No S3 sounds. Pulmonary:      Breath sounds: Normal breath sounds. No wheezing or rales. Abdominal:      General: Bowel sounds are normal.      Palpations: Abdomen is soft. Tenderness: There is no abdominal tenderness. Musculoskeletal:      Cervical back: Neck supple. Skin:     General: Skin is warm and dry. Neurological:      Mental Status: He is alert and oriented to person, place, and time. EKG:   Normal sinus rhythm,  Normal axis, normal QTc interval, nonspecific T wave abnormality. This is unchanged compared to his previous EKG from 2019. ASSESSMENT and PLAN     Aortic insufficiency. This remained moderate in severity by echocardiogram in March 2020. He did have a calcified aortic valve which did appear to be bicuspid, so this will need to be followed up at least every other year, unless new symptoms arise. This will be scheduled for March of next year. Mildly dilated aortic root.   He may have an underlying aortopathy as well given the likelihood that he has a bicuspid aortic valve. This has been stable and last measured 41 mm in diameter. This can be reevaluated on his follow-up echocardiogram early next year. Hypertension. This remains reasonably well controlled on his current regimen, which includes amlodipine and losartan. Both of which we continue    Dyslipidemia. Patient continues to manage with lifestyle modification. This is followed closely by his PCP. His most recent lipid profile from August 2021 showed higher numbers at his previous 2 years: Total cholesterol 218, HDL 51, . I have recommended that he try and lose weight and start exercising regularly. As always his follow-up echocardiogram is unchanged, he can continue to follow-up on an annual basis.

## 2021-11-30 NOTE — PROGRESS NOTES
Jazmín Quigley presents today for   Chief Complaint   Patient presents with    Follow-up     overdue follow up       Marynettie Soraida preferred language for health care discussion is english/other. Is someone accompanying this pt? no    Is the patient using any DME equipment during [address]? no    Depression Screening:  3 most recent PHQ Screens 11/30/2021   Little interest or pleasure in doing things Not at all   Feeling down, depressed, irritable, or hopeless Not at all   Total Score PHQ 2 0   Trouble falling or staying asleep, or sleeping too much -   Feeling tired or having little energy -   Poor appetite, weight loss, or overeating -   Feeling bad about yourself - or that you are a failure or have let yourself or your family down -   Trouble concentrating on things such as school, work, reading, or watching TV -   Moving or speaking so slowly that other people could have noticed; or the opposite being so fidgety that others notice -   Thoughts of being better off dead, or hurting yourself in some way -   How difficult have these problems made it for you to do your work, take care of your home and get along with others -       Learning Assessment:  Learning Assessment 11/30/2021   PRIMARY LEARNER Patient   HIGHEST LEVEL OF EDUCATION - PRIMARY LEARNER  -   BARRIERS PRIMARY LEARNER -   [facility]    NEED -   LEARNER PREFERENCE PRIMARY DEMONSTRATION     -     -   ANSWERED BY patient   RELATIONSHIP SELF       Abuse Screening:  Abuse Screening Questionnaire 11/30/2021   Do you ever feel afraid of your partner? N   Are you in a relationship with someone who physically or mentally threatens you? N   Is it safe for you to go home? Y       Fall Risk  Fall Risk Assessment, last 12 mths 2/27/2020   Able to walk? Yes   Fall in past 12 months? Yes   Number of falls in past 12 months 1   Fall with injury?  1           Pt currently taking Anticoagulant therapy? no    Pt currently taking Antiplatelet therapy ? ASA 81 mg once a day      Coordination of Care:  1. Have you been to the ER, urgent care clinic since your last visit? Hospitalized since your last visit? no    2. Have you seen or consulted any other health care providers outside of the [address] [place] [address] since your last visit? Include any pap smears or colon screening.  no

## 2022-01-17 ENCOUNTER — TELEPHONE (OUTPATIENT)
Dept: INTERNAL MEDICINE CLINIC | Age: 62
End: 2022-01-17

## 2022-01-17 RX ORDER — LOSARTAN POTASSIUM 100 MG/1
100 TABLET ORAL DAILY
Qty: 90 TABLET | Refills: 3 | Status: SHIPPED | OUTPATIENT
Start: 2022-01-17

## 2022-01-17 NOTE — TELEPHONE ENCOUNTER
Patient states his symptoms started 1/8/22 [facility]. Symptoms included headache, body aches, sore throat- patient is not vaccinated. Patient reports feeling fine now other than a stuffy head. Patient has been advised by [facility] that he can discontinue quarantine. Reminded patient to wear a tight fitting mask when out in public preferably a P98 and not a cloth mask and to social distance when possible. No questions or concerns from patient he just wanted to notify  that he was positive.

## 2022-02-07 RX ORDER — AMLODIPINE BESYLATE 5 MG/1
5 TABLET ORAL DAILY
Qty: 90 TABLET | Refills: 3 | Status: SHIPPED | OUTPATIENT
Start: 2022-02-07

## 2022-02-22 ENCOUNTER — APPOINTMENT (OUTPATIENT)
Dept: INTERNAL MEDICINE CLINIC | Age: 62
End: 2022-02-22

## 2022-02-22 ENCOUNTER — HOSPITAL ENCOUNTER (OUTPATIENT)
Dept: LAB | Age: 62
Discharge: HOME | End: 2022-02-22
Payer: COMMERCIAL

## 2022-02-22 DIAGNOSIS — I10 ESSENTIAL HYPERTENSION: ICD-10-CM

## 2022-02-22 DIAGNOSIS — R73.03 PREDIABETES: ICD-10-CM

## 2022-02-22 DIAGNOSIS — E78.5 HYPERLIPIDEMIA, UNSPECIFIED HYPERLIPIDEMIA TYPE: ICD-10-CM

## 2022-02-22 LAB
ALBUMIN SERPL-MCNC: 4 G/DL (ref 3.4–5)
ALBUMIN/GLOB SERPL: 1.5 {RATIO} (ref 0.8–1.7)
ALP SERPL-CCNC: 79 U/L (ref 45–117)
ALT SERPL-CCNC: 40 U/L (ref 16–61)
ANION GAP SERPL CALC-SCNC: 9 MMOL/L (ref 3–18)
AST SERPL-CCNC: 24 U/L (ref 10–38)
BILIRUB SERPL-MCNC: 0.7 MG/DL (ref 0.2–1)
BUN SERPL-MCNC: 17 MG/DL (ref 7–18)
BUN/CREAT SERPL: 14 (ref 12–20)
CALCIUM SERPL-MCNC: 9.2 MG/DL (ref 8.5–10.1)
CHLORIDE SERPL-SCNC: 106 MMOL/L (ref 100–111)
CHOLEST SERPL-MCNC: 202 MG/DL
CO2 SERPL-SCNC: 26 MMOL/L (ref 21–32)
CREAT SERPL-MCNC: 1.18 MG/DL (ref 0.6–1.3)
EST. AVERAGE GLUCOSE BLD GHB EST-MCNC: 123 MG/DL
GLOBULIN SER CALC-MCNC: 2.7 G/DL (ref 2–4)
GLUCOSE SERPL-MCNC: 109 MG/DL (ref 74–99)
HBA1C MFR BLD: 5.9 % (ref 4.2–5.6)
HDLC SERPL-MCNC: 46 MG/DL (ref 40–60)
HDLC SERPL: 4.4 {RATIO} (ref 0–5)
LDLC SERPL CALC-MCNC: 118.2 MG/DL (ref 0–100)
LIPID PROFILE,FLP: ABNORMAL
MAGNESIUM SERPL-MCNC: 2.4 MG/DL (ref 1.6–2.6)
POTASSIUM SERPL-SCNC: 4.4 MMOL/L (ref 3.5–5.5)
PROT SERPL-MCNC: 6.7 G/DL (ref 6.4–8.2)
SODIUM SERPL-SCNC: 141 MMOL/L (ref 136–145)
TRIGL SERPL-MCNC: 189 MG/DL (ref ?–150)
VLDLC SERPL CALC-MCNC: 37.8 MG/DL

## 2022-02-22 PROCEDURE — 36415 COLL VENOUS BLD VENIPUNCTURE: CPT

## 2022-02-22 PROCEDURE — 83735 ASSAY OF MAGNESIUM: CPT

## 2022-02-22 PROCEDURE — 80061 LIPID PANEL: CPT

## 2022-02-22 PROCEDURE — 83036 HEMOGLOBIN GLYCOSYLATED A1C: CPT

## 2022-02-22 PROCEDURE — 80053 COMPREHEN METABOLIC PANEL: CPT

## 2022-02-28 NOTE — PROGRESS NOTES
1. \"Have you been to the ER, urgent care clinic since your last visit? Hospitalized since your last visit? \" No    2. \"Have you seen or consulted any other health care providers outside of the [address] [place] [address] since your last visit? \"      3. For patients aged 39-70: Has the patient had a colonoscopy / FIT/ Cologuard? Yes - no Care Gap present       If the patient is female:    4. For patients aged 41-77: Has the patient had a mammogram within the past 2 years? NA - based on age or sex      11. For patients aged 21-65: Has the patient had a pap smear?  NA - based on age or sex

## 2022-03-01 ENCOUNTER — OFFICE VISIT (OUTPATIENT)
Dept: INTERNAL MEDICINE CLINIC | Age: 62
End: 2022-03-01
Payer: COMMERCIAL

## 2022-03-01 VITALS
TEMPERATURE: 97.2 F | OXYGEN SATURATION: 98 % | DIASTOLIC BLOOD PRESSURE: 60 MMHG | HEIGHT: 72 IN | RESPIRATION RATE: 16 BRPM | SYSTOLIC BLOOD PRESSURE: 114 MMHG | BODY MASS INDEX: 30.34 KG/M2 | HEART RATE: 76 BPM | WEIGHT: 224 LBS

## 2022-03-01 DIAGNOSIS — I10 ESSENTIAL HYPERTENSION: Primary | ICD-10-CM

## 2022-03-01 DIAGNOSIS — U09.9 POST-ACUTE SEQUELAE OF COVID-19 (PASC): ICD-10-CM

## 2022-03-01 DIAGNOSIS — M17.11 PRIMARY OSTEOARTHRITIS OF RIGHT KNEE: ICD-10-CM

## 2022-03-01 DIAGNOSIS — Z86.16 HISTORY OF 2019 NOVEL CORONAVIRUS DISEASE (COVID-19): ICD-10-CM

## 2022-03-01 DIAGNOSIS — I35.1 AORTIC VALVE INSUFFICIENCY, ETIOLOGY OF CARDIAC VALVE DISEASE UNSPECIFIED: ICD-10-CM

## 2022-03-01 DIAGNOSIS — I77.810 DILATED AORTIC ROOT (HCC): ICD-10-CM

## 2022-03-01 DIAGNOSIS — Z00.00 LABORATORY TESTS ORDERED AS PART OF A COMPLETE PHYSICAL EXAM (CPE): ICD-10-CM

## 2022-03-01 DIAGNOSIS — E55.9 VITAMIN D INSUFFICIENCY: ICD-10-CM

## 2022-03-01 DIAGNOSIS — Z12.5 PROSTATE CANCER SCREENING: ICD-10-CM

## 2022-03-01 DIAGNOSIS — E78.5 HYPERLIPIDEMIA, UNSPECIFIED HYPERLIPIDEMIA TYPE: ICD-10-CM

## 2022-03-01 DIAGNOSIS — R73.03 PREDIABETES: ICD-10-CM

## 2022-03-01 DIAGNOSIS — E66.09 CLASS 1 OBESITY DUE TO EXCESS CALORIES WITHOUT SERIOUS COMORBIDITY WITH BODY MASS INDEX (BMI) OF 30.0 TO 30.9 IN ADULT: ICD-10-CM

## 2022-03-01 PROCEDURE — 99214 OFFICE O/P EST MOD 30 MIN: CPT | Performed by: INTERNAL MEDICINE

## 2022-03-01 NOTE — PATIENT INSTRUCTIONS
Heart-Healthy Diet: Care Instructions  Your Care Instructions     A heart-healthy diet has lots of vegetables, fruits, nuts, beans, and whole grains, and is low in salt. It limits foods that are high in saturated fat, such as meats, cheeses, and fried foods. It may be hard to change your diet, but even small changes can lower your risk of heart attack and heart disease. Follow-up care is a key part of your treatment and safety. Be sure to make and go to all appointments, and call your doctor if you are having problems. It's also a good idea to know your test results and keep a list of the medicines you take. How can you care for yourself at home? Watch your portions  · Learn what a serving is. A \"serving\" and a \"portion\" are not always the same thing. Make sure that you are not eating larger portions than are recommended. For example, a serving of pasta is ½ cup. A serving size of meat is 2 to 3 ounces. A 3-ounce serving is about the size of a deck of cards. Measure serving sizes until you are good at [place]" them. Keep in mind that restaurants often serve portions that are 2 or 3 times the size of one serving. · To keep your energy level up and keep you from feeling hungry, eat often but in smaller portions. · Eat only the number of calories you need to stay at a healthy weight. If you need to lose weight, eat fewer calories than your body burns (through exercise and other physical activity). Eat more fruits and vegetables  · Eat a variety of fruit and vegetables every day. Dark green, deep orange, red, or yellow fruits and vegetables are especially good for you. Examples include spinach, carrots, peaches, and berries. · Keep carrots, celery, and other veggies handy for snacks. Buy fruit that is in season and store it where you can see it so that you will be tempted to eat it. · Cook dishes that have a lot of veggies in them, such as stir-fries and soups.   Limit saturated and trans fat  · Read food labels, and try to avoid saturated and trans fats. They increase your risk of heart disease. · Use olive or canola oil when you cook. · Bake, broil, grill, or steam foods instead of frying them. · Choose lean meats instead of high-fat meats such as hot dogs and sausages. Cut off all visible fat when you prepare meat. · Eat fish, skinless poultry, and meat alternatives such as soy products instead of high-fat meats. Soy products, such as tofu, may be especially good for your heart. · Choose low-fat or fat-free milk and dairy products. Eat foods high in fiber  · Eat a variety of grain products every day. Include whole-grain foods that have lots of fiber and nutrients. Examples of whole-grain foods include oats, whole wheat bread, and brown rice. · Buy whole-grain breads and cereals, instead of white bread or pastries. Limit salt and sodium  · Limit how much salt and sodium you eat to help lower your blood pressure. · Taste food before you salt it. Add only a little salt when you think you need it. With time, your taste buds will adjust to less salt. · Eat fewer snack items, fast foods, and other high-salt, processed foods. Check food labels for the amount of sodium in packaged foods. · Choose low-sodium versions of canned goods (such as soups, vegetables, and beans). Limit sugar  · Limit drinks and foods with added sugar. These include candy, desserts, and soda pop. Limit alcohol  · Limit alcohol to no more than 2 drinks a day for men and 1 drink a day for women. Too much alcohol can cause health problems. When should you call for help? Watch closely for changes in your health, and be sure to contact your doctor if:    · You would like help planning heart-healthy meals. Where can you learn more? Go to http://www.Socow[employer].com/  Enter V137 in the search box to learn more about \"Heart-Healthy Diet: Care Instructions. \"  Current as of: August 22, 2019               Content Version: 12.6  © 8283-8023 [employer]. Care instructions adapted under license by [employer] (which disclaims liability or warranty for this information). If you have questions about a medical condition or this instruction, always ask your healthcare professional. [address] any warranty or liability for your use of this information. Learning About Low-Sodium Foods  What foods are low in sodium? The foods you eat contain nutrients, such as vitamins and minerals. Sodium is a nutrient. Your body needs the right amount to stay healthy and work as it should. You can use the list below to help you make choices about which foods to eat. Fruits  · Fresh, frozen, canned, or dried fruit  Vegetables  · Fresh or frozen vegetables, with no added salt  · Canned vegetables, low-sodium or with no added salt  Grains  · Bagels without salted tops  · Cereal with no added salt  · Corn tortillas  · Crackers with no added salt  · Oatmeal, cooked without salt  · Popcorn with no salt  · Pasta and noodles, cooked without salt  · Rice, cooked without salt  · Unsalted pretzels  Dairy and dairy alternatives  · Butter, unsalted  · Cream cheese  · Ice cream  · Milk  · Soy milk  Meats and other protein foods  · Beans and peas, canned with no salt  · Eggs  · Fresh fish (not smoked)  · Fresh meats (not smoked or cured)  · Nuts and nut butter, prepared without salt  · Poultry, not packaged with sodium solution  · Tofu, unseasoned  · Tuna, canned without salt  Seasonings  · Garlic  · Herbs and spices  · Lemon juice  · Mustard  · Olive oil  · Salt-free seasoning mixes  · Vinegar  Work with your doctor to find out how much of this nutrient you need. Depending on your health, you may need more or less of it in your diet. Where can you learn more?   Go to http://www.gray.com/  Enter S460 in the search box to learn more about \"Learning About Low-Sodium Foods.\"  Current as of: August 22, 2019               Content Version: 12.6  © 6112-1843 [employer]. Care instructions adapted under license by [employer] (which disclaims liability or warranty for this information). If you have questions about a medical condition or this instruction, always ask your healthcare professional. [address] any warranty or liability for your use of this information. Low Sodium Diet (2,000 Milligram): Care Instructions  Your Care Instructions     Too much sodium causes your body to hold on to extra water. This can raise your blood pressure and force your heart and kidneys to work harder. In very serious cases, this could cause you to be put in the hospital. It might even be life-threatening. By limiting sodium, you will feel better and lower your risk of serious problems. The most common source of sodium is salt. People get most of the salt in their diet from canned, prepared, and packaged foods. Fast food and restaurant meals also are very high in sodium. Your doctor will probably limit your sodium to less than 2,000 milligrams (mg) a day. This limit counts all the sodium in prepared and packaged foods and any salt you add to your food. Follow-up care is a key part of your treatment and safety. Be sure to make and go to all appointments, and call your doctor if you are having problems. It's also a good idea to know your test results and keep a list of the medicines you take. How can you care for yourself at home? Read food labels  · Read labels on cans and food packages. The labels tell you how much sodium is in each serving. Make sure that you look at the serving size. If you eat more than the serving size, you have eaten more sodium. · Food labels also tell you the Percent Daily Value for sodium. Choose products with low Percent Daily Values for sodium.   · Be aware that sodium can come in forms other than salt, including monosodium glutamate (MSG), sodium citrate, and sodium bicarbonate (baking soda). MSG is often added to Asian food. When you eat out, you can sometimes ask for food without MSG or added salt. Buy low-sodium foods  · Buy foods that are labeled \"unsalted\" (no salt added), \"sodium-free\" (less than 5 mg of sodium per serving), or \"low-sodium\" (less than 140 mg of sodium per serving). Foods labeled \"reduced-sodium\" and \"light sodium\" may still have too much sodium. Be sure to read the label to see how much sodium you are getting. · Buy fresh vegetables, or frozen vegetables without added sauces. Buy low-sodium versions of canned vegetables, soups, and other canned goods. Prepare low-sodium meals  · Cut back on the amount of salt you use in cooking. This will help you adjust to the taste. Do not add salt after cooking. One teaspoon of salt has about 2,300 mg of sodium. · Take the salt shaker off the table. · Flavor your food with garlic, lemon juice, onion, vinegar, herbs, and spices. Do not use soy sauce, lite soy sauce, steak sauce, onion salt, garlic salt, celery salt, mustard, or ketchup on your food. · Use low-sodium salad dressings, sauces, and ketchup. Or make your own salad dressings and sauces without adding salt. · Use less salt (or none) when recipes call for it. You can often use half the salt a recipe calls for without losing flavor. Other foods such as rice, pasta, and grains do not need added salt. · Rinse canned vegetables, and cook them in fresh water. This removes some--but not all--of the salt. · Avoid water that is naturally high in sodium or that has been treated with water softeners, which add sodium. Call your local water company to find out the sodium content of your water supply. If you buy bottled water, read the label and choose a sodium-free brand. Avoid high-sodium foods  · Avoid eating:  ? Smoked, cured, salted, and canned meat, fish, and poultry.   ? Ham, velázquez, hot dogs, and luncheon meats. ? Regular, hard, and processed cheese and regular peanut butter. ? Crackers with salted tops, and other salted snack foods such as pretzels, chips, and salted popcorn. ? Frozen prepared meals, unless labeled low-sodium. ? Canned and dried soups, broths, and bouillon, unless labeled sodium-free or low-sodium. ? Canned vegetables, unless labeled sodium-free or low-sodium. ? [place] fries, pizza, tacos, and other fast foods. ? Pickles, olives, ketchup, and other condiments, especially soy sauce, unless labeled sodium-free or low-sodium. Where can you learn more? Go to http://www.gray.com/  Enter V843 in the search box to learn more about \"Low Sodium Diet (2,000 Milligram): Care Instructions. \"  Current as of: August 22, 2019               Content Version: 12.6  © 3236-5950 [employer]. Care instructions adapted under license by [employer] (which disclaims liability or warranty for this information). If you have questions about a medical condition or this instruction, always ask your healthcare professional. [place] [address] any warranty or liability for your use of this information. High Cholesterol: Care Instructions  Your Care Instructions     Cholesterol is a type of fat in your blood. It is needed for many body functions, such as making new cells. Cholesterol is made by your body. It also comes from food you eat. High cholesterol means that you have too much of the fat in your blood. This raises your risk of a heart attack and stroke. LDL and HDL are part of your total cholesterol. LDL is the \"bad\" cholesterol. High LDL can raise your risk for heart disease, heart attack, and stroke. HDL is the \"good\" cholesterol. It helps clear bad cholesterol from the body. High HDL is linked with a lower risk of heart disease, heart attack, and stroke.   Your cholesterol levels help your doctor find out your risk for having a heart attack or stroke. You and your doctor can talk about whether you need to lower your risk and what treatment is best for you. A heart-healthy lifestyle along with medicines can help lower your cholesterol and your risk. The way you choose to lower your risk will depend on how high your risk is for heart attack and stroke. It will also depend on how you feel about taking medicines. Follow-up care is a key part of your treatment and safety. Be sure to make and go to all appointments, and call your doctor if you are having problems. It's also a good idea to know your test results and keep a list of the medicines you take. How can you care for yourself at home? · Eat a variety of foods every day. Good choices include fruits, vegetables, whole grains (like oatmeal), dried beans and peas, nuts and seeds, soy products (like tofu), and fat-free or low-fat dairy products. · Replace butter, margarine, and hydrogenated or partially hydrogenated oils with olive and canola oils. (Canola oil margarine without trans fat is fine.)  · Replace red meat with fish, poultry, and soy protein (like tofu). · Limit processed and packaged foods like chips, crackers, and cookies. · Bake, broil, or steam foods. Don't dunham them. · Be physically active. Get at least 30 minutes of exercise on most days of the week. Walking is a good choice. You also may want to do other activities, such as running, swimming, cycling, or playing tennis or team sports. · Stay at a healthy weight or lose weight by making the changes in eating and physical activity listed above. Losing just a small amount of weight, even 5 to 10 pounds, can reduce your risk for having a heart attack or stroke. · Do not smoke. When should you call for help? Watch closely for changes in your health, and be sure to contact your doctor if:    · You need help making lifestyle changes. · You have questions about your medicine. Where can you learn more?   Go to http://www.gray.com/  Enter P581 in the search box to learn more about \"High Cholesterol: Care Instructions. \"  Current as of: December 16, 2019               Content Version: 12.6  © 8772-9280 [employer]. Care instructions adapted under license by [employer] (which disclaims liability or warranty for this information). If you have questions about a medical condition or this instruction, always ask your healthcare professional. [address] any warranty or liability for your use of this information.

## 2022-03-12 PROBLEM — U09.9 POST-ACUTE SEQUELAE OF COVID-19 (PASC): Status: ACTIVE | Noted: 2022-03-12

## 2022-03-12 PROBLEM — Z86.16 HISTORY OF 2019 NOVEL CORONAVIRUS DISEASE (COVID-19): Status: ACTIVE | Noted: 2022-03-12

## 2022-03-12 PROBLEM — M75.22 BICIPITAL TENDINITIS OF LEFT SHOULDER: Status: RESOLVED | Noted: 2021-09-05 | Resolved: 2022-03-12

## 2022-03-12 NOTE — PROGRESS NOTES
HPI:   Carley Asher is a 59y.o. year old male who presents today for a routine visit. He has a history of hypertension, hyperlipidemia, moderate aortic insufficiency, atypical chest pain, prediabetes, GERD, osteoarthritis, lumbar radiculopathy, and chronic venous insufficiency. He has NOT completed the COVID-19 vaccine series. He reports that he is doing reasonably well. He reports that on 1/17/2022, he presented to [facility] with headache, sore throat, myalgias, nasal congestion, cough, and mild dyspnea on exertion and was diagnosed with COVID-19. He reports that he never experienced a fever and gradually improved. However he states that he is still experiencing difficulty with altered taste and describes beer as tasting \"moldy\". He otherwise denies any residual symptoms. He reports that he is continuing to have difficulty with right knee pain and underwent evaluation by Dr. Danii Shepherd who recommended physical therapy. He states that he is currently receiving therapy for his bilateral knee pain and is finding it to be helpful. He reestablished care under Dr. Jarvis Renner and is scheduled for a repeat echocardiogram later this month. He is otherwise without new complaints and feeling generally well. Summary of prior hospitalizations and medical history:  He has a history of hypertension, treated with losartan and amlodipine. He does not check his blood pressure at home regularly. He also does not exercise regularly, although admits to doing yard work and projects around the house. He denies any shortness of breath at rest or with exertion, palpitations, lightheadedness, or edema. He does have a history of substernal chest pain that has no relation to exertion and was thought to be atypical. He presented to [facility] on 2/27/2016 with a complaint of chest pain and diaphoresis, which seemed to be relieved by aspirin and sublingual nitroglycerin in the ambulance.   Evaluation included negative ECG and troponins. He had an exercise nuclear stress test (2/29/2016) which showed normal LV function (EF 57%) and a medium sized partially reversible defect inferiorly (intermediate risk study). On 3/1/2016, he underwent a cardiac catheterization which showed no significant epicardial coronary artery disease. He also had an echocardiogram (2/28/2016) showing mild LV septal hypertrophy, hypokinetic basal inferior wall with preserved LV function (EF 01%), mild diastolic dysfunction, and moderate aortic regurgitation with questionable dilation of the distal arch of aorta (3.5 cm). He was found to have elevated triglycerides, with lipid panel showing total cholesterol 201/ / HDL 52/ LDL 89. He was started on Fenofibrate prior to discharge on 3/1/2016. He reports that he has had no significant recurrence of chest pain since that time. He had a repeat echocardiogram in 9/2017 which showed low normal LV function (EF 50-55%), no RWMA, calcified aortic valve with bicuspid appearance and moderate regurgitation, and mild dilatation of the aortic root. He had a repeat echocardiogram in 3/2020 which showed upper normal LV size and low normal LV function (EF 50%), moderate AI and mildly dilated ascending aorta at 4.1 cm. He does have difficulty with mild lower extremity edema due to chronic venous insufficiency. He is now being followed by Dr. Leyla Ocampo. He has a history of prediabetes, with HbA1c ranging from 5.8-6.1 since 2012. He denies any polyuria, polydipsia, nocturia, or blurry vision, and has no history of retinopathy, neuropathy, or nephropathy. He has regular eye exams with Dr. Mara Edwards. He has a history of osteoarthritis, involving his right knee, right great toe, and lumbar spine. He has had difficulty in the past with increasing pain at the base of his right thumb and was evaluated by Dr. Dennise Aggarwal.  He states that x-rays revealed he had severe osteoarthritis, and he received a cortisone injection with some improvement. In 12/2016, he noted increasing difficulty with low back pain and right sided sciatica. He underwent a lumbar MRI (12/2016) which showed progression when compared to a prior lumbar MRI from 2010. It showed prominent right paracentral L4-L5 disc extrusion with impingement of the descending right L5 nerve root and moderately severe multifactorial canal stenosis; additional prominent right paracentral L5-S1 disc protrusion with impingement of the descending right S1 nerve root; abutment of the foraminal right L5 nerve root related to moderate foramina stenosis at L5-S1; mild multifactorial canal stenosis at L3-L4 with abutment of the descending left L4 nerve root. He was evaluated by Dr. Dimas Chavez, and treated with Medrol dose pack, Norco and Flexeril without significant improvement. He was evaluated by Dr. Ruth Jackson in 3/2017 for possible surgical intervention, but this was deferred due to issues regarding his wife's health. He was treated with gabapentin, and was reevaluated by Dr. Dimas Chavez in 8/2017, at which time he reported significant improvement. He states that he no longer required treatment with gabapentin or Norco, and was continuing to take Voltaren ER with good control of symptoms. However in 3/2018, he developed recurrence of severe pain with right sided sciatica that was unresponsive to conservative therapy. He subsequently consulted Dr. Henrik Ashraf and on 7/5/2018, he underwent a right L4-L5 and L5-S1 hemilaminectomy, medial facetectomy, and foraminotomy by Dr. Henrik Ashraf for removal of herniated disks and decompression of foraminal stenoses at these levels. He reports that his right sided sciatica has resolved and he is no longer requiring any pain medication. He does report some residual numbness on the plantar surface of his right foot. He reports difficulty with left shoulder pain and was referred to physical therapy as recommended by Dr. Dimas Chavez in 1/2021.   He underwent evaluation by Dr. Anthony Hernandez in 6/2021 and it was felt that his left shoulder pain was secondary to cervical radiculopathy and bicipital tendinitis. He was again referred to physical therapy and reports improvement. He has a history of GERD, with symptoms well controlled with pantoprazole as needed. He had a screening colonoscopy by Dr. Henri Green in 3/2012 with removal of a benign serrated polyp (pathology shows hyperplastic and adenomatous features). He had repeat screening colonoscopy in 3/2017 by Dr Dayna Jones which was normal. Follow-up recommended for five years. He denies any abdominal pain, nausea, vomiting, melena, hematochezia, or change in bowel movements. In 5/2016, he fell at work with his chest striking the edge of a table and he sustained fractures of the anterior right 7th and 8th ribs. He had serial rib x-rays since that time showing slow interval healing, but reports that he no longer has pain related to this. He underwent a bone density scan in 10/2016 which showed evidence of osteopenia with T-scores:  femoral neck left -1.7  /right -1.5 and lumbar -1.2. He was started on calcium and Vitamin D supplementation. He previously smoked approximately 1 ppd for 40 years, stopping in 2000. He also reports a history of asbestos exposure in 1980. Past Medical History:   Diagnosis Date    Abnormal myocardial perfusion study 2/28/2016    Partially reversible inferior perfusion defect, EF 57%    AI (aortic insufficiency)     Moderate with possible bicuspid aortic valve    Chronic low back pain     Chronic venous insufficiency     Colon polyp 03/2012    Benign serrated polyp    Fracture 05/2016    rib fx's    GERD (gastroesophageal reflux disease)     History of echocardiogram 2/28/2016    EF 55%. Basal inferior hypokinesis. Gr 1 DDfx. Mod AI.       Hyperlipidemia     Hypertension     Osteoarthritis of right knee     Osteopenia     Prediabetes     Prepatellar bursitis of right knee     Recurrent genital herpes 1/16/2013    Right lumbar radiculopathy 10/2016    MRI: prominent right paracentral L4-L5 disc extrusion/impingement of descending right L5 nerve root; moderately severe canal stenosis; prominent right paracentral L5-S1 disc protrusion/ impingement of the descending right S1 nerve root; abutment of the foraminal right L5 nerve root/moderate foramina stenosis at L5-S1; mild canal stenosis at L3-L4 with abutment of the descending left L4 nerve root.  S/P cardiac catheterization 3/1/2016    Normal coronary anatomy     Past Surgical History:   Procedure Laterality Date    ENDOSCOPY, COLON, DIAGNOSTIC      HX CHOLECYSTECTOMY      HX LUMBAR LAMINECTOMY  07/05/2018    R L4/5/S1 Dr. Chey Wright HX TONSILLECTOMY       Current Outpatient Medications   Medication Sig    amLODIPine (NORVASC) 5 mg tablet Take 1 Tablet by mouth daily.  losartan (COZAAR) 100 mg tablet Take 1 Tablet by mouth daily.  valACYclovir (VALTREX) 500 mg tablet TAKE 1 TABLET DAILY    ibuprofen (MOTRIN) 800 mg tablet Take 1 Tablet by mouth every eight (8) hours as needed for Pain (take with food).  clindamycin (CLEOCIN T) 1 % external solution Use thin film on affected area of scalp every 12 hours  Indications: acne    ascorbic acid, vitamin C, (VITAMIN C) 500 mg tablet Take 500 mg by mouth daily.  magnesium oxide (MAG-OX) 400 mg tablet Take 400 mg by mouth.  albuterol (PROVENTIL HFA, VENTOLIN HFA, PROAIR HFA) 90 mcg/actuation inhaler Take 1 Puff by inhalation every six (6) hours as needed for Wheezing.  inhalational spacing device 1 Each by Does Not Apply route as needed (shortness of breath).  nitroglycerin (NITROSTAT) 0.4 mg SL tablet 1 Tab by SubLINGual route every five (5) minutes as needed for Chest Pain. Up to 3 doses.  calcium carbonate (CALCIUM 300 PO) Take 1 Tab by mouth daily.  aspirin delayed-release 81 mg tablet Take 81 mg by mouth daily.     Cholecalciferol, Vitamin D3, 2,000 unit cap Take 2,000 Units by mouth daily as needed.  DOCOSAHEXANOIC ACID/EPA (FISH OIL PO) Take 1 Tab by mouth daily. No current facility-administered medications for this visit. Allergies and Intolerances: Allergies   Allergen Reactions    Tree Nuts Anaphylaxis    Ace Inhibitors Cough    Nuts [Tree Nut] Itching     Family History: His mother is alive with stage 3 ovarian cancer. His father  from esophageal cancer at age 61. His maternal aunt  at age 48 from colon cancer. He has no family history of prostate cancer. Family History   Problem Relation Age of Onset    Cancer Father 61        esophageal    Hypertension Father     Cancer Mother 80        ovarian cancer     Hypertension Brother     Diabetes Other         Grandmother    OSTEOARTHRITIS Other     Other Other         Stomach problems; Father, grandfather     Social History:   He  reports that he quit smoking about 21 years ago. He has a 12.50 pack-year smoking history. He quit smokeless tobacco use about 21 years ago. Reports that he smoked less than 1 ppd for 40 years, and stopped in . He is  and they have one son. His wife was recently diagnosed with Paget's disease of the breast, which represents a recurrence of prior breast cancer. He was employed as an  for [employer], but retired in 2017. He drinks about a 12 pack of beer each month. Social History     Substance and Sexual Activity   Alcohol Use Yes    Alcohol/week: 1.7 standard drinks    Types: 2 Cans of beer per week     Immunization History:  Immunization History   Administered Date(s) Administered    Influenza Vaccine 2013, 2014, 10/01/2015    TDAP Vaccine 2004    Td 2004    Tdap 2014       Review of Systems:   As above included in HPI.   Otherwise 11 point review of systems negative including constitutional, skin, HENT, eyes, respiratory, cardiovascular, gastrointestinal, genitourinary, musculoskeletal, endocrine, hematologic, allergy, and neurologic. Physical:   Visit Vitals  /60   Pulse 76   Temp 97.2 °F (36.2 °C) (Temporal)   Resp 16   Ht 6' (1.829 m)   Wt 224 lb (101.6 kg)   SpO2 98%   BMI 30.38 kg/m²         Exam:     Patient appears in no apparent distress. Affect is appropriate. HEENT: PERRLA, anicteric, no JVD, adenopathy or thyromegaly. No carotid bruits or radiated murmur. Lungs: clear to auscultation, no wheezes, rhonchi, or rales. Heart: regular rate and rhythm. No murmur, rubs, gallops  Abdomen: soft, nontender, nondistended, normal bowel sounds, no hepatosplenomegaly or masses. Extremities: without edema.           Review of Data:  Labs:  Hospital Outpatient Visit on 02/22/2022   Component Date Value Ref Range Status    Hemoglobin A1c 02/22/2022 5.9* 4.2 - 5.6 % Final    Est. average glucose 02/22/2022 123  mg/dL Final    LIPID PROFILE 02/22/2022        Final    Cholesterol, total 02/22/2022 202* <200 MG/DL Final    Triglyceride 02/22/2022 189* <150 MG/DL Final    HDL Cholesterol 02/22/2022 46  40 - 60 MG/DL Final    LDL, calculated 02/22/2022 118.2* 0 - 100 MG/DL Final    VLDL, calculated 02/22/2022 37.8  MG/DL Final    CHOL/HDL Ratio 02/22/2022 4.4  0 - 5.0   Final    Magnesium 02/22/2022 2.4  1.6 - 2.6 mg/dL Final    Sodium 02/22/2022 141  136 - 145 mmol/L Final    Potassium 02/22/2022 4.4  3.5 - 5.5 mmol/L Final    Chloride 02/22/2022 106  100 - 111 mmol/L Final    CO2 02/22/2022 26  21 - 32 mmol/L Final    Anion gap 02/22/2022 9  3.0 - 18 mmol/L Final    Glucose 02/22/2022 109* 74 - 99 mg/dL Final    BUN 02/22/2022 17  7.0 - 18 MG/DL Final    Creatinine 02/22/2022 1.18  0.6 - 1.3 MG/DL Final    BUN/Creatinine ratio 02/22/2022 14  12 - 20   Final    GFR est AA 02/22/2022 >60  >60 ml/min/1.73m2 Final    GFR est non-AA 02/22/2022 >60  >60 ml/min/1.73m2 Final    Calcium 02/22/2022 9.2  8.5 - 10.1 MG/DL Final    Bilirubin, total 02/22/2022 0.7  0.2 - 1.0 MG/DL Final    ALT (SGPT) 02/22/2022 40  16 - 61 U/L Final    AST (SGOT) 02/22/2022 24  10 - 38 U/L Final    Alk. phosphatase 02/22/2022 79  45 - 117 U/L Final    Protein, total 02/22/2022 6.7  6.4 - 8.2 g/dL Final    Albumin 02/22/2022 4.0  3.4 - 5.0 g/dL Final    Globulin 02/22/2022 2.7  2.0 - 4.0 g/dL Final    A-G Ratio 02/22/2022 1.5  0.8 - 1.7   Final       Calculated 10 year ASCVD risk score: 11.1 %    Health Maintenance:  Screening:    Colorectal: colonoscopy (3/2017) normal. Dr. Dianne Ashley. Due 3/2022. Scheduling. Depression: none   DM (HbA1c/FPG): / HbA1c 6.2 (8/2021)   Hepatitis C: negative (9/2016)   Falls: none    DEXA: N/A   PSA/TONI: PSA 0.6 (8/2021)/ TONI (2/2019)   Glaucoma: regular eye exams with [facility] (last 2/2021)   Smoking: none   Vitamin D: 47.3 (8/2021)   Medicare Wellness: N/A      Impression:  Patient Active Problem List   Diagnosis Code    Hyperlipidemia E78.5    GERD (gastroesophageal reflux disease) K21.9    Chronic venous insufficiency I87.2    Colon polyp K63.5    Prediabetes R73.03    Recurrent genital herpes A60.00    Vitamin D insufficiency E55.9    Aortic insufficiency, moderate I35.1    Essential hypertension I10    Primary osteoarthritis of knee M17.10    Lumbar herniated disc  right L4/5/S1 chronic M51.26    Osteopenia determined by DEXA 2016, lowest T score -1.7 M85.80    Annular tear of lumbar disc M51.36    Chronic right-sided low back pain with sciatica M54.40, G89.29    Chronic thumb pain, right M79.644, G89.29    S/P lumbar discectomy Z98.890    Class 1 obesity due to excess calories without serious comorbidity in adult E66.09    Chronic pain of right knee M25.561, G89.29    Chronic left shoulder pain M25.512, G89.29    Cervical spondylosis M47.812    Dilated aortic root (HCC) I77.810    History of 2019 novel coronavirus disease (COVID-19) Z86.16    Post-acute sequelae of COVID-19 (PAS) U09.9       Plan:   1. Hypertension.  Well controlled on current regimen of losartan 100 mg daily and amlodipine 5 mg daily. Renal function remains normal with creatinine 1.18/ eGFR >60. Continue to follow. 2. Dyslipidemia. Patient no longer taking fenofibrate. First line therapy for treating triglycerides < 500 would be initiation of a statin. Lipid panel with triglyceride level 189 today. Calculated 10 year ASCVD risk is 11.1 %, which does place him in one of the four statin benefit groups as per new AHA/ACC guidelines (primary prevention: ASCVD risk > 7.5%). LDL improved today to 118 and HDL 46. Emphasized importance of continued lifestyle modifications, including diet, exercise, and weight loss. Will continue to follow. 3. Aortic insufficiency, moderate with mildly dilated aortic root. Probable bicuspid valve. Being monitored on echocardiogram.  Repeat echocardiogram to reassess valve in 9/2017 without change. Established with new cardiologist, Dr. Damir Biswas, and underwent repeat echocardiogram (3/2020) showing moderate AI with mildly dilated aortic root at 4.1 cm. Low normal LV function (EF 50%). Essentially unchanged by report when compared to prior echocardiogram in 2017. Reestablished with Dr. Ines Carrasco and due for repeat echocardiogram in 3/2022. Already scheduled. 4. Prediabetes. HbA1c improved to 5.9 today. No evidence of microvascular complications. On ARB but unwilling to begin statin. Continue follow-up for annual eye exams. Foot exam normal in 4/2017. Urine microalbumin/ creatinine ratio without evidence of microalbuminuria. Encouraged continued weight loss and dietary changes, particularly avoiding concentrated sweets and minimizing carbohydrates. 5. History of COVID 19 with PASC. Tested positive on 1/17/2022, and reported symptoms of sore throat, headache, myalgias, nasal congestion, cough and mild dyspnea on exertion. Reports loss of taste/smell during illness and states that continuing to have altered taste as sequela.   Other symptoms including dyspnea have resolved. Remains unvaccinated and not willing to proceed with vaccination as recommended. Will continue to monitor. 6. Osteopenia. Last bone density scan 10/2016. Using femoral neck T-scores, calculated FRAX score estimates her 10 year risk of a major osteoporetic fracture at 12 % and hip fracture at 2.0 %, which are not an indication for biphosphonate treatment (>20% and >3%, respectively). Continue calcium and Vitamin D. Encouraged exercise, particularly weight bearing activities. 7. Left cervical radiculopathy. Evaluated by Dr. Bobo Jha on 1/28/2021 and referred to physical therapy with some improvement. Seen by Dr. Tara Cook in 6/2021 for left shoulder pain and felt to be secondary to cervical radiculopathy +/-bicipital tendinitis. Completed physical therapy with improvement. 8. Osteoarthritis, right knee. Reported increasing pain with ambulation and standing. Chronic problem which has now progressed. Underwent evaluation by Dr. Taar Cook and received a cortisone injection. Subsequently evaluated by Dr. Danii Shepherd and now receiving physical therapy for bilateral knee pain with some benefit. 9. Right sciatica. Progression of symptoms and disease on MRI. Responded well to conservative therapy initially, but recurred and unresponsive to medications and injections. Underwent right L4-L5 and L5-S1 hemilaminectomy, medial facetectomy, and foraminotomy by Dr. Jesus Alberto Paris in 7/2018 with great success. Reports only residual numbness on medial plantar surface of right foot. No longer requiring any pain medication. Continuing to do well. 10. Right thumb osteoarthritis. Improved pain control with cortisone injection. Being followed by Dr. Dennise Aggarwal. 11. Erythrocytosis. Review of record shows similar readings since 2014. Erythropoietin level normal in 2/2020. Will continue to monitor. 12. Excessive daytime sleepiness/ snoring. Patient with daytime drowsiness, snoring, and fatigue.  Underwent evaluation by Dr. Remy Villagran in 3/2020 and recommended in-lab sleep study due to severity of symptoms. However, decided not to proceed. Will continue to address. 13.  History of rectal bleeding. Reported episodes of bleeding with bowel movements at last visit in 2/2021. Most likely hemorrhoidal by description and no evidence of anemia. Last colonoscopy normal 3/2017. Evaluated by Dr. Fabián Sher, who did not recommend early colonoscopy as felt to likely be from perianal source. Now due for screening colonoscopy and in the process of scheduling. 14. GERD. Continue occasional use of Protonix with good control of symptoms. Follow. 15. Obesity. Weight fluctuating but essentially stable over the last year. Emphasized importance of lifestyle modifications, including diet, exercise, and weight loss. Will readdress next visit. 16. Health maintenance. Has NOT received the COVID-19 vaccine series and again urged to obtain. Discussed Shingrix vaccine but not wishing to proceed. Other immunizations up to date. Colonoscopy due 3/2022 and reports appointment scheduled with Dr. Tavares Chicas on 3/5/2022. Prostate cancer screening up to date. Continue regular eye exams. Stressed importance of weight loss. Vitamin D level has been normal on maintenance dose supplement. Counseled patient regarding strict mitigation measures for COVID-19, including wearing a mask, social distancing and diligent hand washing, as recommended by the CDC. Discussed importance of proceeding with COVID 19 vaccine and urged to obtain. Patient understands recommendations and agrees with plan. Follow-up in 6 months.

## 2022-03-18 PROBLEM — M25.512 CHRONIC LEFT SHOULDER PAIN: Status: ACTIVE | Noted: 2021-09-05

## 2022-03-18 PROBLEM — M25.561 CHRONIC PAIN OF RIGHT KNEE: Status: ACTIVE | Noted: 2020-03-04

## 2022-03-18 PROBLEM — E66.811 CLASS 1 OBESITY DUE TO EXCESS CALORIES WITHOUT SERIOUS COMORBIDITY IN ADULT: Status: ACTIVE | Noted: 2018-09-08

## 2022-03-18 PROBLEM — E66.09 CLASS 1 OBESITY DUE TO EXCESS CALORIES WITHOUT SERIOUS COMORBIDITY IN ADULT: Status: ACTIVE | Noted: 2018-09-08

## 2022-03-18 PROBLEM — I77.810 DILATED AORTIC ROOT (HCC): Status: ACTIVE | Noted: 2021-11-30

## 2022-03-18 PROBLEM — G89.29 CHRONIC LEFT SHOULDER PAIN: Status: ACTIVE | Noted: 2021-09-05

## 2022-03-18 PROBLEM — Z98.890 S/P LUMBAR DISCECTOMY: Status: ACTIVE | Noted: 2018-09-08

## 2022-03-18 PROBLEM — Z86.16 HISTORY OF 2019 NOVEL CORONAVIRUS DISEASE (COVID-19): Status: ACTIVE | Noted: 2022-03-12

## 2022-03-18 PROBLEM — G89.29 CHRONIC PAIN OF RIGHT KNEE: Status: ACTIVE | Noted: 2020-03-04

## 2022-03-19 PROBLEM — M79.644 CHRONIC THUMB PAIN, RIGHT: Status: ACTIVE | Noted: 2018-02-24

## 2022-03-19 PROBLEM — M47.812 CERVICAL SPONDYLOSIS: Status: ACTIVE | Noted: 2021-09-05

## 2022-03-19 PROBLEM — G89.29 CHRONIC THUMB PAIN, RIGHT: Status: ACTIVE | Noted: 2018-02-24

## 2022-03-19 PROBLEM — U09.9 POST-ACUTE SEQUELAE OF COVID-19 (PASC): Status: ACTIVE | Noted: 2022-03-12

## 2022-03-29 ENCOUNTER — TELEPHONE (OUTPATIENT)
Dept: CARDIOLOGY CLINIC | Age: 62
End: 2022-03-29

## 2022-03-31 ENCOUNTER — TELEPHONE (OUTPATIENT)
Dept: CARDIOLOGY CLINIC | Age: 62
End: 2022-03-31

## 2022-03-31 NOTE — TELEPHONE ENCOUNTER
Patient made aware of echo results and Dr. Jan Griffin remarks. No questions or concerns at present.

## 2022-03-31 NOTE — TELEPHONE ENCOUNTER
----- Message from Trudy Singh MD sent at 3/31/2022 10:12 AM EDT -----  Please let the patient know that his aortic valve regurgitation was unchanged compared to his previous study. ----- Message -----  From: Yanely Patricia LPN  Sent: [id number]/61/[id number]   8:52 AM EDT  To: Trudy Singh MD    Per your note -   Aortic insufficiency. This remained moderate in severity by echocardiogram in March 2020. He did have a calcified aortic valve which did appear to be bicuspid, so this will need to be followed up at least every other year, unless new symptoms arise. This will be scheduled for March of next year.

## 2022-04-22 ENCOUNTER — TRANSCRIBE ORDER (OUTPATIENT)
Dept: SCHEDULING | Age: 62
End: 2022-04-22

## 2022-04-22 DIAGNOSIS — M17.12 PRIMARY OSTEOARTHRITIS OF LEFT KNEE: Primary | ICD-10-CM

## 2022-06-28 NOTE — TELEPHONE ENCOUNTER
Last Visit: 8/31/21 with MD David Lopez  Next Appointment: 3/1/22 with MD David Lopez  Previous Refill Encounter(s): 12/16/20 #30 with 1 refill    Requested Prescriptions     Pending Prescriptions Disp Refills    ibuprofen (MOTRIN) 800 mg tablet 30 Tablet 1     Sig: Take 1 Tablet by mouth every eight (8) hours as needed for Pain (take with food). No

## 2022-07-29 ENCOUNTER — TELEPHONE (OUTPATIENT)
Dept: INTERNAL MEDICINE CLINIC | Age: 62
End: 2022-07-29

## 2022-07-29 NOTE — TELEPHONE ENCOUNTER
Pt calling, transferred from call center for appt. Says for about a week he has been having a strange pain in his lower chest/bottom of his sternum area. Sometimes it feels like he can't take a deep breath. Says it is a weird sensation, hard to put into words. Says he had a similar feel after his endoscopy. He does not feel like it is a chest pain. He says tums doesn't help any    He is going to start taking his bp to have readings for doctor. He isn't sure if it is related to bp. He doesn't take it normally so he isn't sure if it is high. Wants to know if he should see Dr. Aishwarya Zhu for this or one of his specialists? He was advised if it gets worse over weekend to go to ER since doctor is not in the office today to review.     No openings soon

## 2022-08-02 ENCOUNTER — TELEPHONE (OUTPATIENT)
Dept: INTERNAL MEDICINE CLINIC | Age: 62
End: 2022-08-02

## 2022-08-02 ENCOUNTER — OFFICE VISIT (OUTPATIENT)
Dept: INTERNAL MEDICINE CLINIC | Age: 62
End: 2022-08-02
Payer: COMMERCIAL

## 2022-08-02 VITALS
HEIGHT: 72 IN | OXYGEN SATURATION: 97 % | BODY MASS INDEX: 30.34 KG/M2 | WEIGHT: 224 LBS | TEMPERATURE: 97 F | HEART RATE: 73 BPM | RESPIRATION RATE: 16 BRPM | DIASTOLIC BLOOD PRESSURE: 74 MMHG | SYSTOLIC BLOOD PRESSURE: 130 MMHG

## 2022-08-02 DIAGNOSIS — E66.09 CLASS 1 OBESITY DUE TO EXCESS CALORIES WITHOUT SERIOUS COMORBIDITY WITH BODY MASS INDEX (BMI) OF 30.0 TO 30.9 IN ADULT: ICD-10-CM

## 2022-08-02 DIAGNOSIS — R73.03 PREDIABETES: ICD-10-CM

## 2022-08-02 DIAGNOSIS — I10 ESSENTIAL HYPERTENSION: ICD-10-CM

## 2022-08-02 DIAGNOSIS — I35.1 AORTIC VALVE INSUFFICIENCY, ETIOLOGY OF CARDIAC VALVE DISEASE UNSPECIFIED: ICD-10-CM

## 2022-08-02 DIAGNOSIS — I77.810 DILATED AORTIC ROOT (HCC): ICD-10-CM

## 2022-08-02 DIAGNOSIS — K21.9 GASTROESOPHAGEAL REFLUX DISEASE, UNSPECIFIED WHETHER ESOPHAGITIS PRESENT: ICD-10-CM

## 2022-08-02 DIAGNOSIS — E78.5 HYPERLIPIDEMIA, UNSPECIFIED HYPERLIPIDEMIA TYPE: ICD-10-CM

## 2022-08-02 DIAGNOSIS — Z86.16 HISTORY OF 2019 NOVEL CORONAVIRUS DISEASE (COVID-19): ICD-10-CM

## 2022-08-02 DIAGNOSIS — R10.13 EPIGASTRIC PAIN: Primary | ICD-10-CM

## 2022-08-02 PROCEDURE — 99214 OFFICE O/P EST MOD 30 MIN: CPT | Performed by: INTERNAL MEDICINE

## 2022-08-02 RX ORDER — PANTOPRAZOLE SODIUM 40 MG/1
40 TABLET, DELAYED RELEASE ORAL 2 TIMES DAILY
COMMUNITY

## 2022-08-02 NOTE — TELEPHONE ENCOUNTER
Please request copy of upper endoscopy report that patient had performed on 12/31/2022 by Dr. Joaquin Wiggins. Thanks.

## 2022-08-02 NOTE — PATIENT INSTRUCTIONS
Heart-Healthy Diet: Care Instructions  Your Care Instructions     A heart-healthy diet has lots of vegetables, fruits, nuts, beans, and whole grains, and is low in salt. It limits foods that are high in saturated fat, such as meats, cheeses, and fried foods. It may be hard to change your diet, but even small changes can lower your risk of heart attack and heart disease. Follow-up care is a key part of your treatment and safety. Be sure to make and go to all appointments, and call your doctor if you are having problems. It's also a good idea to know your test results and keep a list of the medicines you take. How can you care for yourself at home? Watch your portions  Learn what a serving is. A \"serving\" and a \"portion\" are not always the same thing. Make sure that you are not eating larger portions than are recommended. For example, a serving of pasta is ½ cup. A serving size of meat is 2 to 3 ounces. A 3-ounce serving is about the size of a deck of cards. Measure serving sizes until you are good at [place]" them. Keep in mind that restaurants often serve portions that are 2 or 3 times the size of one serving. To keep your energy level up and keep you from feeling hungry, eat often but in smaller portions. Eat only the number of calories you need to stay at a healthy weight. If you need to lose weight, eat fewer calories than your body burns (through exercise and other physical activity). Eat more fruits and vegetables  Eat a variety of fruit and vegetables every day. Dark green, deep orange, red, or yellow fruits and vegetables are especially good for you. Examples include spinach, carrots, peaches, and berries. Keep carrots, celery, and other veggies handy for snacks. Buy fruit that is in season and store it where you can see it so that you will be tempted to eat it. Cook dishes that have a lot of veggies in them, such as stir-fries and soups.   Limit saturated and trans fat  Read food labels, and try to avoid saturated and trans fats. They increase your risk of heart disease. Use olive or canola oil when you cook. Bake, broil, grill, or steam foods instead of frying them. Choose lean meats instead of high-fat meats such as hot dogs and sausages. Cut off all visible fat when you prepare meat. Eat fish, skinless poultry, and meat alternatives such as soy products instead of high-fat meats. Soy products, such as tofu, may be especially good for your heart. Choose low-fat or fat-free milk and dairy products. Eat foods high in fiber  Eat a variety of grain products every day. Include whole-grain foods that have lots of fiber and nutrients. Examples of whole-grain foods include oats, whole wheat bread, and brown rice. Buy whole-grain breads and cereals, instead of white bread or pastries. Limit salt and sodium  Limit how much salt and sodium you eat to help lower your blood pressure. Taste food before you salt it. Add only a little salt when you think you need it. With time, your taste buds will adjust to less salt. Eat fewer snack items, fast foods, and other high-salt, processed foods. Check food labels for the amount of sodium in packaged foods. Choose low-sodium versions of canned goods (such as soups, vegetables, and beans). Limit sugar  Limit drinks and foods with added sugar. These include candy, desserts, and soda pop. Limit alcohol  Limit alcohol to no more than 2 drinks a day for men and 1 drink a day for women. Too much alcohol can cause health problems. When should you call for help? Watch closely for changes in your health, and be sure to contact your doctor if:    You would like help planning heart-healthy meals. Where can you learn more? Go to http://www.[employer].com/  Enter V137 in the search box to learn more about \"Heart-Healthy Diet: Care Instructions. \"  Current as of: August 22, 2019               Content Version: 12.6  © 6117-2382 [employer]. Care instructions adapted under license by [employer] (which disclaims liability or warranty for this information). If you have questions about a medical condition or this instruction, always ask your healthcare professional. [address] any warranty or liability for your use of this information.

## 2022-08-02 NOTE — PROGRESS NOTES
1. \"Have you been to the ER, urgent care clinic since your last visit? Hospitalized since your last visit? \" No    2. \"Have you seen or consulted any other health care providers outside of the [address] [place] [address] since your last visit? \" No     3. For patients aged 39-70: Has the patient had a colonoscopy / FIT/ Cologuard? Yes - no Care Gap present      If the patient is female:    4. For patients aged 41-77: Has the patient had a mammogram within the past 2 years? NA - based on age or sex      11. For patients aged 21-65: Has the patient had a pap smear?  NA - based on age or sex

## 2022-08-07 NOTE — PROGRESS NOTES
HPI:   Ramiro Palomino is a 57y.o. year old male who presents today for an acute visit. He has a history of hypertension, hyperlipidemia, moderate aortic insufficiency, atypical chest pain, prediabetes, GERD, osteoarthritis, lumbar radiculopathy, and chronic venous insufficiency. He has NOT completed the COVID-19 vaccine series. He reports that he is doing reasonably well. He reports that since his last visit, he underwent a repeat echocardiogram in 3/2022 which showed moderately thickened aortic valve with mild-moderate regurgitation and no stenosis, mildly dilated aortic root at 4.0 cm, normal left atrial volume, low normal LV function (EF 50-55%). He also underwent a screening colonoscopy by Dr. Paulette Alvarez on 3/4/2022 which was normal.  Follow-up recommended for 5 years. He presents today complaining of intermittent upper-mid abdominal dull aching pain which has increased over the last month. He states that the pain will increase towards the late afternoon and worsens following eating a large meal.  He reports that he has stopped taking ibuprofen for joint pain with the onset of symptoms and is now taking Tylenol. He was evaluated in 11/2021 by Dr. Paulette Alvarez for similar symptoms and underwent upper endoscopy on 12/31/2021 which was unremarkable (report not available). He was prescribed Protonix 40 mg twice daily at that time but states that he never started taking it since his symptoms had improved. He denies any nausea, vomiting, dysphagia, hematemesis, hematochezia, or change in bowel movements. He is otherwise without new complaints. Summary of prior hospitalizations and medical history:  On 1/17/2022, he presented to [facility] with headache, sore throat, myalgias, nasal congestion, cough, and mild dyspnea on exertion and was diagnosed with COVID-19. He reports that he never experienced a fever and gradually improved.   However he states that he is still experiencing difficulty with altered taste and describes beer as tasting \"moldy\". He otherwise denies any residual symptoms. He has a history of hypertension, treated with losartan and amlodipine. He does not check his blood pressure at home regularly. He also does not exercise regularly, although admits to doing yard work and projects around the house. He denies any shortness of breath at rest or with exertion, palpitations, lightheadedness, or edema. He does have a history of substernal chest pain that has no relation to exertion and was thought to be atypical. He presented to [facility] on 2/27/2016 with a complaint of chest pain and diaphoresis, which seemed to be relieved by aspirin and sublingual nitroglycerin in the ambulance. Evaluation included negative ECG and troponins. He had an exercise nuclear stress test (2/29/2016) which showed normal LV function (EF 57%) and a medium sized partially reversible defect inferiorly (intermediate risk study). On 3/1/2016, he underwent a cardiac catheterization which showed no significant epicardial coronary artery disease. He also had an echocardiogram (2/28/2016) showing mild LV septal hypertrophy, hypokinetic basal inferior wall with preserved LV function (EF 72%), mild diastolic dysfunction, and moderate aortic regurgitation with questionable dilation of the distal arch of aorta (3.5 cm). He was found to have elevated triglycerides, with lipid panel showing total cholesterol 201/ / HDL 52/ LDL 89. He was started on Fenofibrate prior to discharge on 3/1/2016. He reports that he has had no significant recurrence of chest pain since that time. He had a repeat echocardiogram in 9/2017 which showed low normal LV function (EF 50-55%), no RWMA, calcified aortic valve with bicuspid appearance and moderate regurgitation, and mild dilatation of the aortic root.  He had a repeat echocardiogram in 3/2020 which showed upper normal LV size and low normal LV function (EF 50%), moderate AI and mildly dilated ascending aorta at 4.1 cm. He does have difficulty with mild lower extremity edema due to chronic venous insufficiency. He is being followed by Dr. Deon Rowan. He has a history of prediabetes, with HbA1c ranging from 5.8-6.1 since 2012. He denies any polyuria, polydipsia, nocturia, or blurry vision, and has no history of retinopathy, neuropathy, or nephropathy. He has regular eye exams with Dr. Pushpa Rebollar. He has a history of osteoarthritis, involving his right knee, right great toe, and lumbar spine. He has had difficulty in the past with increasing pain at the base of his right thumb and was evaluated by Dr. Krystal Mitchell. He states that x-rays revealed he had severe osteoarthritis, and he received a cortisone injection with some improvement. In 12/2016, he noted increasing difficulty with low back pain and right sided sciatica. He underwent a lumbar MRI (12/2016) which showed progression when compared to a prior lumbar MRI from 2010. It showed prominent right paracentral L4-L5 disc extrusion with impingement of the descending right L5 nerve root and moderately severe multifactorial canal stenosis; additional prominent right paracentral L5-S1 disc protrusion with impingement of the descending right S1 nerve root; abutment of the foraminal right L5 nerve root related to moderate foramina stenosis at L5-S1; mild multifactorial canal stenosis at L3-L4 with abutment of the descending left L4 nerve root. He was evaluated by Dr. Connor Major, and treated with Medrol dose pack, Norco and Flexeril without significant improvement. He was evaluated by Dr. Grisel Mejia in 3/2017 for possible surgical intervention, but this was deferred due to issues regarding his wife's health. He was treated with gabapentin, and was reevaluated by Dr. Connor Major in 8/2017, at which time he reported significant improvement.  He states that he no longer required treatment with gabapentin or Norco, and was continuing to take Voltaren ER with good control of symptoms. However in 3/2018, he developed recurrence of severe pain with right sided sciatica that was unresponsive to conservative therapy. He subsequently consulted Dr. Ivan Sim and on 7/5/2018, he underwent a right L4-L5 and L5-S1 hemilaminectomy, medial facetectomy, and foraminotomy by Dr. Ivan Sim for removal of herniated disks and decompression of foraminal stenoses at these levels. He reports that his right sided sciatica has resolved and he is no longer requiring any pain medication. He does report some residual numbness on the plantar surface of his right foot. He reports difficulty with left shoulder pain and was referred to physical therapy as recommended by Dr. Rodrigo Santana in 1/2021. He underwent evaluation by Dr. Jazmyn Cross in 6/2021 and it was felt that his left shoulder pain was secondary to cervical radiculopathy and bicipital tendinitis. He was again referred to physical therapy and reports improvement. He has a history of GERD, with symptoms well controlled with pantoprazole as needed. He had a screening colonoscopy by Dr. Claudine Lewis in 3/2012 with removal of a benign serrated polyp (pathology shows hyperplastic and adenomatous features). He had repeat screening colonoscopy in 3/2017 by Dr Tiffanie Mendez which was normal. Follow-up recommended for five years. He denies any abdominal pain, nausea, vomiting, melena, hematochezia, or change in bowel movements. In 5/2016, he fell at work with his chest striking the edge of a table and he sustained fractures of the anterior right 7th and 8th ribs. He had serial rib x-rays since that time showing slow interval healing, but reports that he no longer has pain related to this. He underwent a bone density scan in 10/2016 which showed evidence of osteopenia with T-scores:  femoral neck left -1.7  /right -1.5 and lumbar -1.2. He was started on calcium and Vitamin D supplementation. He previously smoked approximately 1 ppd for 40 years, stopping in 2000. He also reports a history of asbestos exposure in 1980. Past Medical History:   Diagnosis Date    Abnormal myocardial perfusion study 2/28/2016    Partially reversible inferior perfusion defect, EF 57%    AI (aortic insufficiency)     Moderate with possible bicuspid aortic valve    Chronic low back pain     Chronic venous insufficiency     Colon polyp 03/2012    Benign serrated polyp    Fracture 05/2016    rib fx's    GERD (gastroesophageal reflux disease)     History of echocardiogram 2/28/2016    EF 55%. Basal inferior hypokinesis. Gr 1 DDfx. Mod AI. Hyperlipidemia     Hypertension     Osteoarthritis of right knee     Osteopenia     Prediabetes     Prepatellar bursitis of right knee     Recurrent genital herpes 1/16/2013    Right lumbar radiculopathy 10/2016    MRI: prominent right paracentral L4-L5 disc extrusion/impingement of descending right L5 nerve root; moderately severe canal stenosis; prominent right paracentral L5-S1 disc protrusion/ impingement of the descending right S1 nerve root; abutment of the foraminal right L5 nerve root/moderate foramina stenosis at L5-S1; mild canal stenosis at L3-L4 with abutment of the descending left L4 nerve root. S/P cardiac catheterization 3/1/2016    Normal coronary anatomy     Past Surgical History:   Procedure Laterality Date    ENDOSCOPY, COLON, DIAGNOSTIC      HX CHOLECYSTECTOMY      HX LUMBAR LAMINECTOMY  07/05/2018    R L4/5/S1 Dr. Amezquita Hemp TONSILLECTOMY       Current Outpatient Medications   Medication Sig    pantoprazole (PROTONIX) 40 mg tablet Take 40 mg by mouth two (2) times a day. amLODIPine (NORVASC) 5 mg tablet Take 1 Tablet by mouth daily. losartan (COZAAR) 100 mg tablet Take 1 Tablet by mouth daily.     valACYclovir (VALTREX) 500 mg tablet TAKE 1 TABLET DAILY    clindamycin (CLEOCIN T) 1 % external solution Use thin film on affected area of scalp every 12 hours  Indications: acne    ascorbic acid, vitamin C, (VITAMIN C) 500 mg tablet Take 500 mg by mouth daily. magnesium oxide (MAG-OX) 400 mg tablet Take 400 mg by mouth. albuterol (PROVENTIL HFA, VENTOLIN HFA, PROAIR HFA) 90 mcg/actuation inhaler Take 1 Puff by inhalation every six (6) hours as needed for Wheezing. inhalational spacing device 1 Each by Does Not Apply route as needed (shortness of breath). nitroglycerin (NITROSTAT) 0.4 mg SL tablet 1 Tab by SubLINGual route every five (5) minutes as needed for Chest Pain. Up to 3 doses. calcium carbonate (CALCIUM 300 PO) Take 1 Tab by mouth daily. aspirin delayed-release 81 mg tablet Take 81 mg by mouth daily. Cholecalciferol, Vitamin D3, 2,000 unit cap Take 2,000 Units by mouth daily as needed. DOCOSAHEXANOIC ACID/EPA (FISH OIL PO) Take 1 Tab by mouth daily. No current facility-administered medications for this visit. Allergies and Intolerances: Allergies   Allergen Reactions    Tree Nuts Anaphylaxis    Ace Inhibitors Cough    Nuts [Tree Nut] Itching     Family History: His mother is alive with stage 3 ovarian cancer. His father  from esophageal cancer at age 61. His maternal aunt  at age 48 from colon cancer. He has no family history of prostate cancer. Family History   Problem Relation Age of Onset    Cancer Father 61        esophageal    Hypertension Father     Cancer Mother 80        ovarian cancer     Hypertension Brother     Diabetes Other         Grandmother    OSTEOARTHRITIS Other     Other Other         Stomach problems; Father, grandfather     Social History:   He  reports that he quit smoking about 22 years ago. His smoking use included cigarettes. He has a 12.50 pack-year smoking history. He quit smokeless tobacco use about 22 years ago. Reports that he smoked less than 1 ppd for 40 years, and stopped in . He is  and they have one son. His wife was recently diagnosed with Paget's disease of the breast, which represents a recurrence of prior breast cancer.  He was employed as an  for [employer], but retired in 12/2017. He drinks about a 12 pack of beer each month. Social History     Substance and Sexual Activity   Alcohol Use Yes    Alcohol/week: 1.7 standard drinks    Types: 2 Cans of beer per week     Immunization History:  Immunization History   Administered Date(s) Administered    Influenza Vaccine 11/01/2013, 11/12/2014, 10/01/2015    TDAP Vaccine 01/20/2004    Td 01/01/2004    Tdap 01/16/2014       Review of Systems:   As above included in HPI. Otherwise 11 point review of systems negative including constitutional, skin, HENT, eyes, respiratory, cardiovascular, gastrointestinal, genitourinary, musculoskeletal, endocrine, hematologic, allergy, and neurologic. Physical:   Visit Vitals  /74 (BP 1 Location: Left upper arm, BP Patient Position: Sitting)   Pulse 73   Temp 97 °F (36.1 °C) (Temporal)   Resp 16   Ht 6' (1.829 m)   Wt 224 lb (101.6 kg)   SpO2 97%   BMI 30.38 kg/m²         Exam:     Patient appears in no apparent distress. Affect is appropriate. HEENT: PERRLA, anicteric, no JVD, adenopathy or thyromegaly. No carotid bruits or radiated murmur. Lungs: clear to auscultation, no wheezes, rhonchi, or rales. Heart: regular rate and rhythm. No murmur, rubs, gallops  Abdomen: soft, nontender, nondistended, normal bowel sounds, no hepatosplenomegaly or masses. Extremities: without edema. Review of Data:  Labs:  No visits with results within 1 Month(s) from this visit. Latest known visit with results is:    Ancillary Procedure on 03/30/2022   Component Date Value Ref Range Status    IVSd 03/30/2022 1.1 (A) 0.6 - 1.0 cm Final    LVIDd 03/30/2022 3.9 (A) 4.2 - 5.9 cm Final    LVIDs 03/30/2022 2.3  cm Final    LVOT Diameter 03/30/2022 2.4  cm Final    LVPWd 03/30/2022 1.0  0.6 - 1.0 cm Final    LA Volume A/L 03/30/2022 61  mL Final    LA Volume 2C 03/30/2022 76 (A) 18 - 58 mL Final    LA Volume 4C 03/30/2022 33  18 - 58 mL Final MV A Velocity 03/30/2022 0.65  m/s Final    MV E Wave Deceleration Time 03/30/2022 347.8  ms Final    MV E Velocity 03/30/2022 0.77  m/s Final    LV E' Lateral Velocity 03/30/2022 9  cm/s Final    LV E' Septal Velocity 03/30/2022 6  cm/s Final    Aortic Root 03/30/2022 4.0  cm Final    Pulm Vein A Velocity 03/30/2022 0.2  m/s Final    Pulm Vein A Duration 03/30/2022 102.8  ms Final    Fractional Shortening 2D 03/30/2022 41  28 - 44 % Final    LVIDd Index 03/30/2022 1.74  cm/m2 Final    LVIDs Index 03/30/2022 1.03  cm/m2 Final    LV RWT Ratio 03/30/2022 0.51   Final    LV Mass 2D 03/30/2022 131.0  88 - 224 g Final    LV Mass 2D Index 03/30/2022 58.5  49 - 115 g/m2 Final    MV E/A 03/30/2022 1.18   Final    E/E' Ratio (Averaged) 03/30/2022 10.69   Final    E/E' Lateral 03/30/2022 8.56   Final    E/E' Septal 03/30/2022 12.83   Final    LA Volume Index A/L 03/30/2022 27  16 - 34 mL/m2 Final    LVOT Area 03/30/2022 4.5  cm2 Final    LA Volume Index 2C 03/30/2022 34  16 - 34 mL/m2 Final    LA Volume Index 4C 03/30/2022 15 (A) 16 - 34 mL/m2 Final    Ao Root Index 03/30/2022 1.79  cm/m2 Final       Calculated 10 year ASCVD risk score: 11.1 %    Health Maintenance:  Screening:    Colorectal: colonoscopy (3/2022) normal. Dr. Marielena Briceno. Due 3/2027.    Depression: none   DM (HbA1c/FPG): / HbA1c 5.9 (2/2022)   Hepatitis C: negative (9/2016)   Falls: none    DEXA: N/A   PSA/TONI: PSA 0.6 (8/2021)/ TONI (2/2019)   Glaucoma: regular eye exams with [facility] (last 2/2021)   Smoking: none   Vitamin D: 47.3 (8/2021)   Medicare Wellness: N/A      Impression:  Patient Active Problem List   Diagnosis Code    Hyperlipidemia E78.5    GERD (gastroesophageal reflux disease) K21.9    Chronic venous insufficiency I87.2    Colon polyp K63.5    Prediabetes R73.03    Recurrent genital herpes A60.00    Vitamin D insufficiency E55.9    Aortic insufficiency, moderate I35.1    Essential hypertension I10    Primary osteoarthritis of knee M17.10    Lumbar herniated disc  right L4/5/S1 chronic M51.26    Osteopenia determined by DEXA 2016, lowest T score -1.7 M85.80    Annular tear of lumbar disc M51.36    Chronic right-sided low back pain with sciatica M54.40, G89.29    Chronic thumb pain, right M79.644, G89.29    S/P lumbar discectomy Z98.890    Class 1 obesity due to excess calories without serious comorbidity in adult E66.09    Chronic pain of right knee M25.561, G89.29    Chronic left shoulder pain M25.512, G89.29    Cervical spondylosis M47.812    Dilated aortic root (HCC) I77.810    History of 2019 novel coronavirus disease (COVID-19) Z86.16    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 Reports intermittent dull aching pain for approximately the last month. Worsened by large meals and in the late afternoon. Denies change with movement. Presented with similar symptoms in 11/2021 to Dr. Dallin Alexandra, but reports symptoms were worse at that time. Prescribed twice daily pantoprazole 40 mg but never started taking it. Upper endoscopy performed on 12/31/2021 which was reportedly unremarkable. Also completed colonoscopy in 3/2022 which was normal.  No alarm symptoms today and weight stable. Advised to avoid NSAIDs and start treatment with pantoprazole 40 mg twice daily as prescribed. Advised weight loss as would likely help with current symptoms. Will reassess at upcoming routine visit later this month. Other chronic issues:  1. Hypertension. Well controlled on current regimen of losartan 100 mg daily and amlodipine 5 mg daily. Renal function has been normal with creatinine 1.18/ eGFR >60. Continue to follow. 2. Dyslipidemia. Patient no longer taking fenofibrate since first line therapy for treating triglycerides < 500 would be initiation of a statin. Lipid panel with triglyceride level 189 (3/2022).  Calculated 10 year ASCVD risk is 11.1 %, which does place him in one of the four statin benefit groups as per new AHA/ACC guidelines (primary prevention: ASCVD risk > 7.5%). LDL improved today to 118 and HDL 46. Emphasized importance of continued lifestyle modifications, including diet, exercise, and weight loss. Will reassess at next visit. 3. Aortic insufficiency, moderate with mildly dilated aortic root. Probable bicuspid valve being monitored by echocardiogram.  Repeat echocardiogram to assess valve in 9/2017 without change. Established with new cardiologist, Dr. Donald Hunter, and underwent repeat echocardiogram (3/2020) showing moderate AI with mildly dilated aortic root at 4.1 cm. Low normal LV function (EF 50%). Essentially unchanged by report when compared to prior echocardiogram in 2017. Reestablished with Dr. Roz Kocher and underwent repeat echocardiogram in 3/2022 which showed moderately thickened aortic valve with mild-moderate regurgitation and no stenosis, mildly dilated aortic root at 4.0 cm, normal left atrial volume, low normal LV function (EF 50-55%). Has follow-up scheduled with Dr. Roz Kocher in 11/2022. 4. Prediabetes. HbA1c improved to 5.9 in 3/2022. No evidence of microvascular complications. On ARB but unwilling to begin statin. Continue follow-up for annual eye exams. Foot exam normal in 4/2017. Urine microalbumin/ creatinine ratio without evidence of microalbuminuria. Encouraged continued weight loss and dietary changes, particularly avoiding concentrated sweets and minimizing carbohydrates. 5. History of COVID 19 with PASC. Tested positive on 1/17/2022 at [facility], and reported symptoms of sore throat, headache, myalgias, nasal congestion, cough and mild dyspnea on exertion. Reports loss of taste/smell during illness and states that continuing to have altered taste as sequela. Other symptoms resolved. Remains unvaccinated and not willing to proceed with vaccination as recommended. Will continue to monitor. 6. Osteopenia. Last bone density scan 10/2016.  Using femoral neck T-scores, calculated FRAX score estimates her 10 year risk of a major osteoporetic fracture at 12 % and hip fracture at 2.0 %, which are not an indication for biphosphonate treatment (>20% and >3%, respectively). Continue calcium and Vitamin D. Encouraged exercise, particularly weight bearing activities. 7. GERD. Had been occasionally using Protonix previously with good control of symptoms. Evaluated by Dr. Oliverio Baeza in 11/2021 for intermittent dull aching mid abdominal pain and underwent upper endoscopy on 12/31/2021 which was reportedly unremarkable. Biopsies of the mid-lower third of the esophagus showed squamous mucosa without significant abnormality, no glandular mucosa identified, and no evidence of eosinophilic esophagitis. Advised to restart Protonix 40 mg twice daily in 11/2021, but patient never started treatment. 8. Osteoarthritis. Reported increasing right knee pain with ambulation and standing. Chronic problem which has now progressed. Underwent evaluation by Dr. Archana Lora and received a cortisone injection. Subsequently evaluated by Dr. Jose Soria and completed physical therapy for bilateral knee pain with some benefit. Also with difficulty with right thumb pain and evaluated by Dr. Corazon Chandler and received cortisone injection with improvement. No further difficulty today. 9. Lumbar DDD with right sciatica. Progression of symptoms and disease on MRI. Responded well to conservative therapy initially, but recurred and unresponsive to medications and injections. Underwent right L4-L5 and L5-S1 hemilaminectomy, medial facetectomy, and foraminotomy by Dr. Belkys Goldman in 7/2018 with great success. Reports only residual numbness on medial plantar surface of right foot. No longer requiring any pain medication. Continuing to do well. 10. Left cervical radiculopathy. Evaluated by Dr. Yeni Carcamo on 1/28/2021 and referred to physical therapy with some improvement.   Seen by Dr. Archana Lora in 6/2021 for left shoulder pain and felt to be secondary to cervical radiculopathy +/-bicipital tendinitis. Completed physical therapy with improvement. 11. Erythrocytosis. Review of record shows similar readings since 2014. Erythropoietin level normal in 2/2020. Will continue to monitor. 12. Excessive daytime sleepiness/ snoring. Patient with daytime drowsiness, snoring, and fatigue. Underwent evaluation by Dr. Luisa Devine in 3/2020 and recommended in-lab sleep study due to severity of symptoms. However, decided not to proceed. Will continue to address. 13. Obesity. Weight essentially stable since last visit. Emphasized importance of lifestyle modifications, including heart healthy diet, regular exercise, and weight loss. Will readdress next visit. 14. Health maintenance. Has NOT received the COVID-19 vaccine series and remains unwilling to proceed. Discussed Shingrix vaccine but not wishing to proceed. Other immunizations up to date. Colonoscopy due 3/2022 and reports appointment scheduled with Dr. Kashif Oliveros on 3/5/2022. Prostate cancer screening up to date. Continue regular eye exams. Stressed importance of weight loss. Vitamin D level has been normal on maintenance dose supplement. Patient understands recommendations and agrees with plan. Follow-up as previously scheduled.

## 2022-08-29 ENCOUNTER — HOSPITAL ENCOUNTER (OUTPATIENT)
Dept: LAB | Age: 62
Discharge: HOME | End: 2022-08-29
Payer: COMMERCIAL

## 2022-08-29 ENCOUNTER — APPOINTMENT (OUTPATIENT)
Dept: INTERNAL MEDICINE CLINIC | Age: 62
End: 2022-08-29

## 2022-08-29 DIAGNOSIS — Z86.16 HISTORY OF 2019 NOVEL CORONAVIRUS DISEASE (COVID-19): ICD-10-CM

## 2022-08-29 DIAGNOSIS — E55.9 VITAMIN D INSUFFICIENCY: ICD-10-CM

## 2022-08-29 DIAGNOSIS — Z00.00 LABORATORY TESTS ORDERED AS PART OF A COMPLETE PHYSICAL EXAM (CPE): ICD-10-CM

## 2022-08-29 DIAGNOSIS — I10 ESSENTIAL HYPERTENSION: ICD-10-CM

## 2022-08-29 DIAGNOSIS — I35.1 AORTIC VALVE INSUFFICIENCY, ETIOLOGY OF CARDIAC VALVE DISEASE UNSPECIFIED: ICD-10-CM

## 2022-08-29 DIAGNOSIS — Z12.5 PROSTATE CANCER SCREENING: ICD-10-CM

## 2022-08-29 DIAGNOSIS — I77.810 DILATED AORTIC ROOT (HCC): ICD-10-CM

## 2022-08-29 DIAGNOSIS — E78.5 HYPERLIPIDEMIA, UNSPECIFIED HYPERLIPIDEMIA TYPE: ICD-10-CM

## 2022-08-29 DIAGNOSIS — R73.03 PREDIABETES: ICD-10-CM

## 2022-08-29 LAB
25(OH)D3 SERPL-MCNC: 62.6 NG/ML (ref 30–100)
ALBUMIN SERPL-MCNC: 4.1 G/DL (ref 3.4–5)
ALBUMIN/GLOB SERPL: 1.4 {RATIO} (ref 0.8–1.7)
ALP SERPL-CCNC: 81 U/L (ref 45–117)
ALT SERPL-CCNC: 42 U/L (ref 16–61)
ANION GAP SERPL CALC-SCNC: 5 MMOL/L (ref 3–18)
APPEARANCE UR: CLEAR
AST SERPL-CCNC: 28 U/L (ref 10–38)
BACTERIA URNS QL MICRO: ABNORMAL /HPF
BASOPHILS # BLD: 0.1 K/UL (ref 0–0.1)
BASOPHILS NFR BLD: 1 % (ref 0–2)
BILIRUB SERPL-MCNC: 1.2 MG/DL (ref 0.2–1)
BILIRUB UR QL: NEGATIVE
BUN SERPL-MCNC: 18 MG/DL (ref 7–18)
BUN/CREAT SERPL: 15 (ref 12–20)
CALCIUM SERPL-MCNC: 9.2 MG/DL (ref 8.5–10.1)
CHLORIDE SERPL-SCNC: 108 MMOL/L (ref 100–111)
CHOLEST SERPL-MCNC: 205 MG/DL
CO2 SERPL-SCNC: 26 MMOL/L (ref 21–32)
COLOR UR: YELLOW
CREAT SERPL-MCNC: 1.17 MG/DL (ref 0.6–1.3)
CREAT UR-MCNC: 122 MG/DL (ref 30–125)
DIFFERENTIAL METHOD BLD: ABNORMAL
EOSINOPHIL # BLD: 0.2 K/UL (ref 0–0.4)
EOSINOPHIL NFR BLD: 3 % (ref 0–5)
EPITH CASTS URNS QL MICRO: ABNORMAL /LPF (ref 0–5)
ERYTHROCYTE [DISTWIDTH] IN BLOOD BY AUTOMATED COUNT: 12.8 % (ref 11.6–14.5)
EST. AVERAGE GLUCOSE BLD GHB EST-MCNC: 120 MG/DL
GLOBULIN SER CALC-MCNC: 2.9 G/DL (ref 2–4)
GLUCOSE SERPL-MCNC: 110 MG/DL (ref 74–99)
GLUCOSE UR STRIP.AUTO-MCNC: NEGATIVE MG/DL
HBA1C MFR BLD: 5.8 % (ref 4.2–5.6)
HCT VFR BLD AUTO: 50.2 % (ref 36–48)
HDLC SERPL-MCNC: 46 MG/DL (ref 40–60)
HDLC SERPL: 4.5 {RATIO} (ref 0–5)
HGB BLD-MCNC: 17.1 G/DL (ref 13–16)
HGB UR QL STRIP: NEGATIVE
IMM GRANULOCYTES # BLD AUTO: 0.1 K/UL (ref 0–0.04)
IMM GRANULOCYTES NFR BLD AUTO: 1 % (ref 0–0.5)
KETONES UR QL STRIP.AUTO: NEGATIVE MG/DL
LDLC SERPL CALC-MCNC: 129.4 MG/DL (ref 0–100)
LEUKOCYTE ESTERASE UR QL STRIP.AUTO: NEGATIVE
LIPID PROFILE,FLP: ABNORMAL
LYMPHOCYTES # BLD: 2 K/UL (ref 0.9–3.6)
LYMPHOCYTES NFR BLD: 30 % (ref 21–52)
MAGNESIUM SERPL-MCNC: 2.5 MG/DL (ref 1.6–2.6)
MCH RBC QN AUTO: 30.7 PG (ref 24–34)
MCHC RBC AUTO-ENTMCNC: 34.1 G/DL (ref 31–37)
MCV RBC AUTO: 90.1 FL (ref 78–100)
MICROALBUMIN UR-MCNC: 0.87 MG/DL (ref 0–3)
MICROALBUMIN/CREAT UR-RTO: 7 MG/G (ref 0–30)
MONOCYTES # BLD: 0.6 K/UL (ref 0.05–1.2)
MONOCYTES NFR BLD: 9 % (ref 3–10)
MUCOUS THREADS URNS QL MICRO: ABNORMAL /LPF
NEUTS SEG # BLD: 3.6 K/UL (ref 1.8–8)
NEUTS SEG NFR BLD: 56 % (ref 40–73)
NITRITE UR QL STRIP.AUTO: NEGATIVE
NRBC # BLD: 0 K/UL (ref 0–0.01)
NRBC BLD-RTO: 0 PER 100 WBC
PH UR STRIP: 7 [PH] (ref 5–8)
PLATELET # BLD AUTO: 187 K/UL (ref 135–420)
PLATELET COMMENTS,PCOM: ABNORMAL
POTASSIUM SERPL-SCNC: 4.4 MMOL/L (ref 3.5–5.5)
PROT SERPL-MCNC: 7 G/DL (ref 6.4–8.2)
PROT UR STRIP-MCNC: 30 MG/DL
PSA SERPL-MCNC: 0.6 NG/ML (ref 0–4)
RBC # BLD AUTO: 5.57 M/UL (ref 4.35–5.65)
RBC MORPH BLD: ABNORMAL
SODIUM SERPL-SCNC: 139 MMOL/L (ref 136–145)
SP GR UR REFRACTOMETRY: 1.02 (ref 1–1.03)
TRIGL SERPL-MCNC: 148 MG/DL (ref ?–150)
TSH SERPL DL<=0.05 MIU/L-ACNC: 1.57 UIU/ML (ref 0.36–3.74)
UROBILINOGEN UR QL STRIP.AUTO: 0.2 EU/DL (ref 0.2–1)
VLDLC SERPL CALC-MCNC: 29.6 MG/DL
WBC # BLD AUTO: 6.6 K/UL (ref 4.6–13.2)

## 2022-08-29 PROCEDURE — 82043 UR ALBUMIN QUANTITATIVE: CPT

## 2022-08-29 PROCEDURE — 36415 COLL VENOUS BLD VENIPUNCTURE: CPT

## 2022-08-29 PROCEDURE — 84443 ASSAY THYROID STIM HORMONE: CPT

## 2022-08-29 PROCEDURE — 80061 LIPID PANEL: CPT

## 2022-08-29 PROCEDURE — 81001 URINALYSIS AUTO W/SCOPE: CPT

## 2022-08-29 PROCEDURE — 83735 ASSAY OF MAGNESIUM: CPT

## 2022-08-29 PROCEDURE — 83036 HEMOGLOBIN GLYCOSYLATED A1C: CPT

## 2022-08-29 PROCEDURE — 82306 VITAMIN D 25 HYDROXY: CPT

## 2022-08-29 PROCEDURE — 80053 COMPREHEN METABOLIC PANEL: CPT

## 2022-08-29 PROCEDURE — 85025 COMPLETE CBC W/AUTO DIFF WBC: CPT

## 2022-08-29 PROCEDURE — 84153 ASSAY OF PSA TOTAL: CPT

## 2022-09-06 ENCOUNTER — OFFICE VISIT (OUTPATIENT)
Dept: INTERNAL MEDICINE CLINIC | Age: 62
End: 2022-09-06
Payer: COMMERCIAL

## 2022-09-06 VITALS
SYSTOLIC BLOOD PRESSURE: 122 MMHG | TEMPERATURE: 97.5 F | RESPIRATION RATE: 16 BRPM | DIASTOLIC BLOOD PRESSURE: 70 MMHG | HEART RATE: 70 BPM | OXYGEN SATURATION: 96 % | HEIGHT: 72 IN | WEIGHT: 222.4 LBS | BODY MASS INDEX: 30.12 KG/M2

## 2022-09-06 DIAGNOSIS — I77.810 DILATED AORTIC ROOT (HCC): ICD-10-CM

## 2022-09-06 DIAGNOSIS — D75.1 ERYTHROCYTOSIS: ICD-10-CM

## 2022-09-06 DIAGNOSIS — Z00.00 ENCOUNTER FOR ROUTINE HISTORY AND PHYSICAL EXAM FOR MALE: Primary | ICD-10-CM

## 2022-09-06 DIAGNOSIS — I10 ESSENTIAL HYPERTENSION: ICD-10-CM

## 2022-09-06 DIAGNOSIS — G89.29 CHRONIC PAIN OF BOTH KNEES: ICD-10-CM

## 2022-09-06 DIAGNOSIS — R73.03 PREDIABETES: ICD-10-CM

## 2022-09-06 DIAGNOSIS — E66.09 CLASS 1 OBESITY DUE TO EXCESS CALORIES WITHOUT SERIOUS COMORBIDITY WITH BODY MASS INDEX (BMI) OF 30.0 TO 30.9 IN ADULT: ICD-10-CM

## 2022-09-06 DIAGNOSIS — I35.1 AORTIC VALVE INSUFFICIENCY, ETIOLOGY OF CARDIAC VALVE DISEASE UNSPECIFIED: ICD-10-CM

## 2022-09-06 DIAGNOSIS — M25.562 CHRONIC PAIN OF BOTH KNEES: ICD-10-CM

## 2022-09-06 DIAGNOSIS — Z86.16 HISTORY OF 2019 NOVEL CORONAVIRUS DISEASE (COVID-19): ICD-10-CM

## 2022-09-06 DIAGNOSIS — E78.5 HYPERLIPIDEMIA, UNSPECIFIED HYPERLIPIDEMIA TYPE: ICD-10-CM

## 2022-09-06 DIAGNOSIS — U09.9 POST-ACUTE SEQUELAE OF COVID-19 (PASC): ICD-10-CM

## 2022-09-06 DIAGNOSIS — K21.9 GASTROESOPHAGEAL REFLUX DISEASE WITHOUT ESOPHAGITIS: ICD-10-CM

## 2022-09-06 DIAGNOSIS — M17.0 PRIMARY OSTEOARTHRITIS OF BOTH KNEES: ICD-10-CM

## 2022-09-06 DIAGNOSIS — M25.561 CHRONIC PAIN OF BOTH KNEES: ICD-10-CM

## 2022-09-06 PROCEDURE — 99396 PREV VISIT EST AGE 40-64: CPT | Performed by: INTERNAL MEDICINE

## 2022-09-06 NOTE — PROGRESS NOTES
1. \"Have you been to the ER, urgent care clinic since your last visit? Hospitalized since your last visit? \" No    2. \"Have you seen or consulted any other health care providers outside of the [address] [place] [address] since your last visit? \" No     3. For patients aged 39-70: Has the patient had a colonoscopy / FIT/ Cologuard? Yes - no Care Gap present      If the patient is female:    4. For patients aged 41-77: Has the patient had a mammogram within the past 2 years? NA - based on age or sex      11. For patients aged 21-65: Has the patient had a pap smear?  NA - based on age or sex

## 2022-09-06 NOTE — PATIENT INSTRUCTIONS
Heart-Healthy Diet: Care Instructions  Your Care Instructions     A heart-healthy diet has lots of vegetables, fruits, nuts, beans, and whole grains, and is low in salt. It limits foods that are high in saturated fat, such as meats, cheeses, and fried foods. It may be hard to change your diet, but even small changes can lower your risk of heart attack and heart disease. Follow-up care is a key part of your treatment and safety. Be sure to make and go to all appointments, and call your doctor if you are having problems. It's also a good idea to know your test results and keep a list of the medicines you take. How can you care for yourself at home? Watch your portions  Learn what a serving is. A \"serving\" and a \"portion\" are not always the same thing. Make sure that you are not eating larger portions than are recommended. For example, a serving of pasta is ½ cup. A serving size of meat is 2 to 3 ounces. A 3-ounce serving is about the size of a deck of cards. Measure serving sizes until you are good at [place]" them. Keep in mind that restaurants often serve portions that are 2 or 3 times the size of one serving. To keep your energy level up and keep you from feeling hungry, eat often but in smaller portions. Eat only the number of calories you need to stay at a healthy weight. If you need to lose weight, eat fewer calories than your body burns (through exercise and other physical activity). Eat more fruits and vegetables  Eat a variety of fruit and vegetables every day. Dark green, deep orange, red, or yellow fruits and vegetables are especially good for you. Examples include spinach, carrots, peaches, and berries. Keep carrots, celery, and other veggies handy for snacks. Buy fruit that is in season and store it where you can see it so that you will be tempted to eat it. Cook dishes that have a lot of veggies in them, such as stir-fries and soups.   Limit saturated and trans fat  Read food labels, and try to avoid saturated and trans fats. They increase your risk of heart disease. Use olive or canola oil when you cook. Bake, broil, grill, or steam foods instead of frying them. Choose lean meats instead of high-fat meats such as hot dogs and sausages. Cut off all visible fat when you prepare meat. Eat fish, skinless poultry, and meat alternatives such as soy products instead of high-fat meats. Soy products, such as tofu, may be especially good for your heart. Choose low-fat or fat-free milk and dairy products. Eat foods high in fiber  Eat a variety of grain products every day. Include whole-grain foods that have lots of fiber and nutrients. Examples of whole-grain foods include oats, whole wheat bread, and brown rice. Buy whole-grain breads and cereals, instead of white bread or pastries. Limit salt and sodium  Limit how much salt and sodium you eat to help lower your blood pressure. Taste food before you salt it. Add only a little salt when you think you need it. With time, your taste buds will adjust to less salt. Eat fewer snack items, fast foods, and other high-salt, processed foods. Check food labels for the amount of sodium in packaged foods. Choose low-sodium versions of canned goods (such as soups, vegetables, and beans). Limit sugar  Limit drinks and foods with added sugar. These include candy, desserts, and soda pop. Limit alcohol  Limit alcohol to no more than 2 drinks a day for men and 1 drink a day for women. Too much alcohol can cause health problems. When should you call for help? Watch closely for changes in your health, and be sure to contact your doctor if:    You would like help planning heart-healthy meals. Where can you learn more? Go to http://www.perdomo.com/  Enter V137 in the search box to learn more about \"Heart-Healthy Diet: Care Instructions. \"  Current as of: August 22, 2019               Content Version: 12.6  © 3533-9438 [employer]. Care instructions adapted under license by [employer] (which disclaims liability or warranty for this information). If you have questions about a medical condition or this instruction, always ask your healthcare professional. [address] any warranty or liability for your use of this information. Learning About Meal Planning for Diabetes  Why plan your meals? Meal planning can be a key part of managing diabetes. Planning meals and snacks with the right balance of carbohydrate, protein, and fat can help you keep your blood sugar at the target level you set with your doctor. You don't have to eat special foods. You can eat what your family eats, including sweets once in a while. But you do have to pay attention to how often you eat and how much you eat of certain foods. You may want to work with a dietitian or a certified diabetes educator. He or she can give you tips and meal ideas and can answer your questions about meal planning. This health professional can also help you reach a healthy weight if that is one of your goals. What plan is right for you? Your dietitian or diabetes educator may suggest that you start with the plate format or carbohydrate counting. The plate format  The plate format is a simple way to help you manage how you eat. You plan meals by learning how much space each food should take on a plate. Using the plate format helps you spread carbohydrate throughout the day. It can make it easier to keep your blood sugar level within your target range. It also helps you see if you're eating healthy portion sizes. To use the plate format, you put non-starchy vegetables on half your plate. Add meat or meat substitutes on one-quarter of the plate. Put a grain or starchy vegetable (such as brown rice or a potato) on the final quarter of the plate.  You can add a small piece of fruit and some low-fat or fat-free milk or yogurt, depending on your carbohydrate goal for each meal.  Here are some tips for using the plate format:  Make sure that you are not using an oversized plate. A 9-inch plate is best. Many restaurants use larger plates. Get used to using the plate format at home. Then you can use it when you eat out. Write down your questions about using the plate format. Talk to your doctor, a dietitian, or a diabetes educator about your concerns. Carbohydrate counting  With carbohydrate counting, you plan meals based on the amount of carbohydrate in each food. Carbohydrate raises blood sugar higher and more quickly than any other nutrient. It is found in desserts, breads and cereals, and fruit. It's also found in starchy vegetables such as potatoes and corn, grains such as rice and pasta, and milk and yogurt. Spreading carbohydrate throughout the day helps keep your blood sugar levels within your target range. Your daily amount depends on several things, including your weight, how active you are, which diabetes medicines you take, and what your goals are for your blood sugar levels. A registered dietitian or diabetes educator can help you plan how much carbohydrate to include in each meal and snack. A guideline for your daily amount of carbohydrate is:  45 to 60 grams at each meal. That's about the same as 3 to 4 carbohydrate servings. 15 to 20 grams at each snack. That's about the same as 1 carbohydrate serving. The Nutrition Facts label on packaged foods tells you how much carbohydrate is in a serving of the food. First, look at the serving size on the food label. Is that the amount you eat in a serving? All of the nutrition information on a food label is based on that serving size. So if you eat more or less than that, you'll need to adjust the other numbers. Total carbohydrate is the next thing you need to look for on the label. If you count carbohydrate servings, one serving of carbohydrate is 15 grams.   For foods that don't come with labels, such as fresh fruits and vegetables, you'll need a guide that lists carbohydrate in these foods. Ask your doctor, dietitian, or diabetes educator about books or other nutrition guides you can use. If you take insulin, you need to know how many grams of carbohydrate are in a meal. This lets you know how much rapid-acting insulin to take before you eat. If you use an insulin pump, you get a constant rate of insulin during the day. So the pump must be programmed at meals to give you extra insulin to cover the rise in blood sugar after meals. When you know how much carbohydrate you will eat, you can take the right amount of insulin. Or, if you always use the same amount of insulin, you need to make sure that you eat the same amount of carbohydrate at meals. If you need more help to understand carbohydrate counting and food labels, ask your doctor, dietitian, or diabetes educator. How do you get started with meal planning? Here are some tips to get started:  Plan your meals a week at a time. Don't forget to include snacks too. Use cookbooks or online recipes to plan several main meals. Plan some quick meals for busy nights. You also can double some recipes that freeze well. Then you can save half for other busy nights when you don't have time to cook. Make sure you have the ingredients you need for your recipes. If you're running low on basic items, put these items on your shopping list too. List foods that you use to make breakfasts, lunches, and snacks. List plenty of fruits and vegetables. Post this list on the refrigerator. Add to it as you think of more things you need. Take the list to the store to do your weekly shopping. Follow-up care is a key part of your treatment and safety. Be sure to make and go to all appointments, and call your doctor if you are having problems. It's also a good idea to know your test results and keep a list of the medicines you take. Where can you learn more?   Go to http://www.gray.com/  Enter D064 in the search box to learn more about \"Learning About Meal Planning for Diabetes. \"  Current as of: December 20, 2019               Content Version: 12.6  © 2028-8993 [employer]. Care instructions adapted under license by [facility] (which disclaims liability or warranty for this information). If you have questions about a medical condition or this instruction, always ask your healthcare professional. [address] any warranty or liability for your use of this information. High Cholesterol: Care Instructions  Your Care Instructions     Cholesterol is a type of fat in your blood. It is needed for many body functions, such as making new cells. Cholesterol is made by your body. It also comes from food you eat. High cholesterol means that you have too much of the fat in your blood. This raises your risk of a heart attack and stroke. LDL and HDL are part of your total cholesterol. LDL is the \"bad\" cholesterol. High LDL can raise your risk for heart disease, heart attack, and stroke. HDL is the \"good\" cholesterol. It helps clear bad cholesterol from the body. High HDL is linked with a lower risk of heart disease, heart attack, and stroke. Your cholesterol levels help your doctor find out your risk for having a heart attack or stroke. You and your doctor can talk about whether you need to lower your risk and what treatment is best for you. A heart-healthy lifestyle along with medicines can help lower your cholesterol and your risk. The way you choose to lower your risk will depend on how high your risk is for heart attack and stroke. It will also depend on how you feel about taking medicines. Follow-up care is a key part of your treatment and safety. Be sure to make and go to all appointments, and call your doctor if you are having problems.  It's also a good idea to know your test results and keep a list of the medicines you take. How can you care for yourself at home? Eat a variety of foods every day. Good choices include fruits, vegetables, whole grains (like oatmeal), dried beans and peas, nuts and seeds, soy products (like tofu), and fat-free or low-fat dairy products. Replace butter, margarine, and hydrogenated or partially hydrogenated oils with olive and canola oils. (Canola oil margarine without trans fat is fine.)  Replace red meat with fish, poultry, and soy protein (like tofu). Limit processed and packaged foods like chips, crackers, and cookies. Bake, broil, or steam foods. Don't [place] them. Be physically active. Get at least 30 minutes of exercise on most days of the week. Walking is a good choice. You also may want to do other activities, such as running, swimming, cycling, or playing tennis or team sports. Stay at a healthy weight or lose weight by making the changes in eating and physical activity listed above. Losing just a small amount of weight, even 5 to 10 pounds, can reduce your risk for having a heart attack or stroke. Do not smoke. When should you call for help? Watch closely for changes in your health, and be sure to contact your doctor if:    You need help making lifestyle changes. You have questions about your medicine. Where can you learn more? Go to http://www.gray.com/  Enter V578 in the search box to learn more about \"High Cholesterol: Care Instructions. \"  Current as of: December 16, 2019               Content Version: 12.6  © 0877-8003 Healthwise, Incorporated. Care instructions adapted under license by [employer] (which disclaims liability or warranty for this information). If you have questions about a medical condition or this instruction, always ask your healthcare professional. [place] [address] any warranty or liability for your use of this information.         Learning About Diabetes Food Guidelines  Your Care Instructions     Meal planning is important to manage diabetes. It helps keep your blood sugar at a target level (which you set with your doctor). You don't have to eat special foods. You can eat what your family eats, including sweets once in a while. But you do have to pay attention to how often you eat and how much you eat of certain foods. You may want to work with a dietitian or a certified diabetes educator (CDE) to help you plan meals and snacks. A dietitian or CDE can also help you lose weight if that is one of your goals. What should you know about eating carbs? Managing the amount of carbohydrate (carbs) you eat is an important part of healthy meals when you have diabetes. Carbohydrate is found in many foods. Learn which foods have carbs. And learn the amounts of carbs in different foods. Bread, cereal, pasta, and rice have about 15 grams of carbs in a serving. A serving is 1 slice of bread (1 ounce), ½ cup of cooked cereal, or 1/3 cup of cooked pasta or rice. Fruits have 15 grams of carbs in a serving. A serving is 1 small fresh fruit, such as an apple or orange; ½ of a banana; ½ cup of cooked or canned fruit; ½ cup of fruit juice; 1 cup of melon or raspberries; or 2 tablespoons of dried fruit. Milk and no-sugar-added yogurt have 15 grams of carbs in a serving. A serving is 1 cup of milk or 2/3 cup of no-sugar-added yogurt. Starchy vegetables have 15 grams of carbs in a serving. A serving is ½ cup of mashed potatoes or sweet potato; 1 cup winter squash; ½ of a small baked potato; ½ cup of cooked beans; or ½ cup cooked corn or green peas. Learn how much carbs to eat each day and at each meal. A dietitian or CDE can teach you how to keep track of the amount of carbs you eat. This is called carbohydrate counting. If you are not sure how to count carbohydrate grams, use the Plate Method to plan meals.  It is a good, quick way to make sure that you have a balanced meal. It also helps you spread carbs throughout the day. Divide your plate by types of foods. Put non-starchy vegetables on half the plate, meat or other protein food on one-quarter of the plate, and a grain or starchy vegetable in the final quarter of the plate. To this you can add a small piece of fruit and 1 cup of milk or yogurt, depending on how many carbs you are supposed to eat at a meal.  Try to eat about the same amount of carbs at each meal. Do not \"save up\" your daily allowance of carbs to eat at one meal.  Proteins have very little or no carbs per serving. Examples of proteins are beef, chicken, turkey, fish, eggs, tofu, cheese, cottage cheese, and peanut butter. A serving size of meat is 3 ounces, which is about the size of a deck of cards. Examples of meat substitute serving sizes (equal to 1 ounce of meat) are 1/4 cup of cottage cheese, 1 egg, 1 tablespoon of peanut butter, and ½ cup of tofu. How can you eat out and still eat healthy? Learn to estimate the serving sizes of foods that have carbohydrate. If you measure food at home, it will be easier to estimate the amount in a serving of restaurant food. If the meal you order has too much carbohydrate (such as potatoes, corn, or baked beans), ask to have a low-carbohydrate food instead. Ask for a salad or green vegetables. If you use insulin, check your blood sugar before and after eating out to help you plan how much to eat in the future. If you eat more carbohydrate at a meal than you had planned, take a walk or do other exercise. This will help lower your blood sugar. What else should you know? Limit saturated fat, such as the fat from meat and dairy products. This is a healthy choice because people who have diabetes are at higher risk of heart disease. So choose lean cuts of meat and nonfat or low-fat dairy products. Use olive or canola oil instead of butter or shortening when cooking. Don't skip meals.  Your blood sugar may drop too low if you skip meals and take insulin or certain medicines for diabetes. Check with your doctor before you drink alcohol. Alcohol can cause your blood sugar to drop too low. Alcohol can also cause a bad reaction if you take certain diabetes medicines. Follow-up care is a key part of your treatment and safety. Be sure to make and go to all appointments, and call your doctor if you are having problems. It's also a good idea to know your test results and keep a list of the medicines you take. Where can you learn more? Go to http://www.perdomo.com/  Enter I147 in the search box to learn more about \"Learning About Diabetes Food Guidelines. \"  Current as of: December 20, 2019               Content Version: 12.6  © 9474-9721 [employer]. Care instructions adapted under license by [employer] (which disclaims liability or warranty for this information). If you have questions about a medical condition or this instruction, always ask your healthcare professional. [address] any warranty or liability for your use of this information.

## 2022-09-11 PROBLEM — D75.1 ERYTHROCYTOSIS: Status: ACTIVE | Noted: 2019-03-01

## 2022-09-11 PROBLEM — M25.562 CHRONIC PAIN OF BOTH KNEES: Status: ACTIVE | Noted: 2020-03-04

## 2022-09-11 PROBLEM — G89.29 CHRONIC THUMB PAIN, RIGHT: Status: RESOLVED | Noted: 2018-02-24 | Resolved: 2022-09-11

## 2022-09-11 PROBLEM — G89.29 CHRONIC LEFT SHOULDER PAIN: Status: RESOLVED | Noted: 2021-09-05 | Resolved: 2022-09-11

## 2022-09-11 PROBLEM — G89.29 CHRONIC PAIN OF BOTH KNEES: Status: ACTIVE | Noted: 2020-03-04

## 2022-09-11 PROBLEM — M25.512 CHRONIC LEFT SHOULDER PAIN: Status: RESOLVED | Noted: 2021-09-05 | Resolved: 2022-09-11

## 2022-09-11 PROBLEM — M25.561 CHRONIC PAIN OF BOTH KNEES: Status: ACTIVE | Noted: 2020-03-04

## 2022-09-11 PROBLEM — M79.644 CHRONIC THUMB PAIN, RIGHT: Status: RESOLVED | Noted: 2018-02-24 | Resolved: 2022-09-11

## 2022-09-11 NOTE — PROGRESS NOTES
HPI:   Marissa Kirkpatrick is a 57y.o. year old male who presents today for a physical exam. He has a history of hypertension, hyperlipidemia, moderate aortic insufficiency, atypical chest pain, prediabetes, GERD, osteoarthritis, lumbar radiculopathy, and chronic venous insufficiency. He has NOT completed the COVID-19 vaccine series. He reports that he is doing reasonably well. He was last seen on 8/2/2022 and complained of intermittent upper-mid abdominal dull aching pain which had increased over the last month and worsened following a large meal.  He discontinued ibuprofen with the onset of the pain. He was evaluated in 11/2021 by Dr. Elicia Rodriguez for similar symptoms and underwent upper endoscopy on 12/31/2021 which was unremarkable (report not available). He was prescribed pantoprazole 40 mg twice daily at that time but never started taking it since his symptoms had improved. He denied any nausea, vomiting, dysphagia, hematemesis, hematochezia, or change in bowel movements. He was advised to avoid NSAIDs and begin pantoprazole as prescribed. He reports today resolution of his discomfort and states that he has now resumed taking pantoprazole only on an as-needed basis. He does complain of ongoing bilateral knee pain, but states he has not made a decision regarding surgery. He also reports new right shoulder pain which hurts with abduction. He denies any known injury. He is otherwise without new complaints. Summary of prior hospitalizations and medical history:  On 1/17/2022, he presented to [facility] with headache, sore throat, myalgias, nasal congestion, cough, and mild dyspnea on exertion and was diagnosed with COVID-19. He reports that he never experienced a fever and gradually improved. However he states that he is still experiencing difficulty with altered taste and describes beer as tasting \"moldy\". He otherwise denies any residual symptoms.     He has a history of hypertension, treated with losartan and amlodipine. He does not check his blood pressure at home regularly. He also does not exercise regularly, although admits to doing yard work and projects around the house. He denies any shortness of breath at rest or with exertion, palpitations, lightheadedness, or edema. He does have a history of substernal chest pain that has no relation to exertion and was thought to be atypical. He presented to [facility] on 2/27/2016 with a complaint of chest pain and diaphoresis, which seemed to be relieved by aspirin and sublingual nitroglycerin in the ambulance. Evaluation included negative ECG and troponins. He had an exercise nuclear stress test (2/29/2016) which showed normal LV function (EF 57%) and a medium sized partially reversible defect inferiorly (intermediate risk study). On 3/1/2016, he underwent a cardiac catheterization which showed no significant epicardial coronary artery disease. He also had an echocardiogram (2/28/2016) showing mild LV septal hypertrophy, hypokinetic basal inferior wall with preserved LV function (EF 59%), mild diastolic dysfunction, and moderate aortic regurgitation with questionable dilation of the distal arch of aorta (3.5 cm). He was found to have elevated triglycerides, with lipid panel showing total cholesterol 201/ / HDL 52/ LDL 89. He was started on Fenofibrate prior to discharge on 3/1/2016. He reports that he has had no significant recurrence of chest pain since that time. He had a repeat echocardiogram in 9/2017 which showed low normal LV function (EF 50-55%), no RWMA, calcified aortic valve with bicuspid appearance and moderate regurgitation, and mild dilatation of the aortic root. He had a repeat echocardiogram in 3/2020 which showed upper normal LV size and low normal LV function (EF 50%), moderate AI and mildly dilated ascending aorta at 4.1 cm.  He had a repeat echocardiogram (3/2022) which showed moderately thickened aortic valve with mild-moderate regurgitation and no stenosis, mildly dilated aortic root at 4.0 cm, normal left atrial volume, low normal LV function (EF 50-55%). He does have difficulty with mild lower extremity edema due to chronic venous insufficiency. He is being followed by Dr. Jennifer Diaz. He has a history of prediabetes, with HbA1c ranging from 5.8-6.1 since 2012. He denies any polyuria, polydipsia, nocturia, or blurry vision, and has no history of retinopathy, neuropathy, or nephropathy. He has regular eye exams with Dr. Sherly Beck. He has a history of osteoarthritis, involving his right knee, right great toe, and lumbar spine. He has had difficulty in the past with increasing pain at the base of his right thumb and was evaluated by Dr. Tana العراقي. He states that x-rays revealed he had severe osteoarthritis, and he received a cortisone injection with some improvement. In 12/2016, he noted increasing difficulty with low back pain and right sided sciatica. He underwent a lumbar MRI (12/2016) which showed progression when compared to a prior lumbar MRI from 2010. It showed prominent right paracentral L4-L5 disc extrusion with impingement of the descending right L5 nerve root and moderately severe multifactorial canal stenosis; additional prominent right paracentral L5-S1 disc protrusion with impingement of the descending right S1 nerve root; abutment of the foraminal right L5 nerve root related to moderate foramina stenosis at L5-S1; mild multifactorial canal stenosis at L3-L4 with abutment of the descending left L4 nerve root. He was evaluated by Dr. Jony Feldman, and treated with Medrol dose pack, Norco and Flexeril without significant improvement. He was evaluated by Dr. Cassius Espinoza in 3/2017 for possible surgical intervention, but this was deferred due to issues regarding his wife's health. He was treated with gabapentin, and was reevaluated by Dr. Jony Feldman in 8/2017, at which time he reported significant improvement.  He states that he no longer required treatment with gabapentin or Norco, and was continuing to take Voltaren ER with good control of symptoms. However in 3/2018, he developed recurrence of severe pain with right sided sciatica that was unresponsive to conservative therapy. He subsequently consulted Dr. Raquel Mc and on 7/5/2018, he underwent a right L4-L5 and L5-S1 hemilaminectomy, medial facetectomy, and foraminotomy by Dr. Raquel Mc for removal of herniated disks and decompression of foraminal stenoses at these levels. He reports that his right sided sciatica has resolved and he is no longer requiring any pain medication. He does report some residual numbness on the plantar surface of his right foot. He reports difficulty with left shoulder pain and was referred to physical therapy as recommended by Dr. Nora Azul in 1/2021. He underwent evaluation by Dr. Debi Crum in 6/2021 and it was felt that his left shoulder pain was secondary to cervical radiculopathy and bicipital tendinitis. He was again referred to physical therapy and reports improvement. He has a history of GERD, with symptoms well controlled with pantoprazole as needed. He had a screening colonoscopy by Dr. Kayla Dietz in 3/2012 with removal of a benign serrated polyp (pathology shows hyperplastic and adenomatous features). He had a screening colonoscopy in 3/2017 by Dr Tyler Vásquez which was normal. He underwent a repeat screening colonoscopy by Dr. Caroline Blakely on 3/4/2022 which was normal.  Follow-up recommended for 5 years. He denies any abdominal pain, nausea, vomiting, melena, hematochezia, or change in bowel movements. In 5/2016, he fell at work with his chest striking the edge of a table and he sustained fractures of the anterior right 7th and 8th ribs. He had serial rib x-rays since that time showing slow interval healing, but reports that he no longer has pain related to this.  He underwent a bone density scan in 10/2016 which showed evidence of osteopenia with T-scores:  femoral neck left -1.7  /right -1.5 and lumbar -1.2. He was started on calcium and Vitamin D supplementation. He previously smoked approximately 1 ppd for 40 years, stopping in 2000. He also reports a history of asbestos exposure in 1980. Past Medical History:   Diagnosis Date    Abnormal myocardial perfusion study 2/28/2016    Partially reversible inferior perfusion defect, EF 57%    AI (aortic insufficiency)     Moderate with possible bicuspid aortic valve    Chronic low back pain     Chronic venous insufficiency     Colon polyp 03/2012    Benign serrated polyp    Fracture 05/2016    rib fx's    GERD (gastroesophageal reflux disease)     History of echocardiogram 2/28/2016    EF 55%. Basal inferior hypokinesis. Gr 1 DDfx. Mod AI. Hyperlipidemia     Hypertension     Osteoarthritis of right knee     Osteopenia     Prediabetes     Prepatellar bursitis of right knee     Recurrent genital herpes 1/16/2013    Right lumbar radiculopathy 10/2016    MRI: prominent right paracentral L4-L5 disc extrusion/impingement of descending right L5 nerve root; moderately severe canal stenosis; prominent right paracentral L5-S1 disc protrusion/ impingement of the descending right S1 nerve root; abutment of the foraminal right L5 nerve root/moderate foramina stenosis at L5-S1; mild canal stenosis at L3-L4 with abutment of the descending left L4 nerve root. S/P cardiac catheterization 3/1/2016    Normal coronary anatomy     Past Surgical History:   Procedure Laterality Date    ENDOSCOPY, COLON, DIAGNOSTIC      HX CHOLECYSTECTOMY      HX LUMBAR LAMINECTOMY  07/05/2018    R L4/5/S1 Dr. Shannen Barlow TONSILLECTOMY       Current Outpatient Medications   Medication Sig    pantoprazole (PROTONIX) 40 mg tablet Take 40 mg by mouth two (2) times a day. amLODIPine (NORVASC) 5 mg tablet Take 1 Tablet by mouth daily. losartan (COZAAR) 100 mg tablet Take 1 Tablet by mouth daily.     valACYclovir (VALTREX) 500 mg tablet TAKE 1 TABLET DAILY    clindamycin (CLEOCIN T) 1 % external solution Use thin film on affected area of scalp every 12 hours  Indications: acne    ascorbic acid, vitamin C, (VITAMIN C) 500 mg tablet Take 500 mg by mouth daily. magnesium oxide (MAG-OX) 400 mg tablet Take 400 mg by mouth. albuterol (PROVENTIL HFA, VENTOLIN HFA, PROAIR HFA) 90 mcg/actuation inhaler Take 1 Puff by inhalation every six (6) hours as needed for Wheezing. inhalational spacing device 1 Each by Does Not Apply route as needed (shortness of breath). nitroglycerin (NITROSTAT) 0.4 mg SL tablet 1 Tab by SubLINGual route every five (5) minutes as needed for Chest Pain. Up to 3 doses. calcium carbonate (CALCIUM 300 PO) Take 1 Tab by mouth daily. aspirin delayed-release 81 mg tablet Take 81 mg by mouth daily. Cholecalciferol, Vitamin D3, 2,000 unit cap Take 2,000 Units by mouth daily as needed. DOCOSAHEXANOIC ACID/EPA (FISH OIL PO) Take 1 Tab by mouth daily. No current facility-administered medications for this visit. Allergies and Intolerances: Allergies   Allergen Reactions    Tree Nuts Anaphylaxis    Ace Inhibitors Cough    Nuts [Tree Nut] Itching     Family History: His mother is alive with stage 3 ovarian cancer. His father  from esophageal cancer at age 61. His maternal aunt  at age 48 from colon cancer. He has no family history of prostate cancer. Family History   Problem Relation Age of Onset    Cancer Father 61        esophageal    Hypertension Father     Cancer Mother 80        ovarian cancer     Hypertension Brother     Diabetes Other         Grandmother    OSTEOARTHRITIS Other     Other Other         Stomach problems; Father, grandfather     Social History:   He  reports that he quit smoking about 22 years ago. His smoking use included cigarettes. He has a 12.50 pack-year smoking history. He quit smokeless tobacco use about 22 years ago.  Reports that he smoked less than 1 ppd for 40 years, and stopped in 2000. He is  and they have one son. His wife was recently diagnosed with Paget's disease of the breast, which represents a recurrence of prior breast cancer. He was employed as an  for [employer], but retired in 12/2017. He drinks about a 12 pack of beer each month. Social History     Substance and Sexual Activity   Alcohol Use Yes    Alcohol/week: 1.7 standard drinks    Types: 2 Cans of beer per week     Immunization History:  Immunization History   Administered Date(s) Administered    Influenza Vaccine 11/01/2013, 11/12/2014, 10/01/2015    TDAP Vaccine 01/20/2004    Td 01/01/2004    Tdap 01/16/2014       Review of Systems:   As above included in HPI. Otherwise 11 point review of systems negative including constitutional, skin, HENT, eyes, respiratory, cardiovascular, gastrointestinal, genitourinary, musculoskeletal, endocrine, hematologic, allergy, and neurologic. Physical:   Visit Vitals  /70 (BP 1 Location: Left upper arm, BP Patient Position: Sitting)   Pulse 70   Temp 97.5 °F (36.4 °C) (Temporal)   Resp 16   Ht 6' (1.829 m)   Wt 222 lb 6.4 oz (100.9 kg)   SpO2 96%   BMI 30.16 kg/m²         Exam:     . Patient appears in no apparent distress. Affect is appropriate. HEENT --Anicteric sclerae, tympanic membranes normal,  ear canals normal.  PERRL, EOMI, conjunctiva and lids normal.  No cervical lymphadenopathy. No thyromegaly, JVD, or bruits. Carotid pulses 2+ with normal upstroke. Lungs --Clear to auscultation. No wheezing or rales. Heart --Regular rate and rhythm, no murmurs, rubs, gallops, or clicks. Abdomen -- Soft and nontender, no hepatosplenomegaly or masses. Extremities -- Without cyanosis, clubbing, edema.  Normal looking digits, ROM intact  Neuro -- CN 2-12 intact, strength 5/5 with intact soft touch in all extremities  Derm - no obvious abnormalities noted, no rash          Review of Data:  Labs:  Hospital Outpatient Visit on 08/29/2022   Component Date Value Ref Range Status    WBC 08/29/2022 6.6  4.6 - 13.2 K/uL Final    RBC 08/29/2022 5.57  4.35 - 5.65 M/uL Final    HGB 08/29/2022 17.1 (A) 13.0 - 16.0 g/dL Final    HCT 08/29/2022 50.2 (A) 36.0 - 48.0 % Final    MCV 08/29/2022 90.1  78.0 - 100.0 FL Final    MCH 08/29/2022 30.7  24.0 - 34.0 PG Final    MCHC 08/29/2022 34.1  31.0 - 37.0 g/dL Final    RDW 08/29/2022 12.8  11.6 - 14.5 % Final    PLATELET 36/45/8807 051  135 - 420 K/uL Final    NRBC 08/29/2022 0.0  0  WBC Final    ABSOLUTE NRBC 08/29/2022 0.00  0.00 - 0.01 K/uL Final    NEUTROPHILS 08/29/2022 56  40 - 73 % Final    LYMPHOCYTES 08/29/2022 30  21 - 52 % Final    MONOCYTES 08/29/2022 9  3 - 10 % Final    EOSINOPHILS 08/29/2022 3  0 - 5 % Final    BASOPHILS 08/29/2022 1  0 - 2 % Final    IMMATURE GRANULOCYTES 08/29/2022 1 (A) 0.0 - 0.5 % Final    ABS. NEUTROPHILS 08/29/2022 3.6  1.8 - 8.0 K/UL Final    ABS. LYMPHOCYTES 08/29/2022 2.0  0.9 - 3.6 K/UL Final    ABS. MONOCYTES 08/29/2022 0.6  0.05 - 1.2 K/UL Final    ABS. EOSINOPHILS 08/29/2022 0.2  0.0 - 0.4 K/UL Final    ABS. BASOPHILS 08/29/2022 0.1  0.0 - 0.1 K/UL Final    ABS. IMM.  GRANS. 08/29/2022 0.1 (A) 0.00 - 0.04 K/UL Final    DF 08/29/2022 AUTOMATED    Final    PLATELET COMMENTS 83/62/8181 ADEQUATE PLATELETS    Final    RBC COMMENTS 08/29/2022 NORMOCYTIC, NORMOCHROMIC    Final    Hemoglobin A1c 08/29/2022 5.8 (A) 4.2 - 5.6 % Final    Est. average glucose 08/29/2022 120  mg/dL Final    LIPID PROFILE 08/29/2022        Final    Cholesterol, total 08/29/2022 205 (A) <200 MG/DL Final    Triglyceride 08/29/2022 148  <150 MG/DL Final    HDL Cholesterol 08/29/2022 46  40 - 60 MG/DL Final    LDL, calculated 08/29/2022 129.4 (A) 0 - 100 MG/DL Final    VLDL, calculated 08/29/2022 29.6  MG/DL Final    CHOL/HDL Ratio 08/29/2022 4.5  0 - 5.0   Final    Magnesium 08/29/2022 2.5  1.6 - 2.6 mg/dL Final    Sodium 08/29/2022 139  136 - 145 mmol/L Final    Potassium 08/29/2022 4.4  3.5 - 5.5 mmol/L Final Chloride 08/29/2022 108  100 - 111 mmol/L Final    CO2 08/29/2022 26  21 - 32 mmol/L Final    Anion gap 08/29/2022 5  3.0 - 18 mmol/L Final    Glucose 08/29/2022 110 (A) 74 - 99 mg/dL Final    BUN 08/29/2022 18  7.0 - 18 MG/DL Final    Creatinine 08/29/2022 1.17  0.6 - 1.3 MG/DL Final    BUN/Creatinine ratio 08/29/2022 15  12 - 20   Final    GFR est AA 08/29/2022 >60  >60 ml/min/1.73m2 Final    GFR est non-AA 08/29/2022 >60  >60 ml/min/1.73m2 Final    Calcium 08/29/2022 9.2  8.5 - 10.1 MG/DL Final    Bilirubin, total 08/29/2022 1.2 (A) 0.2 - 1.0 MG/DL Final    ALT (SGPT) 08/29/2022 42  16 - 61 U/L Final    AST (SGOT) 08/29/2022 28  10 - 38 U/L Final    Alk.  phosphatase 08/29/2022 81  45 - 117 U/L Final    Protein, total 08/29/2022 7.0  6.4 - 8.2 g/dL Final    Albumin 08/29/2022 4.1  3.4 - 5.0 g/dL Final    Globulin 08/29/2022 2.9  2.0 - 4.0 g/dL Final    A-G Ratio 08/29/2022 1.4  0.8 - 1.7   Final    Microalbumin,urine random 08/29/2022 0.87  0 - 3.0 MG/DL Final    Creatinine, urine 08/29/2022 122.00  30 - 125 mg/dL Final    Microalbumin/Creat ratio (mg/g cre* 08/29/2022 7  0 - 30 mg/g Final    Prostate Specific Ag 08/29/2022 0.6  0.0 - 4.0 ng/mL Final    TSH 08/29/2022 1.57  0.36 - 3.74 uIU/mL Final    Vitamin D 25-Hydroxy 08/29/2022 62.6  30 - 100 ng/mL Final    Color 08/29/2022 YELLOW    Final    Appearance 08/29/2022 CLEAR    Final    Specific gravity 08/29/2022 1.017  1.005 - 1.030   Final    pH (UA) 08/29/2022 7.0  5.0 - 8.0   Final    Protein 08/29/2022 30 (A) NEG mg/dL Final    Glucose 08/29/2022 Negative  NEG mg/dL Final    Ketone 08/29/2022 Negative  NEG mg/dL Final    Bilirubin 08/29/2022 Negative  NEG   Final    Blood 08/29/2022 Negative  NEG   Final    Urobilinogen 08/29/2022 0.2  0.2 - 1.0 EU/dL Final    Nitrites 08/29/2022 Negative  NEG   Final    Leukocyte Esterase 08/29/2022 Negative  NEG   Final    Epithelial cells 08/29/2022 FEW  0 - 5 /lpf Final    Bacteria 08/29/2022 FEW (A) NEG /hpf Final Mucus 08/29/2022 FEW (A) NEG /lpf Final       Calculated 10 year ASCVD risk score: 13.5 %    Health Maintenance:  Screening:    Colorectal: colonoscopy (3/2022) normal. Dr. Marie Pandey. Due 3/2027. Depression: none   DM (HbA1c/FPG): / HbA1c 5.8 (8/2022)   Hepatitis C: negative (9/2016)   Falls: none    DEXA: N/A   PSA/TONI: PSA 0.6 (8/2022)/ TONI (2/2019)   Glaucoma: regular eye exams with [email or web address] (last 2/2021)   Smoking: none   Vitamin D: 62.6 (8/2022)   Medicare Wellness: N/A      Impression:  Patient Active Problem List   Diagnosis Code    Hyperlipidemia E78.5    GERD (gastroesophageal reflux disease) K21.9    Chronic venous insufficiency I87.2    Colon polyp K63.5    Prediabetes R73.03    Recurrent genital herpes A60.00    Vitamin D insufficiency E55.9    Aortic insufficiency, moderate I35.1    Essential hypertension I10    Primary osteoarthritis of knee M17.10    Lumbar herniated disc  right L4/5/S1 chronic M51.26    Osteopenia determined by DEXA 2016, lowest T score -1.7 M85.80    Annular tear of lumbar disc M51.36    Chronic right-sided low back pain with sciatica M54.40, G89.29    S/P lumbar discectomy Z98.890    Class 1 obesity due to excess calories without serious comorbidity in adult E66.09    Erythrocytosis D75.1    Chronic pain of both knees M25.561, M25.562, G89.29    Cervical spondylosis M47.812    Dilated aortic root (HCC) I77.810    History of 2019 novel coronavirus disease (COVID-19) Z86.16    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 Hypertension. Well controlled on current regimen of losartan 100 mg daily and amlodipine 5 mg daily. Renal function remains normal with creatinine 1.17/ eGFR >60. Continue to follow. 2. Dyslipidemia. Patient no longer taking fenofibrate since first line therapy for treating triglycerides < 500 would be initiation of a statin. Lipid panel with triglyceride level 189 (3/2022).  Calculated 10 year ASCVD risk is 13.5 %, which does place him in one of the four statin benefit groups as per new AHA/ACC guidelines (primary prevention: ASCVD risk > 7.5%). LDL remains elevated today at 129 and HDL 46. Not wishing to begin statin therapy. Emphasized importance of continued lifestyle modifications, including heart healthy diet, regular exercise, and weight loss. Will continue to monitor. 3. Aortic insufficiency, moderate with mildly dilated aortic root. Probable bicuspid valve being monitored by echocardiogram.  Repeat echocardiogram to assess valve in 9/2017 without change. Established with new cardiologist, Dr. Roselia Kulkarni, and underwent repeat echocardiogram (3/2020) showing moderate AI with mildly dilated aortic root at 4.1 cm. Low normal LV function (EF 50%). Essentially unchanged by report when compared to prior echocardiogram in 2017. Reestablished with Dr. Wendy Cid and underwent repeat echocardiogram in 3/2022 which showed moderately thickened aortic valve with mild-moderate regurgitation and no stenosis, mildly dilated aortic root at 4.0 cm, normal left atrial volume, low normal LV function (EF 50-55%). Has follow-up scheduled with Dr. Wendy Cid in 11/2022. 4. Prediabetes. HbA1c remains elevated at 5.8. No evidence of microvascular complications. On ARB but unwilling to begin statin. Continue follow-up for annual eye exams. Foot exam normal in 4/2017. Urine microalbumin/ creatinine ratio without evidence of microalbuminuria (7 mg/g). Encouraged continued weight loss and dietary changes, particularly avoiding concentrated sweets and minimizing carbohydrates. 5. History of COVID 19 with PASC. Tested positive on 1/17/2022 at [facility], and reported symptoms of sore throat, headache, myalgias, nasal congestion, cough and mild dyspnea on exertion. Reports loss of taste/smell during illness and states that continuing to have altered taste as sequela. Other symptoms resolved.   Remains unvaccinated and not willing to proceed with vaccination as recommended. Will continue to monitor. 6. Osteopenia. Last bone density scan 10/2016. Using femoral neck T-scores, calculated FRAX score estimates her 10 year risk of a major osteoporetic fracture at 12 % and hip fracture at 2.0 %, which are not an indication for biphosphonate treatment (>20% and >3%, respectively). Continue calcium and Vitamin D. Encouraged exercise, particularly weight bearing activities. 7. GERD. Had been occasionally using Protonix previously with good control of symptoms. Evaluated by Dr. Alejandra Garcia in 11/2021 for intermittent dull aching mid abdominal pain and underwent upper endoscopy on 12/31/2021 which was reportedly unremarkable. Biopsies of the mid-lower third of the esophagus showed squamous mucosa without significant abnormality, no glandular mucosa identified, and no evidence of eosinophilic esophagitis. Advised to restart Protonix 40 mg twice daily in 11/2021, but patient never started treatment. Presented with intermittent mid abdominal pain on 8/2/2022 and advised to restart treatment with pantoprazole 40 mg twice daily. Also advised to avoid NSAIDs. Reports improvement today and has now resumed taking pantoprazole only as needed. 8. Osteoarthritis. Reports increasing bilateral knee pain (R>L) with ambulation and standing. Chronic problem which has now progressed. Underwent evaluation by Dr. Magno Brown and Dr. Diania Cooks and completed physical therapy for bilateral knee pain. Left knee MRI was ordered in 4/2022, but patient did not proceed due to cost.  Has not yet decided to proceed with surgery but considering evaluation by a new orthopedist.  Also describing difficulty with right shoulder pain today with abduction and advised to perform range of motion exercises. Will call if wishing to proceed with physical therapy. 9. Lumbar DDD with right sciatica. Progression of symptoms and disease on MRI.  Responded well to conservative therapy initially, but recurred and unresponsive to medications and injections. Underwent right L4-L5 and L5-S1 hemilaminectomy, medial facetectomy, and foraminotomy by Dr. Dottie Dial in 7/2018 with great success. Reports only residual numbness on medial plantar surface of right foot. No longer requiring any pain medication. Continuing to do well. 10. Left cervical radiculopathy. Evaluated by Dr. Carol Carlson on 1/28/2021 and referred to physical therapy with some improvement. Seen by Dr. Lalo Epps in 6/2021 for left shoulder pain and felt to be secondary to cervical radiculopathy +/-bicipital tendinitis. Completed physical therapy with improvement. 11. Erythrocytosis. Review of record shows similar readings since 2014. Erythropoietin level normal in 2/2020. Elevated again today with Hb 17.1 and HCT 50.2. Will reassess with erythropoietin level at next visit. Advised evaluation for sleep apnea given symptoms but not wishing to proceed. Will continue to monitor. 12. Excessive daytime sleepiness/ snoring. Patient with daytime drowsiness, snoring, and fatigue. Underwent evaluation by Dr. Amalia Holter in 3/2020 and recommended in-lab sleep study due to severity of symptoms. However, decided not to proceed and again confirms today that not wishing to undergo evaluation despite persistent symptoms. 13. Obesity. Weight decreased 9 pounds since 11/2021. Emphasized importance of continued attempts at lifestyle modifications, including heart healthy diet, regular exercise, and weight loss. Will readdress next visit. 14. Health maintenance. Has NOT received the COVID-19 vaccine series and remains unwilling to proceed. Discussed Shingrix vaccine but not wishing to proceed. Other immunizations up to date. Colonoscopy completed and 5-year follow-up recommended. Prostate cancer screening up to date. Continue regular eye exams. Stressed importance of weight loss. Vitamin D level remains normal on maintenance dose supplement.      Patient understands recommendations and agrees with plan. Follow-up in 6 months. Addendum 11/16/2022    Preoperative risk stratification:  Patient is scheduled to undergo left total knee replacement surgery on 12/5/2022. He currently has no symptoms suggestive of angina or decompensated heart failure. He maintains a relatively active lifestyle and has no functional limitations. Preoperative labs (11/8/2022) were reviewed and unremarkable; preoperative ECG (11/8/2022) was normal and unchanged. He is currently clinically stable and without absolute medical contraindication for surgery. He is low risk for a stacie-procedural cardiac event. Surgery may proceed as planned at the discretion of Dr. Mikki Benson. Future orders:    ICD-10-CM ICD-9-CM    1. Encounter for routine history and physical exam for male  Z00.00 V70.0       2. Essential hypertension  I10 401.9 MAGNESIUM      METABOLIC PANEL, COMPREHENSIVE      3. Aortic valve insufficiency, etiology of cardiac valve disease unspecified  I35.1 424.1       4. Dilated aortic root (HCC)  I77.810 447.71       5. Hyperlipidemia, unspecified hyperlipidemia type  E78.5 272.4 LIPID PANEL      MAGNESIUM      METABOLIC PANEL, COMPREHENSIVE      6. Prediabetes  R73.03 790.29 HEMOGLOBIN A1C WITH EAG      MAGNESIUM      METABOLIC PANEL, COMPREHENSIVE      MICROALBUMIN, UR, RAND W/ MICROALB/CREAT RATIO      7. History of 2019 novel coronavirus disease (COVID-19)  Z86.16 V12.09       8. Post-acute sequelae of COVID-19 (PASC)  U09.9 139.8       9. Gastroesophageal reflux disease without esophagitis  K21.9 530.81       10. Erythrocytosis  D75.1 289.0 CBC WITH AUTOMATED DIFF      ERYTHROPOIETIN      11. Primary osteoarthritis of both knees  M17.0 715.16       12. Chronic pain of both knees  M25.561 719.46     M25.562 338.29     G89.29        13.  Class 1 obesity due to excess calories without serious comorbidity with body mass index (BMI) of 30.0 to 30.9 in adult  E66.09 278.00     Z68.30 V85.30

## 2022-10-03 RX ORDER — ALBUTEROL SULFATE 90 UG/1
1 AEROSOL, METERED RESPIRATORY (INHALATION)
Qty: 1 EACH | Refills: 3 | Status: SHIPPED | OUTPATIENT
Start: 2022-10-03

## 2022-10-03 NOTE — TELEPHONE ENCOUNTER
Last Visit: 9/6/22 with MD Brea Teague  Next Appointment: 3/14/23 with MD Brea Teague  Previous Refill Encounter(s): 9/12/19 #1 with 3 refills    Requested Prescriptions     Pending Prescriptions Disp Refills    albuterol (PROVENTIL HFA, VENTOLIN HFA, PROAIR HFA) 90 mcg/actuation inhaler 1 Each 3     Sig: Take 1 Puff by inhalation every six (6) hours as needed for Wheezing. For [facility] in place:   Recommendation Provided To:    Intervention Detail: New Rx: 1, reason: Patient Preference  Gap Closed?:   Intervention Accepted By:   Time Spent (min): 5

## 2022-10-27 ENCOUNTER — CLINICAL SUPPORT (OUTPATIENT)
Dept: INTERNAL MEDICINE CLINIC | Age: 62
End: 2022-10-27
Payer: COMMERCIAL

## 2022-10-27 DIAGNOSIS — Z23 NEEDS FLU SHOT: Primary | ICD-10-CM

## 2022-10-27 PROCEDURE — 90471 IMMUNIZATION ADMIN: CPT | Performed by: INTERNAL MEDICINE

## 2022-10-27 PROCEDURE — 90686 IIV4 VACC NO PRSV 0.5 ML IM: CPT | Performed by: INTERNAL MEDICINE

## 2022-10-27 NOTE — PATIENT INSTRUCTIONS
Vaccine Information Statement    Influenza (Flu) Vaccine (Inactivated or Recombinant): What You Need to Know    Many vaccine information statements are available in Polish and other languages. See www.immunize.org/vis. Hojas de información sobre vacunas están disponibles en español y en muchos otros idiomas. Visite www.immunize.org/vis. 1. Why get vaccinated? Influenza vaccine can prevent influenza (flu). Flu is a contagious disease that spreads around the [facility] every year, usually between October and May. Anyone can get the flu, but it is more dangerous for some people. Infants and young children, people 72 years and older, pregnant people, and people with certain health conditions or a weakened immune system are at greatest risk of flu complications. Pneumonia, bronchitis, sinus infections, and ear infections are examples of flu-related complications. If you have a medical condition, such as heart disease, cancer, or diabetes, flu can make it worse. Flu can cause fever and chills, sore throat, muscle aches, fatigue, cough, headache, and runny or stuffy nose. Some people may have vomiting and diarrhea, though this is more common in children than adults. In an average year, thousands of people in the [facility] die from flu, and many more are hospitalized. Flu vaccine prevents millions of illnesses and flu-related visits to the doctor each year. 2. Influenza vaccines     CDC recommends everyone 6 months and older get vaccinated every flu season. Children 6 months through 6years of age may need 2 doses during a single flu season. Everyone else needs only 1 dose each flu season. It takes about 2 weeks for protection to develop after vaccination. There are many flu viruses, and they are always changing. Each year a new flu vaccine is made to protect against the influenza viruses believed to be likely to cause disease in the upcoming flu season.  Even when the vaccine doesnt exactly match these viruses, it may still provide some protection. Influenza vaccine does not cause flu. Influenza vaccine may be given at the same time as other vaccines. 3. Talk with your health care provider    Tell your vaccination provider if the person getting the vaccine:  Has had an allergic reaction after a previous dose of influenza vaccine, or has any severe, life-threatening allergies   Has ever had Guillain-Barré Syndrome (also called GBS)    In some cases, your health care provider may decide to postpone influenza vaccination until a future visit. Influenza vaccine can be administered at any time during pregnancy. People who are or will be pregnant during influenza season should receive inactivated influenza vaccine. People with minor illnesses, such as a cold, may be vaccinated. People who are moderately or severely ill should usually wait until they recover before getting influenza vaccine. Your health care provider can give you more information. 4. Risks of a vaccine reaction    Soreness, redness, and swelling where the shot is given, fever, muscle aches, and headache can happen after influenza vaccination. There may be a very small increased risk of Guillain-Barré Syndrome (GBS) after inactivated influenza vaccine (the flu shot). Harjit Brown children who get the flu shot along with pneumococcal vaccine (PCV13) and/or DTaP vaccine at the same time might be slightly more likely to have a seizure caused by fever. Tell your health care provider if a child who is getting flu vaccine has ever had a seizure. People sometimes faint after medical procedures, including vaccination. Tell your provider if you feel dizzy or have vision changes or ringing in the ears. As with any medicine, there is a very remote chance of a vaccine causing a severe allergic reaction, other serious injury, or death. 5. What if there is a serious problem?     An allergic reaction could occur after the vaccinated person leaves the clinic. If you see signs of a severe allergic reaction (hives, swelling of the face and throat, difficulty breathing, a fast heartbeat, dizziness, or weakness), call 9-1-1 and get the person to the nearest hospital.    For other signs that concern you, call your health care provider. Adverse reactions should be reported to the Vaccine Adverse Event Reporting System (VAERS). Your health care provider will usually file this report, or you can do it yourself. Visit the VAERS website at www.vaers. [facility].gov or call 5-869.144.9809. VAERS is only for reporting reactions, and VAERS staff members do not give medical advice. 6. The National Vaccine Injury Compensation Program    The [facility] Vaccine Injury Compensation Program (VICP) is a federal program that was created to compensate people who may have been injured by certain vaccines. Claims regarding alleged injury or death due to vaccination have a time limit for filing, which may be as short as two years. Visit the VICP website at www.[facility].gov/vaccinecompensation or call 2-645.121.4676 to learn about the program and about filing a claim. 7. How can I learn more? Ask your health care provider. Call your local or state health department. Visit the website of the Food and Drug Administration (FDA) for vaccine package inserts and additional information at www.fda.gov/vaccines-blood-biologics/vaccines. Contact the Centers for Disease Control and Prevention (CDC): Call 4-148.530.4050 (1-800-CDC-INFO) or  Visit CDCs influenza website at www.cdc.gov/flu. Vaccine Information Statement   Inactivated Influenza Vaccine   8/6/2021  42 CAITLIN Monge 085BC-70   Department of Health and Human Services  Centers for Disease Control and Prevention    Office Use Only

## 2022-10-27 NOTE — PROGRESS NOTES
Angelica Chen 1960 male who presents for routine immunizations. Patient denies any symptoms , reactions or allergies that would exclude them from being immunized today. Risks and adverse reactions were discussed and the VIS was given to them. All questions were addressed. Order placed for Regular Influenza,  per Verbal Order from  with read back. Patient was advised to wait for 15 min post injection. There were no reactions observed.     Manuel Tovar LPN

## 2022-10-30 RX ORDER — VALACYCLOVIR HYDROCHLORIDE 500 MG/1
TABLET, FILM COATED ORAL
Qty: 90 TABLET | Refills: 2 | Status: SHIPPED | OUTPATIENT
Start: 2022-10-30

## 2022-11-01 ENCOUNTER — TELEPHONE (OUTPATIENT)
Dept: INTERNAL MEDICINE CLINIC | Age: 62
End: 2022-11-01

## 2022-11-01 DIAGNOSIS — G47.30 SLEEP APNEA, UNSPECIFIED TYPE: Primary | ICD-10-CM

## 2022-11-01 NOTE — TELEPHONE ENCOUNTER
Dr Puckett [facility] office called stating pt is reestablishing care and needs a referral for a sleep study .

## 2022-11-04 ENCOUNTER — TELEPHONE (OUTPATIENT)
Dept: INTERNAL MEDICINE CLINIC | Age: 62
End: 2022-11-04

## 2022-11-04 ENCOUNTER — HOSPITAL ENCOUNTER (OUTPATIENT)
Dept: LAB | Age: 62
Discharge: HOME | End: 2022-11-04
Payer: COMMERCIAL

## 2022-11-04 ENCOUNTER — OFFICE VISIT (OUTPATIENT)
Dept: PULMONOLOGY | Age: 62
End: 2022-11-04
Payer: COMMERCIAL

## 2022-11-04 ENCOUNTER — HOSPITAL ENCOUNTER (OUTPATIENT)
Dept: GENERAL RADIOLOGY | Age: 62
Discharge: HOME | End: 2022-11-04
Payer: COMMERCIAL

## 2022-11-04 VITALS
BODY MASS INDEX: 30.34 KG/M2 | HEIGHT: 72 IN | OXYGEN SATURATION: 97 % | RESPIRATION RATE: 18 BRPM | WEIGHT: 224 LBS | HEART RATE: 74 BPM | SYSTOLIC BLOOD PRESSURE: 122 MMHG | TEMPERATURE: 97 F | DIASTOLIC BLOOD PRESSURE: 71 MMHG

## 2022-11-04 DIAGNOSIS — D75.1 POLYCYTHEMIA: ICD-10-CM

## 2022-11-04 DIAGNOSIS — R06.00 DYSPNEA, UNSPECIFIED TYPE: Primary | ICD-10-CM

## 2022-11-04 DIAGNOSIS — Z87.09 PERSONAL HISTORY OF ASTHMA: ICD-10-CM

## 2022-11-04 DIAGNOSIS — R06.00 DYSPNEA, UNSPECIFIED TYPE: ICD-10-CM

## 2022-11-04 DIAGNOSIS — R06.83 SNORING: ICD-10-CM

## 2022-11-04 DIAGNOSIS — G89.29 CHRONIC PAIN OF BOTH KNEES: ICD-10-CM

## 2022-11-04 DIAGNOSIS — G47.19 EXCESSIVE DAYTIME SLEEPINESS: ICD-10-CM

## 2022-11-04 DIAGNOSIS — R09.81 NASAL CONGESTION: ICD-10-CM

## 2022-11-04 DIAGNOSIS — I35.1 AORTIC VALVE INSUFFICIENCY, ETIOLOGY OF CARDIAC VALVE DISEASE UNSPECIFIED: ICD-10-CM

## 2022-11-04 DIAGNOSIS — M25.561 CHRONIC PAIN OF BOTH KNEES: ICD-10-CM

## 2022-11-04 DIAGNOSIS — M25.562 CHRONIC PAIN OF BOTH KNEES: ICD-10-CM

## 2022-11-04 LAB — XX-LABCORP SPECIMEN COL,LCBCF: NORMAL

## 2022-11-04 PROCEDURE — 3078F DIAST BP <80 MM HG: CPT | Performed by: INTERNAL MEDICINE

## 2022-11-04 PROCEDURE — 99001 SPECIMEN HANDLING PT-LAB: CPT

## 2022-11-04 PROCEDURE — 99214 OFFICE O/P EST MOD 30 MIN: CPT | Performed by: INTERNAL MEDICINE

## 2022-11-04 PROCEDURE — 71046 X-RAY EXAM CHEST 2 VIEWS: CPT

## 2022-11-04 PROCEDURE — 94729 DIFFUSING CAPACITY: CPT | Performed by: INTERNAL MEDICINE

## 2022-11-04 PROCEDURE — 94727 GAS DIL/WSHOT DETER LNG VOL: CPT | Performed by: INTERNAL MEDICINE

## 2022-11-04 PROCEDURE — 3074F SYST BP LT 130 MM HG: CPT | Performed by: INTERNAL MEDICINE

## 2022-11-04 NOTE — TELEPHONE ENCOUNTER
Please find out when his preop labs and EKG are scheduled so that appointment can be timed for after these are performed. May add onto schedule.

## 2022-11-04 NOTE — LETTER
11/8/2022    Patient: Emily Drake   YOB: 1960   Date of Visit: 11/4/2022     My Garcia, [address]  [address] [place] [address]  Via In [facility] [address]    Dear My Garcia MD,      Thank you for referring Mr. Zhen Garcia to [address] [place] [address] for evaluation. My notes for this consultation are attached. If you have questions, please do not hesitate to call me. I look forward to following your patient along with you.       Sincerely,    Kalyan Carlisle, DO

## 2022-11-04 NOTE — TELEPHONE ENCOUNTER
Pt brought a pre op clearance form in for Dr Gianna Herrera to fill out he is having surgery on 12/05/22  He didn't know he had to sched an appt . Is there a day you can see him for this ?  I put pre op form in mail box up front

## 2022-11-04 NOTE — PROGRESS NOTES
Aureliano Harrison presents today for   Chief Complaint   Patient presents with    Sleep Problem    Shortness of Breath       Is someone accompanying this pt? no    Is the patient using any DME equipment during OV? no    -DME Company     Have you ever had a sleep study done before? No    Depression Screening:  3 most recent PHQ Screens 11/4/2022   Little interest or pleasure in doing things Not at all   Feeling down, depressed, irritable, or hopeless Not at all   Total Score PHQ 2 0   Trouble falling or staying asleep, or sleeping too much -   Feeling tired or having little energy -   Poor appetite, weight loss, or overeating -   Feeling bad about yourself - or that you are a failure or have let yourself or your family down -   Trouble concentrating on things such as school, work, reading, or watching TV -   Moving or speaking so slowly that other people could have noticed; or the opposite being so fidgety that others notice -   Thoughts of being better off dead, or hurting yourself in some way -   How difficult have these problems made it for you to do your work, take care of your home and get along with others -       [place] Sleepiness Scale:  [place] Sleepiness Scale  3/11/2020   Sitting and Reading 2   Watching TV 3   Sitting, inactive in a public place (e.g. a movie theater or meeting) 0   As a passenger in a car for an hour, without a break 0   Lying down to rest in the afternoon, when circumstances permit 1   Sitting and talking to someone 0   Sitting quietly after lunch without alcohol 1   In a car, while stopped for a few minutes in traffic 0   [place] Sleepiness Score 7       Stop-Bang:  Stop Cyrena Drain 3/11/2020   Does the patient snore loudly (louder than talking or loud enough to be heard through closed doors)? 1   Does the patient often feel tired, fatigued, or sleepy during the daytime, even after a \"good\" night's sleep?  1   Has anyone ever observed the patient stop breathing during their sleep?  0   Does the patient have or are they being treated for high blood pressure? 1   Is the patient's BMI greater than 35? 0   Is your neck circumference greater than 17 inches (Male) or 16 inches (Female)? 1   Is the patient older than 48? 1   Is the patient male? 1   LICHA Score 6       Neck Circumference:  17       Coordination of Care:  1. Have you been to the ER, urgent care clinic since your last visit? Hospitalized since your last visit? No    2. Have you seen or consulted any other health care providers outside of the [address] [place] [address] since your last visit? Include any pap smears or colon screening. no    Medication list has been updated according to patient.

## 2022-11-04 NOTE — PATIENT INSTRUCTIONS
Please call our clinic back at [phone] or send a message on [employer] if you have any questions or concerns or if you are experiencing any of the following: You have not received a follow up appointment within 30 days prior the recommended follow up time. If you are not tolerating treatment plan and/or not able to obtain equipment or prescribed medication(s). if you are experiencing any difficulties with the [employer]  (DME) Company you may be using or is assigned to you. Two weeks have passed and you have not received an appointment for a scheduled procedure. Two weeks have passed since you underwent a test and/or procedure and you have not received your results. If you are using a CPAP/BIPAP, or Home Ventilator Device- Please note the following. Currently, many DMEs are experiencing supply chain difficulties and orders for equipment may be back logged several weeks to months. Your  Durable Medical Equipment (DME ) company is supposed to provide you with replacement filters, tubing and masks. You can either call your DME when you need new supplies or you can arrange for an automatic shipment schedule. Your need to be seen by our office at lat minimum of every 12 months in order to renew the prescription for these supplies. Please make note of who your DME company is and their phone number. Please make sure that you clean your mask and hosing on a regular basis. Your DME can provide you with additional information regarding proper care and cleaning of your device           Safety is strongly encouraged. You should not drive if sleepy, tired, distracted and/or  fatigued. If a procedure or imaging study has been ordered for your by this clinic please call the [facility] or [place] at  [address] [place] [address] [place] and blood work do not require appointments.   They are considered \"Walk-In\" services and can be obtained at either [facility] or [facility]. Blood work is performed at the Laboratory (Lab) from 08:00am - 04:00pm      -Thank you     Learning About Sleep Apnea  What is it? Sleep apnea means that breathing stops for short periods during sleep. When you stop breathing or have reduced airflow into your lungs during sleep, you don't sleep well and you can be very tired during the day. The oxygen levels in your blood may go down, and carbon dioxide levels go up. It may lead to other problems, such as high blood pressure and heart disease. Sleep apnea can range from mild to severe, based on how often breathing stops during sleep. For adults, breathing may stop as few as 5 times an hour (mild apnea) to 30 or more times an hour (severe apnea). Obstructive sleep apnea is the most common type. This most often occurs because your airways are blocked or partly blocked. Central sleep apnea is less common. It happens when the brain has trouble controlling breathing. Some people have both types. That's called complex sleep apnea. What are the symptoms? There are symptoms of sleep apnea that you may notice and symptoms that others may notice when you're asleep. Symptoms you may notice include:  Feeling extremely sleepy during the day. Feeling unrefreshed or tired after a night's sleep. Problems with memory and concentration, or mood changes. Morning headaches. Getting up often during the night to urinate. A dry mouth or sore throat in the morning. If you have a bed partner, they may notice that you:  Have episodes of not breathing. Snore loudly. Almost all people who have sleep apnea snore. But not all people who snore have sleep apnea. Toss and turn during sleep. Have nighttime choking or gasping spells. How is it diagnosed? Your doctor will probably do a physical exam and ask about your past health.  The doctor may also ask you or your bed partner about your snoring and sleep behavior and how tired you feel during the day. Your doctor may suggest a sleep study. Sleep studies are a series of tests that look at what happens to the body during sleep. They check for how often you stop breathing or have too little air flowing into your lungs during sleep. They also find out how much oxygen you have in your blood during sleep. A sleep study may take place in your home. Or it might take place at a sleep center, where you will spend the night. If your sleep apnea doesn't improve with treatment, you may have more tests to find out what's causing it. How is it treated? You may be able to help treat sleep apnea by making some lifestyle changes. You could try to lose weight, sleep on your side, and avoid alcohol and medicines like sedatives before bed. Sleep apnea is often treated with machines that deliver air through a mask to help keep your airways open. These include:  Continuous positive airway pressure (CPAP). This increases air pressure in your throat. It keeps your airway open when you breathe in. It's the most common device. Bilevel positive airway pressure (BPAP). You may also hear this called BiPAP. This uses different air pressures when you breathe in and out. Adaptive servo ventilation (ASV). It senses pauses in breathing and adjusts air pressure. It's mostly used for central sleep apnea. You can also try oral breathing devices or nasal devices. If your tonsils or other tissues are blocking your airway, your doctor may suggest surgery to open the airway. How can you care for yourself at home? Lose weight, if needed. Sleep on your side. It may help mild apnea. Avoid alcohol and medicines such as sleeping pills, opioids, or sedatives before bed. Don't smoke. If you need help quitting, talk to your doctor. Prop up the head of your bed. Treat breathing problems, such as a stuffy nose, that are caused by a cold or allergies.   Try a continuous positive airway pressure (CPAP) breathing machine if your doctor recommends it. If CPAP doesn't work for you, ask your doctor if you can try other masks, settings, or breathing machines. Try oral breathing devices or other nasal devices. Talk to your doctor if your nose feels dry or bleeds, or if it gets runny or stuffy when you use a breathing machine. Tell your doctor if you're sleepy during the day and it affects your daily life. Don't drive or operate machinery when you're drowsy. Where can you learn more? Go to http://pat-cuate.info/  Enter S121 in the search box to learn more about \"Learning About Sleep Apnea. \"  Current as of: July 6, 2021               Content Version: 13.2  © 2006-2022 [employer]. Care instructions adapted under license by [employer] (which disclaims liability or warranty for this information). If you have questions about a medical condition or this instruction, always ask your healthcare professional. [address] any warranty or liability for your use of this information. Learning About CPAP for Sleep Apnea  What is CPAP? CPAP is a small machine that you use at home every night while you sleep. It increases air pressure in your throat to keep your airway open. When you have sleep apnea, this can help you sleep better, feel better, and avoid future health problems. CPAP stands for \"continuous positive airway pressure. \"  The CPAP machine will have one of the following:  A mask that covers your nose and mouth  A mask that covers your nose only  A nasal pillow that covers only the openings of your nose  Why is it done? CPAP is usually the best treatment for obstructive sleep apnea. It is the first treatment choice and the most widely used. CPAP:  Helps you have more normal sleep, so you feel less sleepy and more alert during the daytime. May help keep heart failure or other heart problems from getting worse.   May help lower your blood pressure. If you have a bed partner, they may also sleep better when you use a CPAP. That's because you aren't snoring or restless. Your doctor may suggest CPAP if you have: Moderate to severe sleep apnea. Sleep apnea and coronary artery disease (CAD). Sleep apnea and heart failure. What are the side effects? Some people who use CPAP have:  A dry or stuffy nose and a sore throat. Irritated skin on the face. Bloating. How can you care for yourself? If using CPAP is not comfortable, or if you have certain side effects, work with your doctor to fix them. Here are some things you can try:  Be sure the mask, nasal mask, or nasal pillow fits well. See if your doctor can adjust the pressure of your CPAP. If your nose or mouth is dry, set the machine to deliver warmer or wetter air. Or try using a humidifier. If your nose is runny or stuffy, talk to your doctor about using a decongestant medicine or steroid nasal spray. Be safe with medicines. Read and follow all instructions on the label. Do not use the medicine longer than the label says. Your doctor may also help you with problems like swallowing air, bloating, or claustrophobia. Talk to your doctor if you're still having problems. If these things don't help, you might try a different type of machine. Where can you learn more? Go to http://www.gray.com/  Enter Nikki Angela in the search box to learn more about \"Learning About CPAP for Sleep Apnea. \"  Current as of: July 6, 2021               Content Version: 13.2  © 2006-2022 [employer]. Care instructions adapted under license by [employer] (which disclaims liability or warranty for this information). If you have questions about a medical condition or this instruction, always ask your healthcare professional. [place] [address] any warranty or liability for your use of this information.       Directions to [facility]

## 2022-11-04 NOTE — PROGRESS NOTES
[facility]  Pulmonary, Critical Care, and Sleep Medicine    Office Progress Note- Initial Evaluation      Primary Care Physician: Kaye Zaldivar MD     Reason for Visit:  Evaluation for Dyspnea, Snoring and EDS    Assessment:    Dyspnea- broad differential at this time: he has a reported history of childhood asthma and Atopy with report of anaphylaxis to tree nuts. Previous spirometry unremarkable. Symptoms appear to have increased after undergoing esophageal dilation. He does appear to have a persistently elevated absolute eosinophile count of ~200. These findings may suggest reactivation of asthma vs GERD/esophageal dysfunction, aortic valve disease, LICHA could be a contributing factor, occupational lung disease, deconditioning, etc.    Snoring: Patient has symptoms and exam findings highly suggestive of a sleep breathing disorder, such as LICHA. Given severity of symptoms and comorbidities additional in-lab sleep testing is indicated. Excessive Daytime Sleepiness (EDS): there could be multiple reasons for this condition: possible LICHA, insufficient sleep time (wife disturbs his sleep due to untreated LICHA- she could not tolerate PAP therapy), circadian rhythm disorder, erythrocytosis, Bruxism, pain and/or psychological (    Possible Circadian Rhythm disorder: Patient used to work 12 hour night shifts x 6 yeas- Patient reports his sleep pattern never returned to normal since that time. Erythrocytosis/Polycythemia: Chronic and persistent. Unclear etiology at this time. It could be do to untreated LICHA vs primary hematologic process    Aortic Valve disease: last echo in 2017 notable for mild sclerosis and moderate MR- need to get copy of most recent study    Nasal Congestion with H/O nasal fracture:      Report of childhood asthma- spirometry today normal but absolute eosinophile count is elevated.     Increased Family Stressors:    Chronic pain: OA- joint pain does disturb sleep at times         Plan:  Check labs: Allergy Zone 2, CBC with Diff, Erythropoietin   Hematology consultation. Follow up with GI and Cardioogy. Repeat PFTs, obtain 6MW and CXR  Order HSAT  Potential consequences of untreated sleep apnea, and/or excessive daytime sleepiness were discussed with the patient. Educational materials provided. Treatment options including CPAP, dental appliance, weight reduction measures, positional therapy, surgeries etc were discussed. Healthy lifestyle changes to include weight loss and exercise discussed. Healthy sleep habits were reviewed and encouraged.  and workplace safety reviewed and discussed as appropriate. Drowsy and/or inattentive driving should be avoided. Follow-up in after sleep testing , sooner should new symptoms or problems arise. History of Present Illness: Mr. Reyna Sorensen is a 57 y.o. male patient who re-presents for evaluation of dyspnea and EDS. The history was provided by the patient. The patient was last evaluated in our clinic on 3/11/2020 for dyspnea and EDS. Due to COVID-19 pandemic and the death of his mother in March of 2020, the patient did not pursue requested medical workup at that time. The patient is also pending knee replacement surgery on 12/5/22 and the patient also reports that his orthopedic surgeon is requesting per-operative risk stratification as well. 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 Prior construction     Patient used to work 12 hour night shifts x 6 yeas- Patient reports his sleep pattern never returned to normal since that time. Driving:  yes  Drowsy Driving: is not reported. He tries to avoid night time driving. Motor vehicle accident(s) associated with drowsy driving have not occurred. Snoring: This remains a Chronic problem which has been ongoing for years.  Improved but did not resolve when his tonsils were removed about 20 years ago. Witnessed apneas are not reported. Fatigue: This remains a Chronic problem which has been ongoing for years. Dental: The patient is not sure if he is still grinding his teeth, which he noted at last evaluation. Naps: are reported. Leg Symptoms/Pain: He does not have unpleasant or crawling sensation in legs or strong urge to move when inactive. He does have some PVD. He has OA in his knees    Dyspnea: The patient reports ongoing dyspnea and a feeling that he needs to \"catch his breath\" both at rest and with activity. Other than activity, he does not have any known triggers. He denies any difficulty with , fumes or dust.  No of COPD but patient is an ex-smoker. He does report a history of childhood asthma. He does noted some intermittent nasal congestion and rare wheezing episodes after cutting the grass. Symptoms may have increased since he underwent his EGD procedure in Dec 2020. GERD: Patient reports undergoing EGD in December 2020 and was found to have an esophageal stricture. He underwent dilation and since then, the patient reports vague chest discomfort. He denies overt heartburn. Aortic Valve Disease:  Most recent Echo 3/30/22 notes mild to moderate AR. The patient reports he has follow up with his cardiologist later this month. Patient denies syncope or near- syncope. He does note some occasional chest discomfort sating back to Dec 2020 and after undergoing EGD and dilation for esophageal stricture. Mood: Normal.    Sleep-Wake History:     Estimates sleeping approximately 4 hours per night/day. Reports sleeping in a Bed with 1 pillows under their head. He gets into bed at approximately 8377-7359 (mostly before 2400). Once in bed,he will sometimes just fall asleep, sometimes he has noticed dyspnea and other times his wife's snoring keeps him awake.   It usually    Reports waking up to use the bathroom 1-4 times nightly. Vivid dreams are reported but rare    He normally awakens without an alarm to start their day at 0700. He  typically gets out of bed [id number] . Waking up with a morning headache is not reported. Awakening with a dry mouth is not  reported. Symptom(s) suggestive of cataplexy are not reported. Sleep paralysis is not  reported. Hypnagogic and/or hypnopompic hallucinations are not reported. Sleep walking is not  reported. Sleep talking is reported. Other unusual and/or parasomnia behaviors are not reported. Family Sleep History:  - Mother- has never slept well        Stop Bang 11/4/2022 3/11/2020   Does the patient snore loudly (louder than talking or loud enough to be heard through closed doors)? 1 1   Does the patient often feel tired, fatigued, or sleepy during the daytime, even after a \"good\" night's sleep? 1 1   Has anyone ever observed the patient stop breathing during their sleep?  0 0   Does the patient have or are they being treated for high blood pressure? 1 1   Is the patient's BMI greater than 35? 0 0   Is your neck circumference greater than 17 inches (Male) or 16 inches (Female)? 1 1   Is the patient older than 48? 1 1   Is the patient male? 1 1   LICHA Score 6 6   Has the patient been referred to Sleep Medicine?  1 -   Has the patient previously been diagnosed with Obstructive Sleep Apnea? 0 -       3 most recent PHQ Screens 11/4/2022   Little interest or pleasure in doing things Not at all   Feeling down, depressed, irritable, or hopeless Not at all   Total Score PHQ 2 0   Trouble falling or staying asleep, or sleeping too much -   Feeling tired or having little energy -   Poor appetite, weight loss, or overeating -   Feeling bad about yourself - or that you are a failure or have let yourself or your family down -   Trouble concentrating on things such as school, work, reading, or watching TV -   Moving or speaking so slowly that other people could have noticed; or the opposite being so fidgety that others notice -   Thoughts of being better off dead, or hurting yourself in some way -   How difficult have these problems made it for you to do your work, take care of your home and get along with others -       Demorest Scale 11/4/2022 3/11/2020   Sitting and Reading 1 2   Watching TV 1 3   Sitting, inactive in a public place (e.g. a movie theater or meeting) 0 0   As a passenger in a car for an hour, without a break 0 0   Lying down to rest in the afternoon, when circumstances permit 1 1   Sitting and talking to someone 0 0   Sitting quietly after lunch without alcohol 1 1   In a car, while stopped for a few minutes in traffic 0 0   Demorest Sleepiness Score 4 7                Past Medical History:  Past Medical History:   Diagnosis Date    Abnormal myocardial perfusion study 2/28/2016    Partially reversible inferior perfusion defect, EF 57%    AI (aortic insufficiency)     Moderate with possible bicuspid aortic valve    Chronic low back pain     Chronic venous insufficiency     Colon polyp 03/2012    Benign serrated polyp    Fracture 05/2016    rib fx's    GERD (gastroesophageal reflux disease)     History of echocardiogram 2/28/2016    EF 55%. Basal inferior hypokinesis. Gr 1 DDfx. Mod AI. Hyperlipidemia     Hypertension     Osteoarthritis of right knee     Osteopenia     Prediabetes     Prepatellar bursitis of right knee     Recurrent genital herpes 1/16/2013    Right lumbar radiculopathy 10/2016    MRI: prominent right paracentral L4-L5 disc extrusion/impingement of descending right L5 nerve root; moderately severe canal stenosis; prominent right paracentral L5-S1 disc protrusion/ impingement of the descending right S1 nerve root; abutment of the foraminal right L5 nerve root/moderate foramina stenosis at L5-S1; mild canal stenosis at L3-L4 with abutment of the descending left L4 nerve root.     S/P cardiac catheterization 3/1/2016    Normal coronary anatomy       Past Surgical History:  Past Surgical History:   Procedure Laterality Date    ENDOSCOPY, COLON, DIAGNOSTIC      HX CHOLECYSTECTOMY      HX LUMBAR LAMINECTOMY  07/05/2018    R L4/5/S1 Dr. Anthony Corona TONSILLECTOMY         Family History:  Family History   Problem Relation Age of Onset    Cancer Father 61        esophageal    Hypertension Father     Cancer Mother 80        ovarian cancer     Hypertension Brother     Diabetes Other         Grandmother    OSTEOARTHRITIS Other     Other Other         Stomach problems; Father, grandfather       Social History:    Caffeine Amount Time of last Intake Comments   Coffee 2-3 C/day morning    Soda Rare     Tea Rare     Energy Drinks None     Over- the - counter stimulant pills None     Other Substances      Alcohol sporadic no more than 4 beers at a time- usually  Will drink 2 at a sitting   Gave up beer for [employer] use   Tobacco None  Quit smoking 2000 1/2 PPD x 25 years; chewing 1995   Drugs None     Other:          Medications:  Current Outpatient Medications on File Prior to Visit   Medication Sig Dispense Refill    valACYclovir (VALTREX) 500 mg tablet TAKE 1 TABLET DAILY. 90 Tablet 2    albuterol (PROVENTIL HFA, VENTOLIN HFA, PROAIR HFA) 90 mcg/actuation inhaler Take 1 Puff by inhalation every six (6) hours as needed for Wheezing. 1 Each 3    pantoprazole (PROTONIX) 40 mg tablet Take 40 mg by mouth two (2) times a day. amLODIPine (NORVASC) 5 mg tablet Take 1 Tablet by mouth daily. 90 Tablet 3    losartan (COZAAR) 100 mg tablet Take 1 Tablet by mouth daily. 90 Tablet 3    clindamycin (CLEOCIN T) 1 % external solution Use thin film on affected area of scalp every 12 hours  Indications: acne 60 mL 3    ascorbic acid, vitamin C, (VITAMIN C) 500 mg tablet Take 500 mg by mouth daily. magnesium oxide (MAG-OX) 400 mg tablet Take 400 mg by mouth.       inhalational spacing device 1 Each by Does Not Apply route as needed (shortness of breath). 1 Device 0    nitroglycerin (NITROSTAT) 0.4 mg SL tablet 1 Tab by SubLINGual route every five (5) minutes as needed for Chest Pain. Up to 3 doses. 1 Bottle 6    calcium carbonate (CALCIUM 300 PO) Take 1 Tab by mouth daily. aspirin delayed-release 81 mg tablet Take 81 mg by mouth daily. Cholecalciferol, Vitamin D3, 2,000 unit cap Take 2,000 Units by mouth daily as needed. DOCOSAHEXANOIC ACID/EPA (FISH OIL PO) Take 1 Tab by mouth daily. No current facility-administered medications on file prior to visit.         Allergy:  Allergies   Allergen Reactions    Tree Nuts Anaphylaxis    Ace Inhibitors Cough    Nuts [Tree Nut] Itching       Review of Systems  General ROS: positive for  - fatigue and sleep disturbance  negative for - chills, fever, hot flashes, malaise or night sweats  ENT ROS: negative for - epistaxis, hearing change, nasal discharge, nasal polyps, oral lesions, sinus pain, sneezing, sore throat, tinnitus, vertigo, visual changes or vocal changes, positive for nasal congestion and history of nasal fracture- age 10  Hematological and Lymphatic ROS: negative for - bleeding problems, blood clots, bruising, jaundice, pallor or swollen lymph nodes  Endocrine ROS: negative for - polydipsia/polyuria, skin changes, temperature intolerance or unexpected weight changes  Respiratory ROS: positive for - shortness of breath  negative for - cough, hemoptysis, orthopnea, sputum changes, stridor, tachypnea or wheezing  Cardiovascular ROS: positive for - dyspnea on exertion, and chest discomfort, and as noted above  Gastrointestinal ROS: no abdominal pain, change in bowel habits, or black or bloody stools  Genito-Urinary ROS: no dysuria, trouble voiding, or hematuria  Musculoskeletal ROS: as above  Neurological ROS: no TIA or stroke symptoms  Dermatological ROS: negative for - pruritus, rash or skin lesion changes   Psychological ROS: as above   Otherwise negative and per HPI      Physical Exam:  Blood pressure 122/71, pulse 74, temperature 97 °F (36.1 °C), temperature source Temporal, resp. rate 18, height 6' (1.829 m), weight 101.6 kg (224 lb), SpO2 97 %. on RA, Body mass index is 30.38 kg/m². General: No distress, acyanotic, appears stated age, cooperative, pleasant  HEENT: PERRL, EOMI, throat without erythema or exudate, Tongue- dental indention on tongue, Mallampati's score 3+, Uvula- midline, Tonsils- not seen  Neck: Supple,  no abnormally enlarged lymph nodes, thyroid is not enlarged, non-tender, No JVD, no carotid bruits  Chest: Grossly normal.  Lungs: Moderate air entry, clear to auscultation bilaterally- No wheezing  Heart: Regular rate and rhythm, S1S2 present, without murmur. Abdomen: Protuberant, soft  Extremity: Negative for cyanosis, edema, or clubbing. Skin: Skin color, texture, turgor normal. No rashes or lesions. Neurological: CN 2-12 grossly intact, normal muscle tone. Data Reviewed:  CBC:   Lab Results   Component Value Date/Time    WBC 6.6 08/29/2022 10:34 AM    HGB 17.1 (H) 08/29/2022 10:34 AM    HCT 50.2 (H) 08/29/2022 10:34 AM    PLATELET 295 20/20/6060 10:34 AM    MCV 90.1 08/29/2022 10:34 AM      Latest Reference Range & Units 2/19/19 09:06 5/20/19 09:50 9/3/19 09:27 2/20/20 08:48 8/20/20 08:34 2/18/21 08:40 8/24/21 08:49 8/29/22 10:34   WBC 4.6 - 13.2 K/uL 6.6 6.1 6.6 7.3 6.0 7.5 7.1 6.6   HGB 13.0 - 16.0 g/dL 16.3 (H) 16.1 (H) 16.1 (H) 16.5 (H) 16.3 17.3 16.9 17.1 (H)   EOSINOPHILS 0 - 5 % 3 3 4 3 3 3 3 3   ABS.  EOSINOPHILS 0.0 - 0.4 K/UL 0.2 0.2 0.3 0.2 0.2 0.2 0.2 0.2   (H): Data is abnormally high      BMP:   Lab Results   Component Value Date/Time    Sodium 139 08/29/2022 10:34 AM    Potassium 4.4 08/29/2022 10:34 AM    Chloride 108 08/29/2022 10:34 AM    CO2 26 08/29/2022 10:34 AM    Anion gap 5 08/29/2022 10:34 AM    Glucose 110 (H) 08/29/2022 10:34 AM    BUN 18 08/29/2022 10:34 AM    Creatinine 1.17 08/29/2022 10:34 AM    BUN/Creatinine ratio 15 08/29/2022 10:34 AM    GFR est AA >60 08/29/2022 10:34 AM    GFR est non-AA >60 08/29/2022 10:34 AM    Calcium 9.2 08/29/2022 10:34 AM        TSH:  Lab Results   Component Value Date/Time    TSH 1.57 08/29/2022 10:34 AM    TSH 1.940 08/24/2021 08:49 AM    TSH 1.600 08/20/2020 08:34 AM    TSH 1.20 02/19/2019 09:06 AM    TSH 1.91 02/16/2018 08:10 AM    TSH 1.08 02/08/2017 09:54 AM    TSH 1.29 02/02/2016 03:00 PM    TSH 1.100 01/14/2015 08:44 AM    TSH 1.510 01/07/2014 08:04 AM    TSH 2.030 07/10/2012 08:00 AM     Lab Results   Component Value Date/Time    Hemoglobin A1c 5.8 (H) 08/29/2022 10:34 AM     No results found for: BNP, BNPP, BNPPPOC, XBNPT, BNPNT        Imaging:      Cardiac Echo:   03/30/22    ECHO ADULT COMPLETE 03/30/2022 3/30/2022    Interpretation Summary    Left Ventricle: Left ventricle size is normal. Normal wall thickness. Normal wall motion. Low normal left ventricular systolic function with a visually estimated EF of 50 - 55%. Aortic Valve: Moderate regurgitation. Aortic Valve: Moderately thickened cusp. Mild to moderate regurgitation. No stenosis. Left Atrium: Left atrial volume index is normal (16-34 mL/m2). LA Vol Index A/L is 27 mL/m2. Aorta: Mildly dilated aortic root. Ao Root diameter is 4.0 cm. Signed by: Nahomi Bhardwaj MD on 3/30/2022  1:17 PM         9/7/17: SUMMARY:  Left ventricle: Systolic function was at the lower limits of normal by EF   (biplane method of disks). Ejection fraction  was estimated in the range of 50 % to 55 %. No obvious wall motion   abnormalities identified in the views obtained. Wall  thickness was mildly increased. Aortic valve: Leaflets exhibited mildly increased thickness, mild   calcification, normal cuspal separation, and  sclerosis. There was moderate regurgitation. Aorta, systemic arteries: The root exhibited mild dilatation. COMPARISONS:  There has been no significant change.  Comparison was made with the previous   study of 07-Aug-2014. Historical Sleep Testing Data: No Testing To Date.           Jordon Guzman DO, [facility]  Pulmonary, Sleep and Critical Care Medicine

## 2022-11-07 ENCOUNTER — OFFICE VISIT (OUTPATIENT)
Dept: PULMONOLOGY | Age: 62
End: 2022-11-07
Payer: COMMERCIAL

## 2022-11-07 ENCOUNTER — DOCUMENTATION ONLY (OUTPATIENT)
Dept: PULMONOLOGY | Age: 62
End: 2022-11-07

## 2022-11-07 VITALS — BODY MASS INDEX: 30.48 KG/M2 | HEIGHT: 72 IN | WEIGHT: 225 LBS

## 2022-11-07 DIAGNOSIS — Z01.818 PRE-OP EXAM: ICD-10-CM

## 2022-11-07 DIAGNOSIS — R06.00 DYSPNEA, UNSPECIFIED TYPE: Primary | ICD-10-CM

## 2022-11-07 PROCEDURE — 94060 EVALUATION OF WHEEZING: CPT | Performed by: INTERNAL MEDICINE

## 2022-11-07 NOTE — TELEPHONE ENCOUNTER
Patient states he will have the EKG and labs done either tomorrow or weds at [facility].

## 2022-11-08 ENCOUNTER — HOSPITAL ENCOUNTER (OUTPATIENT)
Dept: PREADMISSION TESTING | Age: 62
Discharge: HOME | End: 2022-11-08
Payer: COMMERCIAL

## 2022-11-08 ENCOUNTER — TRANSCRIBE ORDER (OUTPATIENT)
Dept: REGISTRATION | Age: 62
End: 2022-11-08

## 2022-11-08 DIAGNOSIS — M17.12 DEGENERATIVE ARTHRITIS OF LEFT KNEE: Primary | ICD-10-CM

## 2022-11-08 DIAGNOSIS — M17.12 DEGENERATIVE ARTHRITIS OF LEFT KNEE: ICD-10-CM

## 2022-11-08 LAB
ALBUMIN SERPL-MCNC: 3.9 G/DL (ref 3.4–5)
ALBUMIN/GLOB SERPL: 1.3 {RATIO} (ref 0.8–1.7)
ALP SERPL-CCNC: 83 U/L (ref 45–117)
ALT SERPL-CCNC: 37 U/L (ref 16–61)
ANION GAP SERPL CALC-SCNC: 4 MMOL/L (ref 3–18)
APPEARANCE UR: NORMAL
APTT PPP: 26.9 SEC (ref 23–36.4)
AST SERPL-CCNC: 25 U/L (ref 10–38)
ATRIAL RATE: 66 BPM
BASOPHILS # BLD: 0.1 K/UL (ref 0–0.1)
BASOPHILS NFR BLD: 1 % (ref 0–2)
BILIRUB SERPL-MCNC: 0.6 MG/DL (ref 0.2–1)
BILIRUB UR QL: NEGATIVE
BUN SERPL-MCNC: 20 MG/DL (ref 7–18)
BUN/CREAT SERPL: 16 (ref 12–20)
CALCIUM SERPL-MCNC: 9.6 MG/DL (ref 8.5–10.1)
CALCULATED P AXIS, ECG09: 79 DEGREES
CALCULATED R AXIS, ECG10: 45 DEGREES
CALCULATED T AXIS, ECG11: 85 DEGREES
CHLORIDE SERPL-SCNC: 105 MMOL/L (ref 100–111)
CO2 SERPL-SCNC: 30 MMOL/L (ref 21–32)
COLOR UR: YELLOW
CREAT SERPL-MCNC: 1.27 MG/DL (ref 0.6–1.3)
DIAGNOSIS, 93000: NORMAL
DIFFERENTIAL METHOD BLD: NORMAL
EOSINOPHIL # BLD: 0.2 K/UL (ref 0–0.4)
EOSINOPHIL NFR BLD: 3 % (ref 0–5)
ERYTHROCYTE [DISTWIDTH] IN BLOOD BY AUTOMATED COUNT: 13 % (ref 11.6–14.5)
ERYTHROCYTE [SEDIMENTATION RATE] IN BLOOD: 1 MM/HR (ref 0–20)
EST. AVERAGE GLUCOSE BLD GHB EST-MCNC: 123 MG/DL
GLOBULIN SER CALC-MCNC: 3 G/DL (ref 2–4)
GLUCOSE SERPL-MCNC: 96 MG/DL (ref 74–99)
GLUCOSE UR STRIP.AUTO-MCNC: NEGATIVE MG/DL
HBA1C MFR BLD: 5.9 % (ref 4.2–5.6)
HCT VFR BLD AUTO: 45.9 % (ref 36–48)
HGB BLD-MCNC: 15.8 G/DL (ref 13–16)
HGB UR QL STRIP: NEGATIVE
IMM GRANULOCYTES # BLD AUTO: 0 K/UL (ref 0–0.04)
IMM GRANULOCYTES NFR BLD AUTO: 0 % (ref 0–0.5)
INR PPP: 1 (ref 0.8–1.2)
KETONES UR QL STRIP.AUTO: NEGATIVE MG/DL
LEUKOCYTE ESTERASE UR QL STRIP.AUTO: NEGATIVE
LYMPHOCYTES # BLD: 2.3 K/UL (ref 0.9–3.6)
LYMPHOCYTES NFR BLD: 32 % (ref 21–52)
MCH RBC QN AUTO: 30.5 PG (ref 24–34)
MCHC RBC AUTO-ENTMCNC: 34.4 G/DL (ref 31–37)
MCV RBC AUTO: 88.6 FL (ref 78–100)
MONOCYTES # BLD: 0.6 K/UL (ref 0.05–1.2)
MONOCYTES NFR BLD: 9 % (ref 3–10)
NEUTS SEG # BLD: 3.9 K/UL (ref 1.8–8)
NEUTS SEG NFR BLD: 55 % (ref 40–73)
NITRITE UR QL STRIP.AUTO: NEGATIVE
NRBC # BLD: 0 K/UL (ref 0–0.01)
NRBC BLD-RTO: 0 PER 100 WBC
P-R INTERVAL, ECG05: 168 MS
PH UR STRIP: 7 [PH] (ref 5–8)
PLATELET # BLD AUTO: 198 K/UL (ref 135–420)
PMV BLD AUTO: 10 FL (ref 9.2–11.8)
POTASSIUM SERPL-SCNC: 4.4 MMOL/L (ref 3.5–5.5)
PROT SERPL-MCNC: 6.9 G/DL (ref 6.4–8.2)
PROT UR STRIP-MCNC: NEGATIVE MG/DL
PROTHROMBIN TIME: 13.1 SEC (ref 11.5–15.2)
Q-T INTERVAL, ECG07: 386 MS
QRS DURATION, ECG06: 96 MS
QTC CALCULATION (BEZET), ECG08: 404 MS
RBC # BLD AUTO: 5.18 M/UL (ref 4.35–5.65)
SODIUM SERPL-SCNC: 139 MMOL/L (ref 136–145)
SP GR UR REFRACTOMETRY: 1.02 (ref 1–1.03)
UROBILINOGEN UR QL STRIP.AUTO: 0.2 EU/DL (ref 0.2–1)
VENTRICULAR RATE, ECG03: 66 BPM
WBC # BLD AUTO: 7.2 K/UL (ref 4.6–13.2)

## 2022-11-08 PROCEDURE — 85025 COMPLETE CBC W/AUTO DIFF WBC: CPT

## 2022-11-08 PROCEDURE — 80053 COMPREHEN METABOLIC PANEL: CPT

## 2022-11-08 PROCEDURE — 93005 ELECTROCARDIOGRAM TRACING: CPT

## 2022-11-08 PROCEDURE — 85610 PROTHROMBIN TIME: CPT

## 2022-11-08 PROCEDURE — 81003 URINALYSIS AUTO W/O SCOPE: CPT

## 2022-11-08 PROCEDURE — 85730 THROMBOPLASTIN TIME PARTIAL: CPT

## 2022-11-08 PROCEDURE — 36415 COLL VENOUS BLD VENIPUNCTURE: CPT

## 2022-11-08 PROCEDURE — 83036 HEMOGLOBIN GLYCOSYLATED A1C: CPT

## 2022-11-08 PROCEDURE — 85652 RBC SED RATE AUTOMATED: CPT

## 2022-11-08 NOTE — TELEPHONE ENCOUNTER
Pt returned call. I attempted to schedule him for pre opt clearance    He was resistant. He does not understand why appt is needed. Stated he just saw Dr Enrique Lino recently (last ov 09/06/22)    He also said he had labs done last Friday for Dr Walt Haywood, has ov with her 11/22/22    Is scheduled for 11/15/22 for \"walking test\" PPA spirometery. He is also getting labs and chest xray for the surgeon     He also has an appt with cardiology, Dr Jose Gipson 11/29/22    He said can't she just review my labs, he thinks Dr Jose Gipson and Dr Walt Haywood are being asked to clear him for surgery .      Pt may be reached at [phone]

## 2022-11-10 LAB
BACTERIA SPEC CULT: NORMAL
BACTERIA SPEC CULT: NORMAL
SERVICE CMNT-IMP: NORMAL

## 2022-11-11 LAB
A ALTERNATA IGE QN: <0.1 KU/L
A FUMIGATUS IGE QN: <0.1 KU/L
AMER ROACH IGE QN: <0.1 KU/L
BAHIA GRASS IGE QN: <0.1 KU/L
BASOPHILS # BLD AUTO: 0.1 X10E3/UL (ref 0–0.2)
BASOPHILS NFR BLD AUTO: 1 %
BERMUDA GRASS IGE QN: <0.1 KU/L
BOXELDER IGE QN: <0.1 KU/L
C HERBARUM IGE QN: <0.1 KU/L
CAT DANDER IGE QN: <0.1 KU/L
CMN PIGWEED IGE QN: <0.1 KU/L
COMMON RAGWEED IGE QN: 0.26 KU/L
D FARINAE IGE QN: <0.1 KU/L
D PTERONYSS IGE QN: 0.16 KU/L
DOG DANDER IGE QN: <0.1 KU/L
ENGL PLANTAIN IGE QN: <0.1 KU/L
EOSINOPHIL # BLD AUTO: 0.2 X10E3/UL (ref 0–0.4)
EOSINOPHIL NFR BLD AUTO: 3 %
EPO SERPL-ACNC: 8.4 MIU/ML (ref 2.6–18.5)
ERYTHROCYTE [DISTWIDTH] IN BLOOD BY AUTOMATED COUNT: 13.1 % (ref 11.6–15.4)
HCT VFR BLD AUTO: 48.3 % (ref 37.5–51)
HGB BLD-MCNC: 16.2 G/DL (ref 13–17.7)
IGE SERPL-ACNC: 7 IU/ML (ref 6–495)
IMM GRANULOCYTES # BLD AUTO: 0 X10E3/UL (ref 0–0.1)
IMM GRANULOCYTES NFR BLD AUTO: 0 %
JOHNSON GRASS IGE QN: <0.1 KU/L
LONDON PLANE IGE QN: <0.1 KU/L
LYMPHOCYTES # BLD AUTO: 2.7 X10E3/UL (ref 0.7–3.1)
LYMPHOCYTES NFR BLD AUTO: 36 %
Lab: ABNORMAL
M RACEMOSUS IGE QN: <0.1 KU/L
MCH RBC QN AUTO: 30.5 PG (ref 26.6–33)
MCHC RBC AUTO-ENTMCNC: 33.5 G/DL (ref 31.5–35.7)
MCV RBC AUTO: 91 FL (ref 79–97)
MONOCYTES # BLD AUTO: 0.6 X10E3/UL (ref 0.1–0.9)
MONOCYTES NFR BLD AUTO: 8 %
MT JUNIPER IGE QN: <0.1 KU/L
MUGWORT IGE QN: <0.1 KU/L
NETTLE IGE QN: <0.1 KU/L
NEUTROPHILS # BLD AUTO: 3.9 X10E3/UL (ref 1.4–7)
NEUTROPHILS NFR BLD AUTO: 52 %
P NOTATUM IGE QN: <0.1 KU/L
PLATELET # BLD AUTO: 209 X10E3/UL (ref 150–450)
RBC # BLD AUTO: 5.31 X10E6/UL (ref 4.14–5.8)
S BOTRYOSUM IGE QN: <0.1 KU/L
SHEEP SORREL IGE QN: <0.1 KU/L
SILVER BIRCH IGE QN: <0.1 KU/L
SWEET GUM IGE QN: <0.1 KU/L
TIMOTHY IGE QN: <0.1 KU/L
WBC # BLD AUTO: 7.5 X10E3/UL (ref 3.4–10.8)
WHITE ELM IGE QN: <0.1 KU/L
WHITE HICKORY IGE QN: <0.1 KU/L
WHITE MULBERRY IGE QN: <0.1 KU/L
WHITE OAK IGE QN: <0.1 KU/L

## 2022-11-14 ENCOUNTER — TELEPHONE (OUTPATIENT)
Dept: INTERNAL MEDICINE CLINIC | Age: 62
End: 2022-11-14

## 2022-11-14 NOTE — TELEPHONE ENCOUNTER
Please check with Dr. Lizz Wright office to see if addendum to prior office note from 9/6/2022 would be acceptable for preoperative evaluation.

## 2022-11-14 NOTE — TELEPHONE ENCOUNTER
Dr Milan Gee office called me back and said it is completely up to Dr Dinorah Fernandez if she wants to review recent ekg and labs and do an addendum to her note of 9/6/22, they are fine with that.

## 2022-11-15 ENCOUNTER — CLINICAL SUPPORT (OUTPATIENT)
Dept: PULMONOLOGY | Age: 62
End: 2022-11-15
Payer: COMMERCIAL

## 2022-11-15 DIAGNOSIS — M25.562 CHRONIC PAIN OF BOTH KNEES: ICD-10-CM

## 2022-11-15 DIAGNOSIS — R06.83 SNORING: ICD-10-CM

## 2022-11-15 DIAGNOSIS — Z87.09 PERSONAL HISTORY OF ASTHMA: ICD-10-CM

## 2022-11-15 DIAGNOSIS — G89.29 CHRONIC PAIN OF BOTH KNEES: ICD-10-CM

## 2022-11-15 DIAGNOSIS — D75.1 POLYCYTHEMIA: ICD-10-CM

## 2022-11-15 DIAGNOSIS — R06.00 DYSPNEA, UNSPECIFIED TYPE: ICD-10-CM

## 2022-11-15 DIAGNOSIS — M25.561 CHRONIC PAIN OF BOTH KNEES: ICD-10-CM

## 2022-11-15 DIAGNOSIS — G47.19 EXCESSIVE DAYTIME SLEEPINESS: ICD-10-CM

## 2022-11-15 PROCEDURE — 94618 PULMONARY STRESS TESTING: CPT | Performed by: INTERNAL MEDICINE

## 2022-11-16 NOTE — TELEPHONE ENCOUNTER
Preoperative evaluation addendum added to office note from 9/6/2022. Please fax to Dr. Cristian Neil for upcoming left knee replacement surgery on 12/5/2022.

## 2022-11-22 ENCOUNTER — OFFICE VISIT (OUTPATIENT)
Dept: PULMONOLOGY | Age: 62
End: 2022-11-22
Payer: COMMERCIAL

## 2022-11-22 VITALS
BODY MASS INDEX: 30.53 KG/M2 | RESPIRATION RATE: 16 BRPM | SYSTOLIC BLOOD PRESSURE: 122 MMHG | OXYGEN SATURATION: 97 % | HEIGHT: 72 IN | TEMPERATURE: 97.1 F | DIASTOLIC BLOOD PRESSURE: 71 MMHG | WEIGHT: 225.4 LBS | HEART RATE: 95 BPM

## 2022-11-22 DIAGNOSIS — R09.81 NASAL CONGESTION: ICD-10-CM

## 2022-11-22 DIAGNOSIS — D75.1 POLYCYTHEMIA: ICD-10-CM

## 2022-11-22 DIAGNOSIS — G89.29 CHRONIC PAIN OF BOTH KNEES: ICD-10-CM

## 2022-11-22 DIAGNOSIS — M25.561 CHRONIC PAIN OF BOTH KNEES: ICD-10-CM

## 2022-11-22 DIAGNOSIS — R06.00 DYSPNEA, UNSPECIFIED TYPE: Primary | ICD-10-CM

## 2022-11-22 DIAGNOSIS — M25.562 CHRONIC PAIN OF BOTH KNEES: ICD-10-CM

## 2022-11-22 DIAGNOSIS — G47.19 EXCESSIVE DAYTIME SLEEPINESS: ICD-10-CM

## 2022-11-22 DIAGNOSIS — E66.9 OBESITY (BMI 30-39.9): ICD-10-CM

## 2022-11-22 PROCEDURE — 3078F DIAST BP <80 MM HG: CPT | Performed by: INTERNAL MEDICINE

## 2022-11-22 PROCEDURE — 99214 OFFICE O/P EST MOD 30 MIN: CPT | Performed by: INTERNAL MEDICINE

## 2022-11-22 PROCEDURE — 3074F SYST BP LT 130 MM HG: CPT | Performed by: INTERNAL MEDICINE

## 2022-11-22 NOTE — PROGRESS NOTES
[facility]  Pulmonary, Critical Care, and Sleep Medicine    Office Progress Note- Follow Up      Primary Care Physician: Lora Camarena MD     Reason for Visit: Follow Up for : Pre-Operative Risk Stratification, Dyspnea, Snoring and EDS    Assessment:    Dyspnea- broad differential at this time: Suspect some component of deconditioning, allergic rhinitis. Asthma workup negative thus far. The patient does carry a lot of his weight over his abdomen, which is also probably contributary    Snoring: Patient has symptoms and exam findings highly suggestive of a sleep breathing disorder, such as LICHA. Given severity of symptoms and comorbidities additional in-lab sleep testing is indicated. Excessive Daytime Sleepiness (EDS): there could be multiple reasons for this condition: possible LICHA, insufficient sleep time (wife disturbs his sleep due to untreated LICHA- she could not tolerate PAP therapy), circadian rhythm disorder, erythrocytosis,    Possible Circadian Rhythm disorder: Patient used to work 12 hour night shifts x 6 yeas- Patient reports his sleep pattern never returned to normal since that time. Erythrocytosis/Polycythemia: Chronic and persistent. Unclear etiology at this time. It could be do to untreated LICHA vs primary hematologic process- Erythropoietin levels have remained in the normal range on follow up testing    Aortic Valve disease: last echo in 2017 notable for mild sclerosis and moderate MR- need to get copy of most recent study    Nasal Congestion with H/O nasal fracture:      Report of childhood asthma- No signs of acute asthma. Need to monitor eosinophile count which is on the high side    Increased Family Stressors:    Chronic pain: OA- joint pain does disturb sleep at times    Pre-Operative Discussion: based on ARISCAT pulmonary risk stratification, the patient has a score of 11-26.   If procedure is less than 2 hours then the score is 11 if 2-3 hours then the score increases to 26. If surgical procedure is greater than 3 hours, the score increases to 34. These observations suggest that that the patient is at low and possibly moderate risk (score 26-44) for stacie- and post operative complications. Please noted that ARISCAT does not take into account wether a patient has LICHA  or not. The patient may have LICHA, so LICHA precautions are recommended until sleep testing can be completed. Plan:    Hematology consultation. - when able   Follow up with GI and Cardioogy. HSAT- still pending at this time. Our office called sleep lab today to try and expedite  Potential consequences of untreated sleep apnea, and/or excessive daytime sleepiness were discussed with the patient. Educational materials provided. Treatment options including CPAP, dental appliance, weight reduction measures, positional therapy, surgeries etc were discussed. Healthy lifestyle changes to include weight loss and exercise discussed. Healthy sleep habits were reviewed and encouraged.  and workplace safety reviewed and discussed as appropriate. Drowsy and/or inattentive driving should be avoided. Follow-up in after sleep testing , sooner should new symptoms or problems arise. History of Present Illness: Mr. Jordan Arreola is a 57 y.o. male patient who re-presents for evaluation of dyspnea, EDS and pre-operative evaluation . The history was provided by the patient. ng episodes after cutting the grass. Symptoms may have increased since he underwent his EGD procedure in Dec 2020.       Today the following is noted and/or reported: Last Clinic Visit:    The patient is pending knee replacement surgery on 12/5/22  PFTs were noted to be normal  6MW noted some dyspnea on exertion but no supplemental  oxygen requirement  IgE, erythropoietin level were normal  Allergy Zones 2 testing noted mild allergy response to ragweed and dog danger  The patient reports that he does continue with some mild SALAZAR  The patient is pending follow up with his cardiologist next week  His home sleep apnea testing is still pending at this time  No wheezing or chest pain  No syncope, tunnel vision, or near syncope  Last Sunday the patient went to a [facility] and stood from [id number]. He notes some mild musculoskeletal discomfort but no associated chest pain, dizziness or wheezig  No cough, does not feel ill    Stop Harriet Hannavazquez 11/22/2022 11/4/2022 3/11/2020   Does the patient snore loudly (louder than talking or loud enough to be heard through closed doors)? 1 1 1   Does the patient often feel tired, fatigued, or sleepy during the daytime, even after a \"good\" night's sleep? 1 1 1   Has anyone ever observed the patient stop breathing during their sleep?  0 0 0   Does the patient have or are they being treated for high blood pressure? 1 1 1   Is the patient's BMI greater than 35? 0 0 0   Is your neck circumference greater than 17 inches (Male) or 16 inches (Female)? 1 1 1   Is the patient older than 48? 1 1 1   Is the patient male? 1 1 1   LICHA Score 6 6 6   Has the patient been referred to Sleep Medicine?  - 1 -   Has the patient previously been diagnosed with Obstructive Sleep Apnea? - 0 -       3 most recent PHQ Screens 11/22/2022   Little interest or pleasure in doing things Not at all   Feeling down, depressed, irritable, or hopeless Not at all   Total Score PHQ 2 0   Trouble falling or staying asleep, or sleeping too much -   Feeling tired or having little energy -   Poor appetite, weight loss, or overeating -   Feeling bad about yourself - or that you are a failure or have let yourself or your family down -   Trouble concentrating on things such as school, work, reading, or watching TV -   Moving or speaking so slowly that other people could have noticed; or the opposite being so fidgety that others notice -   Thoughts of being better off dead, or hurting yourself in some way -   How difficult have these problems made it for you to do your work, take care of your home and get along with others -       Nye Scale 11/22/2022 11/4/2022 3/11/2020   Sitting and Reading 2 1 2   Watching TV 2 1 3   Sitting, inactive in a public place (e.g. a movie theater or meeting) 0 0 0   As a passenger in a car for an hour, without a break 0 0 0   Lying down to rest in the afternoon, when circumstances permit 2 1 1   Sitting and talking to someone 0 0 0   Sitting quietly after lunch without alcohol 1 1 1   In a car, while stopped for a few minutes in traffic 0 0 0   Nye Sleepiness Score 7 4 7                Past Medical History:  Past Medical History:   Diagnosis Date    Abnormal myocardial perfusion study 2/28/2016    Partially reversible inferior perfusion defect, EF 57%    AI (aortic insufficiency)     Moderate with possible bicuspid aortic valve    Chronic low back pain     Chronic venous insufficiency     Colon polyp 03/2012    Benign serrated polyp    Fracture 05/2016    rib fx's    GERD (gastroesophageal reflux disease)     History of echocardiogram 2/28/2016    EF 55%. Basal inferior hypokinesis. Gr 1 DDfx. Mod AI. Hyperlipidemia     Hypertension     Osteoarthritis of right knee     Osteopenia     Prediabetes     Prepatellar bursitis of right knee     Recurrent genital herpes 1/16/2013    Right lumbar radiculopathy 10/2016    MRI: prominent right paracentral L4-L5 disc extrusion/impingement of descending right L5 nerve root; moderately severe canal stenosis; prominent right paracentral L5-S1 disc protrusion/ impingement of the descending right S1 nerve root; abutment of the foraminal right L5 nerve root/moderate foramina stenosis at L5-S1; mild canal stenosis at L3-L4 with abutment of the descending left L4 nerve root.     S/P cardiac catheterization 3/1/2016    Normal coronary anatomy       Past Surgical History:  Past Surgical History:   Procedure Laterality Date    ENDOSCOPY, COLON, DIAGNOSTIC      HX CHOLECYSTECTOMY      HX LUMBAR LAMINECTOMY  07/05/2018    R L4/5/S1 Dr. Vasile Quijano TONSILLECTOMY         Family History:  Family History   Problem Relation Age of Onset    Cancer Father 61        esophageal    Hypertension Father     Cancer Mother 80        ovarian cancer     Hypertension Brother     Diabetes Other         Grandmother    OSTEOARTHRITIS Other     Other Other         Stomach problems; Father, grandfather       Social History:    Caffeine Amount Time of last Intake Comments   Coffee 2-3 C/day morning    Soda Rare     Tea Rare     Energy Drinks None     Over- the - counter stimulant pills None     Other Substances      Alcohol sporadic no more than 4 beers at a time- usually  Will drink 2 at a sitting   Gave up beer for [employer] use   Tobacco None  Quit smoking 2000 1/2 PPD x 25 years; chewing 1995   Drugs None     Other:          Medications:  Current Outpatient Medications on File Prior to Visit   Medication Sig Dispense Refill    valACYclovir (VALTREX) 500 mg tablet TAKE 1 TABLET DAILY. 90 Tablet 2    albuterol (PROVENTIL HFA, VENTOLIN HFA, PROAIR HFA) 90 mcg/actuation inhaler Take 1 Puff by inhalation every six (6) hours as needed for Wheezing. 1 Each 3    pantoprazole (PROTONIX) 40 mg tablet Take 40 mg by mouth two (2) times a day. amLODIPine (NORVASC) 5 mg tablet Take 1 Tablet by mouth daily. 90 Tablet 3    losartan (COZAAR) 100 mg tablet Take 1 Tablet by mouth daily. 90 Tablet 3    ascorbic acid, vitamin C, (VITAMIN C) 500 mg tablet Take 500 mg by mouth daily. magnesium oxide (MAG-OX) 400 mg tablet Take 400 mg by mouth. inhalational spacing device 1 Each by Does Not Apply route as needed (shortness of breath). 1 Device 0    calcium carbonate (CALCIUM 300 PO) Take 1 Tab by mouth daily. aspirin delayed-release 81 mg tablet Take 81 mg by mouth daily. Cholecalciferol, Vitamin D3, 2,000 unit cap Take 2,000 Units by mouth daily as needed.       DOCOSAHEXANOIC ACID/EPA (FISH OIL PO) Take 1 Tab by mouth daily. clindamycin (CLEOCIN T) 1 % external solution Use thin film on affected area of scalp every 12 hours  Indications: acne (Patient not taking: Reported on 11/22/2022) 60 mL 3    nitroglycerin (NITROSTAT) 0.4 mg SL tablet 1 Tab by SubLINGual route every five (5) minutes as needed for Chest Pain. Up to 3 doses. (Patient not taking: Reported on 11/22/2022) 1 Bottle 6     No current facility-administered medications on file prior to visit.         Allergy:  Allergies   Allergen Reactions    Tree Nuts Anaphylaxis    Ace Inhibitors Cough    Nuts [Tree Nut] Itching       Review of Systems  General ROS: positive for  - fatigue and sleep disturbance  negative for - chills, fever, hot flashes, malaise or night sweats  ENT ROS: negative for - epistaxis, hearing change, nasal discharge, nasal polyps, oral lesions, sinus pain, sneezing, sore throat, tinnitus, vertigo, visual changes or vocal changes, positive for nasal congestion and history of nasal fracture- age 10  Hematological and Lymphatic ROS: negative for - bleeding problems, blood clots, bruising, jaundice, pallor or swollen lymph nodes  Endocrine ROS: negative for - polydipsia/polyuria, skin changes, temperature intolerance or unexpected weight changes  Respiratory ROS: positive for - shortness of breath  negative for - cough, hemoptysis, orthopnea, sputum changes, stridor, tachypnea or wheezing  Cardiovascular ROS: positive for - dyspnea on exertion, and chest discomfort, and as noted above  Gastrointestinal ROS: no abdominal pain, change in bowel habits, or black or bloody stools  Genito-Urinary ROS: no dysuria, trouble voiding, or hematuria  Musculoskeletal ROS: as above  Neurological ROS: no TIA or stroke symptoms  Dermatological ROS: negative for - pruritus, rash or skin lesion changes   Psychological ROS: as above   Otherwise negative and per HPI      Physical Exam:  Blood pressure 122/71, pulse 95, temperature 97.1 °F (36.2 °C), temperature source Temporal, resp. rate 16, height 6' (1.829 m), weight 102.2 kg (225 lb 6.4 oz), SpO2 97 %. on RA, Body mass index is 30.57 kg/m². General: No distress, acyanotic, appears stated age, cooperative, pleasant  HEENT: PERRL, EOMI, throat without erythema or exudate, Tongue- dental indention on tongue, Mallampati's score 3+, Uvula- midline, Tonsils- not seen  Neck: Supple,  no abnormally enlarged lymph nodes, thyroid is not enlarged, non-tender, No JVD, no carotid bruits  Chest: Grossly normal.  Lungs: Moderate air entry, clear to auscultation bilaterally- No wheezing  Heart: Regular rate and rhythm, S1S2 present, without murmur. Abdomen: Protuberant, soft  Extremity: Negative for cyanosis, edema, or clubbing. Skin: Skin color, texture, turgor normal. No rashes or lesions. Neurological: CN 2-12 grossly intact, normal muscle tone. Data Reviewed:  CBC:   Lab Results   Component Value Date/Time    WBC 7.2 11/08/2022 02:13 PM    HGB 15.8 11/08/2022 02:13 PM    HCT 45.9 11/08/2022 02:13 PM    PLATELET 726 63/10/1669 02:13 PM    MCV 88.6 11/08/2022 02:13 PM      Latest Reference Range & Units 2/19/19 09:06 5/20/19 09:50 9/3/19 09:27 2/20/20 08:48 8/20/20 08:34 2/18/21 08:40 8/24/21 08:49 8/29/22 10:34   WBC 4.6 - 13.2 K/uL 6.6 6.1 6.6 7.3 6.0 7.5 7.1 6.6   HGB 13.0 - 16.0 g/dL 16.3 (H) 16.1 (H) 16.1 (H) 16.5 (H) 16.3 17.3 16.9 17.1 (H)   EOSINOPHILS 0 - 5 % 3 3 4 3 3 3 3 3   ABS.  EOSINOPHILS 0.0 - 0.4 K/UL 0.2 0.2 0.3 0.2 0.2 0.2 0.2 0.2   (H): Data is abnormally high      Component Ref Range & Units 11/4/22 0000 5/20/19 0950   Erythropoietin 2.6 - 18.5 mIU/mL 8.4  8.5 CM         Component Ref Range & Units 11/4/22 0000    Immunoglobulin E 6 - 495 IU/mL 7    Resulting Agency         PROFILE, ZONE 2  Order: [id number]  Collected 11/4/2022 00:00    Status: Final result    Next appt: 11/28/2022 at 12:30 PM in Surgery (OR Preadmission Testing)    0 Result Notes  Component Ref Range & Units 11/4/22 0000    CLASS DESCRIPTION Comment       D. pteronyssinus Class 0/I kU/L 0.16 Abnormal     D. farinae Mite Class 0 kU/L <0.10    Cat Hair/Dander Class 0 kU/L <0.10    Dog Hair/Dander Class 0 kU/L <0.10    B[place] Grass Class 0 kU/L <0.10    Samuel grass Class 0 kU/L <0.10    Adolfo Grass Class 0 kU/L <0.10    Bahia Grass Class 0 kU/L <0.10    P059-[id number] COCKROACH, AMERICAN Class 0 kU/L <0.10    Penicillium notatum Class 0 kU/L <0.10    Cladosporium herbarum Class 0 kU/L <0.10    Aspergillus fumigatus Class 0 kU/L <0.10    Mucor racemosus Class 0 kU/L <0.10    Alternaria tenuis Class 0 kU/L <0.10    Stemphylium botryosum Class 0 kU/L <0.10    [id number] COMMON SILVER BIRCH Class 0 kU/L <0.10    [place] Class 0 kU/L <0.10    American White Elm Class 0 kU/L <0.10    Maple/[place] Class 0 kU/L <0.10    White [place] Class 0 kU/L <0.10    [id number] MAPLE LEAF [place] Class 0 kU/L <0.10    White [place] Class 0 kU/L <0.10    Sweet Gum Class 0 kU/L <0.10    Mountain [place] Class 0 kU/L <0.10    Ragweed, Short/Common Class 0/I kU/L 0.26 Abnormal     Mugwort Class 0 kU/L <0.10    Plantain, English Class 0 kU/L <0.10    Pigweed, Rough Class 0 kU/L <0.10    Sheep Sorrel (Dock) Class 0 kU/L <0.10    Nettle Class 0 kU/L <0.10    Resulting Agency  01                  BMP:   Lab Results   Component Value Date/Time    Sodium 139 11/08/2022 02:13 PM    Potassium 4.4 11/08/2022 02:13 PM    Chloride 105 11/08/2022 02:13 PM    CO2 30 11/08/2022 02:13 PM    Anion gap 4 11/08/2022 02:13 PM    Glucose 96 11/08/2022 02:13 PM    BUN 20 (H) 11/08/2022 02:13 PM    Creatinine 1.27 11/08/2022 02:13 PM    BUN/Creatinine ratio 16 11/08/2022 02:13 PM    GFR est AA >60 08/29/2022 10:34 AM    GFR est non-AA >60 08/29/2022 10:34 AM    Calcium 9.6 11/08/2022 02:13 PM        TSH:  Lab Results   Component Value Date/Time    TSH 1.57 08/29/2022 10:34 AM    TSH 1.940 08/24/2021 08:49 AM    TSH 1.600 08/20/2020 08:34 AM    TSH 1.20 02/19/2019 09:06 AM    TSH 1.91 02/16/2018 08:10 AM    TSH 1.08 02/08/2017 09:54 AM    TSH 1.29 02/02/2016 03:00 PM    TSH 1.100 01/14/2015 08:44 AM    TSH 1.510 01/07/2014 08:04 AM    TSH 2.030 07/10/2012 08:00 AM     Lab Results   Component Value Date/Time    Hemoglobin A1c 5.9 (H) 11/08/2022 02:14 PM     No results found for: BNP, BNPP, BNPPPOC, XBNPT, BNPNT        Imaging:      Cardiac Echo:   03/30/22    ECHO ADULT COMPLETE 03/30/2022 3/30/2022    Interpretation Summary    Left Ventricle: Left ventricle size is normal. Normal wall thickness. Normal wall motion. Low normal left ventricular systolic function with a visually estimated EF of 50 - 55%. Aortic Valve: Moderate regurgitation. Aortic Valve: Moderately thickened cusp. Mild to moderate regurgitation. No stenosis. Left Atrium: Left atrial volume index is normal (16-34 mL/m2). LA Vol Index A/L is 27 mL/m2. Aorta: Mildly dilated aortic root. Ao Root diameter is 4.0 cm. Signed by: Chau Cheung MD on 3/30/2022  1:17 PM         9/7/17: SUMMARY:  Left ventricle: Systolic function was at the lower limits of normal by EF   (biplane method of disks). Ejection fraction  was estimated in the range of 50 % to 55 %. No obvious wall motion   abnormalities identified in the views obtained. Wall  thickness was mildly increased. Aortic valve: Leaflets exhibited mildly increased thickness, mild   calcification, normal cuspal separation, and  sclerosis. There was moderate regurgitation. Aorta, systemic arteries: The root exhibited mild dilatation. COMPARISONS:  There has been no significant change. Comparison was made with the previous   study of 07-Aug-2014. Imaging:  CXR: 11/4/22  Narrative & Impression   EXAM: 2 View Chest.     INDICATIONS: Dyspnea. Preop for knee replacement.   COMPARISON: 9/12/2019  TECHNIQUE: 2 views     FINDINGS:     LINES/TUBES/DEVICES:  None  LUNGS: Clear   MEDIASTINUM: Unremarkable  BONES/SOFT TISSUES: No acute findings     IMPRESSION  :     1.  No acute process. Historical Sleep Testing Data: No Testing To Date.           Isrrael Gracia DO, [facility]  Pulmonary, Sleep and Critical Care Medicine

## 2022-11-22 NOTE — PROGRESS NOTES
Rene Montano presents today for   Chief Complaint   Patient presents with    Results     Labs,PFT,6 Minute Walk ,CXR       Is someone accompanying this pt? No    Is the patient using any DME equipment during OV? No    -DME Company NA    Depression Screening:  3 most recent PHQ Screens 11/22/2022   Little interest or pleasure in doing things Not at all   Feeling down, depressed, irritable, or hopeless Not at all   Total Score PHQ 2 0   Trouble falling or staying asleep, or sleeping too much -   Feeling tired or having little energy -   Poor appetite, weight loss, or overeating -   Feeling bad about yourself - or that you are a failure or have let yourself or your family down -   Trouble concentrating on things such as school, work, reading, or watching TV -   Moving or speaking so slowly that other people could have noticed; or the opposite being so fidgety that others notice -   Thoughts of being better off dead, or hurting yourself in some way -   How difficult have these problems made it for you to do your work, take care of your home and get along with others -       Learning Assessment:  Learning Assessment 11/30/2021   PRIMARY LEARNER Patient   HIGHEST LEVEL OF EDUCATION - PRIMARY LEARNER  -   BARRIERS PRIMARY LEARNER -   [facility]    NEED -   LEARNER PREFERENCE PRIMARY DEMONSTRATION     -     -   ANSWERED BY patient   RELATIONSHIP SELF       Abuse Screening:  Abuse Screening Questionnaire 11/30/2021   Do you ever feel afraid of your partner? N   Are you in a relationship with someone who physically or mentally threatens you? N   Is it safe for you to go home? Y       Fall Risk  Fall Risk Assessment, last 12 mths 2/27/2020   Able to walk? Yes   Fall in past 12 months? Yes   Number of falls in past 12 months 1   Fall with injury? 1         Coordination of Care:  1. Have you been to the ER, urgent care clinic since your last visit?  Hospitalized since your last visit? No    2. Have you seen or consulted any other health care providers outside of the [address] [place] [address] since your last visit? Include any pap smears or colon screening.  No

## 2022-11-22 NOTE — LETTER
11/22/2022    Patient: Rene Montano   YOB: 1960   Date of Visit: 11/22/2022     Prince Hassan, [address]  [address] [place] [address]  Via [facility] [address]    Dear Prince Hassan MD,      Thank you for referring Mr. Conner Forward to [address] [place] [address] for evaluation. My notes for this consultation are attached. If you have questions, please do not hesitate to call me. I look forward to following your patient along with you.       Sincerely,    Jeri Alvarez, DO

## 2022-11-22 NOTE — PATIENT INSTRUCTIONS
Please call our clinic back at [phone] or send a message on Tangler if you have any questions or concerns or if you are experiencing any of the following: You have not received a follow up appointment within 30 days prior the recommended follow up time. If you are not tolerating treatment plan and/or not able to obtain equipment or prescribed medication(s). if you are experiencing any difficulties with the [employer]  (DME) Company you may be using or is assigned to you. Two weeks have passed and you have not received an appointment for a scheduled procedure. Two weeks have passed since you underwent a test and/or procedure and you have not received your results. If you are using a CPAP/BIPAP, or Home Ventilator Device- Please note the following. Currently, many DMEs are experiencing supply chain difficulties and orders for equipment may be back logged several weeks to months. Your  Durable Medical Equipment (DME ) company is supposed to provide you with replacement filters, tubing and masks. You can either call your DME when you need new supplies or you can arrange for an automatic shipment schedule. Your need to be seen by our office at lat minimum of every 12 months in order to renew the prescription for these supplies. Please make note of who your DME company is and their phone number. Please make sure that you clean your mask and hosing on a regular basis. Your DME can provide you with additional information regarding proper care and cleaning of your device           Safety is strongly encouraged. You should not drive if sleepy, tired, distracted and/or  fatigued. If a procedure or imaging study has been ordered for your by this clinic please call the [facility] at [facility] or [place] at  [address] [place] [address] [place] and blood work do not require appointments.   They are considered \"Walk-In\" services and can be obtained at either [facility] or [facility].     Blood work is performed at the Laboratory (Lab) from 08:00am - 04:00pm      -Thank you

## 2022-11-28 ENCOUNTER — HOSPITAL ENCOUNTER (OUTPATIENT)
Dept: PREADMISSION TESTING | Age: 62
Discharge: HOME | End: 2022-11-28

## 2022-11-28 VITALS — HEIGHT: 72 IN | BODY MASS INDEX: 30.48 KG/M2 | WEIGHT: 225 LBS

## 2022-11-28 RX ORDER — IBUPROFEN 800 MG/1
800 TABLET ORAL
COMMUNITY
End: 2022-12-05

## 2022-11-28 RX ORDER — SODIUM CHLORIDE, SODIUM LACTATE, POTASSIUM CHLORIDE, CALCIUM CHLORIDE 600; 310; 30; 20 MG/100ML; MG/100ML; MG/100ML; MG/100ML
125 INJECTION, SOLUTION INTRAVENOUS CONTINUOUS
Status: CANCELLED | OUTPATIENT
Start: 2022-11-28

## 2022-11-28 RX ORDER — CHOLECALCIFEROL TAB 125 MCG (5000 UNIT) 125 MCG
5000 TAB ORAL DAILY
COMMUNITY

## 2022-11-28 RX ORDER — LANOLIN ALCOHOL/MO/W.PET/CERES
400 CREAM (GRAM) TOPICAL DAILY
COMMUNITY

## 2022-11-28 RX ORDER — ACETAMINOPHEN 500 MG
1000 TABLET ORAL
COMMUNITY

## 2022-11-28 RX ORDER — LANOLIN ALCOHOL/MO/W.PET/CERES
1 CREAM (GRAM) TOPICAL
COMMUNITY

## 2022-11-28 RX ORDER — CEFAZOLIN SODIUM/WATER 2 G/20 ML
2 SYRINGE (ML) INTRAVENOUS ONCE
Status: CANCELLED | OUTPATIENT
Start: 2022-11-28 | End: 2022-11-28

## 2022-11-28 NOTE — PERIOP NOTES
No sleep apnea, removable prosthetic devices or family history of malignant hyperthermia. CHG wash x 3 days kit instructions reviewed. PCP is aware of the surgery. No participation in clinical trial or research study. Do not bring any valuables on DOS- jewelry, wallet, cash, laptop, medications. Does not meet criteria for special population at this time. Possible time delay day of surgery reviewed. Covid Status: Not Vaccinated DNR status- none.

## 2022-11-29 ENCOUNTER — OFFICE VISIT (OUTPATIENT)
Dept: CARDIOLOGY CLINIC | Age: 62
End: 2022-11-29
Payer: COMMERCIAL

## 2022-11-29 VITALS
DIASTOLIC BLOOD PRESSURE: 74 MMHG | WEIGHT: 228 LBS | OXYGEN SATURATION: 96 % | HEIGHT: 72 IN | BODY MASS INDEX: 30.88 KG/M2 | SYSTOLIC BLOOD PRESSURE: 118 MMHG | HEART RATE: 68 BPM

## 2022-11-29 DIAGNOSIS — I35.1 AORTIC VALVE INSUFFICIENCY, ETIOLOGY OF CARDIAC VALVE DISEASE UNSPECIFIED: Primary | ICD-10-CM

## 2022-11-29 DIAGNOSIS — I10 ESSENTIAL HYPERTENSION: ICD-10-CM

## 2022-11-29 DIAGNOSIS — E78.5 HYPERLIPIDEMIA, UNSPECIFIED HYPERLIPIDEMIA TYPE: ICD-10-CM

## 2022-11-29 DIAGNOSIS — I77.810 DILATED AORTIC ROOT (HCC): ICD-10-CM

## 2022-11-29 PROCEDURE — 3078F DIAST BP <80 MM HG: CPT | Performed by: INTERNAL MEDICINE

## 2022-11-29 PROCEDURE — 99214 OFFICE O/P EST MOD 30 MIN: CPT | Performed by: INTERNAL MEDICINE

## 2022-11-29 PROCEDURE — 3074F SYST BP LT 130 MM HG: CPT | Performed by: INTERNAL MEDICINE

## 2022-11-29 RX ORDER — AMLODIPINE BESYLATE 5 MG/1
5 TABLET ORAL DAILY
Qty: 90 TABLET | Refills: 3 | Status: SHIPPED | OUTPATIENT
Start: 2022-11-29

## 2022-11-29 NOTE — PROGRESS NOTES
Lester Courtney presents today for   Chief Complaint   Patient presents with    Follow-up     1 year    Surgical Clearance     12/5/22: left total knee replacement with Dr. Lillie Velasco at [address] preferred language for health care discussion is english/other. Is someone accompanying this pt? no    Is the patient using any DME equipment during Toby Edinger? no    Depression Screening:  3 most recent PHQ Screens 11/29/2022   Little interest or pleasure in doing things Not at all   Feeling down, depressed, irritable, or hopeless Not at all   Total Score PHQ 2 0   Trouble falling or staying asleep, or sleeping too much -   Feeling tired or having little energy -   Poor appetite, weight loss, or overeating -   Feeling bad about yourself - or that you are a failure or have let yourself or your family down -   Trouble concentrating on things such as school, work, reading, or watching TV -   Moving or speaking so slowly that other people could have noticed; or the opposite being so fidgety that others notice -   Thoughts of being better off dead, or hurting yourself in some way -   How difficult have these problems made it for you to do your work, take care of your home and get along with others -       Learning Assessment:  Learning Assessment 11/29/2022   PRIMARY LEARNER Patient   HIGHEST LEVEL OF EDUCATION - PRIMARY LEARNER  -   BARRIERS PRIMARY LEARNER -   [address] [place] [address]    NEED -   LEARNER PREFERENCE PRIMARY DEMONSTRATION     -     -   ANSWERED BY patient   RELATIONSHIP SELF       Abuse Screening:  Abuse Screening Questionnaire 11/29/2022   Do you ever feel afraid of your partner? N   Are you in a relationship with someone who physically or mentally threatens you? N   Is it safe for you to go home? Y       Fall Risk  Fall Risk Assessment, last 12 mths 2/27/2020   Able to walk? Yes   Fall in past 12 months? Yes   Number of falls in past 12 months 1   Fall with injury? 1           Pt currently taking Anticoagulant therapy? no    Pt currently taking Antiplatelet therapy ? Aspirin 81 mg daily      Coordination of Care:  1. Have you been to the ER, urgent care clinic since your last visit? Hospitalized since your last visit? no    2. Have you seen or consulted any other health care providers outside of the [address] [place] [address] since your last visit? Include any pap smears or colon screening.  no

## 2022-11-29 NOTE — PROGRESS NOTES
HISTORY OF PRESENT ILLNESS  Ella Peguero is a 57 y.o. male. Follow-up  Pertinent negatives include no chest pain, no abdominal pain, no headaches and no shortness of breath. Patient presents for a follow-up office visit. The patient has a history of hypertension, dyslipidemia, aortic insufficiency and possibly a bicuspid aortic valve with moderate aortic insufficiency. The patient was hospitalized at [facility] in 2016 for an episode of acute chest pain, which was apparently relieved by sublingual nitroglycerin in the ambulance. The patient states he had been under increased amount of stress at his job. He was ruled out for myocardial infarction, but underwent a nuclear stress test which was felt to be intermediate risk with a reversible inferior perfusion defect. This led to a cardiac catheterization on March 2016  which showed normal coronary anatomy. He had preserved LV systolic function. Moderate aortic insufficiency which was unchanged. More recently, he underwent a follow-up echocardiogram  in March 2022 which was unchanged compared to his previous studies. He continued to have a low normal LVEF of 50-55% with moderate aortic insufficiency and a mild to moderately dilated aortic root measuring 40 mm in diameter. This has been fairly stable over 5 years. He was last seen in our office 1 year ago. He is scheduled to undergo a left total knee replacement next week with Dr. Ramiro Peters at [facility]. He denies any major change in his activity tolerance, no new exertional dyspnea or chest tightness. No leg swelling, no orthopnea, no PND.     Past Medical History:   Diagnosis Date    Abnormal myocardial perfusion study 02/28/2016    Partially reversible inferior perfusion defect, EF 57%    AI (aortic insufficiency) 2016    Moderate with possible bicuspid aortic valve    Chronic low back pain     Chronic venous insufficiency     Colon polyp 03/2012    Benign serrated polyp    Difficult intubation 08/08/2008 had difficulty with tube placement when had gall bladder surgery    Dyspnea 08/2022    on inhaler    Fracture 05/2016    rib fx's    Genital herpes 1976    on meds    GERD (gastroesophageal reflux disease) 2002    on meds as needed, diet control    History of echocardiogram 02/28/2016    EF 55%. Basal inferior hypokinesis. Gr 1 DDfx. Mod AI. Hyperlipidemia     not meds    Hypertension 2010    Osteoarthritis of right knee     Osteopenia     Prediabetes     on meds    Recurrent genital herpes 01/16/2013    Right lumbar radiculopathy 10/2016    MRI: prominent right paracentral L4-L5 disc extrusion/impingement of descending right L5 nerve root; moderately severe canal stenosis; prominent right paracentral L5-S1 disc protrusion/ impingement of the descending right S1 nerve root; abutment of the foraminal right L5 nerve root/moderate foramina stenosis at L5-S1; mild canal stenosis at L3-L4 with abutment of the descending left L4 nerve root. S/P cardiac catheterization 03/01/2016    Normal coronary anatomy    Tinnitus, bilateral 2012      Current Outpatient Medications   Medication Sig Dispense Refill    amLODIPine (NORVASC) 5 mg tablet Take 1 Tablet by mouth daily. 90 Tablet 3    calcium citrate-vitamin d3 (CITRACAL+D) 315 mg-5 mcg (200 unit) tab Take 1 Tablet by mouth daily (with breakfast). cholecalciferol (VITAMIN D3) (5000 Units/125 mcg) tab tablet Take 5,000 Units by mouth daily. magnesium oxide (MAG-OX) 400 mg tablet Take 400 mg by mouth daily. ZINC PICOLINATE PO Take 22 mg by mouth daily. ibuprofen (MOTRIN) 800 mg tablet Take 800 mg by mouth every six (6) hours as needed for Pain. acetaminophen (TYLENOL) 500 mg tablet Take 1,000 mg by mouth every six (6) hours as needed for Pain. valACYclovir (VALTREX) 500 mg tablet TAKE 1 TABLET DAILY. (Patient taking differently: Take 500 mg by mouth every evening.  Indications: shingles) 90 Tablet 2    albuterol (PROVENTIL HFA, VENTOLIN HFA, PROAIR HFA) 90 mcg/actuation inhaler Take 1 Puff by inhalation every six (6) hours as needed for Wheezing. 1 Each 3    pantoprazole (PROTONIX) 40 mg tablet Take 40 mg by mouth daily as needed. losartan (COZAAR) 100 mg tablet Take 1 Tablet by mouth daily. (Patient taking differently: Take 100 mg by mouth nightly. Indications: high blood pressure) 90 Tablet 3    clindamycin (CLEOCIN T) 1 % external solution Use thin film on affected area of scalp every 12 hours  Indications: acne (Patient taking differently: Apply  to affected area as needed. Indications: acne) 60 mL 3    ascorbic acid, vitamin C, (VITAMIN C) 500 mg tablet Take 1,000 mg by mouth daily. inhalational spacing device 1 Each by Does Not Apply route as needed (shortness of breath). 1 Device 0    nitroglycerin (NITROSTAT) 0.4 mg SL tablet 1 Tab by SubLINGual route every five (5) minutes as needed for Chest Pain. Up to 3 doses. 1 Bottle 6    aspirin delayed-release 81 mg tablet Take 81 mg by mouth daily. DOCOSAHEXANOIC ACID/EPA (FISH OIL PO) Take 2 Tablets by mouth daily. Allergies   Allergen Reactions    Nuts Genetta June Nut] Anaphylaxis    Tree Nuts Anaphylaxis    Ace Inhibitors Cough      Social History     Tobacco Use    Smoking status: Former     Packs/day: 0.50     Years: 25.00     Pack years: 12.50     Types: Cigarettes     Quit date: 2000     Years since quittin.4     Passive exposure: Never    Smokeless tobacco: Former     Quit date:    Vaping Use    Vaping Use: Never used   Substance Use Topics    Alcohol use:  Yes     Alcohol/week: 1.7 standard drinks     Types: 2 Cans of beer per week    Drug use: Yes     Types: Marijuana     Comment: last used in        Family History   Problem Relation Age of Onset    Cancer Father 61        esophageal    Hypertension Father     Cancer Mother 80        ovarian cancer     Hypertension Brother     Diabetes Other         Grandmother    OSTEOARTHRITIS Other     Other Other Stomach problems; Father, grandfather             Review of Systems   Constitutional:  Negative for chills, fever and weight loss. HENT:  Negative for nosebleeds. Eyes:  Negative for blurred vision and double vision. Respiratory:  Negative for cough, shortness of breath and wheezing. Cardiovascular:  Negative for chest pain, palpitations, orthopnea, claudication, leg swelling and PND. Gastrointestinal:  Negative for abdominal pain, heartburn, nausea and vomiting. Genitourinary:  Negative for dysuria and hematuria. Musculoskeletal:  Positive for joint pain. Negative for back pain, falls and myalgias. Skin:  Negative for rash. Neurological:  Negative for dizziness, focal weakness and headaches. Endo/Heme/Allergies:  Does not bruise/bleed easily. Psychiatric/Behavioral:  Negative for substance abuse. Visit Vitals  /74 (BP 1 Location: Left upper arm, BP Patient Position: Sitting, BP Cuff Size: Adult)   Pulse 68   Ht 6' (1.829 m)   Wt 103.4 kg (228 lb)   SpO2 96%   BMI 30.92 kg/m²      Physical Exam  Constitutional:       Appearance: He is well-developed. HENT:      Head: Normocephalic and atraumatic. Eyes:      Conjunctiva/sclera: Conjunctivae normal.   Neck:      Vascular: No carotid bruit or JVD. Cardiovascular:      Rate and Rhythm: Normal rate and regular rhythm. Pulses: Normal pulses. Heart sounds: S1 normal and S2 normal. Murmur heard. Midsystolic murmur is present with a grade of 1/6 at the upper right sternal border radiating to the neck. Diastolic murmur is present with a grade of 1/4. No gallop. No S3 sounds. Pulmonary:      Breath sounds: Normal breath sounds. No wheezing or rales. Abdominal:      General: Bowel sounds are normal.      Palpations: Abdomen is soft. Tenderness: There is no abdominal tenderness. Musculoskeletal:      Cervical back: Neck supple. Skin:     General: Skin is warm and dry.    Neurological:      Mental Status: He is alert and oriented to person, place, and time. November 8, 2022 EKG:   Normal sinus rhythm,  Normal axis, normal QTc interval, no ST or T wave changes concerning for ischemia. No change compared to prior EKGs. ASSESSMENT and PLAN     Low risk from a cardiac standpoint to proceed with orthopedic surgery as scheduled next week. He can stop his daily aspirin starting today. He can resume this once safe from a surgical standpoint. Aortic insufficiency. This remains moderate in severity by echocardiogram in March 2022. He did have a calcified aortic valve which did appear to be bicuspid, so this will need to be followed up at least every other year, unless new symptoms arise. This has not significantly changed over the past 5 years. Mildly dilated aortic root. He may have an underlying aortopathy as well given the likelihood that he has a bicuspid aortic valve. This has been stable and last measured 40 mm in diameter in March 2022. This can be reevaluated on his follow-up echocardiogram early next year. Hypertension. This remains reasonably well controlled on his current regimen, which includes amlodipine and losartan. Both of which we continue    Dyslipidemia. Patient continues to manage with lifestyle modification. This is followed closely by his PCP. His most recent lipid profile from August 2022: Total cholesterol 205, HDL 46, . This was an improvement from the year prior. Follow-up in 12 months, sooner if needed.

## 2022-12-04 ENCOUNTER — ANESTHESIA EVENT (OUTPATIENT)
Dept: SURGERY | Age: 62
End: 2022-12-04
Payer: COMMERCIAL

## 2022-12-04 PROBLEM — M17.12 OSTEOARTHRITIS OF LEFT KNEE: Chronic | Status: ACTIVE | Noted: 2022-12-04

## 2022-12-04 PROBLEM — M17.12 OSTEOARTHRITIS OF LEFT KNEE: Status: ACTIVE | Noted: 2022-12-04

## 2022-12-04 RX ORDER — LANOLIN ALCOHOL/MO/W.PET/CERES
1 CREAM (GRAM) TOPICAL 3 TIMES DAILY
Status: CANCELLED | OUTPATIENT
Start: 2022-12-04

## 2022-12-04 RX ORDER — ACETAMINOPHEN 500 MG
1000 TABLET ORAL
Status: CANCELLED | OUTPATIENT
Start: 2022-12-04 | End: 2022-12-04

## 2022-12-04 RX ORDER — ACETAMINOPHEN 325 MG/1
650 TABLET ORAL EVERY 6 HOURS
Status: CANCELLED | OUTPATIENT
Start: 2022-12-04

## 2022-12-04 RX ORDER — ASPIRIN 81 MG/1
81 TABLET ORAL 2 TIMES DAILY
Status: CANCELLED | OUTPATIENT
Start: 2022-12-04

## 2022-12-04 RX ORDER — DOCUSATE SODIUM 100 MG/1
100 CAPSULE, LIQUID FILLED ORAL DAILY
Status: CANCELLED | OUTPATIENT
Start: 2022-12-05

## 2022-12-04 RX ORDER — CEFAZOLIN SODIUM/WATER 2 G/20 ML
2 SYRINGE (ML) INTRAVENOUS EVERY 8 HOURS
Status: CANCELLED | OUTPATIENT
Start: 2022-12-04 | End: 2022-12-05

## 2022-12-05 ENCOUNTER — APPOINTMENT (OUTPATIENT)
Dept: GENERAL RADIOLOGY | Age: 62
End: 2022-12-05
Attending: PHYSICIAN ASSISTANT
Payer: COMMERCIAL

## 2022-12-05 ENCOUNTER — ANESTHESIA (OUTPATIENT)
Dept: SURGERY | Age: 62
End: 2022-12-05
Payer: COMMERCIAL

## 2022-12-05 ENCOUNTER — HOSPITAL ENCOUNTER (OUTPATIENT)
Age: 62
Discharge: HOME HEALTH | End: 2022-12-05
Attending: ORTHOPAEDIC SURGERY | Admitting: ORTHOPAEDIC SURGERY
Payer: COMMERCIAL

## 2022-12-05 ENCOUNTER — HOME HEALTH ADMISSION (OUTPATIENT)
Dept: HOME HEALTH SERVICES | Facility: HOME HEALTH | Age: 62
End: 2022-12-05
Payer: COMMERCIAL

## 2022-12-05 VITALS
HEIGHT: 72 IN | OXYGEN SATURATION: 98 % | RESPIRATION RATE: 15 BRPM | HEART RATE: 74 BPM | SYSTOLIC BLOOD PRESSURE: 143 MMHG | BODY MASS INDEX: 30.22 KG/M2 | TEMPERATURE: 97.4 F | DIASTOLIC BLOOD PRESSURE: 71 MMHG | WEIGHT: 223.1 LBS

## 2022-12-05 DIAGNOSIS — M17.12 PRIMARY OSTEOARTHRITIS OF LEFT KNEE: Primary | Chronic | ICD-10-CM

## 2022-12-05 LAB
ABO + RH BLD: NORMAL
BLOOD GROUP ANTIBODIES SERPL: NORMAL
GLUCOSE BLD STRIP.AUTO-MCNC: 119 MG/DL (ref 70–110)
SPECIMEN EXP DATE BLD: NORMAL

## 2022-12-05 PROCEDURE — 74011250637 HC RX REV CODE- 250/637: Performed by: PHYSICIAN ASSISTANT

## 2022-12-05 PROCEDURE — 76942 ECHO GUIDE FOR BIOPSY: CPT | Performed by: ORTHOPAEDIC SURGERY

## 2022-12-05 PROCEDURE — 77030039760: Performed by: ORTHOPAEDIC SURGERY

## 2022-12-05 PROCEDURE — C1776 JOINT DEVICE (IMPLANTABLE): HCPCS | Performed by: ORTHOPAEDIC SURGERY

## 2022-12-05 PROCEDURE — 77030003666 HC NDL SPINAL BD -A: Performed by: ORTHOPAEDIC SURGERY

## 2022-12-05 PROCEDURE — 97535 SELF CARE MNGMENT TRAINING: CPT

## 2022-12-05 PROCEDURE — 76210000006 HC OR PH I REC 0.5 TO 1 HR: Performed by: ORTHOPAEDIC SURGERY

## 2022-12-05 PROCEDURE — 73560 X-RAY EXAM OF KNEE 1 OR 2: CPT

## 2022-12-05 PROCEDURE — 74011250636 HC RX REV CODE- 250/636: Performed by: PHYSICIAN ASSISTANT

## 2022-12-05 PROCEDURE — 77030002934 HC SUT MCRYL J&J -B: Performed by: ORTHOPAEDIC SURGERY

## 2022-12-05 PROCEDURE — 74011000258 HC RX REV CODE- 258: Performed by: ORTHOPAEDIC SURGERY

## 2022-12-05 PROCEDURE — 77030013708 HC HNDPC SUC IRR PULS STRY –B: Performed by: ORTHOPAEDIC SURGERY

## 2022-12-05 PROCEDURE — 77030006835 HC BLD SAW SAG STRY -B: Performed by: ORTHOPAEDIC SURGERY

## 2022-12-05 PROCEDURE — 36415 COLL VENOUS BLD VENIPUNCTURE: CPT

## 2022-12-05 PROCEDURE — 97161 PT EVAL LOW COMPLEX 20 MIN: CPT

## 2022-12-05 PROCEDURE — 77030034694 HC SCPL CANADY PLSM DISP USMD -E: Performed by: ORTHOPAEDIC SURGERY

## 2022-12-05 PROCEDURE — 77030020782 HC GWN BAIR PAWS FLX 3M -B: Performed by: ORTHOPAEDIC SURGERY

## 2022-12-05 PROCEDURE — 76010000149 HC OR TIME 1 TO 1.5 HR: Performed by: ORTHOPAEDIC SURGERY

## 2022-12-05 PROCEDURE — 2709999900 HC NON-CHARGEABLE SUPPLY: Performed by: ORTHOPAEDIC SURGERY

## 2022-12-05 PROCEDURE — 77030011628: Performed by: ORTHOPAEDIC SURGERY

## 2022-12-05 PROCEDURE — 74011250637 HC RX REV CODE- 250/637: Performed by: ANESTHESIOLOGY

## 2022-12-05 PROCEDURE — 82962 GLUCOSE BLOOD TEST: CPT

## 2022-12-05 PROCEDURE — 74011000250 HC RX REV CODE- 250: Performed by: ANESTHESIOLOGY

## 2022-12-05 PROCEDURE — 77030012893: Performed by: ORTHOPAEDIC SURGERY

## 2022-12-05 PROCEDURE — 74011250636 HC RX REV CODE- 250/636: Performed by: ANESTHESIOLOGY

## 2022-12-05 PROCEDURE — 76210000022 HC REC RM PH II 1.5 TO 2 HR: Performed by: ORTHOPAEDIC SURGERY

## 2022-12-05 PROCEDURE — 77030016060 HC NDL NRV BLK TELE -A: Performed by: ANESTHESIOLOGY

## 2022-12-05 PROCEDURE — C1713 ANCHOR/SCREW BN/BN,TIS/BN: HCPCS | Performed by: ORTHOPAEDIC SURGERY

## 2022-12-05 PROCEDURE — 77030033263 HC DRSG MEPILEX 16-48IN BORD MOLN -B: Performed by: ORTHOPAEDIC SURGERY

## 2022-12-05 PROCEDURE — 74011000250 HC RX REV CODE- 250: Performed by: NURSE ANESTHETIST, CERTIFIED REGISTERED

## 2022-12-05 PROCEDURE — 77030031139 HC SUT VCRL2 J&J -A: Performed by: ORTHOPAEDIC SURGERY

## 2022-12-05 PROCEDURE — 77030027138 HC INCENT SPIROMETER -A: Performed by: ORTHOPAEDIC SURGERY

## 2022-12-05 PROCEDURE — 74011000250 HC RX REV CODE- 250: Performed by: ORTHOPAEDIC SURGERY

## 2022-12-05 PROCEDURE — 76060000033 HC ANESTHESIA 1 TO 1.5 HR: Performed by: ORTHOPAEDIC SURGERY

## 2022-12-05 PROCEDURE — 74011250636 HC RX REV CODE- 250/636: Performed by: ORTHOPAEDIC SURGERY

## 2022-12-05 PROCEDURE — 77030037713 HC CLOSR DEV INCIS ZIP STRY -B: Performed by: ORTHOPAEDIC SURGERY

## 2022-12-05 PROCEDURE — 74011250636 HC RX REV CODE- 250/636: Performed by: NURSE ANESTHETIST, CERTIFIED REGISTERED

## 2022-12-05 PROCEDURE — 97165 OT EVAL LOW COMPLEX 30 MIN: CPT

## 2022-12-05 PROCEDURE — 97116 GAIT TRAINING THERAPY: CPT

## 2022-12-05 PROCEDURE — 86900 BLOOD TYPING SEROLOGIC ABO: CPT

## 2022-12-05 PROCEDURE — 64447 NJX AA&/STRD FEMORAL NRV IMG: CPT | Performed by: ANESTHESIOLOGY

## 2022-12-05 DEVICE — CEMENT BNE 40GM FULL DOSE PMMA W/O ANTIBIO HI VISC N RADPQ: Type: IMPLANTABLE DEVICE | Site: KNEE | Status: FUNCTIONAL

## 2022-12-05 DEVICE — SIZE 6 TIBIAL TRAY NONPOROUS
Type: IMPLANTABLE DEVICE | Site: KNEE | Status: FUNCTIONAL
Brand: BALANCED KNEE SYSTEM

## 2022-12-05 DEVICE — LT SIZE 6 FEMORAL CR NONPOROUS
Type: IMPLANTABLE DEVICE | Site: KNEE | Status: FUNCTIONAL
Brand: BKS TRIMAX

## 2022-12-05 DEVICE — KNEE K1 TOT HEMI STD CEM IMPL CAPPED K1 OD: Type: IMPLANTABLE DEVICE | Site: KNEE | Status: FUNCTIONAL

## 2022-12-05 DEVICE — SIZE 6 9MM TIBIAL INSERT UC
Type: IMPLANTABLE DEVICE | Site: KNEE | Status: FUNCTIONAL
Brand: BKS E-VITALIZE

## 2022-12-05 DEVICE — 35MM PATELLA, VITAMIN E
Type: IMPLANTABLE DEVICE | Site: KNEE | Status: FUNCTIONAL
Brand: BKS E-VITALIZE

## 2022-12-05 RX ORDER — KETOROLAC TROMETHAMINE 30 MG/ML
15 INJECTION, SOLUTION INTRAMUSCULAR; INTRAVENOUS ONCE
Status: COMPLETED | OUTPATIENT
Start: 2022-12-05 | End: 2022-12-05

## 2022-12-05 RX ORDER — OXYCODONE HYDROCHLORIDE 5 MG/1
5 TABLET ORAL AS NEEDED
Status: DISCONTINUED | OUTPATIENT
Start: 2022-12-05 | End: 2022-12-05 | Stop reason: HOSPADM

## 2022-12-05 RX ORDER — DEXAMETHASONE SODIUM PHOSPHATE 4 MG/ML
4 INJECTION, SOLUTION INTRA-ARTICULAR; INTRALESIONAL; INTRAMUSCULAR; INTRAVENOUS; SOFT TISSUE ONCE
Status: COMPLETED | OUTPATIENT
Start: 2022-12-05 | End: 2022-12-05

## 2022-12-05 RX ORDER — OXYCODONE HYDROCHLORIDE 5 MG/1
10 TABLET ORAL
Status: DISCONTINUED | OUTPATIENT
Start: 2022-12-05 | End: 2022-12-05 | Stop reason: HOSPADM

## 2022-12-05 RX ORDER — HYDROMORPHONE HYDROCHLORIDE 1 MG/ML
0.2 INJECTION, SOLUTION INTRAMUSCULAR; INTRAVENOUS; SUBCUTANEOUS
Status: DISCONTINUED | OUTPATIENT
Start: 2022-12-05 | End: 2022-12-05 | Stop reason: HOSPADM

## 2022-12-05 RX ORDER — DEXAMETHASONE SODIUM PHOSPHATE 4 MG/ML
8 INJECTION, SOLUTION INTRA-ARTICULAR; INTRALESIONAL; INTRAMUSCULAR; INTRAVENOUS; SOFT TISSUE ONCE
Status: COMPLETED | OUTPATIENT
Start: 2022-12-05 | End: 2022-12-05

## 2022-12-05 RX ORDER — MELOXICAM 7.5 MG/1
7.5 TABLET ORAL 2 TIMES DAILY
Qty: 28 TABLET | Refills: 0 | Status: SHIPPED | OUTPATIENT
Start: 2022-12-05 | End: 2022-12-19

## 2022-12-05 RX ORDER — DEXMEDETOMIDINE HYDROCHLORIDE 100 UG/ML
INJECTION, SOLUTION INTRAVENOUS
Status: SHIPPED | OUTPATIENT
Start: 2022-12-05 | End: 2022-12-05

## 2022-12-05 RX ORDER — CELECOXIB 100 MG/1
200 CAPSULE ORAL
Status: COMPLETED | OUTPATIENT
Start: 2022-12-05 | End: 2022-12-05

## 2022-12-05 RX ORDER — SODIUM CHLORIDE, SODIUM LACTATE, POTASSIUM CHLORIDE, CALCIUM CHLORIDE 600; 310; 30; 20 MG/100ML; MG/100ML; MG/100ML; MG/100ML
125 INJECTION, SOLUTION INTRAVENOUS CONTINUOUS
Status: DISCONTINUED | OUTPATIENT
Start: 2022-12-05 | End: 2022-12-05 | Stop reason: HOSPADM

## 2022-12-05 RX ORDER — ASPIRIN 81 MG/1
81 TABLET ORAL 2 TIMES DAILY
Qty: 42 TABLET | Refills: 0 | Status: SHIPPED | OUTPATIENT
Start: 2022-12-05 | End: 2022-12-26

## 2022-12-05 RX ORDER — CEFAZOLIN SODIUM/WATER 2 G/20 ML
2 SYRINGE (ML) INTRAVENOUS ONCE
Status: COMPLETED | OUTPATIENT
Start: 2022-12-05 | End: 2022-12-05

## 2022-12-05 RX ORDER — KETAMINE HYDROCHLORIDE 10 MG/ML
INJECTION, SOLUTION INTRAMUSCULAR; INTRAVENOUS AS NEEDED
Status: DISCONTINUED | OUTPATIENT
Start: 2022-12-05 | End: 2022-12-05 | Stop reason: HOSPADM

## 2022-12-05 RX ORDER — SODIUM CHLORIDE 0.9 % (FLUSH) 0.9 %
5-40 SYRINGE (ML) INJECTION EVERY 8 HOURS
Status: DISCONTINUED | OUTPATIENT
Start: 2022-12-05 | End: 2022-12-05 | Stop reason: HOSPADM

## 2022-12-05 RX ORDER — SODIUM CHLORIDE 0.9 % (FLUSH) 0.9 %
5-40 SYRINGE (ML) INJECTION AS NEEDED
Status: DISCONTINUED | OUTPATIENT
Start: 2022-12-05 | End: 2022-12-05 | Stop reason: HOSPADM

## 2022-12-05 RX ORDER — ROPIVACAINE HYDROCHLORIDE 2 MG/ML
INJECTION, SOLUTION EPIDURAL; INFILTRATION; PERINEURAL
Status: SHIPPED | OUTPATIENT
Start: 2022-12-05 | End: 2022-12-05

## 2022-12-05 RX ORDER — ACETAMINOPHEN 500 MG
1000 TABLET ORAL ONCE
Status: COMPLETED | OUTPATIENT
Start: 2022-12-05 | End: 2022-12-05

## 2022-12-05 RX ORDER — CEFADROXIL 500 MG/1
500 CAPSULE ORAL 2 TIMES DAILY
Qty: 10 CAPSULE | Refills: 0 | Status: SHIPPED | OUTPATIENT
Start: 2022-12-05 | End: 2022-12-10

## 2022-12-05 RX ORDER — OXYCODONE HYDROCHLORIDE 5 MG/1
5 TABLET ORAL
Qty: 42 TABLET | Refills: 0 | Status: SHIPPED | OUTPATIENT
Start: 2022-12-05 | End: 2022-12-12

## 2022-12-05 RX ORDER — SODIUM CHLORIDE 9 MG/ML
300 INJECTION, SOLUTION INTRAVENOUS CONTINUOUS
Status: DISCONTINUED | OUTPATIENT
Start: 2022-12-05 | End: 2022-12-05 | Stop reason: HOSPADM

## 2022-12-05 RX ORDER — KETOROLAC TROMETHAMINE 30 MG/ML
15 INJECTION, SOLUTION INTRAMUSCULAR; INTRAVENOUS EVERY 6 HOURS
Status: DISCONTINUED | OUTPATIENT
Start: 2022-12-05 | End: 2022-12-05

## 2022-12-05 RX ORDER — DIPHENHYDRAMINE HYDROCHLORIDE 50 MG/ML
12.5 INJECTION, SOLUTION INTRAMUSCULAR; INTRAVENOUS
Status: DISCONTINUED | OUTPATIENT
Start: 2022-12-05 | End: 2022-12-05 | Stop reason: HOSPADM

## 2022-12-05 RX ORDER — GLYCOPYRROLATE 0.2 MG/ML
INJECTION INTRAMUSCULAR; INTRAVENOUS AS NEEDED
Status: DISCONTINUED | OUTPATIENT
Start: 2022-12-05 | End: 2022-12-05 | Stop reason: HOSPADM

## 2022-12-05 RX ORDER — NALOXONE HYDROCHLORIDE 0.4 MG/ML
0.4 INJECTION, SOLUTION INTRAMUSCULAR; INTRAVENOUS; SUBCUTANEOUS AS NEEDED
Status: DISCONTINUED | OUTPATIENT
Start: 2022-12-05 | End: 2022-12-05 | Stop reason: HOSPADM

## 2022-12-05 RX ORDER — PROPOFOL 10 MG/ML
INJECTION, EMULSION INTRAVENOUS AS NEEDED
Status: DISCONTINUED | OUTPATIENT
Start: 2022-12-05 | End: 2022-12-05 | Stop reason: HOSPADM

## 2022-12-05 RX ORDER — MIDAZOLAM HYDROCHLORIDE 1 MG/ML
INJECTION, SOLUTION INTRAMUSCULAR; INTRAVENOUS AS NEEDED
Status: DISCONTINUED | OUTPATIENT
Start: 2022-12-05 | End: 2022-12-05 | Stop reason: HOSPADM

## 2022-12-05 RX ORDER — ONDANSETRON 2 MG/ML
4 INJECTION INTRAMUSCULAR; INTRAVENOUS
Status: DISCONTINUED | OUTPATIENT
Start: 2022-12-05 | End: 2022-12-05 | Stop reason: HOSPADM

## 2022-12-05 RX ORDER — LIDOCAINE HYDROCHLORIDE 20 MG/ML
INJECTION, SOLUTION EPIDURAL; INFILTRATION; INTRACAUDAL; PERINEURAL AS NEEDED
Status: DISCONTINUED | OUTPATIENT
Start: 2022-12-05 | End: 2022-12-05 | Stop reason: HOSPADM

## 2022-12-05 RX ORDER — TRANEXAMIC ACID 650 MG/1
1950 TABLET ORAL ONCE
Status: COMPLETED | OUTPATIENT
Start: 2022-12-05 | End: 2022-12-05

## 2022-12-05 RX ORDER — PANTOPRAZOLE SODIUM 40 MG/1
40 TABLET, DELAYED RELEASE ORAL DAILY
Status: DISCONTINUED | OUTPATIENT
Start: 2022-12-05 | End: 2022-12-05 | Stop reason: HOSPADM

## 2022-12-05 RX ORDER — ONDANSETRON 2 MG/ML
INJECTION INTRAMUSCULAR; INTRAVENOUS AS NEEDED
Status: DISCONTINUED | OUTPATIENT
Start: 2022-12-05 | End: 2022-12-05 | Stop reason: HOSPADM

## 2022-12-05 RX ORDER — METOCLOPRAMIDE HYDROCHLORIDE 5 MG/ML
10 INJECTION INTRAMUSCULAR; INTRAVENOUS
Status: DISCONTINUED | OUTPATIENT
Start: 2022-12-05 | End: 2022-12-05 | Stop reason: HOSPADM

## 2022-12-05 RX ORDER — OXYCODONE HYDROCHLORIDE 5 MG/1
5 TABLET ORAL
Status: DISCONTINUED | OUTPATIENT
Start: 2022-12-05 | End: 2022-12-05 | Stop reason: HOSPADM

## 2022-12-05 RX ORDER — HYDROMORPHONE HYDROCHLORIDE 2 MG/ML
INJECTION, SOLUTION INTRAMUSCULAR; INTRAVENOUS; SUBCUTANEOUS AS NEEDED
Status: DISCONTINUED | OUTPATIENT
Start: 2022-12-05 | End: 2022-12-05 | Stop reason: HOSPADM

## 2022-12-05 RX ORDER — SODIUM CHLORIDE 9 MG/ML
125 INJECTION, SOLUTION INTRAVENOUS CONTINUOUS
Status: DISCONTINUED | OUTPATIENT
Start: 2022-12-05 | End: 2022-12-05 | Stop reason: HOSPADM

## 2022-12-05 RX ORDER — DEXAMETHASONE SODIUM PHOSPHATE 10 MG/ML
INJECTION INTRAMUSCULAR; INTRAVENOUS
Status: COMPLETED | OUTPATIENT
Start: 2022-12-05 | End: 2022-12-05

## 2022-12-05 RX ADMIN — DEXAMETHASONE SODIUM PHOSPHATE 8 MG: 4 INJECTION, SOLUTION INTRAMUSCULAR; INTRAVENOUS at 14:51

## 2022-12-05 RX ADMIN — KETOROLAC TROMETHAMINE 15 MG: 30 INJECTION, SOLUTION INTRAMUSCULAR at 16:39

## 2022-12-05 RX ADMIN — SODIUM CHLORIDE, SODIUM LACTATE, POTASSIUM CHLORIDE, AND CALCIUM CHLORIDE 125 ML/HR: 600; 310; 30; 20 INJECTION, SOLUTION INTRAVENOUS at 15:03

## 2022-12-05 RX ADMIN — SODIUM CHLORIDE, SODIUM LACTATE, POTASSIUM CHLORIDE, AND CALCIUM CHLORIDE 125 ML/HR: 600; 310; 30; 20 INJECTION, SOLUTION INTRAVENOUS at 10:59

## 2022-12-05 RX ADMIN — CELECOXIB 200 MG: 100 CAPSULE ORAL at 10:48

## 2022-12-05 RX ADMIN — PROPOFOL 200 MG: 10 INJECTION, EMULSION INTRAVENOUS at 12:59

## 2022-12-05 RX ADMIN — ONDANSETRON HYDROCHLORIDE 4 MG: 2 INJECTION INTRAMUSCULAR; INTRAVENOUS at 13:01

## 2022-12-05 RX ADMIN — SODIUM CHLORIDE, SODIUM LACTATE, POTASSIUM CHLORIDE, AND CALCIUM CHLORIDE 1000 ML: 600; 310; 30; 20 INJECTION, SOLUTION INTRAVENOUS at 10:59

## 2022-12-05 RX ADMIN — MIDAZOLAM 2 MG: 1 INJECTION INTRAMUSCULAR; INTRAVENOUS at 12:50

## 2022-12-05 RX ADMIN — DEXMEDETOMIDINE HYDROCHLORIDE 15 MCG: 100 INJECTION, SOLUTION INTRAVENOUS at 11:19

## 2022-12-05 RX ADMIN — HYDROMORPHONE HYDROCHLORIDE 1 MG: 2 INJECTION, SOLUTION INTRAMUSCULAR; INTRAVENOUS; SUBCUTANEOUS at 12:59

## 2022-12-05 RX ADMIN — HYDROMORPHONE HYDROCHLORIDE 0.2 MG: 1 INJECTION, SOLUTION INTRAMUSCULAR; INTRAVENOUS; SUBCUTANEOUS at 14:49

## 2022-12-05 RX ADMIN — LIDOCAINE HYDROCHLORIDE 80 MG: 20 INJECTION, SOLUTION EPIDURAL; INFILTRATION; INTRACAUDAL; PERINEURAL at 12:59

## 2022-12-05 RX ADMIN — ROPIVACAINE HYDROCHLORIDE 20 ML: 2 INJECTION EPIDURAL; INFILTRATION; PERINEURAL at 11:19

## 2022-12-05 RX ADMIN — GLYCOPYRROLATE 0.2 MG: 0.2 INJECTION INTRAMUSCULAR; INTRAVENOUS at 12:50

## 2022-12-05 RX ADMIN — DEXAMETHASONE SODIUM PHOSPHATE 4 MG: 10 INJECTION, SOLUTION INTRAMUSCULAR; INTRAVENOUS at 11:19

## 2022-12-05 RX ADMIN — MIDAZOLAM 5 MG: 1 INJECTION INTRAMUSCULAR; INTRAVENOUS at 11:14

## 2022-12-05 RX ADMIN — DEXAMETHASONE SODIUM PHOSPHATE 4 MG: 4 INJECTION, SOLUTION INTRAMUSCULAR; INTRAVENOUS at 11:00

## 2022-12-05 RX ADMIN — KETAMINE HYDROCHLORIDE 50 MG: 10 INJECTION, SOLUTION INTRAMUSCULAR; INTRAVENOUS at 12:59

## 2022-12-05 RX ADMIN — Medication 2 G: at 12:52

## 2022-12-05 RX ADMIN — HYDROMORPHONE HYDROCHLORIDE 0.2 MG: 1 INJECTION, SOLUTION INTRAMUSCULAR; INTRAVENOUS; SUBCUTANEOUS at 15:04

## 2022-12-05 RX ADMIN — TRANEXAMIC ACID 1950 MG: 650 TABLET ORAL at 10:48

## 2022-12-05 RX ADMIN — ACETAMINOPHEN 1000 MG: 500 TABLET ORAL at 10:48

## 2022-12-05 NOTE — INTERVAL H&P NOTE
Update History & Physical    The Patient's History and Physical of December 4, 2022 was reviewed with the patient and I examined the patient. There was no change. The surgical site was confirmed by the patient and me. Plan:  The risk, benefits, expected outcome, and alternative to the recommended procedure have been discussed with the patient. Patient understands and wants to proceed with the procedure.     Electronically signed by Rekha Luu MD on 12/5/2022 at 10:23 AM

## 2022-12-05 NOTE — DISCHARGE SUMMARY
[address] [facility] [place] [address]     DISCHARGE SUMMARY     PATIENT: Lamar Trivedi     MRN: [id number]   ADMIT DATE: 2022   [facility]   DISCHARGE DATE: 2022     ATTENDING: Edwin Bernstein MD   DICTATING: HATTIE Pate         ADMISSION DIAGNOSIS: Osteoarthritis of left knee [M17.12]    DISCHARGE DIAGNOSIS: Status post LEFT TOTAL KNEE ARTHROPLASTY    HISTORY OF PRESENT ILLNESS: The patient is a 57y.o. year-old male   with ongoing left knee pain secondary to osteoarthritis of his left knee. The patient's pain has persisted and progressed despite conservative treatments and therapies. The patient has at this time opted for surgical intervention. PAST MEDICAL HISTORY:   Past Medical History:   Diagnosis Date    Abnormal myocardial perfusion study 2016    Partially reversible inferior perfusion defect, EF 57%    AI (aortic insufficiency)     Moderate with possible bicuspid aortic valve    Chronic low back pain     Chronic venous insufficiency     Colon polyp 2012    Benign serrated polyp    Difficult intubation 2008    had difficulty with tube placement when had gall bladder surgery    Dyspnea 2022    on inhaler    Fracture 2016    rib fx's    Genital herpes     on meds    GERD (gastroesophageal reflux disease)     on meds as needed, diet control    History of echocardiogram 2016    EF 55%. Basal inferior hypokinesis. Gr 1 DDfx. Mod AI.       Hyperlipidemia     not meds    Hypertension     Osteoarthritis of left knee 2022    Osteoarthritis of right knee     Osteopenia     Prediabetes     on meds    Recurrent genital herpes 2013    Right lumbar radiculopathy 10/2016    MRI: prominent right paracentral L4-L5 disc extrusion/impingement of descending right L5 nerve root; moderately severe canal stenosis; prominent right paracentral L5-S1 disc protrusion/ impingement of the descending right S1 nerve root; abutment of the foraminal right L5 nerve root/moderate foramina stenosis at L5-S1; mild canal stenosis at L3-L4 with abutment of the descending left L4 nerve root. S/P cardiac catheterization 03/01/2016    Normal coronary anatomy    Tinnitus, bilateral 2012       PAST SURGICAL HISTORY:   Past Surgical History:   Procedure Laterality Date    ENDOSCOPY, COLON, DIAGNOSTIC  12/2021    HX CHOLECYSTECTOMY  08/08/2008    Lap    HX COLONOSCOPY  03/2022    HX HEART CATHETERIZATION  03/01/2016    minor blockage    HX LUMBAR LAMINECTOMY  07/05/2018    R L4/5/S1 Dr. Zachary Rojas TONSILLECTOMY  2007       ALLERGIES:   Allergies   Allergen Reactions    Nuts Edmonia Dash Nut] Anaphylaxis    Tree Nuts Anaphylaxis    Ace Inhibitors Cough        CURRENT MEDICATIONS:  A list of medications prior to the time of admission include:  Prior to Admission medications    Medication Sig Start Date End Date Taking? Authorizing Provider   aspirin delayed-release 81 mg tablet Take 1 Tablet by mouth two (2) times a day for 21 days. 12/5/22 12/26/22 Yes Anaya Ray PA   meloxicam (Mobic) 7.5 mg tablet Take 1 Tablet by mouth two (2) times a day for 14 days. 12/5/22 12/19/22 Yes Anaya Ray PA   oxyCODONE IR (ROXICODONE) 5 mg immediate release tablet Take 1 Tablet by mouth every four (4) hours as needed for Pain for up to 7 days. Max Daily Amount: 30 mg. 12/5/22 12/12/22 Yes Anaya Ray PA   cefadroxil (DURICEF) 500 mg capsule Take 1 Capsule by mouth two (2) times a day for 5 days. 12/5/22 12/10/22 Yes Christy Ray PA   amLODIPine (NORVASC) 5 mg tablet Take 1 Tablet by mouth daily. 11/29/22  Yes Lynn Medellin MD   calcium citrate-vitamin d3 (CITRACAL+D) 315 mg-5 mcg (200 unit) tab Take 1 Tablet by mouth daily (with breakfast). Yes Provider, Historical   cholecalciferol (VITAMIN D3) (5000 Units/125 mcg) tab tablet Take 5,000 Units by mouth daily.    Yes Provider, Historical   magnesium oxide (MAG-OX) 400 mg tablet Take 400 mg by mouth daily. Yes Provider, Historical   valACYclovir (VALTREX) 500 mg tablet TAKE 1 TABLET DAILY. Patient taking differently: Take 500 mg by mouth every evening. Indications: shingles 10/30/22  Yes Temo Vee MD   albuterol (PROVENTIL HFA, VENTOLIN HFA, PROAIR HFA) 90 mcg/actuation inhaler Take 1 Puff by inhalation every six (6) hours as needed for Wheezing. 10/3/22  Yes Temo Vee MD   pantoprazole (PROTONIX) 40 mg tablet Take 40 mg by mouth daily as needed. Yes Provider, Historical   losartan (COZAAR) 100 mg tablet Take 1 Tablet by mouth daily. Patient taking differently: Take 100 mg by mouth nightly. Indications: high blood pressure 22  Yes Teresa Albarado MD   clindamycin (CLEOCIN T) 1 % external solution Use thin film on affected area of scalp every 12 hours  Indications: acne  Patient taking differently: Apply  to affected area as needed. Indications: acne 21  Yes Temo Vee MD   ascorbic acid, vitamin C, (VITAMIN C) 500 mg tablet Take 1,000 mg by mouth daily. Yes Provider, Historical   acetaminophen (TYLENOL) 500 mg tablet Take 1,000 mg by mouth every six (6) hours as needed for Pain. Provider, Historical   inhalational spacing device 1 Each by Does Not Apply route as needed (shortness of breath). 19   Temo Vee MD       FAMILY HISTORY:   Family History   Problem Relation Age of Onset    Cancer Father 61        esophageal    Hypertension Father     Cancer Mother 80        ovarian cancer     Hypertension Brother     Diabetes Other         Grandmother    OSTEOARTHRITIS Other     Other Other         Stomach problems;  Father, grandfather       SOCIAL HISTORY:   Social History     Socioeconomic History    Marital status:    Tobacco Use    Smoking status: Former     Packs/day: 0.50     Years: 25.00     Pack years: 12.50     Types: Cigarettes     Quit date: 2000     Years since quittin.4     Passive exposure: Never    Smokeless tobacco: Former     Quit date: 1995   Vaping Use    Vaping Use: Never used   Substance and Sexual Activity    Alcohol use: Yes     Alcohol/week: 1.7 standard drinks     Types: 2 Cans of beer per week    Drug use: Yes     Types: Marijuana     Comment: last used in 1970's    Sexual activity: Not Currently     Partners: Female       REVIEW OF SYSTEMS: All review of systems are negative. PHYSICAL EXAMINATION: For a detailed physical exam, please refer to the patient's chart. HOSPITAL COURSE: The patient was taken to surgery the day of admission. he underwent a left total knee replacement. Operative course was benign. Estimated blood loss was approximately 150 cc. The patient was taken to the PACU in stable condition and was later taken discharged home in stable condition. During his hospital stay, the patient progressed well with physical therapy and occupational therapy, adherent to instructions. he had been cleared by physical therapy with stair training. he was placed on Aspirin for DVT prophylaxis. his pain has been well controlled with oral pain medications. his vitals have remained stable. he has also remained hemodynamically stable. The patient has been recommended for discharge home. DISCHARGE INSTRUCTIONS: The patient is to be discharged home with the following medication changes:     Discharge Medication List as of 12/5/2022  4:01 PM        START taking these medications    Details   meloxicam (Mobic) 7.5 mg tablet Take 1 Tablet by mouth two (2) times a day for 14 days. , Normal, Disp-28 Tablet, R-0      oxyCODONE IR (ROXICODONE) 5 mg immediate release tablet Take 1 Tablet by mouth every four (4) hours as needed for Pain for up to 7 days. Max Daily Amount: 30 mg., Normal, Disp-42 Tablet, R-0Supervising physician Dr Sandra Romano MD      cefadroxil (DURICEF) 500 mg capsule Take 1 Capsule by mouth two (2) times a day for 5 days. , Normal, Disp-10 Capsule, R-0           CONTINUE these medications which have CHANGED    Details   aspirin delayed-release 81 mg tablet Take 1 Tablet by mouth two (2) times a day for 21 days. , Normal, Disp-42 Tablet, R-0           CONTINUE these medications which have NOT CHANGED    Details   amLODIPine (NORVASC) 5 mg tablet Take 1 Tablet by mouth daily. , Normal, Disp-90 Tablet, R-3      calcium citrate-vitamin d3 (CITRACAL+D) 315 mg-5 mcg (200 unit) tab Take 1 Tablet by mouth daily (with breakfast). , Historical Med      cholecalciferol (VITAMIN D3) (5000 Units/125 mcg) tab tablet Take 5,000 Units by mouth daily. , Historical Med      magnesium oxide (MAG-OX) 400 mg tablet Take 400 mg by mouth daily. , Historical Med      valACYclovir (VALTREX) 500 mg tablet TAKE 1 TABLET DAILY., Normal, Disp-90 Tablet, R-2      albuterol (PROVENTIL HFA, VENTOLIN HFA, PROAIR HFA) 90 mcg/actuation inhaler Take 1 Puff by inhalation every six (6) hours as needed for Wheezing., Normal, Disp-1 Each, R-3      pantoprazole (PROTONIX) 40 mg tablet Take 40 mg by mouth daily as needed., Historical Med      losartan (COZAAR) 100 mg tablet Take 1 Tablet by mouth daily. , Normal, Disp-90 Tablet, R-3      clindamycin (CLEOCIN T) 1 % external solution Use thin film on affected area of scalp every 12 hours  Indications: acne, Normal, Disp-60 mL, R-3      ascorbic acid, vitamin C, (VITAMIN C) 500 mg tablet Take 1,000 mg by mouth daily. , Historical Med      acetaminophen (TYLENOL) 500 mg tablet Take 1,000 mg by mouth every six (6) hours as needed for Pain., Historical Med      inhalational spacing device 1 Each by Does Not Apply route as needed (shortness of breath). , Normal, Disp-1 Device, R-0           STOP taking these medications       ZINC PICOLINATE PO Comments:   Reason for Stopping:         ibuprofen (MOTRIN) 800 mg tablet Comments:   Reason for Stopping:         nitroglycerin (NITROSTAT) 0.4 mg SL tablet Comments:   Reason for Stopping:         DOCOSAHEXANOIC ACID/EPA (FISH OIL PO) Comments: Reason for Stopping:                 The patient is to continue at home with home physical therapy 3 times a week to work on gait training, range of motion, strengthening, and weightbearing exercises as tolerated on the left lower extremity. The patient is to progress from a walker to a cane to complete total weightbearing as tolerable. The patient is to continue to keep his incision dry. The patient is to followup with Dr. Edwin Bernstein, Farooq Dodson PA-C and/or Shaheen Duncan PA-C in the office approximately 10-14 days status post for x-rays and further evaluation.     HATTIE Pate  12/6/2022

## 2022-12-05 NOTE — PERIOP NOTES
TRANSFER - IN REPORT:    Verbal report received from [address] [place] [address] (name) on Ella Kidney  being received from "[employer] for routine post - op      Report consisted of patients Situation, Background, Assessment and   Recommendations(SBAR). Information from the following report(s) SBAR, OR Summary, and MAR was reviewed with the receiving nurse. Opportunity for questions and clarification was provided. Assessment completed upon patients arrival to unit and care assumed.

## 2022-12-05 NOTE — PERIOP NOTES
TRANSFER - OUT REPORT:    Verbal report given to dawson GREENE (name) on Ania Lomax  being transferred to phase 2 (unit) for routine post - op       Report consisted of patients Situation, Background, Assessment and   Recommendations(SBAR). Information from the following report(s) SBAR, OR Summary, Procedure Summary, Intake/Output, MAR, and Cardiac Rhythm NSR  was reviewed with the receiving nurse. Lines:   Peripheral IV 12/05/22 Posterior;Right Hand (Active)   Site Assessment Clean, dry, & intact 12/05/22 1502   Phlebitis Assessment 0 12/05/22 1502   Infiltration Assessment 0 12/05/22 1502   Dressing Status Clean, dry, & intact 12/05/22 1502   Dressing Type Transparent 12/05/22 1502   Hub Color/Line Status Infusing 12/05/22 1502        Opportunity for questions and clarification was provided.       Patient transported with:   Registered Nurse

## 2022-12-05 NOTE — OP NOTES
[address] Medicine  Total Knee Arthroplasty    Patient: Lewis Pratt MRN: [id number]  SSN: [id number]    YOB: 1960  Age: 57 y.o. Sex: male      Date of Surgery: 12/5/2022   Preoperative Diagnosis: OSTEOARTHRITIS OF LEFT KNEE   Postoperative Diagnosis: OSTEOARTHRITIS OF LEFT KNEE   Location: [facility]  Surgeon: Nura Segovia MD  Assistant: Alaina Coffman PA-C    Anesthesia: General and adductor canal Nerve Block    Procedure: Total Knee Arthroplasty:   The complexity of the total joint surgery requires the use of a first assistant for positioning, retraction and assistance in closure. Tourniquet Time: Tourniquet not used. Estimated Blood Loss: Less than 100cc     Implants:   Implant Name Type Inv. Item Serial No.  Lot No. LRB No. Used Action   CEMENT BNE 40GM FULL DOSE PMMA W/O ANTIBIO HI VISC N RADPQ - [id number]  CEMENT BNE 40GM FULL DOSE PMMA W/O ANTIBIO HI VISC N RADPQ  [facility] [employer]WD [id number] Left 1 Implanted   CEMENT BNE 40GM FULL DOSE PMMA W/O ANTIBIO HI VISC N RADPQ - [id number]  CEMENT BNE 40GM FULL DOSE PMMA W/O ANTIBIO HI VISC N RADPQ  [facility] [employer]_WD [id number] Left 1 Implanted   COMPONENT PATELLAR 35 MM KNEE VITAMIN-E - [id number]  COMPONENT PATELLAR 35 MM KNEE VITAMIN-E  [employer] [id number] Left 1 Implanted   INSERT TIB SZ 6 THK9MM UCONG BKS E VITALIZE - [id number]  INSERT TIB SZ 6 THK9MM UCONG BKS E VITALIZE  OR[employer]ELOP[employer] [id number] Left 1 Implanted   TRAY TIB SZ 6 NP A BAL KNEE - [id number]  TRAY TIB SZ 6 NP A BAL KNEE  [employer] [id number] Left 1 Implanted   COMPONENT FEM SZ 6 L KNEE NP CRUCE RET BKS TRIMAX - [id number]  COMPONENT FEM SZ 6 L KNEE NP CRUCE RET BKS TRIMAX  [employer] [id number] Left 1 Implanted        Specimens: None    Additional Findings: None     Complications: none    Body Mass Index:  Body mass index is 30.26 kg/m². Procedure Detail:  Prior to the surgery the patient was administered a femoral nerve block in the preoperative holding area by the anesthesiologist. Jordan Arreola was brought to the operating room and positioned on the operating table. He was anesthetized with anesthesia. Intravenous antibiotics were administered. Prior to the incision being made a timeout was called identifying the patient, procedure, operative side, and surgeon. A pneumatic tourniquet was placed about the limb and the left leg was prepped and draped in the usual sterile manner. The tourniquet was not inflated throughout the case. A midline anterior incision made over the knee. The incision was carried down through the subcutaneous tissue to the underlying capsule. A medial parapatellar capsular incision was performed. The medial capsular flap was carefully elevated around to the posterior medial corner protecting the medial collateral ligaments and the fibers. The patella was sized with a caliper, and approximately 10-12 mm was resected with an oscillating saw allowing the patella to be slid into the lateral gutter. It was not everted throughout the case. Our attention was first turned to the distal femur and using intermedullary instrumentation, a 5-degree valgus cut was on the distal end of the femur. The distal end of the femur was sized to a size 6 femoral component. Pins were inserted through the sizer and the corresponding 4-in-1 block was slid into place and pinned for stability. Anterior and posterior and chamfer cuts were made to accommodate the femoral component. The medial and lateral menisci were excised as were the anterior cruciate ligaments. Our attention was then turned to the tibia. Using extramedullary instrumentation, a 3-degree cut was made on the proximal end of the tibia.  A spacer block was placed to show gaps had been balanced and a size 6  tibial base plate was placed on the tibia and pinned into place. Intramedullary reaming guide was placed on the tibia and the appropriate reamer was used followed by the keel punch to complete the preparation of the tibia. A trial femoral component was then impacted on to the distal end of the femur. Trial reduction was then performed with incremental size trial bearing surfaces. The orthodevelopment BKS trimax UC 9mm bearing surface was inserted and allowed for full extension, good medial, lateral stability at 90 degrees of flexion, especially medially. Our attention was then turned to the patella. The patella was sized to a 35mm patella. The guide was pinned, placed over patella, 3 holes were drilled. Trial patella button was inserted and the patella was reduced in the knee. The patella tracked normally using no-touch technique. The trial components were then all removed. The real components were opened on the back. The cut surfaces of the bone were prepared using the PulsaVac lavage. Prior to this, the Aquamantys was used to cauterize any soft tissue bleeding. 40 grams of Depuy HV cement was mixed. The femoral and tibial components  and patellar component was cemented into place. Excess cement was removed from around the edge of bone using plastic curette. Once the cement had hardened, the knee was placed through range of motion and noted to be stable as mentioned above with the trail components. The wound was dry, therefore no drain was used. The operative knee was injected with 20 cc of exparel. The knee was then soaked with a diluted betadine solution for approximately 3 min. This was then thoroughly irrigated. The capsular layer was closed using a #2 stratafix suture with the knee flexed 90 degrees, while subcutaneous layers were closed using 2-0 Vicryl suture. Finally the skin was closed using Prineo, which were applied in occlusive fashion and sterile bandage applied. All sponge and needle counts were correct.   He was taken to the recovery room extubated in stable condition. Signed By: Radha Ma MD     December 5, 2022            Operative Note    Patient: Jordan Arreola  YOB: 1960  MRN: [id number]    Date of Procedure: 12/5/2022     Pre-Op Diagnosis: OSTEOARTHRITIS OF LEFT KNEE    Post-Op Diagnosis: Same as preoperative diagnosis. Procedure(s):  LEFT TOTAL KNEE REPLACEMENT    Surgeon(s):  Shila Nice MD    Surgical Assistant: Physician Assistant: HATTIE Robertson    Anesthesia: General     Estimated Blood Loss (mL):  less than 358     Complications: None    Specimens: * No specimens in log *     Implants:   Implant Name Type Inv.  Item Serial No.  Lot No. LRB No. Used Action   CEMENT BNE 40GM FULL DOSE PMMA W/O ANTIBIO HI VISC N RADPQ - [id number]  CEMENT BNE 40GM FULL DOSE PMMA W/O ANTIBIO HI VISC N RADPQ  [facility] [employer]WD [id number] Left 1 Implanted   CEMENT BNE 40GM FULL DOSE PMMA W/O ANTIBIO HI VISC N RADPQ - [id number]  CEMENT BNE 40GM FULL DOSE PMMA W/O ANTIBIO HI VISC N RADPQ  [facility] [employer]_WD [id number] Left 1 Implanted   COMPONENT PATELLAR 35 MM KNEE VITAMIN-E - [id number]  COMPONENT PATELLAR 35 MM KNEE VITAMIN-E  [employer] [id number] Left 1 Implanted   INSERT TIB SZ 6 THK9MM UCONG BKS E VITALIZE - [id number]  INSERT TIB SZ 6 THK9MM UCONG BKS E VITALIZE  [employer] [id number] Left 1 Implanted   TRAY TIB SZ 6 NP A BAL KNEE - [id number]  TRAY TIB SZ 6 NP A BAL KNEE  ORTH[employer] DEVELOPMENT [employer] [id number] Left 1 Implanted   COMPONENT FEM SZ 6 L KNEE NP CRUCE RET BKS TRIMAX - [id number]  COMPONENT FEM SZ 6 L KNEE NP CRUCE RET BKS TRIMAX  [employer] [id number] Left 1 Implanted       Drains: * No LDAs found *    Findings: djd knee    Detailed Description of Procedure:   See above note    Electronically Signed by Radha Ma MD on 12/5/2022 at 2:06 PM

## 2022-12-05 NOTE — ANESTHESIA PREPROCEDURE EVALUATION
Relevant Problems   RESPIRATORY SYSTEM   (+) History of 2019 novel coronavirus disease (COVID-19)      CARDIOVASCULAR   (+) Aortic insufficiency, moderate   (+) Essential hypertension      GASTROINTESTINAL   (+) GERD (gastroesophageal reflux disease)      ENDOCRINE   (+) Class 1 obesity due to excess calories without serious comorbidity in adult       Anesthetic History     Increased risk of difficult airway          Review of Systems / Medical History  Patient summary reviewed, nursing notes reviewed and pertinent labs reviewed    Pulmonary  Within defined limits                 Neuro/Psych   Within defined limits           Cardiovascular    Hypertension  Valvular problems/murmurs: aortic insufficiency            Exercise tolerance: >4 METS     GI/Hepatic/Renal     GERD           Endo/Other        Morbid obesity and arthritis     Other Findings            Physical Exam    Airway  Mallampati: III  TM Distance: 4 - 6 cm  Neck ROM: decreased range of motion   Mouth opening: Diminished (comment)     Cardiovascular  Regular rate and rhythm,  S1 and S2 normal,  no murmur, click, rub, or gallop  Rhythm: regular  Rate: normal         Dental  No notable dental hx       Pulmonary  Breath sounds clear to auscultation               Abdominal  GI exam deferred       Other Findings            Anesthetic Plan    ASA: 3  Anesthesia type: general      Post-op pain plan if not by surgeon: peripheral nerve block single      Anesthetic plan and risks discussed with: Patient      GA vs SAB discussed. Pt prefers GA    ACB for postop pain explained. Risks explained including bleeding, infection, nerve injury, failed block. Procedure explained as elective. Pt understands and desires to proceed.

## 2022-12-05 NOTE — H&P
[address] [facility] [address] Medicine  History and Physical Exam    Patient: Jass Bo MRN: [id number]  SSN: [id number]    YOB: 1960  Age: 57 y.o. Sex: male      Subjective:      Chief Complaint: Left knee pain    History of Present Illness:  Patient complains of pain to the left knee and difficulty ambulating, which has progressively worsened over several months. X-rays showed osteoarthritis of the joint. The patient's pain has persisted and progressed despite conservative treatments and therapies. The patient has been previously treated with medications and/or injections. The patient has at this time opted for surgical intervention. Past Medical History:   Diagnosis Date    Abnormal myocardial perfusion study 02/28/2016    Partially reversible inferior perfusion defect, EF 57%    AI (aortic insufficiency) 2016    Moderate with possible bicuspid aortic valve    Chronic low back pain     Chronic venous insufficiency     Colon polyp 03/2012    Benign serrated polyp    Difficult intubation 08/08/2008    had difficulty with tube placement when had gall bladder surgery    Dyspnea 08/2022    on inhaler    Fracture 05/2016    rib fx's    Genital herpes 1976    on meds    GERD (gastroesophageal reflux disease) 2002    on meds as needed, diet control    History of echocardiogram 02/28/2016    EF 55%. Basal inferior hypokinesis. Gr 1 DDfx. Mod AI.       Hyperlipidemia     not meds    Hypertension 2010    Osteoarthritis of left knee 12/4/2022    Osteoarthritis of right knee     Osteopenia     Prediabetes     on meds    Recurrent genital herpes 01/16/2013    Right lumbar radiculopathy 10/2016    MRI: prominent right paracentral L4-L5 disc extrusion/impingement of descending right L5 nerve root; moderately severe canal stenosis; prominent right paracentral L5-S1 disc protrusion/ impingement of the descending right S1 nerve root; abutment of the foraminal right L5 nerve root/moderate foramina stenosis at L5-S1; mild canal stenosis at L3-L4 with abutment of the descending left L4 nerve root. S/P cardiac catheterization 2016    Normal coronary anatomy    Tinnitus, bilateral      Past Surgical History:   Procedure Laterality Date    ENDOSCOPY, COLON, DIAGNOSTIC  2021    HX CHOLECYSTECTOMY  2008    Lap    HX COLONOSCOPY  2022    HX HEART CATHETERIZATION  2016    minor blockage    HX LUMBAR LAMINECTOMY  2018    R L4/5/S1 Dr. Jun Sanchez TONSILLECTOMY       Social History     Occupational History    Not on file   Tobacco Use    Smoking status: Former     Packs/day: 0.50     Years: 25.00     Pack years: 12.50     Types: Cigarettes     Quit date: 2000     Years since quittin.4     Passive exposure: Never    Smokeless tobacco: Former     Quit date:    Vaping Use    Vaping Use: Never used   Substance and Sexual Activity    Alcohol use: Yes     Alcohol/week: 1.7 standard drinks     Types: 2 Cans of beer per week    Drug use: Yes     Types: Marijuana     Comment: last used in     Sexual activity: Not Currently     Partners: Female     Prior to Admission medications    Medication Sig Start Date End Date Taking? Authorizing Provider   amLODIPine (NORVASC) 5 mg tablet Take 1 Tablet by mouth daily. 22   Hampton Brittle, MD   calcium citrate-vitamin d3 (CITRACAL+D) 315 mg-5 mcg (200 unit) tab Take 1 Tablet by mouth daily (with breakfast). Provider, Historical   cholecalciferol (VITAMIN D3) (5000 Units/125 mcg) tab tablet Take 5,000 Units by mouth daily. Provider, Historical   magnesium oxide (MAG-OX) 400 mg tablet Take 400 mg by mouth daily. Provider, Historical   ZINC PICOLINATE PO Take 22 mg by mouth daily. Provider, Historical   ibuprofen (MOTRIN) 800 mg tablet Take 800 mg by mouth every six (6) hours as needed for Pain.     Provider, Historical   acetaminophen (TYLENOL) 500 mg tablet Take 1,000 mg by mouth every six (6) hours as needed for Pain. Provider, Historical   valACYclovir (VALTREX) 500 mg tablet TAKE 1 TABLET DAILY. Patient taking differently: Take 500 mg by mouth every evening. Indications: shingles 10/30/22   Roslyn Cantrell MD   albuterol (PROVENTIL HFA, VENTOLIN HFA, PROAIR HFA) 90 mcg/actuation inhaler Take 1 Puff by inhalation every six (6) hours as needed for Wheezing. 10/3/22   Roslyn Cantrell MD   pantoprazole (PROTONIX) 40 mg tablet Take 40 mg by mouth daily as needed. Provider, Historical   losartan (COZAAR) 100 mg tablet Take 1 Tablet by mouth daily. Patient taking differently: Take 100 mg by mouth nightly. Indications: high blood pressure 1/17/22   Zohreh Mejía MD   clindamycin (CLEOCIN T) 1 % external solution Use thin film on affected area of scalp every 12 hours  Indications: acne  Patient taking differently: Apply  to affected area as needed. Indications: acne 2/18/21   Roslyn Cantrell MD   ascorbic acid, vitamin C, (VITAMIN C) 500 mg tablet Take 1,000 mg by mouth daily. Provider, Historical   inhalational spacing device 1 Each by Does Not Apply route as needed (shortness of breath). 9/12/19   Roslyn Cantrell MD   nitroglycerin (NITROSTAT) 0.4 mg SL tablet 1 Tab by SubLINGual route every five (5) minutes as needed for Chest Pain. Up to 3 doses. 1/7/19   Zohreh Mejía MD   aspirin delayed-release 81 mg tablet Take 81 mg by mouth daily. 3/1/16   Provider, Historical   DOCOSAHEXANOIC ACID/EPA (FISH OIL PO) Take 2 Tablets by mouth daily. Provider, Historical       Allergies: Allergies   Allergen Reactions    Nuts [Tree Nut] Anaphylaxis    Tree Nuts Anaphylaxis    Ace Inhibitors Cough        Review of Systems:  A comprehensive review of systems was negative except for that written in the History of Present Illness.     Objective:       Physical Exam:  HEENT: Normocephalic, atraumatic  Lungs:  Clear to auscultation  Heart:   Regular rate and rhythm  Abdomen: Soft  Extremities: Pain with range of motion of the left knee. Active ROM limited due to pain. Tenderness generalized. Crepitus present. Antalgic gait. Assessment:      Arthritis of the left knee. Plan:       Proceed with scheduled LEFT TOTAL KNEE ARTHROPLASTY. The various methods of treatment have been discussed with the patient and family. After consideration of risks, benefits, and other options for treatment, the patient has consented to surgical interventions. Questions were answered and preoperative teaching was done by Dr Nerissa Arnold.      Signed By: HATTIE Morales     December 4, 2022

## 2022-12-05 NOTE — ANESTHESIA POSTPROCEDURE EVALUATION
Post-Anesthesia Evaluation and Assessment    Cardiovascular Function/Vital Signs  Visit Vitals  /62   Pulse 83   Temp 36.1 °C (97 °F)   Resp 18   Ht 6' (1.829 m)   Wt 101.2 kg (223 lb 1.6 oz)   SpO2 95%   BMI 30.26 kg/m²       Patient is status post Procedure(s):  LEFT TOTAL KNEE REPLACEMENT. Nausea/Vomiting: Controlled. Postoperative hydration reviewed and adequate. Pain:  Pain Scale 1: FLACC (12/05/22 1517)  Pain Intensity 1: 4 (12/05/22 1512)   Managed. Neurological Status:   Neuro (WDL): Within Defined Limits (12/05/22 1457)   At baseline. Mental Status and Level of Consciousness: Baseline and stable. Pulmonary Status:   O2 Device: Non-rebreather mask (12/05/22 1420)   Adequate oxygenation and airway patent. Complications related to anesthesia: None    Post-anesthesia assessment completed. No concerns. Patient has met all discharge requirements.     Signed By: Xi Shelton MD

## 2022-12-05 NOTE — ANESTHESIA PROCEDURE NOTES
Peripheral Block    Start time: 12/5/2022 11:14 AM  End time: 12/5/2022 11:19 AM  Performed by: Ese Maciel MD  Authorized by: Ese Maciel MD       Pre-procedure: Indications: at surgeon's request and procedure for pain    Preanesthetic Checklist: patient identified, risks and benefits discussed, site marked, timeout performed, anesthesia consent given, patient being monitored and fire risk safety assessment completed and verbalized    Timeout Time: 11:14 EST      Block Type:   Block Type: Adductor canal block  Laterality:  Left  Monitoring:  Standard ASA monitoring, continuous pulse ox, frequent vital sign checks, heart rate, oxygen and responsive to questions  Injection Technique:  Single shot  Procedures: ultrasound guided    Procedures comment:  For needle placement  Patient Position: supine  Prep: chlorhexidine    Location:  Lower thigh  Needle Type:  Stimuplex  Needle Gauge:  21 G  Needle Localization:  Ultrasound guidance  Medication Injected:  Ropivacaine (NAROPIN) 2 mg/mL (0.2 %) injection - Peripheral Nerve Block   20 mL - 12/5/2022 11:19:00 AM  dexamethasone (PF) (DECADRON) 10 mg/mL injection - Peripheral Nerve Block   4 mg - 12/5/2022 11:19:00 AM  dexmedeTOMidine (PRECEDEX) 100 mcg/mL iv solution - Peripheral Nerve Block   15 mcg - 12/5/2022 11:19:00 AM  Med Admin Time: 12/5/2022 11:19 AM    Assessment:  Number of attempts:  1  Injection Assessment:  Incremental injection every 5 mL, negative aspiration for CSF, no paresthesia, ultrasound image on chart, local visualized surrounding nerve on ultrasound, negative aspiration for blood and no intravascular symptoms  Patient tolerance:  Patient tolerated the procedure well with no immediate complications  Patient able to straight leg raise left leg after block. No sensory or motor block.

## 2022-12-05 NOTE — PERIOP NOTES
Discharge instructions were reviewed with patient and his wife. All questions were answered. PT/OT in to assess patient. Patient has not voided yet.

## 2022-12-05 NOTE — DISCHARGE INSTRUCTIONS
[address] [place] [address] Sports Medicine   Patient Discharge Instructions    Marshal Parter / [id number] : 1960    Admitted 2022 Discharged: 2022     IF YOU HAVE ANY PROBLEMS ONCE YOU ARE AT HOME CALL THE FOLLOWING NUMBERS:   Main office number: [phone]    Your follow up appointment to see either Dr. Jameson Lopez PA-C, or [facility] TAY[facility] as scheduled in 2 weeks. If you are unsure of your appointment date call the office at [phone]. Medication Instructions     Resume your home medictions as directed, you may have directed not to resume supplements until after your follow up. A prescription for pain medication has been given   It is important that you take the medication exactly as they are prescribed. Keep your medication in the bottles provided by the pharmacist and keep a list of the medication names, dosages, and times to be taken in your wallet. Do not take other medications without consulting your doctor. What to do at [address] [place] [address] Ave your prehospital diet. If you have excessive nausea or vomitting call your doctor's office. Be sure to maintain adequate fluid intake. Some pain medications may cause constipation. Remember to drink fluids, stay as active as possible, and eat plenty of fiber-rich foods. Begin In-Home Physical Therapy; 3 times a week to work on gait training, range of motion, strengthening, and weight bearing exercises as tolerable. Continue to use your walker or cane when walking. May progress from the walker to a cane to complete total bearing as tolerable. Patient may shower. Wrap incision with plastic wrap/covering to prevent incision from getting wet. Avoid complete immersion. YOUR DRESSING SHOULD BE CHANGED BY YOUR HOME HEALTH NURSE 5-7 AFTER SURGERY ACCORDING TO THE DATE WRITTEN ON YOUR DRESSING.       When to Call    - Call if you have a temperature greater then 101  - Unable to keep food down  - Are unable to bear any wieght   - Need a pain medication refill     Information obtained by :  I understand that if any problems occur once I am at home I am to contact my physician. I understand and acknowledge receipt of the instructions indicated above. Physician's or R.N.'s Signature                                                                  Date/Time                                                                                                                                              Patient or Representative Signature                                                          Date/Time         DISCHARGE SUMMARY from Nurse    PATIENT INSTRUCTIONS:    After general anesthesia or intravenous sedation, for 24 hours or while taking prescription Narcotics:  Limit your activities  Do not drive and operate hazardous machinery  Do not make important personal or business decisions  Do  not drink alcoholic beverages  If you have not urinated within 8 hours after discharge, please contact your surgeon on call. Report the following to your surgeon:  Excessive pain, swelling, redness or odor of or around the surgical area  Temperature over 100.5  Nausea and vomiting lasting longer than 4 hours or if unable to take medications  Any signs of decreased circulation or nerve impairment to extremity: change in color, persistent  numbness, tingling, coldness or increase pain  Any questions    What to do at Home:  Recommended activity: Activity as tolerated, and no driving until cleared by Dr. Blanca Rogers. *  Please give a list of your current medications to your Primary Care Provider. *  Please update this list whenever your medications are discontinued, doses are      changed, or new medications (including over-the-counter products) are added.     *  Please carry medication information at all times in case of emergency situations. These are general instructions for a healthy lifestyle:    No smoking/ No tobacco products/ Avoid exposure to second hand smoke  Surgeon General's Warning:  Quitting smoking now greatly reduces serious risk to your health. Obesity, smoking, and sedentary lifestyle greatly increases your risk for illness    A healthy diet, regular physical exercise & weight monitoring are important for maintaining a healthy lifestyle    You may be retaining fluid if you have a history of heart failure or if you experience any of the following symptoms:  Weight gain of 3 pounds or more overnight or 5 pounds in a week, increased swelling in our hands or feet or shortness of breath while lying flat in bed. Please call your doctor as soon as you notice any of these symptoms; do not wait until your next office visit. The discharge information has been reviewed with the patient and caregiver. The patient and caregiver verbalized understanding. Discharge medications reviewed with the patient and caregiver and appropriate educational materials and side effects teaching were provided.     Patient armband removed and shredded   ___________________________________________________________________________________________________________________________________

## 2022-12-06 ENCOUNTER — HOME CARE VISIT (OUTPATIENT)
Dept: SCHEDULING | Facility: HOME HEALTH | Age: 62
End: 2022-12-06
Payer: COMMERCIAL

## 2022-12-06 VITALS
OXYGEN SATURATION: 95 % | TEMPERATURE: 98.2 F | RESPIRATION RATE: 16 BRPM | HEART RATE: 76 BPM | DIASTOLIC BLOOD PRESSURE: 72 MMHG | SYSTOLIC BLOOD PRESSURE: 132 MMHG

## 2022-12-06 PROCEDURE — G0151 HHCP-SERV OF PT,EA 15 MIN: HCPCS

## 2022-12-06 NOTE — Clinical Note
Dear Dr. Sujey Diego    I wanted to thank you for your referral of your patient Margy Velez.  We have admitted this patient for Home Health Physical Therapy with the following frequency: 3w2.       Sincerely,  DIANNA Humphreys   Physical Therapist

## 2022-12-06 NOTE — PROGRESS NOTES
Problem: Self Care Deficits Care Plan (Adult)  Goal: *Acute Goals and Plan of Care (Insert Text)  Description: Initial Occupational Therapy Goals (12/5/2022) Within 7 day(s):    1. Patient will perform grooming standing sinkside with supervision for increased independence with ADLs. 2. Patient will perform LB dressing with supervision & A/E PRN for increased independence with ADLs. 3. Patient will perform toilet transfer with supervision for increased independence with ADLs. 4. Patient will perform all aspects of toileting with supervision for increased independence with ADLs. 5. Patient will independently apply energy conservation techniques with 1 verbal cue(s)for increased independence with ADLs. 6. Patient will perform bathroom mobility with supervision for increased independence/safety with ADLs. Outcome: Progressing Towards Goal  OCCUPATIONAL THERAPY EVALUATION    Patient: Betty Henriquez (50 y.o. male)  Date: 12/5/2022  Primary Diagnosis: Osteoarthritis of left knee [M17.12]  Procedure(s) (LRB):  LEFT TOTAL KNEE REPLACEMENT (Left) Day of Surgery   Precautions: Fall, WBAT  PLOF: pt independent for ADLs/functional mobility    ASSESSMENT AND RECOMMENDATIONS:  Based on the objective data described below, the patient presents with LLE decreased ROM and strength affecting LE ADLs. Pt found seated in recliner chair, vitals assessed and WNL, pt reporting pain 5/10, agreeable to therapy. Educated pt on proper body mechanics for ADLs s/p TKR. Pt completed upper body dressing with supervision. Pt able to thread B feet through underwear/pants without assist, and CGA when standing to pull up to waist. Pt required CGA/SBA for STS/bathroom mobility with vc for safe use of RW. Pt voided standing at toilet with CGA for clothing management. Pt ambulated back to recliner, ice applied to L knee. Spouse present during session for education on home safety.  Provided opportunity for pt to voice questions on ADL performance when home, pt has no further concerns. Patient will benefit from skilled Occupational Therapy intervention to maximize safety/independence with ADLs at d/c.    Education: Reviewed home safety, body mechanics, importance of moving every hour to prevent joint stiffness, role of ice for edema/pain control, Rolling Walker management/safety, and adaptive dressing techniques with patient verbalizing  understanding at this time     Patient will benefit from skilled intervention to address the above impairments. Patient's rehabilitation potential is considered to be Good  Factors which may influence rehabilitation potential include:   [x]             None noted  []             Mental ability/status  []             Medical condition  []             Home/family situation and support systems  []             Safety awareness  []             Pain tolerance/management  []             Other:        PLAN :  Recommendations and Planned Interventions:   [x]               Self Care Training                  [x]      Therapeutic Activities  [x]               Functional Mobility Training   []      Cognitive Retraining  [x]               Therapeutic Exercises           []      Endurance Activities  [x]               Balance Training                    []      Neuromuscular Re-Education  []               Visual/Perceptual Training     [x]      Home Safety Training  [x]               Patient Education                   [x]      Family Training/Education  []               Other (comment):    Frequency/Duration: Patient will be followed by Occupational Therapy 1-2 times per day/4-7 days per week to address goals.   Discharge Recommendations: Home health with adult supervision at least 24 hours after d/c  Further Equipment Recommendations for Discharge: N/A    AMPAC: Based on an AM-PAC score of 19/24 and their current ADL deficits; it is recommended that the patient have 2-3 sessions per week of Occupational Therapy at d/c to increase the patient's independence. This AMPAC score should be considered in conjunction with interdisciplinary team recommendations to determine the most appropriate discharge setting. Patient's social support, diagnosis, medical stability, and prior level of function should also be taken into consideration. SUBJECTIVE:   Patient stated I'm doing alright.     OBJECTIVE DATA SUMMARY:     Past Medical History:   Diagnosis Date    Abnormal myocardial perfusion study 02/28/2016    Partially reversible inferior perfusion defect, EF 57%    AI (aortic insufficiency) 2016    Moderate with possible bicuspid aortic valve    Chronic low back pain     Chronic venous insufficiency     Colon polyp 03/2012    Benign serrated polyp    Difficult intubation 08/08/2008    had difficulty with tube placement when had gall bladder surgery    Dyspnea 08/2022    on inhaler    Fracture 05/2016    rib fx's    Genital herpes 1976    on meds    GERD (gastroesophageal reflux disease) 2002    on meds as needed, diet control    History of echocardiogram 02/28/2016    EF 55%. Basal inferior hypokinesis. Gr 1 DDfx. Mod AI. Hyperlipidemia     not meds    Hypertension 2010    Osteoarthritis of left knee 12/4/2022    Osteoarthritis of right knee     Osteopenia     Prediabetes     on meds    Recurrent genital herpes 01/16/2013    Right lumbar radiculopathy 10/2016    MRI: prominent right paracentral L4-L5 disc extrusion/impingement of descending right L5 nerve root; moderately severe canal stenosis; prominent right paracentral L5-S1 disc protrusion/ impingement of the descending right S1 nerve root; abutment of the foraminal right L5 nerve root/moderate foramina stenosis at L5-S1; mild canal stenosis at L3-L4 with abutment of the descending left L4 nerve root.     S/P cardiac catheterization 03/01/2016    Normal coronary anatomy    Tinnitus, bilateral 2012     Past Surgical History:   Procedure Laterality Date    ENDOSCOPY, COLON, DIAGNOSTIC 12/2021    HX CHOLECYSTECTOMY  08/08/2008    Lap    HX COLONOSCOPY  03/2022    HX HEART CATHETERIZATION  03/01/2016    minor blockage    HX LUMBAR LAMINECTOMY  07/05/2018    Atiya Richardson TONSILLECTOMY  2007     Barriers to Learning/Limitations: yes;  physical, post-anesthesia  Compensate with: visual, verbal, tactile, kinesthetic cues/model    Home Situation/Prior Level of Function:   Home Situation  Home Environment: Private residence  # Steps to Enter: 3  Rails to Enter: Yes  Hand Rails : Bilateral  One/Two Story Residence: One story  Living Alone: No  Support Systems: Spouse/Significant Other  Patient Expects to be Discharged to[de-identified] Home with home health  Current DME Used/Available at Home: Elnoria Rudy, rolling, Shower chair  Tub or Shower Type: Shower  []  Right hand dominant   []  Left hand dominant    Cognitive/Behavioral Status:  Neurologic State: Alert  Orientation Level: Oriented to person;Oriented to place;Oriented to situation  Cognition: Follows commands  Safety/Judgement: Awareness of environment    Skin: L knee incision w/ Mepilex   Edema: compression hose in place & applied ice     Coordination: BUE  Coordination: Within functional limits  Fine Motor Skills-Upper: Left Intact; Right Intact    Gross Motor Skills-Upper: Left Intact; Right Intact    Balance:  Sitting: Intact  Standing: Intact; With support    Strength: BUE  Strength: Generally decreased, functional     Tone & Sensation:BUE  Tone: Normal  Sensation: Impaired (L knee)    Range of Motion: BUE  AROM: Generally decreased, functional  PROM: Generally decreased, functional    Functional Mobility and Transfers for ADLs:  Bed Mobility:  Scooting: Stand-by assistance  Transfers:  Sit to Stand: Contact guard assistance (vc)   Bathroom Mobility: Contact guard assistance;Stand-by assistance    ADL Assessment:  Feeding: Independent  Oral Facial Hygiene/Grooming: Stand-by assistance  Bathing: Minimum assistance  Upper Body Dressing: Supervision  Lower Body Dressing: Contact guard assistance  Toileting: Stand by assistance    ADL Intervention:  Upper Body Dressing Assistance  Dressing Assistance: Supervision  Pullover Shirt: Supervision    Lower Body Dressing Assistance  Dressing Assistance: Contact guard assistance  Underpants: Contact guard assistance  Pants With Elastic Waist: Contact guard assistance  Leg Crossed Method Used: No  Position Performed: Seated in chair  Cues: Verbal cues provided;Visual cues provided    Toileting  Toileting Assistance: Contact guard assistance  Bladder Hygiene: Stand-by assistance  Clothing Management: Contact guard assistance    Cognitive Retraining  Safety/Judgement: Awareness of environment    Pain:  Pain level pre-treatment: 5/10  Pain level post-treatment: 5/10  Pain Intervention(s): Rest, Ice, Repositioning   Response to intervention: Nurse notified, see doc flow     Activity Tolerance:   Fair. Patient able to stand ~5 minute(s). Patient able to complete ADLs with intermittent rest breaks. Patient limited by pain, strength, ROM. Patient unsteady. Please refer to the flowsheet for vital signs taken during this treatment. After treatment:   [x]  Patient left in no apparent distress sitting up in chair  []  Patient sitting on EOB  []  Patient left in no apparent distress in bed  [x]  Call bell left within reach  [x]  Nursing notified  []  Caregiver present  [x]  Ice applied  []  SCD's on while back in bed  [] Bed alarm activated    COMMUNICATION/EDUCATION:   Communication/Collaboration:  [x]       Role of Occupational Therapy in the acute care setting. [x]      Home safety education was provided and the patient/caregiver indicated understanding. [x]      Patient/family have participated as able in goal setting and plan of care. [x]      Patient/family agree to work toward stated goals and plan of care.   []      Patient understands intent and goals of therapy, but is neutral about his/her participation. []      Patient is unable to participate in plan of care at this time. Thank you for this referral.  Raheel Arnett, OTR/L  Time Calculation: 25 mins    Eval Complexity: History: MEDIUM Complexity : Expanded review of history including physical, cognitive and psychosocial  history ; Examination: LOW Complexity : 1-3 performance deficits relating to physical, cognitive , or psychosocial skils that result in activity limitations and / or participation restrictions ; Decision Making:LOW Complexity : No comorbidities that affect functional and no verbal or physical assistance needed to complete eval tasks     [facility] AM-PAC® Daily Activity Inpatient Short Form (6-Clicks)*    How much HELP from another person does the patient currently need    (If the patient hasn't done an activity recently, how much help from another person do you think he/she would need if he/she tried?)   Total (Total A or Dep)   A Lot  (Mod to Max A)   A Little (Sup or Min A)   None (Mod I to I)   Putting on and taking off regular lower body clothing? [] 1 [] 2 [x] 3 [] 4   2. Bathing (including washing, rinsing,      drying)? [] 1 [] 2 [x] 3 [] 4   3. Toileting, which includes using toilet, bedpan or urinal?   [] 1 [] 2 [x] 3 [] 4   4. Putting on and taking off regular upper body clothing? [] 1 [] 2 [x] 3 [] 4   5. Taking care of personal grooming such as brushing teeth? [] 1 [] 2 [x] 3 [] 4   6. Eating meals? [] 1 [] 2 [] 3 [x] 4     Based on an AM-PAC score of 19/24 and their current ADL deficits; it is recommended that the patient have 2-3 sessions per week of Occupational Therapy at d/c to increase the patient's independence.

## 2022-12-06 NOTE — HOME HEALTH
PT INITIAL EVALUATION    Past Medical Hx:      PDGM Type (Community vs. Institutional): Community    PDGM Time (Early vs. Late):  Early    PDGM Diagnosis Can be found in CC on progress note 12/4/22    List of Comorbidities:      Abnormal myocardial perfusion study 02/28/2016      Partially reversible inferior perfusion defect, EF 57%    AI (aortic insufficiency) 2016      Moderate with possible bicuspid aortic valve    Chronic low back pain      Chronic venous insufficiency      Col on polyp 03/2012      Benign serrated polyp    Difficult intubation 08/08/2008      had difficulty with tube placement when had gall bladder surgery    Dyspnea 08/2022      on inhaler    Fracture 05/2016      rib fx's    Genital herpes 1976      on meds    GERD (gastroesophageal reflux disease) 2002      on meds as needed, diet control    History of echocardiogram 02/28/2016      EF 55%. Basal inferior hypokinesis. Gr 1 DDfx. Mod AI.  Hyperlipidemia        not meds    Hypertension 2010    Osteoarthritis of left knee 12/4/2022    Osteoarthritis of right knee      Osteopenia      Prediabetes        on meds    Recurrent genital herpes 01/16/2013    Right lumbar radiculopathy 10/2016      MRI: prominent right paracentral L4-L5 disc extrusion/impingement of descending right L5 nerve root; moderately severe canal stenosis; prominent right paracentral L5-S1 disc protr usion/ impingement of the descending right S1 nerve root; abutment of the foraminal right L5 nerve root/moderate foramina stenosis at L5-S1; mild canal stenosis at L3-L4 with abutment of the descending left L4 nerve root.  S/P cardiac catheterization 03/01/2016      Normal coronary anatomy    Tinnitus, bilateral     Recent H/o current illness:  57 year old male presents with MD referral for HHPT s/p hospitalization due to left TKR  Medication Management: wife assists with medications  Social hx and home eval:  Pt lives in 2 story home with wife. Caregiver Involvement: assists with ADL's  PLOF:  Pt PLOF is ambulation w no AD, pts base level of function independent with functional mobility  BALANCE:     Seated unsupported balance is good   Standing static balance is fair  Standing dynamic balance is fair  Tinetti 16 /28  Patient is at risk for falls due to recent hospitalization/weakness  BLE Strength:  Left Hip flexion 3/5 , hip abduction 3/5, hip adduction 3/5, Knee flexion 2+/5  knee extension 2+/5, ankle dorsiflexion 3+/5  Right Hip flexion 3+/5 , hip abduction 3+/5, hip adduction 3+/5, Knee flexion 4-/5  knee extension 4-/5, ankle dorsiflexion 4/5  BLE ROM:  Right hip/knee/ankle: WFL  Left hip/ankle: [facility]   knee: 17-90 degrees  Bed mobility:  independent   Transfers:  min A with sit<->stand for bed to chair, with FWW AD. min cues and instruction needed for safety and sequencing  GAIT:  Patient ambulated  50 ft  with FWW  on level  surfaces min A. Pt demonstrates with decreased hip and knee flexion on LLE in pre and mid swing phase of gait as well as decreased stride-length and jose. Patient is unable to safely ambulate without assistance at this time. Pt required min  cues for  safety and sequencing  Stairs: 4  with min A and hand rail/AD  Patient education provided this visit: Safety with functional mobility and instruction with HEP. Patient level of understanding of education provided: good ,able to return demonstrate mobility instruction and HEP with min assistance  Patient response to treatment:  good with no c/o increased pain  Instructed patient with regards to signs and symptoms of infection. WOUND: see wound LDA  Assessment: Referral for HHPT following recent hospitalization due to left TKR.    HHPT is medically necessary to address the following clinical findings: decreased BLE strength and ROM, impaired gait, decreased I and safety with transfers and gait, decreased endurance, decreased balance and decreased safety in order to improve functional mobility and decrease fall risk. Pt is a fall risk as indicated by Tinetti score of 16/28. Patient will be seen for HHPT 3w2 for therapeutic exercises to increase BLE strength and ROM,  training for gait, stairs and transfers to increase I and safety with daily functional mobility and balance and endurance activities to decrease fall risk, decrease impairment and increase functional mobility and independence in the home. Pt. requires skilled PT intervention to instruct Pt. with gait training with LRAD, ROM and strengthening, stair training, transfer training, balance training, manual therapy, neuromuscular re-education, patient care-giver education, HEP, endurance training, body mechanics. Instructed patient with HEP for LLE strengthening and left HEP handout for the patient. Patient was able to return demonstrate the exercises given. HEP includes:   Pt. received therapeutic exercises which included:   TKA basic protocol: gluteal sets with isometric hold x 10 seconds, quad set with isometric hold x 10 seconds, hip abd, ham string sets with isometric hold x 10 seconds, Sartorius strengthening with  isometric hold x 10 seconds , heel slides, stretching of the L knee with belt assist, knee extension with isometric hold x 10 seconds, SAQ with isometric hold x 10 seconds x 10 x 2, 3 times/day    Skilled services/Home bound verification:     Skilled Reason for admission/summary of clinical condition:  left TKR . This patient is homebound for the following reasons Requires considerable and taxing effort to leave the home , Requires the assistance of 1 or more persons to leave the home  and Only leaves the home for medical reasons or Sh[employer] services and are infrequent and of short duration for other reasons . Caregiver: spouse. Caregiver assists with ADL's and medical appointments. Medications reconciled and all medications are available in the home this visit.       The following education was provided regarding medications: take medications as ordered  Medications  are effective at this time. High risk medication teaching regarding anticoagulants, hyperglycemic agents or opiod narcotics performed (specify) oxycodone, Instructed only to take only amount prescribed, notify sn/pt if oversedated,  may cause constipation--notify if no BM x 3 days    Marky Pandya MD notified of any discrepancies/look a like medications/medication interactions NA. Home health supplies by type and quantity ordered/delivered this visit include: all needed wound supplies    Patient education provided this visit to include: safety with functional mobility. Patient level of understanding of education provided: good with repeat verbalization    Sharps Education Provided: NA  Patient response to procedure performed:  NA      Pt/Caregiver instructed on plan of care and are agreeable to plan of care at this time. Physician Marky Pandya MD notified of patient admission to home health and plan of care including anticipated frequency of 3w2 and treatments/interventions/modalities of therex, gait training, functional mobility training, safety education, balance and coordination ROM to left knee. Discharge planning discussed with patient and caregiver. Discharge planning as follows: once goals are met, pt will be discharged to self under MD supervision. Pt/Caregiver did verbalize understanding of discharge planning. Next MD appointment 12/16/22 with Marky Pandya MD MD/NP/PA. Patient/caregiver encouraged/instructed to keep appointment as lack of follow through with physician appointment could result in discontinuation of home care services for non-compliance.

## 2022-12-06 NOTE — PROGRESS NOTES
Problem: Mobility Impaired (Adult and Pediatric)  Goal: *Acute Goals and Plan of Care (Insert Text)  Description: PT goals to be met in 1 day:  Pt will be able to perform supine<>sit SBA for transfers at home. Pt will be able to perform sit<>stand SBA for increased ability to transfer at home safely. Pt will be able to participate in gt training >100' w/ RW, WBAT, GB and CGA/SBA for improved ability in home upon d/c. Pt will be able to perform stair training step to pattern, B/U rail and CGA to obtain safe entry into home upon d/c. Pt will be educated regarding HEP per MD protocol for optimal AROM/strength outcomes. Note: [x]  Patient has met MD mobilization critieria for d/c home   [x]  Recommend HH with 24 hour adult care   []  Benefit from additional acute PT session to address:      PHYSICAL THERAPY EVALUATION    Patient: Sommer العلي (49 y.o. male)  Date: 12/5/2022  Primary Diagnosis: Osteoarthritis of left knee [M17.12]  Procedure(s) (LRB):  LEFT TOTAL KNEE REPLACEMENT (Left) Day of Surgery   Precautions:   Fall, WBAT  PLOF: Independent    ASSESSMENT :  Based on the objective data described below, decreased mobility in regards to bed mobility, transfers, gt quality and tolerance, balance, stair negotiation and safety due to L TKA surgery. Decreased AROM of L knee, dec strength of L knee, pain in L knee, dec sensation of L knee also impacting pt functional mobility. Pt rating pain on numerical pain scale pre/post and during session 6/10. Pt and caregiver ed regarding mobility safety, WB, HEP, ice application/use, elevation, environmental safety and home safe techniques. Pt sitting in recliner upon arrival.  Pt able to perform sit<>stand w/ CGA/SBA. Safety vc required throughout session to reinforce safety. Pt able to participate in gt training using RW, GB, WBAT and CGA w/ antalgic gt pattern. Pt was able to participate in stair training using step to pattern, B UE support and CGA.   Answered questions by pt and caregiver in regards to PT and mobility. Pt c/o light headedness at end of session. Pt left sitting in recliner w/ all needs within reach and ice pack to L knee. Nurse Lucy Epley aware of session and outcomes. Recommend HHPT with responsible adult care at least 24 hours upon hospital d/c. Patient will benefit from skilled intervention to address the above impairments. Patient's rehabilitation potential is considered to be Good  Factors which may influence rehabilitation potential include:   []         None noted  []         Mental ability/status  []         Medical condition  []         Home/family situation and support systems  []         Safety awareness  [x]         Pain tolerance/management  []         Other:      PLAN :  Recommendations and Planned Interventions:   [x]           Bed Mobility Training             []    Neuromuscular Re-Education  [x]           Transfer Training                   []    Orthotic/Prosthetic Training  [x]           Gait Training                          [x]    Modalities  [x]           Therapeutic Exercises           [x]    Edema Management/Control  [x]           Therapeutic Activities            [x]    Family Training/Education  [x]           Patient Education  []           Other (comment):    Frequency/Duration: Patient will be followed by physical therapy 1-2 times per day/4-7 days per week to address goals. Discharge Recommendations: Home Health  Further Equipment Recommendations for Discharge: N/A    AMPAC: 18/24    This AMPAC score should be considered in conjunction with interdisciplinary team recommendations to determine the most appropriate discharge setting. Patient's social support, diagnosis, medical stability, and prior level of function should also be taken into consideration. SUBJECTIVE:   Patient stated I think the nurse is getting me some pain medicine.     OBJECTIVE DATA SUMMARY:     Past Medical History:   Diagnosis Date    Abnormal myocardial perfusion study 02/28/2016    Partially reversible inferior perfusion defect, EF 57%    AI (aortic insufficiency) 2016    Moderate with possible bicuspid aortic valve    Chronic low back pain     Chronic venous insufficiency     Colon polyp 03/2012    Benign serrated polyp    Difficult intubation 08/08/2008    had difficulty with tube placement when had gall bladder surgery    Dyspnea 08/2022    on inhaler    Fracture 05/2016    rib fx's    Genital herpes 1976    on meds    GERD (gastroesophageal reflux disease) 2002    on meds as needed, diet control    History of echocardiogram 02/28/2016    EF 55%. Basal inferior hypokinesis. Gr 1 DDfx. Mod AI. Hyperlipidemia     not meds    Hypertension 2010    Osteoarthritis of left knee 12/4/2022    Osteoarthritis of right knee     Osteopenia     Prediabetes     on meds    Recurrent genital herpes 01/16/2013    Right lumbar radiculopathy 10/2016    MRI: prominent right paracentral L4-L5 disc extrusion/impingement of descending right L5 nerve root; moderately severe canal stenosis; prominent right paracentral L5-S1 disc protrusion/ impingement of the descending right S1 nerve root; abutment of the foraminal right L5 nerve root/moderate foramina stenosis at L5-S1; mild canal stenosis at L3-L4 with abutment of the descending left L4 nerve root.     S/P cardiac catheterization 03/01/2016    Normal coronary anatomy    Tinnitus, bilateral 2012     Past Surgical History:   Procedure Laterality Date    ENDOSCOPY, COLON, DIAGNOSTIC  12/2021    HX CHOLECYSTECTOMY  08/08/2008    Lap    HX COLONOSCOPY  03/2022    HX HEART CATHETERIZATION  03/01/2016    minor blockage    HX LUMBAR LAMINECTOMY  07/05/2018    R L4/5/S1 Dr. Garth Orellana TONSILLECTOMY  2007     Barriers to Learning/Limitations: yes;  physical and other anesthesia  Compensate with: Visual Cues, Verbal Cues, and Tactile Cues  Home Situation:  Home Situation  Home Environment: Private residence  # Steps to Enter: 3  Rails to Enter: Yes  Hand Rails : Bilateral  One/Two Story Residence: One story  Living Alone: No  Support Systems: Spouse/Significant Other  Patient Expects to be Discharged to[de-identified] Home with home health  Current DME Used/Available at Home: Corder Else, rolling, Shower chair  Tub or Shower Type: Shower  Critical Behavior:  Neurologic State: Alert  Orientation Level: Oriented to person;Oriented to place;Oriented to situation  Cognition: Follows commands  Safety/Judgement: Awareness of environment  Psychosocial  Patient Behaviors: Calm; Cooperative  Family  Behaviors: Calm;Supportive  Skin Condition/Temp: Dry  Family  Behaviors: Calm;Supportive  Skin Integrity: Incision (comment)  Skin Integumentary  Skin Color: Appropriate for ethnicity;Pink  Skin Condition/Temp: Dry  Skin Integrity: Incision (comment)  Turgor: Non-tenting  Strength:    Strength: Generally decreased, functional  Tone & Sensation:   Tone: Normal  Sensation: Impaired (L knee)  Range Of Motion:  AROM: Generally decreased, functional  PROM: Generally decreased, functional  Posture:  Functional Mobility:  Bed Mobility:  Scooting: Stand-by assistance  Transfers:  Sit to Stand: Contact guard assistance (vc)  Stand to Sit: Contact guard assistance;Stand-by assistance (vc)  Balance:   Sitting: Intact  Standing: Intact; With support  Wheelchair Mobility:  Ambulation/Gait Training:  Distance (ft): 160 Feet (ft)  Assistive Device: Walker, rolling;Gait belt  Ambulation - Level of Assistance: Contact guard assistance (vc)  Gait Abnormalities: Antalgic;Decreased step clearance; Step to gait  Left Side Weight Bearing: As tolerated  Base of Support: Shift to right  Stance: Left decreased  Speed/Loretta: Slow  Step Length: Left shortened;Right shortened  Swing Pattern: Left asymmetrical;Right asymmetrical  Interventions: Safety awareness training; Tactile cues; Verbal cues; Visual/Demos  Stairs:  Number of Stairs Trained: 5  Stairs - Level of Assistance: Contact guard assistance (vc)  Rail Use: Both     Therapeutic Exercises:   Encouraged HEP  Pain:  Pain level pre-treatment: 6/10   Pain level post-treatment: 6/10   Pain Intervention(s) : Medication (see MAR); Rest, Ice, Repositioning  Response to intervention: Nurse notified, See doc flow    Activity Tolerance:   Fair  Please refer to the flowsheet for vital signs taken during this treatment. After treatment:   [x]         Patient left in no apparent distress sitting up in chair  []         Patient left in no apparent distress in bed  [x]         Call bell left within reach  [x]         Nursing notified  [x]         Caregiver present  []         Bed alarm activated  []         SCDs applied    COMMUNICATION/EDUCATION:   [x]         Role of Physical Therapy in the acute care setting. [x]         Fall prevention education was provided and the patient/caregiver indicated understanding. [x]         Patient/family have participated as able in goal setting and plan of care. [x]         Patient/family agree to work toward stated goals and plan of care. []         Patient understands intent and goals of therapy, but is neutral about his/her participation. []         Patient is unable to participate in goal setting/plan of care: ongoing with therapy staff.  []         Other:     Thank you for this referral.  Jennie Rodriguez, PT   Time Calculation: 28 mins      Eval Complexity: History: HIGH Complexity :3+ comorbidities / personal factors will impact the outcome/ POC Exam:MEDIUM Complexity : 3 Standardized tests and measures addressing body structure, function, activity limitation and / or participation in recreation  Presentation: LOW Complexity : Stable, uncomplicated  Clinical Decision Making:Low Complexity  Overall Complexity:LOW     [employer] AM-PAC® Basic Mobility Inpatient Short Form (6-Clicks) Version 2    How much HELP from another person does the patient currently need    (If the patient hasn't done an activity recently, how much help from another person do you think he/she would need if he/she tried?)   Total (Total A or Dep)   A Lot  (Mod to Max A)   A Little (Sup or Min A)   None (Mod I to I)   Turning from your back to your side while in a flat bed without using bedrails? [] 1 [] 2 [x] 3 [] 4   2. Moving from lying on your back to sitting on the side of a flat bed without using bedrails? [] 1 [] 2 [x] 3 [] 4   3. Moving to and from a bed to a chair (including a wheelchair)? [] 1 [] 2 [x] 3 [] 4   4. Standing up from a chair using your arms (e.g., wheelchair, or bedside chair)? [] 1 [] 2 [x] 3 [] 4   5. Walking in hospital room? [] 1 [] 2 [x] 3 [] 4   6. Climbing 3-5 steps with a railing?+   [] 1 [] 2 [x] 3 [] 4   +If stair climbing cannot be assessed, skip item #6. Sum responses from items 1-5. Based on an AM-PAC score of 18/24 and their current functional mobility deficits, it is recommended that the patient have 3-5 sessions per week of Physical Therapy at d/c to increase the patient's independence.

## 2022-12-07 ENCOUNTER — HOME CARE VISIT (OUTPATIENT)
Dept: SCHEDULING | Facility: HOME HEALTH | Age: 62
End: 2022-12-07
Payer: COMMERCIAL

## 2022-12-07 PROCEDURE — G0157 HHC PT ASSISTANT EA 15: HCPCS

## 2022-12-09 ENCOUNTER — HOME CARE VISIT (OUTPATIENT)
Dept: SCHEDULING | Facility: HOME HEALTH | Age: 62
End: 2022-12-09
Payer: COMMERCIAL

## 2022-12-09 PROCEDURE — G0157 HHC PT ASSISTANT EA 15: HCPCS

## 2022-12-10 ENCOUNTER — HOME CARE VISIT (OUTPATIENT)
Dept: HOME HEALTH SERVICES | Facility: HOME HEALTH | Age: 62
End: 2022-12-10
Payer: COMMERCIAL

## 2022-12-10 VITALS
TEMPERATURE: 98 F | SYSTOLIC BLOOD PRESSURE: 137 MMHG | HEART RATE: 77 BPM | OXYGEN SATURATION: 5 % | SYSTOLIC BLOOD PRESSURE: 138 MMHG | DIASTOLIC BLOOD PRESSURE: 72 MMHG | DIASTOLIC BLOOD PRESSURE: 63 MMHG | TEMPERATURE: 98.1 F | OXYGEN SATURATION: 5 %

## 2022-12-10 NOTE — HOME HEALTH
SUBJECTIVE: Patient reports 8/10 for pain in L knee, has attempted some exercises but having difficulty with pain  No s/sx of infection  No falls  No changes in medications  CAREGIVER INVOLVEMENT/ASSISTANCE NEEDED: pt performs ADLs with assistance, spouse able to help with meals, household chores    OBJECTIVE:  See interventions. Cristopher 19/28  PATIENT EDUCATION PROVIDED THIS VISIT:  Educated on fall prevention and outside gait with increased hip/knee flexion to decrease risk for falls. Education provided on HEP with added stretches to be done 10 reps 3x day for strengthening and increased ROM. PATIENT RESPONSE TO EDUCATION PROVIDED:  Pt able to teach back fall prevention and demonstrate improved gait pattern outdoors with cues. Patient able to demonstrate HEP with handout and verbal cues, with understanding of performing 3x day, continued training required. PATIENT RESPONSE TO TREATMENT: Patient able to tolerate exercises without rest breaks. Patient reported pain increased from 4/10 to 7/10 following WB activity. ASSESSMENT OF PROGRESS TOWARD GOALS: Patient is making steady progress toward goals with increased gait distance outdoors. Patient improveing with HEP, required decreased verbal cues and no tactile cues to perform exercises correctly. Patient is motivated to improve and return to PLOF. PLAN FOR NEXT VISIT: Outside gait, Increase reps HEP, ROM, dressing change  THE FOLLOWING DISCHARGE PLANNING WAS DISCUSSED WITH THE PATIENT/CAREGIVER: Continue with HHPT services 3 x week for strengthening, endurance, and ROM training to improve Independent functional mobility. Discharge set for 12/16/22.

## 2022-12-12 ENCOUNTER — HOME CARE VISIT (OUTPATIENT)
Dept: SCHEDULING | Facility: HOME HEALTH | Age: 62
End: 2022-12-12
Payer: COMMERCIAL

## 2022-12-12 PROCEDURE — G0157 HHC PT ASSISTANT EA 15: HCPCS

## 2022-12-12 NOTE — HOME HEALTH
SUBJECTIVE: Patient reports 8/10 for pain in L knee, has attempted some exercises but having difficulty with pain  No s/sx of infection  No falls  No changes in medications  CAREGIVER INVOLVEMENT/ASSISTANCE NEEDED: pt performs ADLs with assistance, spouse able to help with meals, household chores    OBJECTIVE:  See interventions. PATIENT EDUCATION PROVIDED THIS VISIT:  Educated on fall prevention strategies, medication management, and management of pain. Education provided on HEP with exercises to be done 10 reps 3x day for strengthening and increased ROM. PATIENT RESPONSE TO EDUCATION PROVIDED:  Pt able to teach back fall prevention strategies and pain management with medication/ice. Pt gave verbal understanding of medication management. Patient able to demonstrate HEP with handout and verbal /tactile cues,  with understanding of performing 3x day, continued training required. PATIENT RESPONSE TO TREATMENT: Patient able to tolerate exercises with tactile cues and frequent rest breaks due to increased pain. ASSESSMENT OF PROGRESS TOWARD GOALS: Patient is making slow progress toward goals with increased gait distance and establishing HEP. Progress is hindered by pain at this time. Patient spoke with MD during visit and was instructed to double all pain meds as needed. Education provided on pain medication and informed pt/caregiver that he will be done with pain medication in 3-4 days if that dosage continues. Verbal understanding from pt. PLAN FOR NEXT VISIT: Bed transfers, Stairs, outside gait  THE FOLLOWING DISCHARGE PLANNING WAS DISCUSSED WITH THE PATIENT/CAREGIVER: Continue with HHPT services 3 x week for strengthening, endurance, and ROM training to improve Independent functional mobility. Discharge set for 12/16/22.

## 2022-12-14 ENCOUNTER — HOME CARE VISIT (OUTPATIENT)
Dept: SCHEDULING | Facility: HOME HEALTH | Age: 62
End: 2022-12-14
Payer: COMMERCIAL

## 2022-12-14 PROCEDURE — G0157 HHC PT ASSISTANT EA 15: HCPCS

## 2022-12-15 VITALS
SYSTOLIC BLOOD PRESSURE: 135 MMHG | SYSTOLIC BLOOD PRESSURE: 132 MMHG | DIASTOLIC BLOOD PRESSURE: 67 MMHG | OXYGEN SATURATION: 99 % | DIASTOLIC BLOOD PRESSURE: 69 MMHG | TEMPERATURE: 97.9 F | TEMPERATURE: 98.4 F | OXYGEN SATURATION: 98 %

## 2022-12-15 NOTE — HOME HEALTH
SUBJECTIVE: Patient reports no pain at start of session. Pt went out to ER on Monday 12/12/22 and they did US to verify No DVT. No s/sx of infection,   No falls  No changes in medications  CAREGIVER INVOLVEMENT/ASSISTANCE NEEDED:Pt able to perform ADLs,  spouse able to help with meals, household chores, and medication management. OBJECTIVE:  See interventions. PATIENT EDUCATION PROVIDED THIS VISIT: Education provided on importance of continuing HEP for improved strength, endurance, and stability. PATIENT RESPONSE TO EDUCATION PROVIDED:  Pt able to demonstrate HEP with verbal understanding of 15 reps per exercises and 3 x day. PATIENT RESPONSE TO TREATMENT: Patient performed gait training, stairs, and balance activities with slight increase in pain from 0/10 to 3/10. Patient able to perform all exercises without rest breaks. ASSESSMENT /SUMMARY OF CARE: Patient's current functional status before discharge is as follows    Strength: Pt. L knee strength is 3+/5 which is an improvement from the initial evaluation strength of 2+/5. This allows the patient increased functional independence and mobility  ROM: L knee ROM 8-105, improved from eval 17-90 degrees  BED MOBILITY: Independent at [facility], no change. TRANSFERS: Pt. is Independnt with all transfers with [facility] which demonstrates and improvement from the initial evaluation score of Min A with FWW This allows the patient increased functional independence and mobility  GAIT: Pt. is able to ambulate >300feet outside over even and uneven surfaces with Abdirahman with SPC. This represents an improvement from the initial evaluation of 50feet with Min A on level surfaces with FWW AD.  STAIRS: Able to negotiate up/down 11 steps with SPC and No rail, Abdirahman  BALANCE: Patient's tinetti is 22/28 which is an improvement from the initial evaluation score of 16/28.  This allows the patient to be functionally more independent and have a decreased fall risk  Home exercise program: Patient has been given a HEP and patient/caregiver understand the HEP. Patient is doing the HEP 3 x/day as a  ble.   PLAN /  DISCHARGE PLANNING WAS DISCUSSED WITH THE PATIENT/CAREGIVER: Patient has met goals set for [facility] PT services, Discharge from [address] on 12/16/22

## 2022-12-15 NOTE — HOME HEALTH
SUBJECTIVE: Patient reports 1/10 with pain at start of session and having trouble walking with pain in calf region. No s/sx of infection, Dressing change performed. No falls  No changes in medications  CAREGIVER INVOLVEMENT/ASSISTANCE NEEDED: pt performs ADLs with assistance, spouse able to help with meals, household chores    OBJECTIVE:  See interventions. Antonietta's sign was POSITIVE  PATIENT EDUCATION PROVIDED THIS VISIT:  Educated pt on possible DVT and importance of increased gait and exercises once it its varified there is not a DVT. Education provided on HEP with isometric hold for strengthening. PATIENT RESPONSE TO EDUCATION PROVIDED:  Pt and caregiver with verbal understanding of possible DVT and precautions to take. Patient able to demonstrate HEP with understanding of performing 3x day, but waiting to clear possible DVT prior to resuming exercises. PATIENT RESPONSE TO TREATMENT: Patient reported increased pain with gait training from 1/10 to 9/10 in calf, pain decreased with elevation and rest.    ASSESSMENT : Patitent reports increased pain in calf with all WB activity. Antonietta's sign -POSITIVE, but no excessive redness or edema. Bruising in calf region from surgery  Spoke with Dr. Xavier Saxena office, sent pt to ER for to check possible DVT. PLAN FOR NEXT VISIT: Outside gait, Increase reps HEP, ROM,Tinetti  THE FOLLOWING DISCHARGE PLANNING WAS DISCUSSED WITH THE PATIENT/CAREGIVER: Continue with HHPT services 3 x week for strengthening, endurance, and ROM training to improve Independent functional mobility. Discharge set for 12/16/22.

## 2022-12-16 ENCOUNTER — HOME CARE VISIT (OUTPATIENT)
Dept: SCHEDULING | Facility: HOME HEALTH | Age: 62
End: 2022-12-16
Payer: COMMERCIAL

## 2022-12-16 VITALS
SYSTOLIC BLOOD PRESSURE: 128 MMHG | HEART RATE: 81 BPM | RESPIRATION RATE: 16 BRPM | OXYGEN SATURATION: 96 % | DIASTOLIC BLOOD PRESSURE: 80 MMHG | TEMPERATURE: 98 F

## 2022-12-16 PROCEDURE — G0151 HHCP-SERV OF PT,EA 15 MIN: HCPCS

## 2022-12-16 NOTE — Clinical Note
Mr. Kushal Montalvo has been  clinically discharged and documentation finalized for completion of PT discharge. Caregiver involvement: Pt wife is primary caregiver at this time and has been assisting with medical appointments and ADL support  Medications reconciled and all medications are available in the home this visit. The following education was provided regarding medications, medication interactions, and look a like medications: NA  Medications  are effective at this time. Home health supplies by type and quantity ordered/delivered this visit include: NA  Patient education provided this visit: safety with functinal mobility  Current Functional Status and progress towards goals:  Strength: Pt. LLE strength is 3+/5 which is an improvement from the initial evaluation strength of 2+/5. This allows the patient increased functional independence and mobility  ROM: left knee: 8-113 degrees  . TRANSFERS: Pt. is indendent with all transfers which demonstrates and improvement from the initial evaluation score of min A. This allows the patient increased functional independence and mobility  GAIT/WC MOBILITY: Pt. is able to ambulate >300 feet outside over even and uneven surfaces with mod I A with SPC AD. This represents an improvement from the initial evaluation of 50 feet with FWW AD on level surfaces with min A. STAIRS: 11 stairs with SPC with mod I  BALANCE: Patient's final tinetti is 22/28 which is an improvement from the initial evaluation score of 16/28. This allows the patient to be functionally more independent and have a decreased fall risk  Progress toward goals: Patient has met all goals, see interventions for details. Pt. was able to return demonstrate all mobility training and HEP shown independent. Patient response to treatment and education: Patient tolerated treatment well, no complaint of new pain noted post treatment.  Home exercise program: Patient has been given a HEP and patient/caregiver understand the HEP. Patient is doing the HEP 2-3/day as able  Continued need for the following skills:  NA patient is final DC from PT today   The following discharge planning was discussed with the pt/caregiver: DC from agency    Thank you for your referral of this patient.     Sincerely,    DIANNA Lopes  Physical Therapist

## 2022-12-16 NOTE — HOME HEALTH
DC ACTIONS NARRATIVE  Pt. clinically discharged and documentation finalized for completion of PT discharge. Caregiver involvement: Pt wife is primary caregiver at this time and has been assisting with medical appointments and ADL support  Medications reconciled and all medications are available in the home this visit. The following education was provided regarding medications, medication interactions, and look a like medications: NA  Medications  are effective at this time. Home health supplies by type and quantity ordered/delivered this visit include: NA  Patient education provided this visit: safety with functinal mobility  Current Functional Status and progress towards goals:  Strength: Pt. LLE strength is 3+/5 which is an improvement from the initial evaluation strength of 2+/5. This allows the patient increased functional independence and mobility  ROM: left knee: 8-113 degrees  . TRANSFERS: Pt. is indendent with all transfers which demonstrates and improvement from the initial evaluation score of min A. This allows the patient increased functional independence and mobility  GAIT/WC MOBILITY: Pt. is able to ambulate >300 feet outside over even and uneven surfaces with mod I A with SPC AD. This represents an improvement from the initial evaluation of 50 feet with FWW AD on level surfaces with min A. STAIRS: 11 stairs with SPC with mod I  BALANCE: Patient's final tinetti is 22/28 which is an improvement from the initial evaluation score of 16/28. This allows the patient to be functionally more independent and have a decreased fall risk  Progress toward goals: Patient has met all goals, see interventions for details. Pt. was able to return demonstrate all mobility training and HEP shown independent. Patient response to treatment and education: Patient tolerated treatment well, no complaint of new pain noted post treatment.  Home exercise program: Patient has been given a HEP and patient/caregiver understand the HEP.  Patient is doing the HEP 2-3/day as able  Continued need for the following skills:  NA patient is final DC from PT today   The following discharge planning was discussed with the pt/caregiver: DC from agency

## 2022-12-27 DIAGNOSIS — R06.00 DYSPNEA, UNSPECIFIED TYPE: ICD-10-CM

## 2022-12-27 DIAGNOSIS — Z87.09 PERSONAL HISTORY OF ASTHMA: ICD-10-CM

## 2022-12-27 DIAGNOSIS — D75.1 POLYCYTHEMIA: ICD-10-CM

## 2022-12-27 DIAGNOSIS — G47.19 EXCESSIVE DAYTIME SLEEPINESS: ICD-10-CM

## 2022-12-27 DIAGNOSIS — M25.562 CHRONIC PAIN OF BOTH KNEES: ICD-10-CM

## 2022-12-27 DIAGNOSIS — R06.83 SNORING: ICD-10-CM

## 2022-12-27 DIAGNOSIS — M25.561 CHRONIC PAIN OF BOTH KNEES: ICD-10-CM

## 2022-12-27 DIAGNOSIS — G89.29 CHRONIC PAIN OF BOTH KNEES: ICD-10-CM

## 2023-01-31 DIAGNOSIS — M17.12 PRIMARY OSTEOARTHRITIS OF LEFT KNEE: Primary | ICD-10-CM

## 2023-02-03 DIAGNOSIS — D75.1 ERYTHROCYTOSIS: Primary | ICD-10-CM

## 2023-02-03 DIAGNOSIS — R73.03 PREDIABETES: Primary | ICD-10-CM

## 2023-02-03 DIAGNOSIS — E78.5 HYPERLIPIDEMIA, UNSPECIFIED HYPERLIPIDEMIA TYPE: Primary | ICD-10-CM

## 2023-02-03 DIAGNOSIS — M17.12 PRIMARY OSTEOARTHRITIS OF LEFT KNEE: Primary | ICD-10-CM

## 2023-02-05 DIAGNOSIS — E78.5 HYPERLIPIDEMIA, UNSPECIFIED HYPERLIPIDEMIA TYPE: ICD-10-CM

## 2023-02-05 DIAGNOSIS — R73.03 PREDIABETES: ICD-10-CM

## 2023-02-05 DIAGNOSIS — D75.1 ERYTHROCYTOSIS: Primary | ICD-10-CM

## 2023-02-05 DIAGNOSIS — R73.03 PREDIABETES: Primary | ICD-10-CM

## 2023-02-05 DIAGNOSIS — I10 ESSENTIAL HYPERTENSION: Primary | ICD-10-CM

## 2023-02-06 DIAGNOSIS — I10 ESSENTIAL HYPERTENSION: Primary | ICD-10-CM

## 2023-02-06 DIAGNOSIS — R73.03 PREDIABETES: ICD-10-CM

## 2023-02-06 DIAGNOSIS — E78.5 HYPERLIPIDEMIA, UNSPECIFIED HYPERLIPIDEMIA TYPE: ICD-10-CM

## 2023-02-14 DIAGNOSIS — G47.19 EXCESSIVE DAYTIME SLEEPINESS: ICD-10-CM

## 2023-02-14 DIAGNOSIS — R06.00 DYSPNEA, UNSPECIFIED TYPE: Primary | ICD-10-CM

## 2023-02-14 DIAGNOSIS — D75.1 POLYCYTHEMIA: ICD-10-CM

## 2023-03-07 ENCOUNTER — HOSPITAL ENCOUNTER (OUTPATIENT)
Facility: HOSPITAL | Age: 63
Setting detail: SPECIMEN
Discharge: HOME | End: 2023-03-10
Payer: COMMERCIAL

## 2023-03-07 DIAGNOSIS — I10 ESSENTIAL HYPERTENSION: ICD-10-CM

## 2023-03-07 DIAGNOSIS — E78.5 HYPERLIPIDEMIA, UNSPECIFIED HYPERLIPIDEMIA TYPE: ICD-10-CM

## 2023-03-07 DIAGNOSIS — D75.1 ERYTHROCYTOSIS: ICD-10-CM

## 2023-03-07 DIAGNOSIS — R73.03 PREDIABETES: ICD-10-CM

## 2023-03-07 LAB
ALBUMIN SERPL-MCNC: 4.2 G/DL (ref 3.4–5)
ALBUMIN/GLOB SERPL: 1.6 (ref 0.8–1.7)
ALP SERPL-CCNC: 91 U/L (ref 45–117)
ALT SERPL-CCNC: 42 U/L (ref 16–61)
ANION GAP SERPL CALC-SCNC: 6 MMOL/L (ref 3–18)
AST SERPL-CCNC: 29 U/L (ref 10–38)
BASOPHILS # BLD: 0.1 K/UL (ref 0–0.1)
BASOPHILS NFR BLD: 1 % (ref 0–2)
BILIRUB SERPL-MCNC: 0.6 MG/DL (ref 0.2–1)
BUN SERPL-MCNC: 18 MG/DL (ref 7–18)
BUN/CREAT SERPL: 16 (ref 12–20)
CALCIUM SERPL-MCNC: 9.6 MG/DL (ref 8.5–10.1)
CHLORIDE SERPL-SCNC: 106 MMOL/L (ref 100–111)
CHOLEST SERPL-MCNC: 220 MG/DL
CO2 SERPL-SCNC: 25 MMOL/L (ref 21–32)
CREAT SERPL-MCNC: 1.13 MG/DL (ref 0.6–1.3)
CREAT UR-MCNC: 147 MG/DL (ref 30–125)
DIFFERENTIAL METHOD BLD: ABNORMAL
EOSINOPHIL # BLD: 0.3 K/UL (ref 0–0.4)
EOSINOPHIL NFR BLD: 4 % (ref 0–5)
ERYTHROCYTE [DISTWIDTH] IN BLOOD BY AUTOMATED COUNT: 13 % (ref 11.6–14.5)
EST. AVERAGE GLUCOSE BLD GHB EST-MCNC: 120 MG/DL
GLOBULIN SER CALC-MCNC: 2.7 G/DL (ref 2–4)
GLUCOSE SERPL-MCNC: 116 MG/DL (ref 74–99)
HBA1C MFR BLD: 5.8 % (ref 4.2–5.6)
HCT VFR BLD AUTO: 50.2 % (ref 36–48)
HDLC SERPL-MCNC: 47 MG/DL (ref 40–60)
HDLC SERPL: 4.7 (ref 0–5)
HGB BLD-MCNC: 16.6 G/DL (ref 13–16)
IMM GRANULOCYTES # BLD AUTO: 0 K/UL (ref 0–0.04)
IMM GRANULOCYTES NFR BLD AUTO: 1 % (ref 0–0.5)
LDLC SERPL CALC-MCNC: 134.6 MG/DL (ref 0–100)
LIPID PANEL: ABNORMAL
LYMPHOCYTES # BLD: 2.1 K/UL (ref 0.9–3.6)
LYMPHOCYTES NFR BLD: 29 % (ref 21–52)
MAGNESIUM SERPL-MCNC: 2.4 MG/DL (ref 1.6–2.6)
MCH RBC QN AUTO: 30.6 PG (ref 24–34)
MCHC RBC AUTO-ENTMCNC: 33.1 G/DL (ref 31–37)
MCV RBC AUTO: 92.4 FL (ref 78–100)
MICROALBUMIN UR-MCNC: 1.31 MG/DL (ref 0–3)
MICROALBUMIN/CREAT UR-RTO: 9 MG/G (ref 0–30)
MONOCYTES # BLD: 0.5 K/UL (ref 0.05–1.2)
MONOCYTES NFR BLD: 7 % (ref 3–10)
NEUTS SEG # BLD: 4.3 K/UL (ref 1.8–8)
NEUTS SEG NFR BLD: 59 % (ref 40–73)
NRBC # BLD: 0 K/UL (ref 0–0.01)
NRBC BLD-RTO: 0 PER 100 WBC
PLATELET # BLD AUTO: 222 K/UL (ref 135–420)
PMV BLD AUTO: 10.6 FL (ref 9.2–11.8)
POTASSIUM SERPL-SCNC: 4.3 MMOL/L (ref 3.5–5.5)
PROT SERPL-MCNC: 6.9 G/DL (ref 6.4–8.2)
RBC # BLD AUTO: 5.43 M/UL (ref 4.35–5.65)
SODIUM SERPL-SCNC: 137 MMOL/L (ref 136–145)
TRIGL SERPL-MCNC: 192 MG/DL
VLDLC SERPL CALC-MCNC: 38.4 MG/DL
WBC # BLD AUTO: 7.2 K/UL (ref 4.6–13.2)

## 2023-03-07 PROCEDURE — 85025 COMPLETE CBC W/AUTO DIFF WBC: CPT

## 2023-03-07 PROCEDURE — 36415 COLL VENOUS BLD VENIPUNCTURE: CPT

## 2023-03-07 PROCEDURE — 83735 ASSAY OF MAGNESIUM: CPT

## 2023-03-07 PROCEDURE — 80061 LIPID PANEL: CPT

## 2023-03-07 PROCEDURE — 82668 ASSAY OF ERYTHROPOIETIN: CPT

## 2023-03-07 PROCEDURE — 80053 COMPREHEN METABOLIC PANEL: CPT

## 2023-03-07 PROCEDURE — 83036 HEMOGLOBIN GLYCOSYLATED A1C: CPT

## 2023-03-07 PROCEDURE — 82043 UR ALBUMIN QUANTITATIVE: CPT

## 2023-03-09 LAB — EPO SERPL-ACNC: 9 MIU/ML (ref 2.6–18.5)

## 2023-03-12 NOTE — PROGRESS NOTES
HPI:   Alice De La Torre is a 57y.o. year old male who presents today for a routine visit. He has a history of hypertension, hyperlipidemia, moderate aortic insufficiency, atypical chest pain, prediabetes, GERD, osteoarthritis, lumbar radiculopathy, and chronic venous insufficiency. He has NOT completed the COVID-19 vaccine series. He reports that he is doing reasonably well. He reestablished care with Dr. Amanda Lagunas in 11/2022 prior to his knee surgery, and PFT testing (11/7/2022) was normal and 6-minute walk testing (11/15/2022) did not show any desaturation with exercise. Her recommendation was to proceed with an in-lab sleep study, but he reports today that he has not yet scheduled. He reports that he was referred to Dr. Arianna Ramos for evaluation of his erythrocytosis and has an upcoming appointment on 3/31/2023. He underwent a left total knee replacement by Dr. Caridad Hedrick on 39/5/7167 without complications. He presented to [facility] on 12/12/2022 complaining of left calf pain and swelling. Left lower extremity venous duplex study obtained which was negative for DVT but did show venous valvular reflux in the left common femoral deep venous system. He completed at home and outpatient physical therapy with significant improvement. He states that he has a follow-up appointment with Dr. Caridad Hedrick today and due to ongoing right knee pain, he states that he is planning to schedule a right total knee arthroplasty after 6/2023. He is otherwise without new complaints and feeling generally well. Summary of prior hospitalizations and medical history:  On 1/17/2022, he presented to [facility] with headache, sore throat, myalgias, nasal congestion, cough, and mild dyspnea on exertion and was diagnosed with COVID-19. He reports that he never experienced a fever and gradually improved. However he states that he is still experiencing difficulty with altered taste and describes beer as tasting \"moldy\".   He otherwise denies any residual symptoms. He has a history of hypertension, treated with losartan and amlodipine. He does not check his blood pressure at home regularly. He also does not exercise regularly, although admits to doing yard work and projects around the house. He denies any shortness of breath at rest or with exertion, palpitations, lightheadedness, or edema. He does have a history of substernal chest pain that has no relation to exertion and was thought to be atypical. He presented to [facility] on 2/27/2016 with a complaint of chest pain and diaphoresis, which seemed to be relieved by aspirin and sublingual nitroglycerin in the ambulance. Evaluation included negative ECG and troponins. He had an exercise nuclear stress test (2/29/2016) which showed normal LV function (EF 57%) and a medium sized partially reversible defect inferiorly (intermediate risk study). On 3/1/2016, he underwent a cardiac catheterization which showed no significant epicardial coronary artery disease. He also had an echocardiogram (2/28/2016) showing mild LV septal hypertrophy, hypokinetic basal inferior wall with preserved LV function (EF 75%), mild diastolic dysfunction, and moderate aortic regurgitation with questionable dilation of the distal arch of aorta (3.5 cm). He was found to have elevated triglycerides, with lipid panel showing total cholesterol 201/ / HDL 52/ LDL 89. He was started on Fenofibrate prior to discharge on 3/1/2016. He reports that he has had no significant recurrence of chest pain since that time. He had a repeat echocardiogram in 9/2017 which showed low normal LV function (EF 50-55%), no RWMA, calcified aortic valve with bicuspid appearance and moderate regurgitation, and mild dilatation of the aortic root. He had a repeat echocardiogram in 3/2020 which showed upper normal LV size and low normal LV function (EF 50%), moderate AI and mildly dilated ascending aorta at 4.1 cm.  He had a repeat echocardiogram (3/2022) which showed moderately thickened aortic valve with mild-moderate regurgitation and no stenosis, mildly dilated aortic root at 4.0 cm, normal left atrial volume, low normal LV function (EF 50-55%). He does have difficulty with mild lower extremity edema due to chronic venous insufficiency. He is being followed by Dr. Humaira Luu. He has a history of prediabetes, with HbA1c ranging from 5.8-6.1 since 2012. He denies any polyuria, polydipsia, nocturia, or blurry vision, and has no history of retinopathy, neuropathy, or nephropathy. He has regular eye exams with Dr. Hair James. He has a history of osteoarthritis, involving his right knee, right great toe, and lumbar spine. He has had difficulty in the past with increasing pain at the base of his right thumb and was evaluated by Dr. Boubacar Fiore. He states that x-rays revealed he had severe osteoarthritis, and he received a cortisone injection with some improvement. In 12/2016, he noted increasing difficulty with low back pain and right sided sciatica. He underwent a lumbar MRI (12/2016) which showed progression when compared to a prior lumbar MRI from 2010. It showed prominent right paracentral L4-L5 disc extrusion with impingement of the descending right L5 nerve root and moderately severe multifactorial canal stenosis; additional prominent right paracentral L5-S1 disc protrusion with impingement of the descending right S1 nerve root; abutment of the foraminal right L5 nerve root related to moderate foramina stenosis at L5-S1; mild multifactorial canal stenosis at L3-L4 with abutment of the descending left L4 nerve root. He was evaluated by Dr. Sonia Macias, and treated with Medrol dose pack, Norco and Flexeril without significant improvement. He was evaluated by Dr. James Amaro in 3/2017 for possible surgical intervention, but this was deferred due to issues regarding his wife's health.  He was treated with gabapentin, and was reevaluated by Dr. Sonia Macias in 8/2017, at which time he reported significant improvement. He states that he no longer required treatment with gabapentin or Norco, and was continuing to take Voltaren ER with good control of symptoms. However in 3/2018, he developed recurrence of severe pain with right sided sciatica that was unresponsive to conservative therapy. He subsequently consulted Dr. Latrice Lowe and on 7/5/2018, he underwent a right L4-L5 and L5-S1 hemilaminectomy, medial facetectomy, and foraminotomy by Dr. Latrice Lowe for removal of herniated disks and decompression of foraminal stenoses at these levels. He reports that his right sided sciatica has resolved and he is no longer requiring any pain medication. He does report some residual numbness on the plantar surface of his right foot. He reports difficulty with left shoulder pain and was referred to physical therapy as recommended by Dr. Franco Baeza in 1/2021. He underwent evaluation by Dr. Keny Rubio in 6/2021 and it was felt that his left shoulder pain was secondary to cervical radiculopathy and bicipital tendinitis. He was again referred to physical therapy and reports improvement. He has a history of GERD, with symptoms well controlled with pantoprazole as needed. On 8/2/2022, he complained of intermittent upper-mid abdominal dull aching pain worsened following a large meal.  He discontinued ibuprofen with the onset of the pain. He was evaluated by Dr. Ammon Garber for similar symptoms and underwent upper endoscopy (12/31/2021) which was unremarkable. Pathology: squamous mucosa without abnormality in the mid-lower third of the esophagus and no evidence of eosinophilic esophagitis. He was instructed to begin pantoprazole 40 mg twice daily at that time but never started taking it since his symptoms had improved. He had a screening colonoscopy by Dr. Madeline Rios in 3/2012 with removal of a benign serrated polyp (pathology shows hyperplastic and adenomatous features).  He had a screening colonoscopy in 3/2017 by Dr Samy Gaspar which was normal. He underwent a repeat screening colonoscopy by Dr. Sharona Buckner (3/4/2022) which was normal.  Follow-up recommended for 5 years. He denies any abdominal pain, nausea, vomiting, melena, hematochezia, or change in bowel movements. In 5/2016, he fell at work with his chest striking the edge of a table and he sustained fractures of the anterior right 7th and 8th ribs. He had serial rib x-rays since that time showing slow interval healing, but reports that he no longer has pain related to this. He underwent a bone density scan in 10/2016 which showed evidence of osteopenia with T-scores:  femoral neck left -1.7  /right -1.5 and lumbar -1.2. He was started on calcium and Vitamin D supplementation. He previously smoked approximately 1 ppd for 40 years, stopping in 2000. He also reports a history of asbestos exposure in 1980. Past Medical History:   Diagnosis Date    Abnormal myocardial perfusion study 02/28/2016    Partially reversible inferior perfusion defect, EF 57%    AI (aortic insufficiency) 2016    Moderate with possible bicuspid aortic valve    Chronic low back pain     Chronic venous insufficiency     Colon polyp 03/2012    Benign serrated polyp    Difficult intubation 08/08/2008    had difficulty with tube placement when had gall bladder surgery    Dyspnea 08/2022    on inhaler    Fracture 05/2016    rib fx's    Genital herpes 1976    on meds    GERD (gastroesophageal reflux disease) 2002    on meds as needed, diet control    History of echocardiogram 02/28/2016    EF 55%. Basal inferior hypokinesis. Gr 1 DDfx. Mod AI.       Hyperlipidemia     not meds    Hypertension 2010    Osteoarthritis of left knee 12/4/2022    Osteoarthritis of right knee     Osteopenia     Prediabetes     on meds    Recurrent genital herpes 01/16/2013    Right lumbar radiculopathy 10/2016    MRI: prominent right paracentral L4-L5 disc extrusion/impingement of descending right L5 nerve root; moderately severe canal stenosis; prominent right paracentral L5-S1 disc protrusion/ impingement of the descending right S1 nerve root; abutment of the foraminal right L5 nerve root/moderate foramina stenosis at L5-S1; mild canal stenosis at L3-L4 with abutment of the descending left L4 nerve root. S/P cardiac catheterization 03/01/2016    Normal coronary anatomy    Tinnitus, bilateral 2012     Past Surgical History:   Procedure Laterality Date    CARDIAC CATHETERIZATION  03/01/2016    minor blockage    CHOLECYSTECTOMY  08/08/2008    Lap    COLONOSCOPY  12/2021    COLONOSCOPY  03/2022    LUMBAR LAMINECTOMY  07/05/2018    R L4/5/S1 Dr. Slick Mishra  2007     Current Outpatient Medications   Medication Sig    ibuprofen (ADVIL;MOTRIN) 800 MG tablet Take 1 tablet by mouth every 8 hours as needed for Pain    acetaminophen (TYLENOL) 500 MG tablet Take 1,000 mg by mouth every 6 hours as needed    albuterol sulfate HFA (PROVENTIL;VENTOLIN;PROAIR) 108 (90 Base) MCG/ACT inhaler Inhale 1 puff into the lungs every 6 hours as needed    amLODIPine (NORVASC) 5 MG tablet Take 5 mg by mouth daily    ascorbic acid (VITAMIN C) 500 MG tablet Take 1,000 mg by mouth daily    bisacodyl (DULCOLAX) 5 MG EC tablet Take 5 mg by mouth 2 times daily    calcium citrate-vitamin D (CITRACAL+D) 315-5 MG-MCG TABS per tablet Take 1 tablet by mouth daily (with breakfast)    vitamin D3 (CHOLECALCIFEROL) 125 MCG (5000 UT) TABS tablet Take 5,000 Units by mouth daily    clindamycin (CLEOCIN T) 1 % external solution Use thin film on affected area of scalp every 12 hours  Indications: acne    losartan (COZAAR) 100 MG tablet Take 100 mg by mouth daily    magnesium oxide (MAG-OX) 400 MG tablet Take 400 mg by mouth daily    pantoprazole (PROTONIX) 40 MG tablet Take 40 mg by mouth daily as needed    valACYclovir (VALTREX) 500 MG tablet TAKE 1 TABLET DAILY. No current facility-administered medications for this visit. Allergies and Intolerances: Allergies   Allergen Reactions    Macadamia Nut Oil Anaphylaxis    Ace Inhibitors Cough     Family History:  His mother passed away from stage 3 ovarian cancer. His father  from esophageal cancer at age 61. His maternal aunt  at age 48 from colon cancer. He has no family history of prostate cancer. Family History   Problem Relation Age of Onset    Osteoarthritis Other     Diabetes Other         Grandmother    Hypertension Brother     Cancer Mother 80        ovarian cancer     Other Other         Stomach problems; Father, grandfather    Cancer Father 61        esophageal    Hypertension Father      Social History:   He  reports that he quit smoking about 22 years ago. His smoking use included cigarettes. He smoked an average of .5 packs per day. He quit smokeless tobacco use about 28 years ago. He has a 12.50 pack-year smoking history. He quit smokeless tobacco use about 22 years ago. Reports that he smoked less than 1 ppd for 40 years, and stopped in . He is  and has one son. His wife has Paget's disease of the breast, which represents a recurrence of prior breast cancer. He was employed as an  for [employer], but retired in 2017. He drinks about a 12 pack of beer each month. Social History     Substance and Sexual Activity   Alcohol Use Yes    Alcohol/week: 1.7 standard drinks     Immunization History:  Immunization History   Administered Date(s) Administered    Influenza Trivalent 10/01/2015    Influenza Virus Vaccine 2013, 2014    Influenza, FLUARIX, FLULAVAL, FLUZONE (age 10 mo+) AND AFLURIA, (age 1 y+), PF, 0.5mL 10/27/2022    Td vaccine (adult) 2004    Tdap (Boostrix, Adacel) 2004, 2014       Review of Systems:   As above included in HPI.   Otherwise 11 point review of systems negative including constitutional, skin, HENT, eyes, respiratory, cardiovascular, gastrointestinal, genitourinary, musculoskeletal, endocrine, hematologic, allergy, and neurologic. Physical:   Vitals:   Vitals:    03/14/23 1005   BP: 124/78   Pulse: 66   Resp: 16   Temp: 97.3 °F (36.3 °C)   TempSrc: Temporal   SpO2: 98%   Weight: 224 lb 9.6 oz (101.9 kg)   Height: 6' (1.829 m)       Exam:   Patient appears in no apparent distress. Affect is appropriate. HEENT: PERRLA, anicteric, oropharynx clear, no JVD, adenopathy or thyromegaly. No carotid bruits or radiated murmur. Lungs: clear to auscultation, no wheezes, rhonchi, or rales. Heart: regular rate and rhythm. No murmur, rubs, gallops  Abdomen: soft, nontender, nondistended, normal bowel sounds, no hepatosplenomegaly or masses. Extremities: without edema.         Review of Data:  Labs:  Hospital Outpatient Visit on 03/07/2023   Component Date Value Ref Range Status    WBC 03/07/2023 7.2  4.6 - 13.2 K/uL Final    RBC 03/07/2023 5.43  4.35 - 5.65 M/uL Final    Hemoglobin 03/07/2023 16.6 (A)  13.0 - 16.0 g/dL Final    Hematocrit 03/07/2023 50.2 (A)  36.0 - 48.0 % Final    MCV 03/07/2023 92.4  78.0 - 100.0 FL Final    MCH 03/07/2023 30.6  24.0 - 34.0 PG Final    MCHC 03/07/2023 33.1  31.0 - 37.0 g/dL Final    RDW 03/07/2023 13.0  11.6 - 14.5 % Final    Platelets 45/42/9374 222  135 - 420 K/uL Final    MPV 03/07/2023 10.6  9.2 - 11.8 FL Final    Nucleated RBCs 03/07/2023 0.0  0  WBC Final    nRBC 03/07/2023 0.00  0.00 - 0.01 K/uL Final    Seg Neutrophils 03/07/2023 59  40 - 73 % Final    Lymphocytes 03/07/2023 29  21 - 52 % Final    Monocytes 03/07/2023 7  3 - 10 % Final    Eosinophils % 03/07/2023 4  0 - 5 % Final    Basophils 03/07/2023 1  0 - 2 % Final    Immature Granulocytes 03/07/2023 1 (A)  0.0 - 0.5 % Final    Segs Absolute 03/07/2023 4.3  1.8 - 8.0 K/UL Final    Absolute Lymph # 03/07/2023 2.1  0.9 - 3.6 K/UL Final    Absolute Mono # 03/07/2023 0.5  0.05 - 1.2 K/UL Final    Absolute Eos # 03/07/2023 0.3  0.0 - 0.4 K/UL Final    Basophils Absolute 03/07/2023 0.1  0.0 - 0.1 K/UL Final    Absolute Immature Granulocyte 03/07/2023 0.0  0.00 - 0.04 K/UL Final    Differential Type 03/07/2023 AUTOMATED    Final    Hemoglobin A1C 03/07/2023 5.8 (A)  4.2 - 5.6 % Final    eAG 03/07/2023 120  mg/dL Final    LIPID PANEL 03/07/2023        Final    Cholesterol, Total 03/07/2023 220 (A)  <200 MG/DL Final    Triglycerides 03/07/2023 192 (A)  <150 MG/DL Final    HDL 03/07/2023 47  40 - 60 MG/DL Final    LDL Calculated 03/07/2023 134.6 (A)  0 - 100 MG/DL Final    VLDL Cholesterol Calculated 03/07/2023 38.4  MG/DL Final    Chol/HDL Ratio 03/07/2023 4.7  0 - 5.0   Final    ERYTHROPOIETIN 03/07/2023 9.0  2.6 - 18.5 mIU/mL Final    Microalbumin, Random Urine 03/07/2023 1.31  0 - 3.0 MG/DL Final    Creatinine, Ur 03/07/2023 147.00 (A)  30 - 125 mg/dL Final    Microalbumin Creatinine Ratio 03/07/2023 9  0 - 30 mg/g Final    Sodium 03/07/2023 137  136 - 145 mmol/L Final    Potassium 03/07/2023 4.3  3.5 - 5.5 mmol/L Final    Chloride 03/07/2023 106  100 - 111 mmol/L Final    CO2 03/07/2023 25  21 - 32 mmol/L Final    Anion Gap 03/07/2023 6  3.0 - 18 mmol/L Final    Glucose 03/07/2023 116 (A)  74 - 99 mg/dL Final    BUN 03/07/2023 18  7.0 - 18 MG/DL Final    Creatinine 03/07/2023 1.13  0.6 - 1.3 MG/DL Final    Bun/Cre Ratio 03/07/2023 16  12 - 20   Final    Est, Glom Filt Rate 03/07/2023 >60  >60 ml/min/1.73m2 Final    Calcium 03/07/2023 9.6  8.5 - 10.1 MG/DL Final    Total Bilirubin 03/07/2023 0.6  0.2 - 1.0 MG/DL Final    ALT 03/07/2023 42  16 - 61 U/L Final    AST 03/07/2023 29  10 - 38 U/L Final    Alk Phosphatase 03/07/2023 91  45 - 117 U/L Final    Total Protein 03/07/2023 6.9  6.4 - 8.2 g/dL Final    Albumin 03/07/2023 4.2  3.4 - 5.0 g/dL Final    Globulin 03/07/2023 2.7  2.0 - 4.0 g/dL Final    Albumin/Globulin Ratio 03/07/2023 1.6  0.8 - 1.7   Final    Magnesium 03/07/2023 2.4  1.6 - 2.6 mg/dL Final       Calculated 10 year ASCVD risk score: 14.4%     Health Maintenance:  Screening: Colorectal: colonoscopy (3/2022) normal. Dr. Jessenia Dickerson. Due 3/2027. Depression: none              DM (HbA1c/FPG): / HbA1c 5.8 (3/2023)              Hepatitis C: negative (9/2016)              Falls: none               DEXA: N/A              PSA/DINA: PSA 0.6 (8/2022)/ DINA (2/2019)              Glaucoma: regular eye exams with [facility] (last 2/2021)              Smoking: none              Vitamin D: 62.6 (8/2022)              Medicare Wellness: N/A      Impression:  Patient Active Problem List   Diagnosis    Prediabetes    Dilated aortic root (HCC)    Colon polyp    Hyperlipidemia    GERD (gastroesophageal reflux disease)    Lumbar herniated disc    Class 1 obesity due to excess calories without serious comorbidity in adult    History of 2019 novel coronavirus disease (COVID-19)    S/P lumbar discectomy    Annular tear of lumbar disc    Chronic right-sided low back pain with sciatica    Chronic venous insufficiency    Vitamin D insufficiency    Cervical spondylosis    Osteopenia determined by x-ray    Post-acute sequelae of COVID-19 (PASC)    Essential hypertension    Aortic insufficiency    Primary osteoarthritis of knee    Recurrent genital herpes    Chronic pain of both knees    Erythrocytosis    Osteoarthritis of left knee       Plan:  1. Hypertension. Well controlled on current regimen of losartan 100 mg daily and amlodipine 5 mg daily. Renal function remains normal with creatinine 1.13/ eGFR >60. Continue to follow. 2. Dyslipidemia. Patient no longer taking fenofibrate since first line therapy for treating triglycerides < 500 would be initiation of a statin. Lipid panel with triglyceride level 192 (3/2023). Calculated 10 year ASCVD risk is 14.4 %, which places him in one of the four statin benefit groups as per new AHA/ACC guidelines (primary prevention: ASCVD risk > 7.5%). LDL remains elevated today at 134 and HDL 46. Not wishing to begin statin therapy.   Cardiac catheterization in 3/2016 without evidence of epicardial CAD. Will discuss obtaining CT chest for calcium scoring at next visit to help with decision-making regarding statin therapy. Emphasized importance of continued lifestyle modifications, including heart healthy diet, regular exercise, and weight loss. Will continue to monitor. 3. Aortic insufficiency, moderate with mildly dilated aortic root. Probable bicuspid valve being monitored by echocardiogram. Reestablished with Dr. Andrew Casillas and underwent repeat echocardiogram in 3/2022 which showed moderately thickened aortic valve with mild-moderate regurgitation and no stenosis, mildly dilated aortic root at 4.0 cm, normal left atrial volume, low normal LV function (EF 50-55%). Recommendation to monitor aortic valve by echocardiogram every other year so next due in 3/2024. Continuing to follow with Dr. Andrew Casillas annually with last visit in 11/2022. 4. Prediabetes. HbA1c remains elevated at 5.8 today. No evidence of microvascular complications. On ARB but unwilling to begin statin. Continue follow-up for annual eye exams and urged to schedule. Foot exam normal in 4/2017. Urine microalbumin/ creatinine ratio without evidence of microalbuminuria (9 mg/g). Encouraged continued weight loss and dietary changes, particularly avoiding concentrated sweets and minimizing carbohydrates. 5. History of COVID 19 with [facility]. Tested positive on 1/17/2022 at [facility], and reported symptoms of sore throat, headache, myalgias, nasal congestion, cough and mild dyspnea on exertion. Reports loss of taste/smell during illness and states that continuing to have altered taste to certain foods as sequela. Otherwise symptoms have resolved. Remains unvaccinated and not willing to proceed with vaccination as recommended. Will continue to monitor. 6. Osteopenia. Last bone density scan 10/2016.  Using femoral neck T-scores, calculated FRAX score estimates her 10 year risk of a major osteoporetic fracture at 12 % and hip fracture at 2.0 %, which are not an indication for biphosphonate treatment (>20% and >3%, respectively). Continue calcium and Vitamin D. Encouraged exercise, particularly weight bearing activities. 7. GERD. Had been occasionally using Protonix previously with good control of symptoms. Evaluated by Dr. Wili Payne in 11/2021 for intermittent dull aching mid abdominal pain and underwent upper endoscopy on 12/31/2021 which was reportedly unremarkable. Biopsies of the mid-lower third of the esophagus showed squamous mucosa without significant abnormality, no glandular mucosa identified, and no evidence of eosinophilic esophagitis. Advised to restart Protonix 40 mg twice daily in 11/2021, but patient never started treatment. Presented with intermittent mid abdominal pain on 8/2/2022 and advised to restart treatment with pantoprazole 40 mg twice daily. Also advised to avoid NSAIDs. Reports improvement today and has now resumed taking pantoprazole only as needed. 8. Osteoarthritis. History of bilateral knee pain with ambulation and standing. Underwent a left total knee replacement on 12/5/2022 by Dr. Jacob Pickard without complications. Has follow-up appointment with Dr. Jacob Pickard today. Planning to proceed with right TKA in 6/2023.  9. Lumbar DDD with right sciatica. Progression of symptoms and disease on MRI. Responded well to conservative therapy initially, but recurred and unresponsive to medications and injections. Underwent right L4-L5 and L5-S1 hemilaminectomy, medial facetectomy, and foraminotomy by Dr. Camilo White in 7/2018 with great success. Reports only residual numbness on medial plantar surface of right foot. No longer requiring any pain medication. Continuing to do well. 10. Left cervical radiculopathy. Evaluated by Dr. Lona Mojica on 1/28/2021 and referred to physical therapy with some improvement.   Seen by Dr. Thea Okeefe in 6/2021 for left shoulder pain and felt to be secondary to cervical radiculopathy +/-bicipital tendinitis. Completed physical therapy with improvement. 11. Erythrocytosis. Review of record shows similar readings since 2014. Erythropoietin level normal in 2/2020, 11/2022 and today. Hemoglobin mildly elevated again today at 16.6 and HCT 50.2 with normal erythropoietin level. Now scheduled for evaluation by Dr. Rigoberto Fernandes on 3/31/2023. Will continue to monitor. 12. Probable sleep apnea. Patient with daytime drowsiness, snoring, and fatigue. Underwent evaluation by Dr. Andrea Baeza in 3/2020 and recommended in-lab sleep study due to severity of symptoms but did not proceed. Again evaluated by Dr. Andrea [place] in 12/2022 and sleep study again recommended. Urged to schedule. 13. Obesity. Weight remaining overall stable since last visit. BMI at 30.46 emphasized importance of continued attempts at lifestyle modifications, including heart healthy diet, regular exercise, and weight loss. Will readdress next visit. 14. Health maintenance. Has NOT received the COVID-19 vaccine series and remains unwilling to proceed. Discussed Shingrix vaccine but not wishing to proceed. Other immunizations up to date. Colonoscopy completed and 5-year follow-up recommended. Prostate cancer screening up to date. Continue regular eye exams and urged to schedule given prediabetes. Stressed importance of weight loss. Vitamin D level remains normal on maintenance dose supplement. Patient understands recommendations and agrees with plan.   Follow-up in 6 months for physical.

## 2023-03-13 ENCOUNTER — TELEPHONE (OUTPATIENT)
Age: 63
End: 2023-03-13

## 2023-03-13 RX ORDER — IBUPROFEN 800 MG/1
800 TABLET ORAL EVERY 8 HOURS PRN
Qty: 90 TABLET | Refills: 0 | Status: SHIPPED | OUTPATIENT
Start: 2023-03-13

## 2023-03-13 NOTE — TELEPHONE ENCOUNTER
Patient called and needs a medication refill on ibuprofen (MOTRIN) 800 mg tablet. Please advise.      His pharmacy is [facility], [address]

## 2023-03-13 NOTE — TELEPHONE ENCOUNTER
PCP: Leida Kruse MD    Last appt: [unfilled]  Future Appointments   Date Time Provider Chelsea Silva   3/14/2023 10:00 AM Leida Kruse MD [facility] BS AMB   3/31/2023 10:30 AM Dorathy Meigs, MD [facility] BS AMB   11/28/2023  9:00 AM Luis Florez MD [facility] BS AMB       Requested Prescriptions     Pending Prescriptions Disp Refills    ibuprofen (ADVIL;MOTRIN) 800 MG tablet 120 tablet 0     Sig: Take 1 tablet by mouth 4 times daily as needed for Pain

## 2023-03-14 ENCOUNTER — OFFICE VISIT (OUTPATIENT)
Age: 63
End: 2023-03-14

## 2023-03-14 VITALS
HEIGHT: 72 IN | RESPIRATION RATE: 16 BRPM | HEART RATE: 66 BPM | OXYGEN SATURATION: 98 % | DIASTOLIC BLOOD PRESSURE: 78 MMHG | BODY MASS INDEX: 30.42 KG/M2 | WEIGHT: 224.6 LBS | TEMPERATURE: 97.3 F | SYSTOLIC BLOOD PRESSURE: 124 MMHG

## 2023-03-14 DIAGNOSIS — I35.1 AORTIC VALVE INSUFFICIENCY, ETIOLOGY OF CARDIAC VALVE DISEASE UNSPECIFIED: ICD-10-CM

## 2023-03-14 DIAGNOSIS — E66.09 CLASS 1 OBESITY DUE TO EXCESS CALORIES WITHOUT SERIOUS COMORBIDITY WITH BODY MASS INDEX (BMI) OF 30.0 TO 30.9 IN ADULT: ICD-10-CM

## 2023-03-14 DIAGNOSIS — E78.2 MIXED HYPERLIPIDEMIA: ICD-10-CM

## 2023-03-14 DIAGNOSIS — R73.03 PREDIABETES: ICD-10-CM

## 2023-03-14 DIAGNOSIS — D75.1 ERYTHROCYTOSIS: ICD-10-CM

## 2023-03-14 DIAGNOSIS — I10 ESSENTIAL HYPERTENSION: Primary | ICD-10-CM

## 2023-03-14 DIAGNOSIS — Q23.1 BICUSPID AORTIC VALVE: ICD-10-CM

## 2023-03-14 DIAGNOSIS — E55.9 VITAMIN D INSUFFICIENCY: ICD-10-CM

## 2023-03-14 DIAGNOSIS — Z12.5 PROSTATE CANCER SCREENING: ICD-10-CM

## 2023-03-14 DIAGNOSIS — I77.810 DILATED AORTIC ROOT (HCC): ICD-10-CM

## 2023-03-14 DIAGNOSIS — Z96.652 S/P TOTAL KNEE ARTHROPLASTY, LEFT: ICD-10-CM

## 2023-03-14 DIAGNOSIS — M17.0 PRIMARY OSTEOARTHRITIS OF BOTH KNEES: ICD-10-CM

## 2023-03-14 ASSESSMENT — PATIENT HEALTH QUESTIONNAIRE - PHQ9
1. LITTLE INTEREST OR PLEASURE IN DOING THINGS: 0
SUM OF ALL RESPONSES TO PHQ9 QUESTIONS 1 & 2: 0
2. FEELING DOWN, DEPRESSED OR HOPELESS: 0
SUM OF ALL RESPONSES TO PHQ QUESTIONS 1-9: 0

## 2023-03-14 NOTE — PROGRESS NOTES
Sandra Packer presents today for   Chief Complaint   Patient presents with    Hypertension     6 month follow up          1. \"Have you been to the ER, urgent care clinic since your last visit? Hospitalized since your last visit? \" yes    2. \"Have you seen or consulted any other health care providers outside of the [address] [place] [address] since your last visit? \" no     3. For patients aged 39-70: Has the patient had a colonoscopy / FIT/ Cologuard? Yes - Care Gap present. Most recent result on file      If the patient is female:    4. For patients aged 41-77: Has the patient had a mammogram within the past 2 years? NA - based on age or sex      11. For patients aged 21-65: Has the patient had a pap smear?  NA - based on age or sex

## 2023-03-14 NOTE — PATIENT INSTRUCTIONS
Heart-Healthy Diet: Care Instructions  Your Care Instructions     A heart-healthy diet has lots of vegetables, fruits, nuts, beans, and whole grains, and is low in salt. It limits foods that are high in saturated fat, such as meats, cheeses, and fried foods. It may be hard to change your diet, but even small changes can lower your risk of heart attack and heart disease. Follow-up care is a key part of your treatment and safety. Be sure to make and go to all appointments, and call your doctor if you are having problems. It's also a good idea to know your test results and keep a list of the medicines you take. How can you care for yourself at home? Watch your portions  Learn what a serving is. A \"serving\" and a \"portion\" are not always the same thing. Make sure that you are not eating larger portions than are recommended. For example, a serving of pasta is ½ cup. A serving size of meat is 2 to 3 ounces. A 3-ounce serving is about the size of a deck of cards. Measure serving sizes until you are good at [place]" them. Keep in mind that restaurants often serve portions that are 2 or 3 times the size of one serving. To keep your energy level up and keep you from feeling hungry, eat often but in smaller portions. Eat only the number of calories you need to stay at a healthy weight. If you need to lose weight, eat fewer calories than your body burns (through exercise and other physical activity). Eat more fruits and vegetables  Eat a variety of fruit and vegetables every day. Dark green, deep orange, red, or yellow fruits and vegetables are especially good for you. Examples include spinach, carrots, peaches, and berries. Keep carrots, celery, and other veggies handy for snacks. Buy fruit that is in season and store it where you can see it so that you will be tempted to eat it. Cook dishes that have a lot of veggies in them, such as stir-fries and soups.   Limit saturated and trans fat  Read food labels, and try to avoid saturated and trans fats. They increase your risk of heart disease. Use olive or canola oil when you cook. Bake, broil, grill, or steam foods instead of frying them. Choose lean meats instead of high-fat meats such as hot dogs and sausages. Cut off all visible fat when you prepare meat. Eat fish, skinless poultry, and meat alternatives such as soy products instead of high-fat meats. Soy products, such as tofu, may be especially good for your heart. Choose low-fat or fat-free milk and dairy products. Eat foods high in fiber  Eat a variety of grain products every day. Include whole-grain foods that have lots of fiber and nutrients. Examples of whole-grain foods include oats, whole wheat bread, and brown rice. Buy whole-grain breads and cereals, instead of white bread or pastries. Limit salt and sodium  Limit how much salt and sodium you eat to help lower your blood pressure. Taste food before you salt it. Add only a little salt when you think you need it. With time, your taste buds will adjust to less salt. Eat fewer snack items, fast foods, and other high-salt, processed foods. Check food labels for the amount of sodium in packaged foods. Choose low-sodium versions of canned goods (such as soups, vegetables, and beans). Limit sugar  Limit drinks and foods with added sugar. These include candy, desserts, and soda pop. Limit alcohol  Limit alcohol to no more than 2 drinks a day for men and 1 drink a day for women. Too much alcohol can cause health problems. When should you call for help? Watch closely for changes in your health, and be sure to contact your doctor if:    You would like help planning heart-healthy meals. Where can you learn more? Go to http://www.brandt.com/  Enter V137 in the search box to learn more about \"Heart-Healthy Diet: Care Instructions. \"  Current as of: August 22, 2019               Content Version: 12.6  © 8566-0072 Healthwise, Incorporated. Care instructions adapted under license by [employer] (which disclaims liability or warranty for this information). If you have questions about a medical condition or this instruction, always ask your healthcare professional. [address] any warranty or liability for your use of this information. Learning About Diabetes Food Guidelines  Your Care Instructions     Meal planning is important to manage diabetes. It helps keep your blood sugar at a target level (which you set with your doctor). You don't have to eat special foods. You can eat what your family eats, including sweets once in a while. But you do have to pay attention to how often you eat and how much you eat of certain foods. You may want to work with a dietitian or a certified diabetes educator (CDE) to help you plan meals and snacks. A dietitian or CDE can also help you lose weight if that is one of your goals. What should you know about eating carbs? Managing the amount of carbohydrate (carbs) you eat is an important part of healthy meals when you have diabetes. Carbohydrate is found in many foods. Learn which foods have carbs. And learn the amounts of carbs in different foods. Bread, cereal, pasta, and rice have about 15 grams of carbs in a serving. A serving is 1 slice of bread (1 ounce), ½ cup of cooked cereal, or 1/3 cup of cooked pasta or rice. Fruits have 15 grams of carbs in a serving. A serving is 1 small fresh fruit, such as an apple or orange; ½ of a banana; ½ cup of cooked or canned fruit; ½ cup of fruit juice; 1 cup of melon or raspberries; or 2 tablespoons of dried fruit. Milk and no-sugar-added yogurt have 15 grams of carbs in a serving. A serving is 1 cup of milk or 2/3 cup of no-sugar-added yogurt. Starchy vegetables have 15 grams of carbs in a serving.  A serving is ½ cup of mashed potatoes or sweet potato; 1 cup winter squash; ½ of a small baked potato; ½ cup of cooked beans; or ½ cup cooked corn or green peas. Learn how much carbs to eat each day and at each meal. A dietitian or CDE can teach you how to keep track of the amount of carbs you eat. This is called carbohydrate counting. If you are not sure how to count carbohydrate grams, use the Plate Method to plan meals. It is a good, quick way to make sure that you have a balanced meal. It also helps you spread carbs throughout the day. Divide your plate by types of foods. Put non-starchy vegetables on half the plate, meat or other protein food on one-quarter of the plate, and a grain or starchy vegetable in the final quarter of the plate. To this you can add a small piece of fruit and 1 cup of milk or yogurt, depending on how many carbs you are supposed to eat at a meal.  Try to eat about the same amount of carbs at each meal. Do not \"save up\" your daily allowance of carbs to eat at one meal.  Proteins have very little or no carbs per serving. Examples of proteins are beef, chicken, turkey, fish, eggs, tofu, cheese, cottage cheese, and peanut butter. A serving size of meat is 3 ounces, which is about the size of a deck of cards. Examples of meat substitute serving sizes (equal to 1 ounce of meat) are 1/4 cup of cottage cheese, 1 egg, 1 tablespoon of peanut butter, and ½ cup of tofu. How can you eat out and still eat healthy? Learn to estimate the serving sizes of foods that have carbohydrate. If you measure food at home, it will be easier to estimate the amount in a serving of restaurant food. If the meal you order has too much carbohydrate (such as potatoes, corn, or baked beans), ask to have a low-carbohydrate food instead. Ask for a salad or green vegetables. If you use insulin, check your blood sugar before and after eating out to help you plan how much to eat in the future. If you eat more carbohydrate at a meal than you had planned, take a walk or do other exercise. This will help lower your blood sugar.   What else should you know? Limit saturated fat, such as the fat from meat and dairy products. This is a healthy choice because people who have diabetes are at higher risk of heart disease. So choose lean cuts of meat and nonfat or low-fat dairy products. Use olive or canola oil instead of butter or shortening when cooking. Don't skip meals. Your blood sugar may drop too low if you skip meals and take insulin or certain medicines for diabetes. Check with your doctor before you drink alcohol. Alcohol can cause your blood sugar to drop too low. Alcohol can also cause a bad reaction if you take certain diabetes medicines. Follow-up care is a key part of your treatment and safety. Be sure to make and go to all appointments, and call your doctor if you are having problems. It's also a good idea to know your test results and keep a list of the medicines you take. Where can you learn more? Go to http://www.brandt.com/  Enter I147 in the search box to learn more about \"Learning About Diabetes Food Guidelines. \"  Current as of: December 20, 2019               Content Version: 12.6  © 6209-3124 [employer]. Care instructions adapted under license by [employer] (which disclaims liability or warranty for this information). If you have questions about a medical condition or this instruction, always ask your healthcare professional. [address] any warranty or liability for your use of this information. Learning About Low-Sodium Foods  What foods are low in sodium? The foods you eat contain nutrients, such as vitamins and minerals. Sodium is a nutrient. Your body needs the right amount to stay healthy and work as it should. You can use the list below to help you make choices about which foods to eat.   Fruits  Fresh, frozen, canned, or dried fruit  Vegetables  Fresh or frozen vegetables, with no added salt  Canned vegetables, low-sodium or with no added salt  Grains  Bagels without salted tops  Cereal with no added salt  Corn tortillas  Crackers with no added salt  Oatmeal, cooked without salt  Popcorn with no salt  Pasta and noodles, cooked without salt  Rice, cooked without salt  Unsalted pretzels  Dairy and dairy alternatives  Butter, unsalted  Cream cheese  Ice cream  Milk  Soy milk  Meats and other protein foods  Beans and peas, canned with no salt  Eggs  Fresh fish (not smoked)  Fresh meats (not smoked or cured)  Nuts and nut butter, prepared without salt  Poultry, not packaged with sodium solution  Tofu, unseasoned  Tuna, canned without salt  Seasonings  Garlic  Herbs and spices  Lemon juice  Mustard  Olive oil  Salt-free seasoning mixes  Vinegar  Work with your doctor to find out how much of this nutrient you need. Depending on your health, you may need more or less of it in your diet. Where can you learn more? Go to http://www.gray.com/  Enter S460 in the search box to learn more about \"Learning About Low-Sodium Foods. \"  Current as of: August 22, 2019               Content Version: 12.6  © 7042-0726 [employer]. Care instructions adapted under license by [employer] (which disclaims liability or warranty for this information). If you have questions about a medical condition or this instruction, always ask your healthcare professional. [address] any warranty or liability for your use of this information. Low Sodium Diet (2,000 Milligram): Care Instructions  Your Care Instructions     Too much sodium causes your body to hold on to extra water. This can raise your blood pressure and force your heart and kidneys to work harder. In very serious cases, this could cause you to be put in the hospital. It might even be life-threatening. By limiting sodium, you will feel better and lower your risk of serious problems. The most common source of sodium is salt.  People get most of the salt in their diet from canned, prepared, and packaged foods. Fast food and restaurant meals also are very high in sodium. Your doctor will probably limit your sodium to less than 2,000 milligrams (mg) a day. This limit counts all the sodium in prepared and packaged foods and any salt you add to your food. Follow-up care is a key part of your treatment and safety. Be sure to make and go to all appointments, and call your doctor if you are having problems. It's also a good idea to know your test results and keep a list of the medicines you take. How can you care for yourself at home? Read food labels  Read labels on cans and food packages. The labels tell you how much sodium is in each serving. Make sure that you look at the serving size. If you eat more than the serving size, you have eaten more sodium. Food labels also tell you the Percent Daily Value for sodium. Choose products with low Percent Daily Values for sodium. Be aware that sodium can come in forms other than salt, including monosodium glutamate (MSG), sodium citrate, and sodium bicarbonate (baking soda). MSG is often added to Asian food. When you eat out, you can sometimes ask for food without MSG or added salt. Buy low-sodium foods  Buy foods that are labeled \"unsalted\" (no salt added), \"sodium-free\" (less than 5 mg of sodium per serving), or \"low-sodium\" (less than 140 mg of sodium per serving). Foods labeled \"reduced-sodium\" and \"light sodium\" may still have too much sodium. Be sure to read the label to see how much sodium you are getting. Buy fresh vegetables, or frozen vegetables without added sauces. Buy low-sodium versions of canned vegetables, soups, and other canned goods. Prepare low-sodium meals  Cut back on the amount of salt you use in cooking. This will help you adjust to the taste. Do not add salt after cooking. One teaspoon of salt has about 2,300 mg of sodium. Take the salt shaker off the table.   Flavor your food with garlic, lemon juice, onion, vinegar, herbs, and spices. Do not use soy sauce, lite soy sauce, steak sauce, onion salt, garlic salt, celery salt, mustard, or ketchup on your food. Use low-sodium salad dressings, sauces, and ketchup. Or make your own salad dressings and sauces without adding salt. Use less salt (or none) when recipes call for it. You can often use half the salt a recipe calls for without losing flavor. Other foods such as rice, pasta, and grains do not need added salt. Rinse canned vegetables, and cook them in fresh water. This removes some--but not all--of the salt. Avoid water that is naturally high in sodium or that has been treated with water softeners, which add sodium. Call your local water company to find out the sodium content of your water supply. If you buy bottled water, read the label and choose a sodium-free brand. Avoid high-sodium foods  Avoid eating:  Smoked, cured, salted, and canned meat, fish, and poultry. Ham, bedolla, hot dogs, and luncheon meats. Regular, hard, and processed cheese and regular peanut butter. Crackers with salted tops, and other salted snack foods such as pretzels, chips, and salted popcorn. Frozen prepared meals, unless labeled low-sodium. Canned and dried soups, broths, and bouillon, unless labeled sodium-free or low-sodium. Canned vegetables, unless labeled sodium-free or low-sodium. [place] fries, pizza, tacos, and other fast foods. Pickles, olives, ketchup, and other condiments, especially soy sauce, unless labeled sodium-free or low-sodium. Where can you learn more? Go to http://www.gray.com/  Enter V843 in the search box to learn more about \"Low Sodium Diet (2,000 Milligram): Care Instructions. \"  Current as of: August 22, 2019               Content Version: 12.6  © 2022-6386 [employer]. Care instructions adapted under license by [employer] (which disclaims liability or warranty for this information). If you have questions about a medical condition or this instruction, always ask your healthcare professional. [address] any warranty or liability for your use of this information. High Cholesterol: Care Instructions  Your Care Instructions     Cholesterol is a type of fat in your blood. It is needed for many body functions, such as making new cells. Cholesterol is made by your body. It also comes from food you eat. High cholesterol means that you have too much of the fat in your blood. This raises your risk of a heart attack and stroke. LDL and HDL are part of your total cholesterol. LDL is the \"bad\" cholesterol. High LDL can raise your risk for heart disease, heart attack, and stroke. HDL is the \"good\" cholesterol. It helps clear bad cholesterol from the body. High HDL is linked with a lower risk of heart disease, heart attack, and stroke. Your cholesterol levels help your doctor find out your risk for having a heart attack or stroke. You and your doctor can talk about whether you need to lower your risk and what treatment is best for you. A heart-healthy lifestyle along with medicines can help lower your cholesterol and your risk. The way you choose to lower your risk will depend on how high your risk is for heart attack and stroke. It will also depend on how you feel about taking medicines. Follow-up care is a key part of your treatment and safety. Be sure to make and go to all appointments, and call your doctor if you are having problems. It's also a good idea to know your test results and keep a list of the medicines you take. How can you care for yourself at home? Eat a variety of foods every day. Good choices include fruits, vegetables, whole grains (like oatmeal), dried beans and peas, nuts and seeds, soy products (like tofu), and fat-free or low-fat dairy products.   Replace butter, margarine, and hydrogenated or partially hydrogenated oils with olive and canola oils. (Canola oil margarine without trans fat is fine.)  Replace red meat with fish, poultry, and soy protein (like tofu). Limit processed and packaged foods like chips, crackers, and cookies. Bake, broil, or steam foods. Don't leggett them. Be physically active. Get at least 30 minutes of exercise on most days of the week. Walking is a good choice. You also may want to do other activities, such as running, swimming, cycling, or playing tennis or team sports. Stay at a healthy weight or lose weight by making the changes in eating and physical activity listed above. Losing just a small amount of weight, even 5 to 10 pounds, can reduce your risk for having a heart attack or stroke. Do not smoke. When should you call for help? Watch closely for changes in your health, and be sure to contact your doctor if:    You need help making lifestyle changes. You have questions about your medicine. Where can you learn more? Go to http://www.gray.com/  Enter W210 in the search box to learn more about \"High Cholesterol: Care Instructions. \"  Current as of: December 16, 2019               Content Version: 12.6  © 4756-2708 [employer]. Care instructions adapted under license by [employer] (which disclaims liability or warranty for this information). If you have questions about a medical condition or this instruction, always ask your healthcare professional. [address] any warranty or liability for your use of this information.

## 2023-03-18 PROBLEM — M17.12 OSTEOARTHRITIS OF LEFT KNEE: Status: RESOLVED | Noted: 2022-12-04 | Resolved: 2023-03-18

## 2023-03-18 PROBLEM — Z96.652 S/P TOTAL KNEE ARTHROPLASTY, LEFT: Status: ACTIVE | Noted: 2023-03-18

## 2023-03-18 PROBLEM — Q23.81 BICUSPID AORTIC VALVE: Status: ACTIVE | Noted: 2023-03-18

## 2023-03-18 PROBLEM — Q23.1 BICUSPID AORTIC VALVE: Status: ACTIVE | Noted: 2023-03-18

## 2023-03-30 RX ORDER — LOSARTAN POTASSIUM 100 MG/1
TABLET ORAL
Qty: 90 TABLET | Refills: 3 | Status: SHIPPED | OUTPATIENT
Start: 2023-03-30

## 2023-03-30 NOTE — PROGRESS NOTES
Hematology/Oncology Consultation Note      Date: 2023     Name: Angel Ely  : 1960     Kane Dangelo MD      Subjective:     Chief complaint: Polycythemia    History of Present Illness:   Mr. Ibrahima Conway is a most pleasant 57y.o. year old male who was seen for consultation of polycythemia. The patient has a past medical history significant of aortic insufficiency, chronic venous insufficiency, hypertension, hyperlipidemia, GERD, status post cardiac cath . He has history of remote smoking. He quit smoking since . He has follow up with Pulmonology for chronic dyspnea, snoring suggested a sleep breathing disorder, such as ERASTO. His Sleep study was still pending appointment at this time. Lab reviews indicated chronic polycythemia since at least 9/3/2019, hemoglobin 16.1, hematocrit 48.1%  3/7/2023 CBC reported hemoglobin 16.6, hematocrit 50.2%, WBC 7.2, platelet 708,460. The patient reported chronic on-off dyspnea. The patient otherwise has no other complaints. Denied fever, chills, night sweat, unintentional weight loss, skin lumps or bumps, acute bleeding or bruising issues. Denied headache, acute vision change, dizziness, chest pain, worsen shortness of breath, productive cough, nausea, vomiting, abdominal pain, altered bowel habits, dysuria, worsen bone pain or back pain, new focal numbness or weakness.          Past Medical History, Family History, and Social History:    Past Medical History:   Diagnosis Date    Abnormal myocardial perfusion study 2016    Partially reversible inferior perfusion defect, EF 57%    AI (aortic insufficiency)     Moderate with possible bicuspid aortic valve    Chronic low back pain     Chronic venous insufficiency     Colon polyp 2012    Benign serrated polyp    Difficult intubation 2008    had difficulty with tube placement when had gall bladder surgery    Dyspnea 2022    on inhaler    Fracture 2016    rib fx's    Genital herpes

## 2023-03-31 ENCOUNTER — OFFICE VISIT (OUTPATIENT)
Age: 63
End: 2023-03-31
Payer: COMMERCIAL

## 2023-03-31 ENCOUNTER — HOSPITAL ENCOUNTER (OUTPATIENT)
Facility: HOSPITAL | Age: 63
Setting detail: SPECIMEN
End: 2023-03-31
Payer: COMMERCIAL

## 2023-03-31 VITALS
HEIGHT: 72 IN | WEIGHT: 226 LBS | OXYGEN SATURATION: 97 % | DIASTOLIC BLOOD PRESSURE: 74 MMHG | BODY MASS INDEX: 30.61 KG/M2 | RESPIRATION RATE: 16 BRPM | HEART RATE: 70 BPM | SYSTOLIC BLOOD PRESSURE: 119 MMHG

## 2023-03-31 DIAGNOSIS — D75.1 POLYCYTHEMIA: ICD-10-CM

## 2023-03-31 DIAGNOSIS — D75.1 POLYCYTHEMIA: Primary | ICD-10-CM

## 2023-03-31 LAB
ALBUMIN SERPL-MCNC: 4.4 G/DL (ref 3.4–5)
ALBUMIN/GLOB SERPL: 1.6 (ref 0.8–1.7)
ALP SERPL-CCNC: 101 U/L (ref 45–117)
ALT SERPL-CCNC: 37 U/L (ref 16–61)
ANION GAP SERPL CALC-SCNC: 7 MMOL/L (ref 3–18)
AST SERPL-CCNC: 23 U/L (ref 10–38)
BASOPHILS # BLD: 0.1 K/UL (ref 0–0.1)
BASOPHILS NFR BLD: 1 % (ref 0–2)
BILIRUB SERPL-MCNC: 1.2 MG/DL (ref 0.2–1)
BUN SERPL-MCNC: 19 MG/DL (ref 7–18)
BUN/CREAT SERPL: 17 (ref 12–20)
CALCIUM SERPL-MCNC: 9.7 MG/DL (ref 8.5–10.1)
CHLORIDE SERPL-SCNC: 107 MMOL/L (ref 100–111)
CO2 SERPL-SCNC: 24 MMOL/L (ref 21–32)
CREAT SERPL-MCNC: 1.15 MG/DL (ref 0.6–1.3)
DIFFERENTIAL METHOD BLD: ABNORMAL
EOSINOPHIL # BLD: 0.1 K/UL (ref 0–0.4)
EOSINOPHIL NFR BLD: 2 % (ref 0–5)
ERYTHROCYTE [DISTWIDTH] IN BLOOD BY AUTOMATED COUNT: 13.3 % (ref 11.6–14.5)
GLOBULIN SER CALC-MCNC: 2.7 G/DL (ref 2–4)
GLUCOSE SERPL-MCNC: 111 MG/DL (ref 74–99)
HCT VFR BLD AUTO: 49.3 % (ref 36–48)
HGB BLD-MCNC: 16.6 G/DL (ref 13–16)
IMM GRANULOCYTES # BLD AUTO: 0.1 K/UL (ref 0–0.04)
IMM GRANULOCYTES NFR BLD AUTO: 1 % (ref 0–0.5)
LYMPHOCYTES # BLD: 2.5 K/UL (ref 0.9–3.6)
LYMPHOCYTES NFR BLD: 29 % (ref 21–52)
MCH RBC QN AUTO: 30.5 PG (ref 24–34)
MCHC RBC AUTO-ENTMCNC: 33.7 G/DL (ref 31–37)
MCV RBC AUTO: 90.6 FL (ref 78–100)
MONOCYTES # BLD: 0.6 K/UL (ref 0.05–1.2)
MONOCYTES NFR BLD: 7 % (ref 3–10)
NEUTS SEG # BLD: 5.3 K/UL (ref 1.8–8)
NEUTS SEG NFR BLD: 61 % (ref 40–73)
NRBC # BLD: 0 K/UL (ref 0–0.01)
NRBC BLD-RTO: 0 PER 100 WBC
PLATELET # BLD AUTO: 233 K/UL (ref 135–420)
PMV BLD AUTO: 10.4 FL (ref 9.2–11.8)
POTASSIUM SERPL-SCNC: 4.4 MMOL/L (ref 3.5–5.5)
PROT SERPL-MCNC: 7.1 G/DL (ref 6.4–8.2)
RBC # BLD AUTO: 5.44 M/UL (ref 4.35–5.65)
SODIUM SERPL-SCNC: 138 MMOL/L (ref 136–145)
WBC # BLD AUTO: 8.7 K/UL (ref 4.6–13.2)

## 2023-03-31 PROCEDURE — 3078F DIAST BP <80 MM HG: CPT | Performed by: INTERNAL MEDICINE

## 2023-03-31 PROCEDURE — 85025 COMPLETE CBC W/AUTO DIFF WBC: CPT

## 2023-03-31 PROCEDURE — 81206 BCR/ABL1 GENE MAJOR BP: CPT

## 2023-03-31 PROCEDURE — 99204 OFFICE O/P NEW MOD 45 MIN: CPT | Performed by: INTERNAL MEDICINE

## 2023-03-31 PROCEDURE — 80053 COMPREHEN METABOLIC PANEL: CPT

## 2023-03-31 PROCEDURE — 36415 COLL VENOUS BLD VENIPUNCTURE: CPT

## 2023-03-31 PROCEDURE — 3074F SYST BP LT 130 MM HG: CPT | Performed by: INTERNAL MEDICINE

## 2023-03-31 ASSESSMENT — ENCOUNTER SYMPTOMS
SHORTNESS OF BREATH: 1
NAUSEA: 0
BACK PAIN: 0
ABDOMINAL PAIN: 0
VOMITING: 0
COUGH: 0
DIARRHEA: 0

## 2023-04-03 ENCOUNTER — TELEPHONE (OUTPATIENT)
Age: 63
End: 2023-04-03

## 2023-04-03 NOTE — TELEPHONE ENCOUNTER
Migdalia @ [facility] advised she was sent only 2 Scratch Hard labs and she needs 5

## 2023-04-05 NOTE — TELEPHONE ENCOUNTER
Left detailed VM for pt. To return to [facility] to have remainder of labs drawn. Only 2 lavender tops were drawn when 5 total were needed.

## 2023-04-06 ENCOUNTER — HOSPITAL ENCOUNTER (OUTPATIENT)
Facility: HOSPITAL | Age: 63
Setting detail: SPECIMEN
Discharge: HOME | End: 2023-04-06
Payer: COMMERCIAL

## 2023-04-06 ENCOUNTER — TELEPHONE (OUTPATIENT)
Age: 63
End: 2023-04-06

## 2023-04-06 PROCEDURE — 81338 MPL GENE COMMON VARIANTS: CPT

## 2023-04-06 PROCEDURE — 81219 CALR GENE COM VARIANTS: CPT

## 2023-04-06 PROCEDURE — 81270 JAK2 GENE: CPT

## 2023-04-06 PROCEDURE — 36415 COLL VENOUS BLD VENIPUNCTURE: CPT

## 2023-04-06 PROCEDURE — 83835 ASSAY OF METANEPHRINES: CPT

## 2023-04-20 ENCOUNTER — HOSPITAL ENCOUNTER (OUTPATIENT)
Facility: HOSPITAL | Age: 63
Discharge: HOME | End: 2023-04-20
Payer: COMMERCIAL

## 2023-04-20 DIAGNOSIS — D75.1 POLYCYTHEMIA: ICD-10-CM

## 2023-04-20 PROCEDURE — 76700 US EXAM ABDOM COMPLETE: CPT

## 2023-05-03 ENCOUNTER — OFFICE VISIT (OUTPATIENT)
Age: 63
End: 2023-05-03
Payer: MEDICARE

## 2023-05-03 VITALS
HEIGHT: 72 IN | WEIGHT: 223.2 LBS | BODY MASS INDEX: 30.23 KG/M2 | SYSTOLIC BLOOD PRESSURE: 135 MMHG | HEART RATE: 60 BPM | DIASTOLIC BLOOD PRESSURE: 84 MMHG | RESPIRATION RATE: 16 BRPM | OXYGEN SATURATION: 95 %

## 2023-05-03 DIAGNOSIS — D75.1 POLYCYTHEMIA: Primary | ICD-10-CM

## 2023-05-03 DIAGNOSIS — D75.1 SECONDARY POLYCYTHEMIA: ICD-10-CM

## 2023-05-03 PROCEDURE — 99212 OFFICE O/P EST SF 10 MIN: CPT | Performed by: INTERNAL MEDICINE

## 2023-05-03 PROCEDURE — 3074F SYST BP LT 130 MM HG: CPT | Performed by: INTERNAL MEDICINE

## 2023-05-03 PROCEDURE — 99214 OFFICE O/P EST MOD 30 MIN: CPT | Performed by: INTERNAL MEDICINE

## 2023-05-03 PROCEDURE — 3078F DIAST BP <80 MM HG: CPT | Performed by: INTERNAL MEDICINE

## 2023-05-03 PROCEDURE — 3017F COLORECTAL CA SCREEN DOC REV: CPT | Performed by: INTERNAL MEDICINE

## 2023-05-03 PROCEDURE — 1036F TOBACCO NON-USER: CPT | Performed by: INTERNAL MEDICINE

## 2023-05-03 PROCEDURE — G8417 CALC BMI ABV UP PARAM F/U: HCPCS | Performed by: INTERNAL MEDICINE

## 2023-05-03 PROCEDURE — G8427 DOCREV CUR MEDS BY ELIG CLIN: HCPCS | Performed by: INTERNAL MEDICINE

## 2023-05-03 ASSESSMENT — ENCOUNTER SYMPTOMS
SHORTNESS OF BREATH: 1
ABDOMINAL PAIN: 0
COUGH: 0
DIARRHEA: 0
VOMITING: 0
NAUSEA: 0
BACK PAIN: 0

## 2023-05-03 NOTE — PROGRESS NOTES
ECOG Performance Status (grade): 0  0 - able to carry on all pre-disease activity w/out restriction  1 - restricted but able to carry out light work  2 - ambulatory and can self- care but unable to carry out work  3 - bed or chair >50% of waking hours  4 - completely disable, total care, confined to bed or chair    Physical Exam  Constitutional:       Appearance: Normal appearance. He is not ill-appearing. HENT:      Head: Normocephalic and atraumatic. Cardiovascular:      Rate and Rhythm: Normal rate and regular rhythm. Pulses: Normal pulses. Heart sounds: Normal heart sounds. Pulmonary:      Effort: Pulmonary effort is normal.      Breath sounds: Normal breath sounds. Abdominal:      General: Bowel sounds are normal.      Palpations: Abdomen is soft. Tenderness: There is no abdominal tenderness. There is no guarding. Musculoskeletal:         General: No swelling or tenderness. Cervical back: Neck supple. Skin:     General: Skin is warm. Findings: No erythema or rash. Neurological:      General: No focal deficit present. Mental Status: He is alert and oriented to person, place, and time. Mental status is at baseline. Psychiatric:         Behavior: Behavior normal.            Diagnostics:      No results found for this or any previous visit (from the past 96 hour(s)). Imaging:  No valid procedures specified. No results found for this or any previous visit. No results found for this or any previous visit. Diagnosis:                                        1. Polycythemia    2. Secondary polycythemia        Assessment and Plan:                                        # Polycythemia   -- Past medical history significant of aortic insufficiency, chronic venous insufficiency, hypertension, hyperlipidemia, GERD, status post cardiac cath 2016.  -- He has history of remote smoking.   He quit smoking since 2000.  -- He has follow up with Pulmonology for

## 2023-05-10 ENCOUNTER — HOSPITAL ENCOUNTER (OUTPATIENT)
Facility: HOSPITAL | Age: 63
Discharge: HOME | End: 2023-05-13
Payer: MEDICARE

## 2023-05-10 DIAGNOSIS — D75.1 ERYTHROCYTOSIS: ICD-10-CM

## 2023-05-10 DIAGNOSIS — Z12.5 PROSTATE CANCER SCREENING: ICD-10-CM

## 2023-05-10 DIAGNOSIS — R73.03 PREDIABETES: ICD-10-CM

## 2023-05-10 DIAGNOSIS — E78.2 MIXED HYPERLIPIDEMIA: ICD-10-CM

## 2023-05-10 DIAGNOSIS — E55.9 VITAMIN D INSUFFICIENCY: ICD-10-CM

## 2023-05-10 DIAGNOSIS — I10 ESSENTIAL HYPERTENSION: ICD-10-CM

## 2023-05-10 DIAGNOSIS — I77.810 DILATED AORTIC ROOT (HCC): ICD-10-CM

## 2023-05-10 LAB
25(OH)D3 SERPL-MCNC: 50.4 NG/ML (ref 30–100)
APPEARANCE UR: CLEAR
BACTERIA URNS QL MICRO: ABNORMAL /HPF
BASOPHILS # BLD: 0.1 K/UL (ref 0–0.1)
BASOPHILS NFR BLD: 1 % (ref 0–2)
BILIRUB UR QL: NEGATIVE
CHOLEST SERPL-MCNC: 209 MG/DL
COLOR UR: YELLOW
CREAT UR-MCNC: 58 MG/DL (ref 30–125)
DIFFERENTIAL METHOD BLD: ABNORMAL
EOSINOPHIL # BLD: 0.1 K/UL (ref 0–0.4)
EOSINOPHIL NFR BLD: 1 % (ref 0–5)
EPITH CASTS URNS QL MICRO: ABNORMAL /LPF (ref 0–5)
ERYTHROCYTE [DISTWIDTH] IN BLOOD BY AUTOMATED COUNT: 13.8 % (ref 11.6–14.5)
GLUCOSE UR STRIP.AUTO-MCNC: NEGATIVE MG/DL
HBA1C MFR BLD: 5.8 % (ref 4.2–5.6)
HCT VFR BLD AUTO: 50.2 % (ref 36–48)
HDLC SERPL-MCNC: 64 MG/DL (ref 40–60)
HDLC SERPL: 3.3 (ref 0–5)
HGB BLD-MCNC: 17.1 G/DL (ref 13–16)
HGB UR QL STRIP: NEGATIVE
IMM GRANULOCYTES # BLD AUTO: 0.1 K/UL (ref 0–0.04)
IMM GRANULOCYTES NFR BLD AUTO: 1 % (ref 0–0.5)
KETONES UR QL STRIP.AUTO: NEGATIVE MG/DL
LDLC SERPL CALC-MCNC: 118.2 MG/DL (ref 0–100)
LEUKOCYTE ESTERASE UR QL STRIP.AUTO: NEGATIVE
LIPID PANEL: ABNORMAL
LYMPHOCYTES # BLD: 2.1 K/UL (ref 0.9–3.6)
LYMPHOCYTES NFR BLD: 25 % (ref 21–52)
MAGNESIUM SERPL-MCNC: 2.5 MG/DL (ref 1.6–2.6)
MCH RBC QN AUTO: 31 PG (ref 24–34)
MCHC RBC AUTO-ENTMCNC: 34.1 G/DL (ref 31–37)
MCV RBC AUTO: 91.1 FL (ref 78–100)
MICROALBUMIN UR-MCNC: 0.65 MG/DL (ref 0–3)
MICROALBUMIN/CREAT UR-RTO: 11 MG/G (ref 0–30)
MONOCYTES # BLD: 0.6 K/UL (ref 0.05–1.2)
MONOCYTES NFR BLD: 7 % (ref 3–10)
MUCOUS THREADS URNS QL MICRO: ABNORMAL /LPF
NEUTS SEG # BLD: 5.4 K/UL (ref 1.8–8)
NEUTS SEG NFR BLD: 65 % (ref 40–73)
NITRITE UR QL STRIP.AUTO: NEGATIVE
NRBC # BLD: 0 K/UL (ref 0–0.01)
NRBC BLD-RTO: 0 PER 100 WBC
PH UR STRIP: 7 (ref 5–8)
PLATELET # BLD AUTO: 209 K/UL (ref 135–420)
PMV BLD AUTO: 10.2 FL (ref 9.2–11.8)
PROT UR STRIP-MCNC: NEGATIVE MG/DL
RBC # BLD AUTO: 5.51 M/UL (ref 4.35–5.65)
RBC #/AREA URNS HPF: ABNORMAL /HPF (ref 0–5)
SP GR UR REFRACTOMETRY: 1.01 (ref 1–1.03)
TRIGL SERPL-MCNC: 134 MG/DL
UROBILINOGEN UR QL STRIP.AUTO: 0.2 EU/DL (ref 0.2–1)
VLDLC SERPL CALC-MCNC: 26.8 MG/DL
WBC # BLD AUTO: 8.3 K/UL (ref 4.6–13.2)
WBC URNS QL MICRO: ABNORMAL /HPF (ref 0–5)

## 2023-05-10 PROCEDURE — 93005 ELECTROCARDIOGRAM TRACING: CPT | Performed by: ORTHOPAEDIC SURGERY

## 2023-05-10 PROCEDURE — 84153 ASSAY OF PSA TOTAL: CPT

## 2023-05-10 PROCEDURE — 85025 COMPLETE CBC W/AUTO DIFF WBC: CPT

## 2023-05-10 PROCEDURE — 36415 COLL VENOUS BLD VENIPUNCTURE: CPT

## 2023-05-10 PROCEDURE — 82306 VITAMIN D 25 HYDROXY: CPT

## 2023-05-10 PROCEDURE — 83735 ASSAY OF MAGNESIUM: CPT

## 2023-05-10 PROCEDURE — 82570 ASSAY OF URINE CREATININE: CPT

## 2023-05-10 PROCEDURE — 82043 UR ALBUMIN QUANTITATIVE: CPT

## 2023-05-10 PROCEDURE — 83036 HEMOGLOBIN GLYCOSYLATED A1C: CPT

## 2023-05-10 PROCEDURE — 81001 URINALYSIS AUTO W/SCOPE: CPT

## 2023-05-10 PROCEDURE — 80061 LIPID PANEL: CPT

## 2023-05-11 LAB
EKG ATRIAL RATE: 63 BPM
EKG DIAGNOSIS: NORMAL
EKG P AXIS: 73 DEGREES
EKG P-R INTERVAL: 172 MS
EKG Q-T INTERVAL: 394 MS
EKG QRS DURATION: 102 MS
EKG QTC CALCULATION (BAZETT): 403 MS
EKG R AXIS: 36 DEGREES
EKG T AXIS: 82 DEGREES
EKG VENTRICULAR RATE: 63 BPM
PSA SERPL-MCNC: 0.6 NG/ML (ref 0–4)

## 2023-05-22 ENCOUNTER — HOSPITAL ENCOUNTER (OUTPATIENT)
Facility: HOSPITAL | Age: 63
Discharge: HOME | End: 2023-05-25
Payer: MEDICARE

## 2023-05-22 ENCOUNTER — TRANSCRIBE ORDERS (OUTPATIENT)
Facility: HOSPITAL | Age: 63
End: 2023-05-22

## 2023-05-22 DIAGNOSIS — Z01.818 PREOP TESTING: ICD-10-CM

## 2023-05-22 DIAGNOSIS — Z01.818 PREOP TESTING: Primary | ICD-10-CM

## 2023-05-22 LAB
APTT PPP: 27.3 SEC (ref 23–36.4)
INR PPP: 0.9 (ref 0.8–1.2)
PROTHROMBIN TIME: 12.9 SEC (ref 11.5–15.2)

## 2023-05-22 PROCEDURE — 85730 THROMBOPLASTIN TIME PARTIAL: CPT

## 2023-05-22 PROCEDURE — 85610 PROTHROMBIN TIME: CPT

## 2023-05-22 PROCEDURE — 36415 COLL VENOUS BLD VENIPUNCTURE: CPT

## 2023-05-23 LAB
BACTERIA SPEC CULT: NORMAL
BACTERIA SPEC CULT: NORMAL
SERVICE CMNT-IMP: NORMAL

## 2023-05-25 ENCOUNTER — HOSPITAL ENCOUNTER (OUTPATIENT)
Facility: HOSPITAL | Age: 63
Setting detail: SPECIMEN
Discharge: HOME | End: 2023-05-25
Payer: MEDICARE

## 2023-05-25 ENCOUNTER — OFFICE VISIT (OUTPATIENT)
Age: 63
End: 2023-05-25
Payer: MEDICARE

## 2023-05-25 VITALS
DIASTOLIC BLOOD PRESSURE: 68 MMHG | HEART RATE: 77 BPM | RESPIRATION RATE: 16 BRPM | WEIGHT: 222 LBS | BODY MASS INDEX: 29.42 KG/M2 | HEIGHT: 73 IN | SYSTOLIC BLOOD PRESSURE: 110 MMHG | OXYGEN SATURATION: 96 % | TEMPERATURE: 97.3 F

## 2023-05-25 VITALS
BODY MASS INDEX: 29.42 KG/M2 | WEIGHT: 222 LBS | HEART RATE: 71 BPM | OXYGEN SATURATION: 97 % | HEIGHT: 73 IN | DIASTOLIC BLOOD PRESSURE: 80 MMHG | SYSTOLIC BLOOD PRESSURE: 124 MMHG

## 2023-05-25 DIAGNOSIS — I10 ESSENTIAL HYPERTENSION: ICD-10-CM

## 2023-05-25 DIAGNOSIS — E78.2 MIXED HYPERLIPIDEMIA: ICD-10-CM

## 2023-05-25 DIAGNOSIS — E78.5 HYPERLIPIDEMIA, UNSPECIFIED HYPERLIPIDEMIA TYPE: ICD-10-CM

## 2023-05-25 DIAGNOSIS — I77.810 DILATED AORTIC ROOT (HCC): ICD-10-CM

## 2023-05-25 DIAGNOSIS — D75.1 ERYTHROCYTOSIS: ICD-10-CM

## 2023-05-25 DIAGNOSIS — I35.1 AORTIC VALVE INSUFFICIENCY, ETIOLOGY OF CARDIAC VALVE DISEASE UNSPECIFIED: Primary | ICD-10-CM

## 2023-05-25 DIAGNOSIS — Z00.00 WELCOME TO MEDICARE PREVENTIVE VISIT: ICD-10-CM

## 2023-05-25 DIAGNOSIS — Q23.1 BICUSPID AORTIC VALVE: ICD-10-CM

## 2023-05-25 DIAGNOSIS — R73.03 PREDIABETES: ICD-10-CM

## 2023-05-25 DIAGNOSIS — E55.9 VITAMIN D INSUFFICIENCY: ICD-10-CM

## 2023-05-25 DIAGNOSIS — Z01.818 PREOPERATIVE EVALUATION TO RULE OUT SURGICAL CONTRAINDICATION: Primary | ICD-10-CM

## 2023-05-25 DIAGNOSIS — M17.0 PRIMARY OSTEOARTHRITIS OF BOTH KNEES: ICD-10-CM

## 2023-05-25 DIAGNOSIS — G47.30 SLEEP APNEA, UNSPECIFIED TYPE: ICD-10-CM

## 2023-05-25 PROBLEM — M54.16 LUMBAR RADICULOPATHY: Status: ACTIVE | Noted: 2018-07-05

## 2023-05-25 PROBLEM — M48.061 SPINAL STENOSIS, LUMBAR REGION WITHOUT NEUROGENIC CLAUDICATION: Status: ACTIVE | Noted: 2018-07-03

## 2023-05-25 LAB
ALBUMIN SERPL-MCNC: 4 G/DL (ref 3.4–5)
ALBUMIN/GLOB SERPL: 1.4 (ref 0.8–1.7)
ALP SERPL-CCNC: 92 U/L (ref 45–117)
ALT SERPL-CCNC: 40 U/L (ref 16–61)
ANION GAP SERPL CALC-SCNC: 6 MMOL/L (ref 3–18)
AST SERPL-CCNC: 28 U/L (ref 10–38)
BILIRUB SERPL-MCNC: 1.3 MG/DL (ref 0.2–1)
BUN SERPL-MCNC: 17 MG/DL (ref 7–18)
BUN/CREAT SERPL: 13 (ref 12–20)
CALCIUM SERPL-MCNC: 9 MG/DL (ref 8.5–10.1)
CHLORIDE SERPL-SCNC: 108 MMOL/L (ref 100–111)
CO2 SERPL-SCNC: 25 MMOL/L (ref 21–32)
CREAT SERPL-MCNC: 1.32 MG/DL (ref 0.6–1.3)
GLOBULIN SER CALC-MCNC: 2.8 G/DL (ref 2–4)
GLUCOSE SERPL-MCNC: 145 MG/DL (ref 74–99)
POTASSIUM SERPL-SCNC: 4.1 MMOL/L (ref 3.5–5.5)
PROT SERPL-MCNC: 6.8 G/DL (ref 6.4–8.2)
SODIUM SERPL-SCNC: 139 MMOL/L (ref 136–145)

## 2023-05-25 PROCEDURE — G8427 DOCREV CUR MEDS BY ELIG CLIN: HCPCS | Performed by: INTERNAL MEDICINE

## 2023-05-25 PROCEDURE — 3017F COLORECTAL CA SCREEN DOC REV: CPT | Performed by: INTERNAL MEDICINE

## 2023-05-25 PROCEDURE — 3079F DIAST BP 80-89 MM HG: CPT | Performed by: INTERNAL MEDICINE

## 2023-05-25 PROCEDURE — 36415 COLL VENOUS BLD VENIPUNCTURE: CPT

## 2023-05-25 PROCEDURE — 1036F TOBACCO NON-USER: CPT | Performed by: INTERNAL MEDICINE

## 2023-05-25 PROCEDURE — 99214 OFFICE O/P EST MOD 30 MIN: CPT | Performed by: INTERNAL MEDICINE

## 2023-05-25 PROCEDURE — G8417 CALC BMI ABV UP PARAM F/U: HCPCS | Performed by: INTERNAL MEDICINE

## 2023-05-25 PROCEDURE — 3074F SYST BP LT 130 MM HG: CPT | Performed by: INTERNAL MEDICINE

## 2023-05-25 PROCEDURE — 3078F DIAST BP <80 MM HG: CPT | Performed by: INTERNAL MEDICINE

## 2023-05-25 PROCEDURE — 80053 COMPREHEN METABOLIC PANEL: CPT

## 2023-05-25 PROCEDURE — G0402 INITIAL PREVENTIVE EXAM: HCPCS | Performed by: INTERNAL MEDICINE

## 2023-05-25 PROCEDURE — 99211 OFF/OP EST MAY X REQ PHY/QHP: CPT | Performed by: INTERNAL MEDICINE

## 2023-05-25 RX ORDER — AMLODIPINE BESYLATE 5 MG/1
5 TABLET ORAL EVERY EVENING
Qty: 90 TABLET | Refills: 3 | Status: SHIPPED | OUTPATIENT
Start: 2023-05-25

## 2023-05-25 RX ORDER — NITROGLYCERIN 0.4 MG/1
0.4 TABLET SUBLINGUAL EVERY 5 MIN PRN
COMMUNITY

## 2023-05-25 RX ORDER — LOSARTAN POTASSIUM 100 MG/1
100 TABLET ORAL DAILY
Qty: 90 TABLET | Refills: 3 | Status: SHIPPED | OUTPATIENT
Start: 2023-05-25

## 2023-05-25 ASSESSMENT — ANXIETY QUESTIONNAIRES
3. WORRYING TOO MUCH ABOUT DIFFERENT THINGS: 0
4. TROUBLE RELAXING: 0
6. BECOMING EASILY ANNOYED OR IRRITABLE: 0
2. NOT BEING ABLE TO STOP OR CONTROL WORRYING: 0
1. FEELING NERVOUS, ANXIOUS, OR ON EDGE: 0
5. BEING SO RESTLESS THAT IT IS HARD TO SIT STILL: 0
7. FEELING AFRAID AS IF SOMETHING AWFUL MIGHT HAPPEN: 0
GAD7 TOTAL SCORE: 0

## 2023-05-25 ASSESSMENT — PATIENT HEALTH QUESTIONNAIRE - PHQ9
SUM OF ALL RESPONSES TO PHQ QUESTIONS 1-9: 0
SUM OF ALL RESPONSES TO PHQ QUESTIONS 1-9: 0
1. LITTLE INTEREST OR PLEASURE IN DOING THINGS: 0
SUM OF ALL RESPONSES TO PHQ QUESTIONS 1-9: 0
SUM OF ALL RESPONSES TO PHQ QUESTIONS 1-9: 0
SUM OF ALL RESPONSES TO PHQ9 QUESTIONS 1 & 2: 0
2. FEELING DOWN, DEPRESSED OR HOPELESS: 0
SUM OF ALL RESPONSES TO PHQ9 QUESTIONS 1 & 2: 0
2. FEELING DOWN, DEPRESSED OR HOPELESS: 0
SUM OF ALL RESPONSES TO PHQ QUESTIONS 1-9: 0
1. LITTLE INTEREST OR PLEASURE IN DOING THINGS: 0
SUM OF ALL RESPONSES TO PHQ QUESTIONS 1-9: 0

## 2023-05-25 ASSESSMENT — ENCOUNTER SYMPTOMS
NAUSEA: 0
ABDOMINAL PAIN: 0
SORE THROAT: 0
VOMITING: 0
ABDOMINAL DISTENTION: 0
SHORTNESS OF BREATH: 0
COUGH: 0

## 2023-05-25 ASSESSMENT — LIFESTYLE VARIABLES
HOW MANY STANDARD DRINKS CONTAINING ALCOHOL DO YOU HAVE ON A TYPICAL DAY: 1 OR 2
HOW OFTEN DO YOU HAVE A DRINK CONTAINING ALCOHOL: 2-4 TIMES A MONTH

## 2023-05-25 ASSESSMENT — VISUAL ACUITY
OD_CC: 20/13
OS_CC: 20/20

## 2023-05-25 NOTE — PROGRESS NOTES
Antoine Bhakta presents today for   Chief Complaint   Patient presents with    Follow-up     Add on: Surgical clearance    Procedure     6/6/23: right total knee replacement at [facility] with Dr. Serena Eduardo preferred language for health care discussion is english/other. Is someone accompanying this pt? no    Is the patient using any DME equipment during OV? no    Depression Screening:  Depression: Not at risk    PHQ-2 Score: 0        Learning Assessment:  Who is the primary learner? Patient    What is the preferred language for health care of the primary learner? ENGLISH    How does the primary learner prefer to learn new concepts? DEMONSTRATION    Answered By patient    Relationship to Learner SELF           Pt currently taking Anticoagulant therapy? no    Pt currently taking Antiplatelet therapy ? no      Coordination of Care:  1. Have you been to the ER, urgent care clinic since your last visit? Hospitalized since your last visit? no    2. Have you seen or consulted any other health care providers outside of the [address] [place] [address] since your last visit? Include any pap smears or colon screening.  no
cholesterol 205, HDL 46, . This was an improvement from the year prior. Follow-up in November of this year as previously scheduled.       Balbir Chow MD

## 2023-05-29 ENCOUNTER — TELEPHONE (OUTPATIENT)
Age: 63
End: 2023-05-29

## 2023-05-29 PROBLEM — M25.561 CHRONIC PAIN OF BOTH KNEES: Status: RESOLVED | Noted: 2020-03-04 | Resolved: 2023-05-29

## 2023-05-29 PROBLEM — M25.562 CHRONIC PAIN OF BOTH KNEES: Status: RESOLVED | Noted: 2020-03-04 | Resolved: 2023-05-29

## 2023-05-29 PROBLEM — G89.29 CHRONIC PAIN OF BOTH KNEES: Status: RESOLVED | Noted: 2020-03-04 | Resolved: 2023-05-29

## 2023-05-29 NOTE — TELEPHONE ENCOUNTER
Please fax office note to Dr. Marvelyn Duverney for preoperative evaluation for upcoming right knee surgery on 6/6/2023. Fax number [phone].

## 2023-06-05 ENCOUNTER — ANESTHESIA EVENT (OUTPATIENT)
Facility: HOSPITAL | Age: 63
End: 2023-06-05
Payer: MEDICARE

## 2023-06-05 PROBLEM — M17.11 OSTEOARTHRITIS OF RIGHT KNEE: Chronic | Status: ACTIVE | Noted: 2023-06-05

## 2023-06-05 RX ORDER — ACETAMINOPHEN 325 MG/1
650 TABLET ORAL EVERY 6 HOURS
Status: CANCELLED | OUTPATIENT
Start: 2023-06-05

## 2023-06-05 RX ORDER — DIPHENHYDRAMINE HYDROCHLORIDE 50 MG/ML
25 INJECTION INTRAMUSCULAR; INTRAVENOUS EVERY 6 HOURS PRN
Status: CANCELLED | OUTPATIENT
Start: 2023-06-05

## 2023-06-05 RX ORDER — SODIUM CHLORIDE 9 MG/ML
INJECTION, SOLUTION INTRAVENOUS CONTINUOUS
Status: CANCELLED | OUTPATIENT
Start: 2023-06-05 | End: 2023-06-05

## 2023-06-05 RX ORDER — DIPHENHYDRAMINE HCL 25 MG
25 CAPSULE ORAL EVERY 6 HOURS PRN
Status: CANCELLED | OUTPATIENT
Start: 2023-06-05

## 2023-06-05 RX ORDER — KETOROLAC TROMETHAMINE 15 MG/ML
15 INJECTION, SOLUTION INTRAMUSCULAR; INTRAVENOUS EVERY 6 HOURS
Status: CANCELLED | OUTPATIENT
Start: 2023-06-05 | End: 2023-06-08

## 2023-06-05 RX ORDER — SODIUM CHLORIDE 9 MG/ML
INJECTION, SOLUTION INTRAVENOUS CONTINUOUS
Status: CANCELLED | OUTPATIENT
Start: 2023-06-05

## 2023-06-05 RX ORDER — SODIUM CHLORIDE 0.9 % (FLUSH) 0.9 %
5-40 SYRINGE (ML) INJECTION PRN
Status: CANCELLED | OUTPATIENT
Start: 2023-06-05

## 2023-06-05 RX ORDER — ONDANSETRON 2 MG/ML
4 INJECTION INTRAMUSCULAR; INTRAVENOUS EVERY 6 HOURS PRN
Status: CANCELLED | OUTPATIENT
Start: 2023-06-05

## 2023-06-05 NOTE — H&P
[address] [facility] [address]  History and Physical Exam    Patient: Eugenia Rivera MRN: [id number]  SSN: [id number]    YOB: 1960  Age: 70 y.o. Sex: male      Subjective:      Chief Complaint: Right knee pain    History of Present Illness:  Patient complains of pain to the right knee and difficulty ambulating, which has progressively worsened over several months. X-rays showed osteoarthritis of the joint. The patient's pain has persisted and progressed despite conservative treatments and therapies. The patient has been previously treated with medications and/or injections. The patient has at this time opted for surgical intervention. Past Medical History:   Diagnosis Date    Abnormal myocardial perfusion study 02/28/2016    Partially reversible inferior perfusion defect, EF 57%    AI (aortic insufficiency) 2016    Moderate with possible bicuspid aortic valve, cardio-Dr. Melvin Colby    Basal cell carcinoma 2014    nose    Chronic low back pain 1994    was treated with steroid injection    Chronic venous insufficiency 2016    Colon polyp 2012    Benign serrated polyp    Difficult intubation 08/08/2008    had difficulty with tube placement when had gall bladder surgery    Dyspnea 08/2022    on inhaler, cause unkown, Pulmo- Dr. Alvarez Arts    Exercise tolerance finding 05/09/2023    Able to climb one flight of stairs without stopping for SOB/CP. Uses eliptical 5 mins couple times a week    Fracture 05/2016    rib fx's due to work injury from fall, no surgery    GERD (gastroesophageal reflux disease) 2002    on meds as needed, diet control    History of echocardiogram 02/28/2016    EF 55%. Basal inferior hypokinesis. Gr 1 DDfx. Mod AI.       Hyperlipidemia 2008    not on meds on currently    Hypertension 2010    on meds, managed by cardio-Dr. Melvin Colby    Osteoarthritis (arthritis due to wear and tear of joints) 2015    knees, hands    Osteoarthritis of right knee 6/5/2023 Last visit 9/10/2020  Next visit   11/2/2020    Last labs   AST/SGOT (U/L)   Date Value   03/05/2020 22     ALT/SGPT (U/L)   Date Value   03/05/2020 19     Creatinine (mg/dL)   Date Value   03/05/2020 0.93     CE checked

## 2023-06-06 ENCOUNTER — ANESTHESIA (OUTPATIENT)
Facility: HOSPITAL | Age: 63
End: 2023-06-06
Payer: MEDICARE

## 2023-06-06 ENCOUNTER — APPOINTMENT (OUTPATIENT)
Facility: HOSPITAL | Age: 63
End: 2023-06-06
Attending: ORTHOPAEDIC SURGERY
Payer: MEDICARE

## 2023-06-06 ENCOUNTER — HOSPITAL ENCOUNTER (OUTPATIENT)
Facility: HOSPITAL | Age: 63
Setting detail: OUTPATIENT SURGERY
Discharge: HOME | End: 2023-06-06
Attending: ORTHOPAEDIC SURGERY | Admitting: ORTHOPAEDIC SURGERY
Payer: MEDICARE

## 2023-06-06 VITALS
SYSTOLIC BLOOD PRESSURE: 148 MMHG | RESPIRATION RATE: 16 BRPM | BODY MASS INDEX: 29.35 KG/M2 | HEART RATE: 74 BPM | WEIGHT: 216.7 LBS | OXYGEN SATURATION: 99 % | HEIGHT: 72 IN | DIASTOLIC BLOOD PRESSURE: 68 MMHG | TEMPERATURE: 96.9 F

## 2023-06-06 DIAGNOSIS — M17.11 PRIMARY OSTEOARTHRITIS OF RIGHT KNEE: Primary | Chronic | ICD-10-CM

## 2023-06-06 LAB
ABO + RH BLD: NORMAL
BLOOD GROUP ANTIBODIES SERPL: NORMAL
GLUCOSE BLD STRIP.AUTO-MCNC: 106 MG/DL (ref 70–110)
SPECIMEN EXP DATE BLD: NORMAL

## 2023-06-06 PROCEDURE — 97165 OT EVAL LOW COMPLEX 30 MIN: CPT

## 2023-06-06 PROCEDURE — 3600000005 HC SURGERY LEVEL 5 BASE: Performed by: ORTHOPAEDIC SURGERY

## 2023-06-06 PROCEDURE — 73560 X-RAY EXAM OF KNEE 1 OR 2: CPT

## 2023-06-06 PROCEDURE — 86901 BLOOD TYPING SEROLOGIC RH(D): CPT

## 2023-06-06 PROCEDURE — 6370000000 HC RX 637 (ALT 250 FOR IP): Performed by: PHYSICIAN ASSISTANT

## 2023-06-06 PROCEDURE — 2500000003 HC RX 250 WO HCPCS: Performed by: ANESTHESIOLOGY

## 2023-06-06 PROCEDURE — 3700000001 HC ADD 15 MINUTES (ANESTHESIA): Performed by: ORTHOPAEDIC SURGERY

## 2023-06-06 PROCEDURE — 2500000003 HC RX 250 WO HCPCS: Performed by: NURSE ANESTHETIST, CERTIFIED REGISTERED

## 2023-06-06 PROCEDURE — 6360000002 HC RX W HCPCS: Performed by: ANESTHESIOLOGY

## 2023-06-06 PROCEDURE — 7100000010 HC PHASE II RECOVERY - FIRST 15 MIN: Performed by: ORTHOPAEDIC SURGERY

## 2023-06-06 PROCEDURE — 6360000002 HC RX W HCPCS: Performed by: ORTHOPAEDIC SURGERY

## 2023-06-06 PROCEDURE — 7100000011 HC PHASE II RECOVERY - ADDTL 15 MIN: Performed by: ORTHOPAEDIC SURGERY

## 2023-06-06 PROCEDURE — 2500000003 HC RX 250 WO HCPCS: Performed by: ORTHOPAEDIC SURGERY

## 2023-06-06 PROCEDURE — 6360000002 HC RX W HCPCS: Performed by: NURSE ANESTHETIST, CERTIFIED REGISTERED

## 2023-06-06 PROCEDURE — A4217 STERILE WATER/SALINE, 500 ML: HCPCS | Performed by: ORTHOPAEDIC SURGERY

## 2023-06-06 PROCEDURE — 3700000000 HC ANESTHESIA ATTENDED CARE: Performed by: ORTHOPAEDIC SURGERY

## 2023-06-06 PROCEDURE — 6360000002 HC RX W HCPCS: Performed by: PHYSICIAN ASSISTANT

## 2023-06-06 PROCEDURE — 7100000000 HC PACU RECOVERY - FIRST 15 MIN: Performed by: ORTHOPAEDIC SURGERY

## 2023-06-06 PROCEDURE — C1713 ANCHOR/SCREW BN/BN,TIS/BN: HCPCS | Performed by: ORTHOPAEDIC SURGERY

## 2023-06-06 PROCEDURE — 86900 BLOOD TYPING SEROLOGIC ABO: CPT

## 2023-06-06 PROCEDURE — 2709999900 HC NON-CHARGEABLE SUPPLY: Performed by: ORTHOPAEDIC SURGERY

## 2023-06-06 PROCEDURE — 2580000003 HC RX 258: Performed by: ORTHOPAEDIC SURGERY

## 2023-06-06 PROCEDURE — 97161 PT EVAL LOW COMPLEX 20 MIN: CPT

## 2023-06-06 PROCEDURE — 3600000015 HC SURGERY LEVEL 5 ADDTL 15MIN: Performed by: ORTHOPAEDIC SURGERY

## 2023-06-06 PROCEDURE — 97116 GAIT TRAINING THERAPY: CPT

## 2023-06-06 PROCEDURE — 86850 RBC ANTIBODY SCREEN: CPT

## 2023-06-06 PROCEDURE — 7100000001 HC PACU RECOVERY - ADDTL 15 MIN: Performed by: ORTHOPAEDIC SURGERY

## 2023-06-06 PROCEDURE — C1776 JOINT DEVICE (IMPLANTABLE): HCPCS | Performed by: ORTHOPAEDIC SURGERY

## 2023-06-06 PROCEDURE — 82962 GLUCOSE BLOOD TEST: CPT

## 2023-06-06 PROCEDURE — 97535 SELF CARE MNGMENT TRAINING: CPT

## 2023-06-06 PROCEDURE — 64447 NJX AA&/STRD FEMORAL NRV IMG: CPT | Performed by: ANESTHESIOLOGY

## 2023-06-06 DEVICE — 38MM PATELLA, VITAMIN E
Type: IMPLANTABLE DEVICE | Site: KNEE | Status: FUNCTIONAL
Brand: BKS E-VITALIZE

## 2023-06-06 DEVICE — RT SIZE 6 FEMORAL CR NONPOROUS
Type: IMPLANTABLE DEVICE | Site: KNEE | Status: FUNCTIONAL
Brand: BKS TRIMAX

## 2023-06-06 DEVICE — SIZE 6 7MM TIBIAL INSERT CR
Type: IMPLANTABLE DEVICE | Site: KNEE | Status: FUNCTIONAL
Brand: BKS E-VITALIZE

## 2023-06-06 DEVICE — SIZE 6 TIBIAL TRAY NONPOROUS
Type: IMPLANTABLE DEVICE | Site: KNEE | Status: FUNCTIONAL
Brand: BALANCED KNEE SYSTEM

## 2023-06-06 DEVICE — CEMENT BNE 40GM FULL DOSE PMMA W/O ANTIBIO HI VISC N RADPQ: Type: IMPLANTABLE DEVICE | Site: KNEE | Status: FUNCTIONAL

## 2023-06-06 RX ORDER — MAGNESIUM HYDROXIDE 1200 MG/15ML
LIQUID ORAL CONTINUOUS PRN
Status: COMPLETED | OUTPATIENT
Start: 2023-06-06 | End: 2023-06-06

## 2023-06-06 RX ORDER — OXYCODONE HYDROCHLORIDE 5 MG/1
5 TABLET ORAL
Status: DISCONTINUED | OUTPATIENT
Start: 2023-06-06 | End: 2023-06-06 | Stop reason: HOSPADM

## 2023-06-06 RX ORDER — DIPHENHYDRAMINE HYDROCHLORIDE 50 MG/ML
12.5 INJECTION INTRAMUSCULAR; INTRAVENOUS
Status: DISCONTINUED | OUTPATIENT
Start: 2023-06-06 | End: 2023-06-06 | Stop reason: HOSPADM

## 2023-06-06 RX ORDER — OXYCODONE HYDROCHLORIDE 5 MG/1
10 TABLET ORAL EVERY 4 HOURS PRN
Status: DISCONTINUED | OUTPATIENT
Start: 2023-06-06 | End: 2023-06-06 | Stop reason: HOSPADM

## 2023-06-06 RX ORDER — SODIUM CHLORIDE, SODIUM LACTATE, POTASSIUM CHLORIDE, AND CALCIUM CHLORIDE .6; .31; .03; .02 G/100ML; G/100ML; G/100ML; G/100ML
1000 INJECTION, SOLUTION INTRAVENOUS ONCE
Status: DISCONTINUED | OUTPATIENT
Start: 2023-06-06 | End: 2023-06-06 | Stop reason: HOSPADM

## 2023-06-06 RX ORDER — ROPIVACAINE HYDROCHLORIDE 5 MG/ML
INJECTION, SOLUTION EPIDURAL; INFILTRATION; PERINEURAL
Status: COMPLETED | OUTPATIENT
Start: 2023-06-06 | End: 2023-06-06

## 2023-06-06 RX ORDER — HYDROMORPHONE HYDROCHLORIDE 1 MG/ML
0.5 INJECTION, SOLUTION INTRAMUSCULAR; INTRAVENOUS; SUBCUTANEOUS EVERY 5 MIN PRN
Status: COMPLETED | OUTPATIENT
Start: 2023-06-06 | End: 2023-06-06

## 2023-06-06 RX ORDER — DEXAMETHASONE SODIUM PHOSPHATE 10 MG/ML
INJECTION, SOLUTION INTRAMUSCULAR; INTRAVENOUS
Status: COMPLETED | OUTPATIENT
Start: 2023-06-06 | End: 2023-06-06

## 2023-06-06 RX ORDER — SODIUM CHLORIDE 0.9 % (FLUSH) 0.9 %
5-40 SYRINGE (ML) INJECTION EVERY 12 HOURS SCHEDULED
Status: DISCONTINUED | OUTPATIENT
Start: 2023-06-06 | End: 2023-06-06 | Stop reason: HOSPADM

## 2023-06-06 RX ORDER — TRANEXAMIC ACID 650 MG/1
1950 TABLET ORAL ONCE
Status: COMPLETED | OUTPATIENT
Start: 2023-06-06 | End: 2023-06-06

## 2023-06-06 RX ORDER — OXYCODONE HYDROCHLORIDE 5 MG/1
5 TABLET ORAL EVERY 4 HOURS PRN
Status: DISCONTINUED | OUTPATIENT
Start: 2023-06-06 | End: 2023-06-06 | Stop reason: HOSPADM

## 2023-06-06 RX ORDER — LABETALOL HYDROCHLORIDE 5 MG/ML
10 INJECTION, SOLUTION INTRAVENOUS
Status: DISCONTINUED | OUTPATIENT
Start: 2023-06-06 | End: 2023-06-06 | Stop reason: HOSPADM

## 2023-06-06 RX ORDER — DEXAMETHASONE SODIUM PHOSPHATE 4 MG/ML
8 INJECTION, SOLUTION INTRA-ARTICULAR; INTRALESIONAL; INTRAMUSCULAR; INTRAVENOUS; SOFT TISSUE ONCE
Status: DISCONTINUED | OUTPATIENT
Start: 2023-06-06 | End: 2023-06-06

## 2023-06-06 RX ORDER — FENTANYL CITRATE 50 UG/ML
INJECTION, SOLUTION INTRAMUSCULAR; INTRAVENOUS PRN
Status: DISCONTINUED | OUTPATIENT
Start: 2023-06-06 | End: 2023-06-06 | Stop reason: SDUPTHER

## 2023-06-06 RX ORDER — CHLORHEXIDINE GLUCONATE 4 G/100ML
SOLUTION TOPICAL ONCE
Status: DISCONTINUED | OUTPATIENT
Start: 2023-06-06 | End: 2023-06-06 | Stop reason: HOSPADM

## 2023-06-06 RX ORDER — DEXMEDETOMIDINE HYDROCHLORIDE 100 UG/ML
INJECTION, SOLUTION INTRAVENOUS
Status: COMPLETED | OUTPATIENT
Start: 2023-06-06 | End: 2023-06-06

## 2023-06-06 RX ORDER — ONDANSETRON 2 MG/ML
4 INJECTION INTRAMUSCULAR; INTRAVENOUS
Status: COMPLETED | OUTPATIENT
Start: 2023-06-06 | End: 2023-06-06

## 2023-06-06 RX ORDER — ONDANSETRON 2 MG/ML
INJECTION INTRAMUSCULAR; INTRAVENOUS PRN
Status: DISCONTINUED | OUTPATIENT
Start: 2023-06-06 | End: 2023-06-06 | Stop reason: SDUPTHER

## 2023-06-06 RX ORDER — ACETAMINOPHEN 500 MG
1000 TABLET ORAL ONCE
Status: COMPLETED | OUTPATIENT
Start: 2023-06-06 | End: 2023-06-06

## 2023-06-06 RX ORDER — CEFADROXIL 500 MG/1
500 CAPSULE ORAL 2 TIMES DAILY
Qty: 10 CAPSULE | Refills: 0 | Status: SHIPPED | OUTPATIENT
Start: 2023-06-06 | End: 2023-06-11

## 2023-06-06 RX ORDER — MIDAZOLAM HYDROCHLORIDE 1 MG/ML
INJECTION INTRAMUSCULAR; INTRAVENOUS
Status: COMPLETED
Start: 2023-06-06 | End: 2023-06-06

## 2023-06-06 RX ORDER — SODIUM CHLORIDE 9 MG/ML
INJECTION, SOLUTION INTRAVENOUS PRN
Status: DISCONTINUED | OUTPATIENT
Start: 2023-06-06 | End: 2023-06-06 | Stop reason: HOSPADM

## 2023-06-06 RX ORDER — KETAMINE HCL IN NACL, ISO-OSM 100MG/10ML
SYRINGE (ML) INJECTION PRN
Status: DISCONTINUED | OUTPATIENT
Start: 2023-06-06 | End: 2023-06-06 | Stop reason: SDUPTHER

## 2023-06-06 RX ORDER — SODIUM CHLORIDE 0.9 % (FLUSH) 0.9 %
5-40 SYRINGE (ML) INJECTION PRN
Status: DISCONTINUED | OUTPATIENT
Start: 2023-06-06 | End: 2023-06-06 | Stop reason: HOSPADM

## 2023-06-06 RX ORDER — SUCCINYLCHOLINE/SOD CL,ISO/PF 100 MG/5ML
SYRINGE (ML) INTRAVENOUS PRN
Status: DISCONTINUED | OUTPATIENT
Start: 2023-06-06 | End: 2023-06-06 | Stop reason: SDUPTHER

## 2023-06-06 RX ORDER — GLYCOPYRROLATE 0.2 MG/ML
INJECTION INTRAMUSCULAR; INTRAVENOUS PRN
Status: DISCONTINUED | OUTPATIENT
Start: 2023-06-06 | End: 2023-06-06 | Stop reason: SDUPTHER

## 2023-06-06 RX ORDER — OXYCODONE HYDROCHLORIDE 5 MG/1
5 TABLET ORAL EVERY 4 HOURS PRN
Qty: 42 TABLET | Refills: 0 | Status: SHIPPED | OUTPATIENT
Start: 2023-06-06 | End: 2023-06-13

## 2023-06-06 RX ORDER — SODIUM CHLORIDE, SODIUM LACTATE, POTASSIUM CHLORIDE, CALCIUM CHLORIDE 600; 310; 30; 20 MG/100ML; MG/100ML; MG/100ML; MG/100ML
INJECTION, SOLUTION INTRAVENOUS CONTINUOUS
Status: DISCONTINUED | OUTPATIENT
Start: 2023-06-06 | End: 2023-06-06 | Stop reason: HOSPADM

## 2023-06-06 RX ORDER — DEXAMETHASONE SODIUM PHOSPHATE 4 MG/ML
8 INJECTION, SOLUTION INTRA-ARTICULAR; INTRALESIONAL; INTRAMUSCULAR; INTRAVENOUS; SOFT TISSUE ONCE
Status: COMPLETED | OUTPATIENT
Start: 2023-06-06 | End: 2023-06-06

## 2023-06-06 RX ORDER — MIDAZOLAM HYDROCHLORIDE 1 MG/ML
INJECTION INTRAMUSCULAR; INTRAVENOUS PRN
Status: DISCONTINUED | OUTPATIENT
Start: 2023-06-06 | End: 2023-06-06 | Stop reason: SDUPTHER

## 2023-06-06 RX ORDER — EPHEDRINE SULFATE/0.9% NACL/PF 50 MG/5 ML
SYRINGE (ML) INTRAVENOUS PRN
Status: DISCONTINUED | OUTPATIENT
Start: 2023-06-06 | End: 2023-06-06 | Stop reason: SDUPTHER

## 2023-06-06 RX ORDER — PROCHLORPERAZINE EDISYLATE 5 MG/ML
5 INJECTION INTRAMUSCULAR; INTRAVENOUS
Status: DISCONTINUED | OUTPATIENT
Start: 2023-06-06 | End: 2023-06-06 | Stop reason: HOSPADM

## 2023-06-06 RX ORDER — PANTOPRAZOLE SODIUM 40 MG/1
40 TABLET, DELAYED RELEASE ORAL ONCE
Status: DISCONTINUED | OUTPATIENT
Start: 2023-06-06 | End: 2023-06-06 | Stop reason: HOSPADM

## 2023-06-06 RX ORDER — IPRATROPIUM BROMIDE AND ALBUTEROL SULFATE 2.5; .5 MG/3ML; MG/3ML
1 SOLUTION RESPIRATORY (INHALATION)
Status: DISCONTINUED | OUTPATIENT
Start: 2023-06-06 | End: 2023-06-06 | Stop reason: HOSPADM

## 2023-06-06 RX ORDER — DEXAMETHASONE SODIUM PHOSPHATE 4 MG/ML
4 INJECTION, SOLUTION INTRA-ARTICULAR; INTRALESIONAL; INTRAMUSCULAR; INTRAVENOUS; SOFT TISSUE ONCE
Status: COMPLETED | OUTPATIENT
Start: 2023-06-06 | End: 2023-06-06

## 2023-06-06 RX ORDER — FENTANYL CITRATE 50 UG/ML
25 INJECTION, SOLUTION INTRAMUSCULAR; INTRAVENOUS EVERY 5 MIN PRN
Status: COMPLETED | OUTPATIENT
Start: 2023-06-06 | End: 2023-06-06

## 2023-06-06 RX ADMIN — ROPIVACAINE HYDROCHLORIDE 25 ML: 5 INJECTION EPIDURAL; INFILTRATION; PERINEURAL at 08:31

## 2023-06-06 RX ADMIN — GLYCOPYRROLATE 0.2 MG: 0.2 INJECTION INTRAMUSCULAR; INTRAVENOUS at 09:08

## 2023-06-06 RX ADMIN — MIDAZOLAM 2 MG: 1 INJECTION INTRAMUSCULAR; INTRAVENOUS at 09:08

## 2023-06-06 RX ADMIN — FENTANYL CITRATE 25 MCG: 50 INJECTION, SOLUTION INTRAMUSCULAR; INTRAVENOUS at 09:43

## 2023-06-06 RX ADMIN — FENTANYL CITRATE 25 MCG: 50 INJECTION, SOLUTION INTRAMUSCULAR; INTRAVENOUS at 11:18

## 2023-06-06 RX ADMIN — HYDROMORPHONE HYDROCHLORIDE 0.5 MG: 1 INJECTION, SOLUTION INTRAMUSCULAR; INTRAVENOUS; SUBCUTANEOUS at 11:27

## 2023-06-06 RX ADMIN — Medication 30 MG: at 09:28

## 2023-06-06 RX ADMIN — TRANEXAMIC ACID 1950 MG: 650 TABLET ORAL at 06:44

## 2023-06-06 RX ADMIN — ACETAMINOPHEN 1000 MG: 500 TABLET ORAL at 06:44

## 2023-06-06 RX ADMIN — DEXAMETHASONE SODIUM PHOSPHATE 8 MG: 4 INJECTION, SOLUTION INTRAMUSCULAR; INTRAVENOUS at 10:44

## 2023-06-06 RX ADMIN — ONDANSETRON HYDROCHLORIDE 4 MG: 2 INJECTION INTRAMUSCULAR; INTRAVENOUS at 10:14

## 2023-06-06 RX ADMIN — FENTANYL CITRATE 25 MCG: 50 INJECTION, SOLUTION INTRAMUSCULAR; INTRAVENOUS at 10:14

## 2023-06-06 RX ADMIN — HYDROMORPHONE HYDROCHLORIDE 0.5 MG: 1 INJECTION, SOLUTION INTRAMUSCULAR; INTRAVENOUS; SUBCUTANEOUS at 11:39

## 2023-06-06 RX ADMIN — DEXAMETHASONE SODIUM PHOSPHATE 4 MG: 4 INJECTION, SOLUTION INTRAMUSCULAR; INTRAVENOUS at 06:44

## 2023-06-06 RX ADMIN — DEXAMETHASONE SODIUM PHOSPHATE 4 MG: 10 INJECTION, SOLUTION INTRAMUSCULAR; INTRAVENOUS at 08:31

## 2023-06-06 RX ADMIN — SODIUM CHLORIDE, SODIUM LACTATE, POTASSIUM CHLORIDE, AND CALCIUM CHLORIDE: 600; 310; 30; 20 INJECTION, SOLUTION INTRAVENOUS at 06:45

## 2023-06-06 RX ADMIN — SODIUM CHLORIDE, SODIUM LACTATE, POTASSIUM CHLORIDE, AND CALCIUM CHLORIDE 1000 ML: .6; .31; .03; .02 INJECTION, SOLUTION INTRAVENOUS at 06:45

## 2023-06-06 RX ADMIN — MIDAZOLAM 2 MG: 1 INJECTION INTRAMUSCULAR; INTRAVENOUS at 08:27

## 2023-06-06 RX ADMIN — Medication 2000 MG: at 09:20

## 2023-06-06 RX ADMIN — FENTANYL CITRATE 50 MCG: 50 INJECTION, SOLUTION INTRAMUSCULAR; INTRAVENOUS at 09:14

## 2023-06-06 RX ADMIN — FENTANYL CITRATE 25 MCG: 50 INJECTION, SOLUTION INTRAMUSCULAR; INTRAVENOUS at 11:13

## 2023-06-06 RX ADMIN — Medication 20 MG: at 09:48

## 2023-06-06 RX ADMIN — Medication 20 MG: at 09:12

## 2023-06-06 RX ADMIN — ONDANSETRON 4 MG: 2 INJECTION INTRAMUSCULAR; INTRAVENOUS at 12:01

## 2023-06-06 RX ADMIN — Medication 10 MG: at 10:02

## 2023-06-06 RX ADMIN — DEXMEDETOMIDINE HYDROCHLORIDE 0.2 ML: 100 INJECTION, SOLUTION INTRAVENOUS at 08:31

## 2023-06-06 RX ADMIN — SODIUM CHLORIDE, SODIUM LACTATE, POTASSIUM CHLORIDE, AND CALCIUM CHLORIDE: 600; 310; 30; 20 INJECTION, SOLUTION INTRAVENOUS at 09:08

## 2023-06-06 RX ADMIN — OXYCODONE 10 MG: 5 TABLET ORAL at 12:01

## 2023-06-06 ASSESSMENT — PAIN DESCRIPTION - LOCATION
LOCATION: KNEE

## 2023-06-06 ASSESSMENT — PAIN SCALES - GENERAL
PAINLEVEL_OUTOF10: 2
PAINLEVEL_OUTOF10: 5
PAINLEVEL_OUTOF10: 6
PAINLEVEL_OUTOF10: 8
PAINLEVEL_OUTOF10: 7
PAINLEVEL_OUTOF10: 9
PAINLEVEL_OUTOF10: 0
PAINLEVEL_OUTOF10: 6

## 2023-06-06 ASSESSMENT — PAIN DESCRIPTION - ORIENTATION
ORIENTATION: RIGHT

## 2023-06-06 ASSESSMENT — PAIN DESCRIPTION - DESCRIPTORS
DESCRIPTORS: ACHING;SORE
DESCRIPTORS: ACHING
DESCRIPTORS: ACHING
DESCRIPTORS: DULL;ACHING
DESCRIPTORS: ACHING
DESCRIPTORS: ACHING;SORE

## 2023-06-06 ASSESSMENT — PAIN DESCRIPTION - ONSET
ONSET: SUDDEN
ONSET: PROGRESSIVE

## 2023-06-06 ASSESSMENT — PAIN DESCRIPTION - PAIN TYPE
TYPE: SURGICAL PAIN
TYPE: SURGICAL PAIN

## 2023-06-06 ASSESSMENT — ENCOUNTER SYMPTOMS: SHORTNESS OF BREATH: 1

## 2023-06-06 ASSESSMENT — PAIN - FUNCTIONAL ASSESSMENT: PAIN_FUNCTIONAL_ASSESSMENT: 0-10

## 2023-06-06 NOTE — PERIOP NOTE
TRANSFER - IN REPORT:    Verbal report received from Tresa President on Marivel Velez  being received from PACU for routine post-op      Report consisted of patient's Situation, Background, Assessment and   Recommendations(SBAR). Information from the following report(s) Nurse Handoff Report, Surgery Report, Intake/Output, and MAR was reviewed with the receiving nurse. Opportunity for questions and clarification was provided. Assessment completed upon patient's arrival to unit and care assumed.

## 2023-06-06 NOTE — ANESTHESIA POSTPROCEDURE EVALUATION
Department of Anesthesiology  Postprocedure Note    Patient: Alice De La Torre  MRN: [id number]  YOB: 1960  Date of evaluation: 6/6/2023      Procedure Summary     Date: 06/06/23 Room / Location: THE [facility] 09 / [facility] [address] OR    Anesthesia Start: 0908 Anesthesia Stop: 4595    Procedure: RIGHT TOTAL KNEE REPLACMENT (SPEC POP) (Right: Knee) Diagnosis:       Osteoarthritis of right knee, unspecified osteoarthritis type      (RIGHT KNEE OSTEOARTHRITIS)    Surgeons: Sonny Raymond MD Responsible Provider: Gadiel Neri DO    Anesthesia Type: Regional, General ASA Status: 3          Anesthesia Type: Regional, General    Millicent Phase I: Millicent Score: 10    Millicent Phase II: Millicent Score: 10      Anesthesia Post Evaluation    Patient location during evaluation: PACU  Patient participation: complete - patient participated  Level of consciousness: awake and alert  Pain score: 3  Airway patency: patent  Nausea & Vomiting: no nausea and no vomiting  Complications: no  Cardiovascular status: blood pressure returned to baseline  Respiratory status: acceptable  Hydration status: euvolemic  Multimodal analgesia pain management approach

## 2023-06-06 NOTE — PROGRESS NOTES
Occupational Therapy Goals:  Initiated 6/6/2023 to be met within 7-10 days. Short Term Goals  Time Frame for Short Term Goals: 7 days  Short Term Goal 1: Patient will complete LBD with supervision  Short Term Goal 2: Patient will complete bathing with supervision  Short Term Goal 3: Patient will complete toileting with supervision  Short Term Goal 4: Patient will complete grooming in standing with supervision for 5 min    OCCUPATIONAL THERAPY EVALUATION    Patient: Pierce Bermudez (68 y.o. male)  Date: 6/6/2023  Primary Diagnosis: Osteoarthritis of right knee, unspecified osteoarthritis type [M17.11]  Procedure(s) (LRB):  RIGHT TOTAL KNEE REPLACMENT (SPEC POP) (Right) Day of Surgery   Precautions: Weight Bearing, Right Lower Extremity Weight Bearing: Weight Bearing As Tolerated,  ,  ,  ,  ,  ,    PLOF: Patient was independent    ASSESSMENT :  Patient seated in recliner at start of session. Patient educated on dressing strategies and demonstrated them with verbal cues. Patient educated on safety with diallo hose. Patient sit <>stand with CGA and RW. Using RW patient ambulating with CGA and completing stairs with Physical Therapy. Patient expressed no questions or concerns regarding completing daily activities. Recommend home health OT at discharge to increase independence with ADLs. DEFICITS/IMPAIRMENTS:  Performance deficits / Impairments: Decreased functional mobility ; Decreased high-level IADLs;Decreased ADL status; Decreased ROM; Decreased strength    Patient will benefit from skilled intervention to address the above impairments. Patient's rehabilitation potential is considered to be Prognosis: Good.   Factors which may influence rehabilitation potential include:   []             None noted  []             Mental ability/status  [x]             Medical condition  []             Home/family situation and support systems  []             Safety awareness  [x]             Pain tolerance/management  []
Yes  Right Side Weight Bearing: As tolerated  Gait  Overall Level of Assistance: Contact-guard assistance; Additional time  Interventions: Safety awareness training;Demonstration;Verbal cues  Base of Support: Shift to left  Speed/Kendra: Slow  Step Length: Left shortened;Right shortened  Swing Pattern: Left asymmetrical;Right asymmetrical  Stance: Right decreased  Gait Abnormalities: Antalgic;Decreased step clearance; Step to gait  Distance (ft): 160 Feet  Assistive Device: Gait belt;Walker, rolling  Rail Use: Both  Stairs - Level of Assistance: Contact-guard assistance  Number of Stairs Trained: 5  Therapeutic Exercises/Neuromuscular Re-education:   Pain:  Pain level pre-treatment: 7/10   Pain level post-treatment: 7/10   Pain Intervention(s): Medication (see MAR); Rest, Ice, Repositioning  Response to intervention: Nurse notified, See doc flow    Activity Tolerance:   Activity Tolerance: Patient tolerated evaluation without incident  Please refer to the flowsheet for vital signs taken during this treatment. After treatment:   [x]         Patient left in no apparent distress sitting up in chair  []         Patient left in no apparent distress in bed  [x]         Call bell left within reach  [x]         Nursing notified  [x]         Caregiver present  []         Bed alarm activated  []         SCDs applied    COMMUNICATION/EDUCATION:   Patient Education  Education Given To: Patient  Education Provided: Role of Therapy; Fall Prevention Strategies;Precautions;Transfer Training  Education Method: Demonstration;Verbal;Teach Back  Education Outcome: Verbalized understanding;Demonstrated understanding;Continued education needed    Thank you for this referral.  Renato Jha, PT  Minutes: 23      Eval Complexity: [facility] AM-PAC® Basic Mobility Inpatient Short Form (6-Clicks) Version 2    How much HELP from another person does the patient currently need    (If the patient

## 2023-06-06 NOTE — OP NOTE
to the operating room and positioned on the operating table. He was anesthetized with anesthesia. Intravenous antibiotics were administered. Prior to the incision being made a timeout was called identifying the patient, procedure, operative side, and surgeon. A pneumatic tourniquet was placed about the limb and the right leg was prepped and draped in the usual sterile manner. The tourniquet was not inflated throughout the case. A midline anterior incision made over the knee. The incision was carried down through the subcutaneous tissue to the underlying capsule. A medial parapatellar capsular incision was performed. The medial capsular flap was carefully elevated around to the posterior medial corner protecting the medial collateral ligaments and the fibers. The patella was sized with a caliper, and approximately 10-12 mm was resected with an oscillating saw allowing the patella to be slid into the lateral gutter. It was not everted throughout the case. Our attention was first turned to the distal femur and using intermedullary instrumentation, a 5-degree valgus cut was on the distal end of the femur. The distal end of the femur was sized to a size 6 femoral component. Pins were inserted through the sizer and the corresponding 4-in-1 block was slid into place and pinned for stability. Anterior and posterior and chamfer cuts were made to accommodate the femoral component. The medial and lateral menisci were excised as were the anterior cruciate ligaments. Our attention was then turned to the tibia. Using extramedullary instrumentation, a 3-degree cut was made on the proximal end of the tibia. A spacer block was placed to show gaps had been balanced and a size 6  tibial base plate was placed on the tibia and pinned into place. Intramedullary reaming guide was placed on the tibia and the appropriate reamer was used followed by the keel punch to complete the preparation of the tibia.  A trial femoral component

## 2023-06-06 NOTE — ANESTHESIA PRE PROCEDURE
Department of Anesthesiology  Preprocedure Note       Name:  Bonnie Mix   Age:  70 y.o.  :  1960                                          MRN:  [id number]         Date:  2023      Surgeon: Brenda Desir):  Skip Hawk MD    Procedure: Procedure(s):  RIGHT TOTAL KNEE REPLACMENT ([address] POP)    Medications prior to admission:   Prior to Admission medications    Medication Sig Start Date End Date Taking? Authorizing Provider   nitroGLYCERIN (NITROSTAT) 0.4 MG SL tablet Place 1 tablet under the tongue every 5 minutes as needed for Chest pain up to max of 3 total doses. If no relief after 1 dose, call 911.     Historical Provider, MD   amLODIPine (NORVASC) 5 MG tablet Take 1 tablet by mouth every evening Indications: HTN 23   Vandana Parra MD   losartan (COZAAR) 100 MG tablet Take 1 tablet by mouth daily 23   Vandana Parra MD   ZINC PICOLINATE PO Take 22 mg by mouth every other day    Historical Provider, MD   Copper Gluconate (COPPER CAPS PO) Take 2 mg by mouth every other day    Historical Provider, MD   Omega-3 Fatty Acids (FISH OIL) 1000 MG capsule Take 2 capsules by mouth daily    Historical Provider, MD   ibuprofen (ADVIL;MOTRIN) 800 MG tablet Take 1 tablet by mouth every 8 hours as needed for Pain 3/13/23   Matteo Gallego MD   acetaminophen (TYLENOL) 500 MG tablet Take 2 tablets by mouth every 6 hours as needed    Ar Automatic Reconciliation   albuterol sulfate HFA (PROVENTIL;VENTOLIN;PROAIR) 108 (90 Base) MCG/ACT inhaler Inhale 1 puff into the lungs every 6 hours as needed for Wheezing or Shortness of Breath 10/3/22   Ar Automatic Reconciliation   ascorbic acid (VITAMIN C) 500 MG tablet Take 2 tablets by mouth daily    Ar Automatic Reconciliation   calcium citrate-vitamin D (CITRACAL+D) 315-5 MG-MCG TABS per tablet Take 1 tablet by mouth daily (with breakfast)    Ar Automatic Reconciliation   vitamin D3 (CHOLECALCIFEROL) 125 MCG (5000 UT) TABS tablet Take 1 tablet by mouth daily

## 2023-06-06 NOTE — DISCHARGE INSTRUCTIONS
products) are added. *  Please carry medication information at all times in case of emergency situations. These are general instructions for a healthy lifestyle:    No smoking/ No tobacco products/ Avoid exposure to second hand smoke  Surgeon General's Warning:  Quitting smoking now greatly reduces serious risk to your health. Obesity, smoking, and sedentary lifestyle greatly increases your risk for illness    A healthy diet, regular physical exercise & weight monitoring are important for maintaining a healthy lifestyle    You may be retaining fluid if you have a history of heart failure or if you experience any of the following symptoms:  Weight gain of 3 pounds or more overnight or 5 pounds in a week, increased swelling in our hands or feet or shortness of breath while lying flat in bed. Please call your doctor as soon as you notice any of these symptoms; do not wait until your next office visit. The discharge information has been reviewed with the patient and caregiver. The patient and caregiver verbalized understanding. Discharge medications reviewed with the patient and caregiver and appropriate educational materials and side effects teaching were provided.     Patient armband removed and shredded   ___________________________________________________________________________________________________________________________________

## 2023-06-06 NOTE — PERIOP NOTE
.Anesthesia notified for sign-out. Per Dr. Jiménez Pi, patient meets all requirements for transition to next phase of care.

## 2023-06-06 NOTE — DISCHARGE SUMMARY
Family History   Problem Relation Age of Onset    Osteoarthritis Other     Diabetes Other         Grandmother    Hypertension Brother     Cancer Mother 80        ovarian cancer     Other Other         Stomach problems; Father, grandfather    Cancer Father 61        esophageal    Hypertension Father        SOCIAL HISTORY:   Social History     Socioeconomic History    Marital status:      Spouse name: None    Number of children: None    Years of education: None    Highest education level: None   Tobacco Use    Smoking status: Former     Packs/day: 0.50     Years: 24.00     Pack years: 12.00     Types: Cigarettes     Quit date: 2000     Years since quittin.9     Passive exposure: Past    Smokeless tobacco: Former     Types: Chew     Quit date: 1995   Vaping Use    Vaping Use: Never used   Substance and Sexual Activity    Alcohol use: Yes     Alcohol/week: 4.0 standard drinks     Types: 4 Cans of beer per week    Drug use: Not Currently     Types: Marijuana Orin Monzon     Comment: last use     Sexual activity: Defer     Social Determinants of Health     Physical Activity: Insufficiently Active    Days of Exercise per Week: 3 days    Minutes of Exercise per Session: 10 min       REVIEW OF SYSTEMS: All review of systems are negative. PHYSICAL EXAMINATION: For a detailed physical exam, please refer to the patient's chart. HOSPITAL COURSE: The patient was taken to surgery the day of admission. he underwent a right total knee replacement. Operative course was benign. Estimated blood loss was approximately 150 cc. The patient was taken to the PACU in stable condition and was later discharged home in stable condition. During his hospital stay, the patient progressed well with physical therapy and occupational therapy, adherent to instructions. he had been cleared by physical therapy with stair training. he was placed on Aspirin for DVT prophylaxis.   his pain has been well controlled with oral

## 2023-06-06 NOTE — PERIOP NOTE
TRANSFER - OUT REPORT:    Verbal report given to [facility] FELICIANO MAXWELL RN on Pierce Bermudez  being transferred to Phase 2 for routine post-op       Report consisted of patient's Situation, Background, Assessment and   Recommendations(SBAR). Information from the following report(s) Nurse Handoff Report was reviewed with the receiving nurse. [place] Assessment: No data recorded  Lines:   Peripheral IV 06/06/23 Left Forearm (Active)   Site Assessment Clean, dry & intact 06/06/23 1100   Line Status Infusing 06/06/23 1100   Line Care Connections checked and tightened 06/06/23 1100   Phlebitis Assessment No symptoms 06/06/23 1100   Infiltration Assessment 0 06/06/23 1100   Alcohol Cap Used No 06/06/23 1100   Dressing Status Clean, dry & intact 06/06/23 1100   Dressing Type Transparent 06/06/23 1100   Dressing Intervention New 06/06/23 0644        Opportunity for questions and clarification was provided.       Patient transported with:  Registered Nurse

## 2023-06-06 NOTE — INTERVAL H&P NOTE
Update History & Physical    The patient's History and Physical of June 5, 2023 was reviewed with the patient and I examined the patient. There was no change. The surgical site was confirmed by the patient and me. Plan: The risks, benefits, expected outcome, and alternative to the recommended procedure have been discussed with the patient. Patient understands and wants to proceed with the procedure.      Electronically signed by Ric Bustamante MD on 6/6/2023 at 7:01 AM

## 2023-06-06 NOTE — ANESTHESIA PROCEDURE NOTES
Peripheral Block    Patient location during procedure: pre-op  Reason for block: post-op pain management and at surgeon's request  Start time: 6/6/2023 8:27 AM  End time: 6/6/2023 8:31 AM  Staffing  Performed: anesthesiologist   Anesthesiologist: Rosemarie Ramsay, DO  Preanesthetic Checklist  Completed: patient identified, IV checked, site marked, risks and benefits discussed, surgical/procedural consents, equipment checked, pre-op evaluation, timeout performed, anesthesia consent given, oxygen available, monitors applied/VS acknowledged, fire risk safety assessment completed and verbalized and blood product R/B/A discussed and consented  Peripheral Block   Patient position: supine  Prep: ChloraPrep  Provider prep: mask and sterile gloves  Patient monitoring: continuous pulse ox, cardiac monitor, frequent blood pressure checks, IV access, oxygen and responsive to questions  Block type: Femoral  Adductor canal  Laterality: right  Injection technique: single-shot  Guidance: nerve stimulator and ultrasound guided    Needle   Needle type: insulated echogenic nerve stimulator needle   Needle gauge: 20 G  Needle localization: nerve stimulator and ultrasound guidance  Needle length: 10 cm  Assessment   Injection assessment: negative aspiration for heme, no paresthesia on injection, local visualized surrounding nerve on ultrasound and no intravascular symptoms  Paresthesia pain: none  Slow fractionated injection: yes  Hemodynamics: stable  Real-time US image taken/store: yes  Outcomes: uncomplicated    Additional Notes  10 Ml injected near NVM using nerve stim.    Medications Administered  ropivacaine (NAROPIN) injection 0.5% - Perineural   25 mL - 6/6/2023 8:31:00 AM  dexmedetomidine (PRECEDEX) injection 200 mcg/2 mL - Perineural   0.2 mL - 6/6/2023 8:31:00 AM  dexamethasone (DECADRON) (PF) 10 mg/mL injection - Other   4 mg - 6/6/2023 8:31:00 AM

## 2023-06-06 NOTE — PERIOP NOTE
Pt. Used restroom in pre-op area with assistance. Patient placed on Ayden Paws for a minimum of 30 min in  Preop.

## 2023-06-07 ENCOUNTER — TELEPHONE (OUTPATIENT)
Age: 63
End: 2023-06-07

## 2023-06-07 NOTE — TELEPHONE ENCOUNTER
Cardiac clearance faxed over to [facility] per Dr. Clifford Morrison and confirmation received.

## 2023-07-18 RX ORDER — VALACYCLOVIR HYDROCHLORIDE 500 MG/1
TABLET, FILM COATED ORAL
Qty: 90 TABLET | Refills: 3 | Status: SHIPPED | OUTPATIENT
Start: 2023-07-18

## 2023-07-18 NOTE — TELEPHONE ENCOUNTER
Previous refill per chart    valACYclovir (VALTREX) 500 MG tablet   10/30/2022     Sig - Route:  Take 1 tablet by mouth as needed Indications: genital herpes - Oral    Class: Historical Med

## 2023-07-18 NOTE — TELEPHONE ENCOUNTER
Patient called and needs a medication refill on valACYclovir (VALTREX) 500 MG tablet. Please advise.     Patients pharmacy is [facility]

## 2023-11-16 ENCOUNTER — HOSPITAL ENCOUNTER (OUTPATIENT)
Facility: HOSPITAL | Age: 63
Setting detail: SPECIMEN
Discharge: HOME | End: 2023-11-16
Payer: MEDICARE

## 2023-11-16 ENCOUNTER — TELEPHONE (OUTPATIENT)
Age: 63
End: 2023-11-16

## 2023-11-16 DIAGNOSIS — E55.9 VITAMIN D INSUFFICIENCY: ICD-10-CM

## 2023-11-16 DIAGNOSIS — E78.2 MIXED HYPERLIPIDEMIA: ICD-10-CM

## 2023-11-16 DIAGNOSIS — R73.03 PREDIABETES: ICD-10-CM

## 2023-11-16 DIAGNOSIS — I10 ESSENTIAL HYPERTENSION: ICD-10-CM

## 2023-11-16 LAB
25(OH)D3 SERPL-MCNC: 56.8 NG/ML (ref 30–100)
ALBUMIN SERPL-MCNC: 4.1 G/DL (ref 3.4–5)
ALBUMIN/GLOB SERPL: 1.5 (ref 0.8–1.7)
ALP SERPL-CCNC: 95 U/L (ref 45–117)
ALT SERPL-CCNC: 38 U/L (ref 16–61)
ANION GAP SERPL CALC-SCNC: 5 MMOL/L (ref 3–18)
APPEARANCE UR: CLEAR
AST SERPL-CCNC: 25 U/L (ref 10–38)
BACTERIA URNS QL MICRO: NEGATIVE /HPF
BASOPHILS # BLD: 0.1 K/UL (ref 0–0.1)
BASOPHILS NFR BLD: 1 % (ref 0–2)
BILIRUB SERPL-MCNC: 0.7 MG/DL (ref 0.2–1)
BILIRUB UR QL: NEGATIVE
BUN SERPL-MCNC: 19 MG/DL (ref 7–18)
BUN/CREAT SERPL: 16 (ref 12–20)
CALCIUM SERPL-MCNC: 9.3 MG/DL (ref 8.5–10.1)
CHLORIDE SERPL-SCNC: 106 MMOL/L (ref 100–111)
CHOLEST SERPL-MCNC: 209 MG/DL
CO2 SERPL-SCNC: 28 MMOL/L (ref 21–32)
COLOR UR: YELLOW
CREAT SERPL-MCNC: 1.16 MG/DL (ref 0.6–1.3)
CREAT UR-MCNC: 138 MG/DL (ref 30–125)
DIFFERENTIAL METHOD BLD: ABNORMAL
EOSINOPHIL # BLD: 0.2 K/UL (ref 0–0.4)
EOSINOPHIL NFR BLD: 3 % (ref 0–5)
EPITH CASTS URNS QL MICRO: NEGATIVE /LPF (ref 0–5)
ERYTHROCYTE [DISTWIDTH] IN BLOOD BY AUTOMATED COUNT: 13.2 % (ref 11.6–14.5)
GLOBULIN SER CALC-MCNC: 2.7 G/DL (ref 2–4)
GLUCOSE SERPL-MCNC: 115 MG/DL (ref 74–99)
GLUCOSE UR STRIP.AUTO-MCNC: NEGATIVE MG/DL
HBA1C MFR BLD: 5.8 % (ref 4.2–5.6)
HCT VFR BLD AUTO: 48 % (ref 36–48)
HDLC SERPL-MCNC: 41 MG/DL (ref 40–60)
HDLC SERPL: 5.1 (ref 0–5)
HGB BLD-MCNC: 15.8 G/DL (ref 13–16)
HGB UR QL STRIP: NEGATIVE
IMM GRANULOCYTES # BLD AUTO: 0 K/UL (ref 0–0.04)
IMM GRANULOCYTES NFR BLD AUTO: 1 % (ref 0–0.5)
KETONES UR QL STRIP.AUTO: NEGATIVE MG/DL
LDLC SERPL CALC-MCNC: 132 MG/DL (ref 0–100)
LEUKOCYTE ESTERASE UR QL STRIP.AUTO: NEGATIVE
LIPID PANEL: ABNORMAL
LYMPHOCYTES # BLD: 1.9 K/UL (ref 0.9–3.6)
LYMPHOCYTES NFR BLD: 27 % (ref 21–52)
MCH RBC QN AUTO: 29.6 PG (ref 24–34)
MCHC RBC AUTO-ENTMCNC: 32.9 G/DL (ref 31–37)
MCV RBC AUTO: 89.9 FL (ref 78–100)
MICROALBUMIN UR-MCNC: 0.54 MG/DL (ref 0–3)
MICROALBUMIN/CREAT UR-RTO: 4 MG/G (ref 0–30)
MONOCYTES # BLD: 0.5 K/UL (ref 0.05–1.2)
MONOCYTES NFR BLD: 8 % (ref 3–10)
NEUTS SEG # BLD: 4.2 K/UL (ref 1.8–8)
NEUTS SEG NFR BLD: 60 % (ref 40–73)
NITRITE UR QL STRIP.AUTO: NEGATIVE
NRBC # BLD: 0 K/UL (ref 0–0.01)
NRBC BLD-RTO: 0 PER 100 WBC
PH UR STRIP: 6.5 (ref 5–8)
PLATELET # BLD AUTO: 243 K/UL (ref 135–420)
PMV BLD AUTO: 10.4 FL (ref 9.2–11.8)
POTASSIUM SERPL-SCNC: 4.1 MMOL/L (ref 3.5–5.5)
PROT SERPL-MCNC: 6.8 G/DL (ref 6.4–8.2)
PROT UR STRIP-MCNC: NEGATIVE MG/DL
RBC # BLD AUTO: 5.34 M/UL (ref 4.35–5.65)
RBC #/AREA URNS HPF: NEGATIVE /HPF (ref 0–5)
SODIUM SERPL-SCNC: 139 MMOL/L (ref 136–145)
SP GR UR REFRACTOMETRY: 1.02 (ref 1–1.03)
TRIGL SERPL-MCNC: 180 MG/DL
UROBILINOGEN UR QL STRIP.AUTO: 0.2 EU/DL (ref 0.2–1)
VLDLC SERPL CALC-MCNC: 36 MG/DL
WBC # BLD AUTO: 6.9 K/UL (ref 4.6–13.2)
WBC URNS QL MICRO: NEGATIVE /HPF (ref 0–4)

## 2023-11-16 PROCEDURE — 82306 VITAMIN D 25 HYDROXY: CPT

## 2023-11-16 PROCEDURE — 36415 COLL VENOUS BLD VENIPUNCTURE: CPT

## 2023-11-16 PROCEDURE — 83036 HEMOGLOBIN GLYCOSYLATED A1C: CPT

## 2023-11-16 PROCEDURE — 82570 ASSAY OF URINE CREATININE: CPT

## 2023-11-16 PROCEDURE — 80061 LIPID PANEL: CPT

## 2023-11-16 PROCEDURE — 85025 COMPLETE CBC W/AUTO DIFF WBC: CPT

## 2023-11-16 PROCEDURE — 81001 URINALYSIS AUTO W/SCOPE: CPT

## 2023-11-16 PROCEDURE — 80053 COMPREHEN METABOLIC PANEL: CPT

## 2023-11-16 PROCEDURE — 82043 UR ALBUMIN QUANTITATIVE: CPT

## 2023-11-16 NOTE — TELEPHONE ENCOUNTER
Please let him know that his discomfort is most likely due to a muscle strain, but if continuing to increase, would recommend evaluation in urgent care.

## 2023-11-16 NOTE — TELEPHONE ENCOUNTER
Patient stating the day before yesterday he was leaning over the arm rest of his chart to  something off the floor and he felt a pop in his left rib rib area. Stating the pain is spreading. He has a follow up scheduled for  the 29th. Please advise if there is anything he can do in the meantime.

## 2023-11-29 ENCOUNTER — OFFICE VISIT (OUTPATIENT)
Age: 63
End: 2023-11-29
Payer: MEDICARE

## 2023-11-29 VITALS
HEART RATE: 63 BPM | RESPIRATION RATE: 16 BRPM | SYSTOLIC BLOOD PRESSURE: 126 MMHG | BODY MASS INDEX: 30.34 KG/M2 | TEMPERATURE: 98.6 F | DIASTOLIC BLOOD PRESSURE: 64 MMHG | HEIGHT: 72 IN | OXYGEN SATURATION: 97 % | WEIGHT: 224 LBS

## 2023-11-29 DIAGNOSIS — Z13.6 SCREENING FOR AAA (ABDOMINAL AORTIC ANEURYSM): ICD-10-CM

## 2023-11-29 DIAGNOSIS — I10 ESSENTIAL HYPERTENSION: Primary | ICD-10-CM

## 2023-11-29 DIAGNOSIS — E83.42 HYPOMAGNESEMIA: ICD-10-CM

## 2023-11-29 DIAGNOSIS — I35.1 AORTIC VALVE INSUFFICIENCY, ETIOLOGY OF CARDIAC VALVE DISEASE UNSPECIFIED: ICD-10-CM

## 2023-11-29 DIAGNOSIS — I77.810 DILATED AORTIC ROOT (HCC): ICD-10-CM

## 2023-11-29 DIAGNOSIS — Z87.891 FORMER SMOKER: ICD-10-CM

## 2023-11-29 DIAGNOSIS — D75.1 ERYTHROCYTOSIS: ICD-10-CM

## 2023-11-29 DIAGNOSIS — E66.09 CLASS 1 OBESITY DUE TO EXCESS CALORIES WITHOUT SERIOUS COMORBIDITY WITH BODY MASS INDEX (BMI) OF 30.0 TO 30.9 IN ADULT: ICD-10-CM

## 2023-11-29 DIAGNOSIS — Z23 NEEDS FLU SHOT: ICD-10-CM

## 2023-11-29 DIAGNOSIS — G47.30 SLEEP APNEA, UNSPECIFIED TYPE: ICD-10-CM

## 2023-11-29 DIAGNOSIS — R73.03 PREDIABETES: ICD-10-CM

## 2023-11-29 DIAGNOSIS — Z12.5 PROSTATE CANCER SCREENING: ICD-10-CM

## 2023-11-29 DIAGNOSIS — E78.2 MIXED HYPERLIPIDEMIA: ICD-10-CM

## 2023-11-29 DIAGNOSIS — M17.0 PRIMARY OSTEOARTHRITIS OF BOTH KNEES: ICD-10-CM

## 2023-11-29 DIAGNOSIS — Q23.1 BICUSPID AORTIC VALVE: ICD-10-CM

## 2023-11-29 DIAGNOSIS — M54.16 LUMBAR RADICULOPATHY: ICD-10-CM

## 2023-11-29 PROCEDURE — 99215 OFFICE O/P EST HI 40 MIN: CPT | Performed by: INTERNAL MEDICINE

## 2023-11-29 PROCEDURE — 1123F ACP DISCUSS/DSCN MKR DOCD: CPT | Performed by: INTERNAL MEDICINE

## 2023-11-29 PROCEDURE — 3074F SYST BP LT 130 MM HG: CPT | Performed by: INTERNAL MEDICINE

## 2023-11-29 PROCEDURE — G8482 FLU IMMUNIZE ORDER/ADMIN: HCPCS | Performed by: INTERNAL MEDICINE

## 2023-11-29 PROCEDURE — PBSHW INFLUENZA, FLUCELVAX, (AGE 6 MO+), IM, PF, 0.5 ML: Performed by: INTERNAL MEDICINE

## 2023-11-29 PROCEDURE — G8417 CALC BMI ABV UP PARAM F/U: HCPCS | Performed by: INTERNAL MEDICINE

## 2023-11-29 PROCEDURE — 3078F DIAST BP <80 MM HG: CPT | Performed by: INTERNAL MEDICINE

## 2023-11-29 PROCEDURE — 90674 CCIIV4 VAC NO PRSV 0.5 ML IM: CPT | Performed by: INTERNAL MEDICINE

## 2023-11-29 PROCEDURE — 3017F COLORECTAL CA SCREEN DOC REV: CPT | Performed by: INTERNAL MEDICINE

## 2023-11-29 PROCEDURE — 1036F TOBACCO NON-USER: CPT | Performed by: INTERNAL MEDICINE

## 2023-11-29 PROCEDURE — G8427 DOCREV CUR MEDS BY ELIG CLIN: HCPCS | Performed by: INTERNAL MEDICINE

## 2023-11-29 SDOH — ECONOMIC STABILITY: HOUSING INSECURITY
IN THE LAST 12 MONTHS, WAS THERE A TIME WHEN YOU DID NOT HAVE A STEADY PLACE TO SLEEP OR SLEPT IN A SHELTER (INCLUDING NOW)?: NO

## 2023-11-29 SDOH — ECONOMIC STABILITY: FOOD INSECURITY: WITHIN THE PAST 12 MONTHS, THE FOOD YOU BOUGHT JUST DIDN'T LAST AND YOU DIDN'T HAVE MONEY TO GET MORE.: NEVER TRUE

## 2023-11-29 SDOH — ECONOMIC STABILITY: FOOD INSECURITY: WITHIN THE PAST 12 MONTHS, YOU WORRIED THAT YOUR FOOD WOULD RUN OUT BEFORE YOU GOT MONEY TO BUY MORE.: NEVER TRUE

## 2023-11-29 SDOH — ECONOMIC STABILITY: INCOME INSECURITY: HOW HARD IS IT FOR YOU TO PAY FOR THE VERY BASICS LIKE FOOD, HOUSING, MEDICAL CARE, AND HEATING?: NOT HARD AT ALL

## 2023-11-29 ASSESSMENT — PATIENT HEALTH QUESTIONNAIRE - PHQ9
SUM OF ALL RESPONSES TO PHQ9 QUESTIONS 1 & 2: 0
SUM OF ALL RESPONSES TO PHQ QUESTIONS 1-9: 0
2. FEELING DOWN, DEPRESSED OR HOPELESS: 0
SUM OF ALL RESPONSES TO PHQ QUESTIONS 1-9: 0
1. LITTLE INTEREST OR PLEASURE IN DOING THINGS: 0

## 2023-11-29 NOTE — PROGRESS NOTES
Rochelle Pisano presents today for   Chief Complaint   Patient presents with    6 Month Follow-Up       1. \"Have you been to the ER, urgent care clinic since your last visit? Hospitalized since your last visit? \" no    2. \"Have you seen or consulted any other health care providers outside of the [address] [place] [address] since your last visit? \" no     3. For patients aged 43-73: Has the patient had a colonoscopy / FIT/ Cologuard? Yes - no Care Gap present      If the patient is female:    4. For patients aged 43-66: Has the patient had a mammogram within the past 2 years? NA - based on age or sex      11. For patients aged 21-65: Has the patient had a pap smear?  NA - based on age or sex

## 2023-11-29 NOTE — PROGRESS NOTES
HPI:   Audrey Paez is a 70y.o. year old male who presents today for a routine visit. He has a history of hypertension, hyperlipidemia, moderate aortic insufficiency, atypical chest pain, prediabetes, GERD, osteoarthritis, lumbar radiculopathy, and chronic venous insufficiency. He reports that he is doing reasonably well. He states that he is remaining active hunting and doing projects at home. He reports that his ambulation is significantly improved since his bilateral knee surgeries. He states that he completed right total knee arthroplasty with Dr. Aide Donohue on 6/6/2023. He reports that surgery went well and he has completed outpatient physical therapy with significant improvement. He states that he is no longer experiencing any pain when ambulating although does note recurrence of right lateral knee pain radiating to his right foot which he feels may actually be related to his lumbar degenerative disc disease. He has a follow-up appointment with Dr. Aide Donohue in 12/2023 for the 1 year anniversary of his left knee replacement and states that he will request additional physical therapy for his right knee at that visit. He reestablished care with Dr. Donna Hernandez in 11/2022 prior to his knee surgery, and PFT testing (11/7/2022) was normal and 6-minute walk testing (11/15/2022) did not show any desaturation with exercise. Her recommendation was also to proceed with an in-lab sleep study, but he has not yet scheduled. He underwent evaluation by Dr. Favian Bray on 3/31/2023 for his chronic erythrocytosis and further laboratory testing including JAK2 gene mutation, calreticulin, MPL mutation, plasma metanephrines and serum erythropoietin level were within normal limits. He also underwent an abdominal ultrasound (4/25/2023) which showed mildly enlarged echogenic liver without focal masses and bilateral renal cysts with benign features.   The etiology of his polycythemia was unclear and thought to likely be related to

## 2024-01-22 ENCOUNTER — TELEPHONE (OUTPATIENT)
Age: 64
End: 2024-01-22

## 2024-01-22 RX ORDER — PANTOPRAZOLE SODIUM 40 MG/1
40 TABLET, DELAYED RELEASE ORAL
Qty: 90 TABLET | Refills: 3 | Status: SHIPPED | OUTPATIENT
Start: 2024-01-22

## 2024-01-22 NOTE — TELEPHONE ENCOUNTER
----- Message from Aspen Bonilla sent at 1/22/2024 10:43 AM EST -----  Subject: Refill Request    QUESTIONS  Name of Medication? pantoprazole (PROTONIX) 40 MG tablet  Patient-reported dosage and instructions? 40 MG Tablet 1 Tablet by mouth 2   times per day, take 30 minutes before breakfast, dinner for 2 months-   follow by once daily 30 minutes before breakfast  How many days do you have left? 3  Preferred Pharmacy? [id number]  Pharmacy phone number (if available)? [phone]  ---------------------------------------------------------------------------  --------------  CALL BACK INFO  What is the best way for the office to contact you? OK to leave message on   voicemail  Preferred Call Back Phone Number? [phone]  ---------------------------------------------------------------------------  --------------  SCRIPT ANSWERS  Relationship to Patient? Self

## 2024-02-01 ENCOUNTER — OFFICE VISIT (OUTPATIENT)
Age: 64
End: 2024-02-01

## 2024-02-01 VITALS
DIASTOLIC BLOOD PRESSURE: 70 MMHG | HEART RATE: 61 BPM | SYSTOLIC BLOOD PRESSURE: 136 MMHG | OXYGEN SATURATION: 97 % | HEIGHT: 72 IN | BODY MASS INDEX: 31.02 KG/M2 | WEIGHT: 229 LBS

## 2024-02-01 DIAGNOSIS — I10 ESSENTIAL HYPERTENSION: ICD-10-CM

## 2024-02-01 DIAGNOSIS — E78.5 HYPERLIPIDEMIA, UNSPECIFIED HYPERLIPIDEMIA TYPE: ICD-10-CM

## 2024-02-01 DIAGNOSIS — I77.810 DILATED AORTIC ROOT (HCC): ICD-10-CM

## 2024-02-01 DIAGNOSIS — I35.1 AORTIC VALVE INSUFFICIENCY, ETIOLOGY OF CARDIAC VALVE DISEASE UNSPECIFIED: Primary | ICD-10-CM

## 2024-02-01 RX ORDER — ASPIRIN 81 MG/1
81 TABLET ORAL DAILY
COMMUNITY

## 2024-02-01 ASSESSMENT — ANXIETY QUESTIONNAIRES
2. NOT BEING ABLE TO STOP OR CONTROL WORRYING: 0
4. TROUBLE RELAXING: 0
6. BECOMING EASILY ANNOYED OR IRRITABLE: 0
GAD7 TOTAL SCORE: 0
1. FEELING NERVOUS, ANXIOUS, OR ON EDGE: 0
3. WORRYING TOO MUCH ABOUT DIFFERENT THINGS: 0
5. BEING SO RESTLESS THAT IT IS HARD TO SIT STILL: 0
7. FEELING AFRAID AS IF SOMETHING AWFUL MIGHT HAPPEN: 0

## 2024-02-01 ASSESSMENT — ENCOUNTER SYMPTOMS
VOMITING: 0
ABDOMINAL PAIN: 0
SORE THROAT: 0
SHORTNESS OF BREATH: 0
COUGH: 0
ABDOMINAL DISTENTION: 0
NAUSEA: 0

## 2024-02-01 ASSESSMENT — PATIENT HEALTH QUESTIONNAIRE - PHQ9
SUM OF ALL RESPONSES TO PHQ QUESTIONS 1-9: 0
SUM OF ALL RESPONSES TO PHQ9 QUESTIONS 1 & 2: 0
2. FEELING DOWN, DEPRESSED OR HOPELESS: 0
SUM OF ALL RESPONSES TO PHQ QUESTIONS 1-9: 0
SUM OF ALL RESPONSES TO PHQ QUESTIONS 1-9: 0
1. LITTLE INTEREST OR PLEASURE IN DOING THINGS: 0
SUM OF ALL RESPONSES TO PHQ QUESTIONS 1-9: 0

## 2024-02-01 NOTE — PROGRESS NOTES
Philippe Stallworth presents today for   Chief Complaint   Patient presents with    Follow-up     6 month       Philippe Stallworth preferred language for health care discussion is english/other.    Is someone accompanying this pt? no    Is the patient using any DME equipment during OV? no    Depression Screening:  Depression: Not at risk (2/1/2024)    PHQ-2     PHQ-2 Score: 0        Learning Assessment:  Who is the primary learner? Patient    What is the preferred language for health care of the primary learner? ENGLISH    How does the primary learner prefer to learn new concepts? DEMONSTRATION    Answered By patient    Relationship to Learner SELF           Pt currently taking Anticoagulant therapy? no    Pt currently taking Antiplatelet therapy ? Aspirin 81 mg daily      Coordination of Care:  1. Have you been to the ER, urgent care clinic since your last visit? Hospitalized since your last visit? no    2. Have you seen or consulted any other health care providers outside of the [facility] since your last visit? Include any pap smears or colon screening. no    
Brother     No Known Problems Maternal Grandmother     No Known Problems Maternal Grandfather     No Known Problems Paternal Grandmother     No Known Problems Paternal Grandfather     Osteoarthritis Other     Diabetes Other         Grandmother    Other Other         Stomach problems; Father, grandfather         Review of Systems   Constitutional:  Negative for chills, fatigue and fever.   HENT:  Negative for congestion, hearing loss, nosebleeds and sore throat.    Eyes:  Negative for visual disturbance.   Respiratory:  Negative for cough and shortness of breath.    Cardiovascular:  Negative for chest pain, palpitations and leg swelling.   Gastrointestinal:  Negative for abdominal distention, abdominal pain, nausea and vomiting.   Endocrine: Negative for cold intolerance and heat intolerance.   Genitourinary:  Negative for dysuria.   Musculoskeletal:  Positive for arthralgias.   Skin:  Negative for rash.   Neurological:  Negative for dizziness, syncope, weakness and headaches.   Hematological:  Does not bruise/bleed easily.   Psychiatric/Behavioral:  Negative for suicidal ideas.          /70 (Site: Left Upper Arm, Position: Sitting, Cuff Size: Medium Adult)   Pulse 61   Ht 1.829 m (6')   Wt 103.9 kg (229 lb)   SpO2 97%   BMI 31.06 kg/m²     Objective:   Physical Exam  Constitutional:       General: He is not in acute distress.  HENT:      Head: Normocephalic.   Neck:      Vascular: No carotid bruit or JVD.   Cardiovascular:      Rate and Rhythm: Normal rate and regular rhythm. No extrasystoles are present.     Heart sounds: Murmur heard.      Blowing early diastolic murmur is present with a grade of 2/4 at the upper right sternal border radiating to the apex.      No gallop.   Pulmonary:      Effort: Pulmonary effort is normal.      Breath sounds: No wheezing, rhonchi or rales.   Abdominal:      General: Bowel sounds are normal. There is no distension.      Palpations: Abdomen is soft.      Tenderness:

## 2024-03-06 ENCOUNTER — TELEPHONE (OUTPATIENT)
Age: 64
End: 2024-03-06

## 2024-03-06 NOTE — TELEPHONE ENCOUNTER
----- Message from Dipak Shultz sent at 3/6/2024  3:03 PM EST -----  Subject: Message to Provider    QUESTIONS  Information for Provider? pt called in asking for a callback , asking for   information on stomach scan that would of been ordered from last appt on   11/29/23 nothing new or worse but pt looking for information on this and   not sure what the lab would be called . please callback pt to let him know   if already in system   ---------------------------------------------------------------------------  --------------  CALL BACK INFO  [phone]; OK to leave message on voicemail  ---------------------------------------------------------------------------  --------------  SCRIPT ANSWERS  Relationship to Patient? Self

## 2024-03-07 NOTE — TELEPHONE ENCOUNTER
Order for vascular aaa screening is in place, needs to call scheduling [phone]. Attempted to call patient, no answer left voicemail to return call.

## 2024-03-12 ENCOUNTER — HOSPITAL ENCOUNTER (OUTPATIENT)
Facility: HOSPITAL | Age: 64
Discharge: HOME | End: 2024-03-15
Attending: INTERNAL MEDICINE
Payer: MEDICARE

## 2024-03-12 ENCOUNTER — TELEPHONE (OUTPATIENT)
Age: 64
End: 2024-03-12

## 2024-03-12 DIAGNOSIS — Z13.6 SCREENING FOR AAA (ABDOMINAL AORTIC ANEURYSM): ICD-10-CM

## 2024-03-12 DIAGNOSIS — Z87.891 FORMER SMOKER: ICD-10-CM

## 2024-03-12 PROCEDURE — 76706 US ABDL AORTA SCREEN AAA: CPT

## 2024-03-12 NOTE — TELEPHONE ENCOUNTER
US Result (most recent):  US SCREENING FOR AAA 03/12/2024    Narrative  EXAM: ABDOMINAL AORTA ULTRASOUND    CLINICAL INDICATION/HISTORY: Encounter for screening for cardiovascular  disorders; Personal history of nicotine dependence    COMPARISON: No prior studies for comparison    TECHNIQUE: Select gray scale and color doppler images from  retroperitoneal/aortic ultrasound provided for interpretation    FINDINGS:    Abdominal Aorta:  Visualized portions are normal caliber. No aneurysmal dilation.    Proximal: 2.9 x 2.9 cm  Mid: 2.6 x 2.1 cm  Distal: 2.0 x 1.8 cm  Lumen: No significant atherosclerotic plaque or wall calcification.  IVC: Unremarkable and patent to the extent visible.    Right Iliac Vessel: 1.1 x 1.2 cm  Left Iliac Vessel: 1.2 x 1.2 cm    Other findings: None    Impression  1. No evidence of abdominal aortic aneurysm.      Please let the patient know that his screening abdominal ultrasound did NOT show evidence of an aortic aneurysm.

## 2024-03-19 ENCOUNTER — TELEPHONE (OUTPATIENT)
Age: 64
End: 2024-03-19

## 2024-03-19 NOTE — TELEPHONE ENCOUNTER
----- Message from Tobias Rios MD sent at 3/12/2024 11:06 AM EDT -----  Please let the patient know that his echocardiogram is unchanged compared to the one from 2 years ago.  His aortic root was unchanged in size.  His aortic insufficiency remains moderate.  Heart function remains normal.  ----- Message -----  From: Amy Johnson MA  Sent: 3/11/2024  12:32 PM EDT  To: Tobias Rios MD    Per your last note \"  Aortic insufficiency.  This was moderate in severity by echocardiogram in March 2022.  He did have a calcified aortic valve which did appear to be bicuspid, so this will need to be followed up every other year with serial echocardiograms, unless new symptoms arise.  A repeat echocardiogram will be ordered for next month.     Mildly dilated aortic root.  He may have an underlying aortopathy as well given the likelihood that he has a bicuspid aortic valve.  This has been stable and last measured 40 mm in diameter in March 2022.  This can be reevaluated on his follow-up echocardiogram early next year.

## 2024-03-19 NOTE — TELEPHONE ENCOUNTER
Verbal order and read back per Tobias Rios MD  Please let the patient know that his echocardiogram is unchanged compared to the one from 2 years ago.  His aortic root was unchanged in size.  His aortic insufficiency remains moderate.  Heart function remains normal.     -had to leave a voicemail

## 2024-03-29 RX ORDER — LOSARTAN POTASSIUM 100 MG/1
100 TABLET ORAL DAILY
Qty: 90 TABLET | Refills: 3 | Status: SHIPPED | OUTPATIENT
Start: 2024-03-29

## 2024-03-29 RX ORDER — AMLODIPINE BESYLATE 5 MG/1
TABLET ORAL
Qty: 90 TABLET | Refills: 3 | Status: SHIPPED | OUTPATIENT
Start: 2024-03-29

## 2024-05-29 ENCOUNTER — HOSPITAL ENCOUNTER (OUTPATIENT)
Facility: HOSPITAL | Age: 64
Setting detail: SPECIMEN
Discharge: HOME | End: 2024-06-01
Payer: MEDICARE

## 2024-05-29 DIAGNOSIS — R73.03 PREDIABETES: ICD-10-CM

## 2024-05-29 DIAGNOSIS — D75.1 ERYTHROCYTOSIS: ICD-10-CM

## 2024-05-29 DIAGNOSIS — E78.2 MIXED HYPERLIPIDEMIA: ICD-10-CM

## 2024-05-29 DIAGNOSIS — E83.42 HYPOMAGNESEMIA: ICD-10-CM

## 2024-05-29 DIAGNOSIS — Z12.5 PROSTATE CANCER SCREENING: ICD-10-CM

## 2024-05-29 DIAGNOSIS — I10 ESSENTIAL HYPERTENSION: ICD-10-CM

## 2024-05-29 LAB
ALBUMIN SERPL-MCNC: 4.1 G/DL (ref 3.4–5)
ALBUMIN/GLOB SERPL: 1.5 (ref 0.8–1.7)
ALP SERPL-CCNC: 83 U/L (ref 45–117)
ALT SERPL-CCNC: 37 U/L (ref 16–61)
ANION GAP SERPL CALC-SCNC: 4 MMOL/L (ref 3–18)
APPEARANCE UR: CLEAR
AST SERPL-CCNC: 26 U/L (ref 10–38)
BACTERIA URNS QL MICRO: NEGATIVE /HPF
BASOPHILS # BLD: 0.1 K/UL (ref 0–0.1)
BASOPHILS NFR BLD: 1 % (ref 0–2)
BILIRUB SERPL-MCNC: 1 MG/DL (ref 0.2–1)
BILIRUB UR QL: NEGATIVE
BUN SERPL-MCNC: 23 MG/DL (ref 7–18)
BUN/CREAT SERPL: 18 (ref 12–20)
CALCIUM SERPL-MCNC: 9.3 MG/DL (ref 8.5–10.1)
CHLORIDE SERPL-SCNC: 106 MMOL/L (ref 100–111)
CHOLEST SERPL-MCNC: 197 MG/DL
CO2 SERPL-SCNC: 27 MMOL/L (ref 21–32)
COLOR UR: YELLOW
CREAT SERPL-MCNC: 1.25 MG/DL (ref 0.6–1.3)
CREAT UR-MCNC: 149 MG/DL (ref 30–125)
DIFFERENTIAL METHOD BLD: ABNORMAL
EOSINOPHIL # BLD: 0.2 K/UL (ref 0–0.4)
EOSINOPHIL NFR BLD: 3 % (ref 0–5)
EPITH CASTS URNS QL MICRO: NORMAL /LPF (ref 0–5)
ERYTHROCYTE [DISTWIDTH] IN BLOOD BY AUTOMATED COUNT: 12.9 % (ref 11.6–14.5)
EST. AVERAGE GLUCOSE BLD GHB EST-MCNC: 111 MG/DL
GLOBULIN SER CALC-MCNC: 2.7 G/DL (ref 2–4)
GLUCOSE SERPL-MCNC: 120 MG/DL (ref 74–99)
GLUCOSE UR STRIP.AUTO-MCNC: NEGATIVE MG/DL
HBA1C MFR BLD: 5.5 % (ref 4.2–5.6)
HCT VFR BLD AUTO: 49.7 % (ref 36–48)
HDLC SERPL-MCNC: 44 MG/DL (ref 40–60)
HDLC SERPL: 4.5 (ref 0–5)
HGB BLD-MCNC: 16.5 G/DL (ref 13–16)
HGB UR QL STRIP: NEGATIVE
IMM GRANULOCYTES # BLD AUTO: 0 K/UL (ref 0–0.04)
IMM GRANULOCYTES NFR BLD AUTO: 1 % (ref 0–0.5)
KETONES UR QL STRIP.AUTO: NEGATIVE MG/DL
LDLC SERPL CALC-MCNC: 121 MG/DL (ref 0–100)
LEUKOCYTE ESTERASE UR QL STRIP.AUTO: NEGATIVE
LIPID PANEL: ABNORMAL
LYMPHOCYTES # BLD: 2.1 K/UL (ref 0.9–3.6)
LYMPHOCYTES NFR BLD: 32 % (ref 21–52)
MAGNESIUM SERPL-MCNC: 2.3 MG/DL (ref 1.6–2.6)
MCH RBC QN AUTO: 29.8 PG (ref 24–34)
MCHC RBC AUTO-ENTMCNC: 33.2 G/DL (ref 31–37)
MCV RBC AUTO: 89.7 FL (ref 78–100)
MICROALBUMIN UR-MCNC: 0.98 MG/DL (ref 0–3)
MICROALBUMIN/CREAT UR-RTO: 7 MG/G (ref 0–30)
MONOCYTES # BLD: 0.6 K/UL (ref 0.05–1.2)
MONOCYTES NFR BLD: 9 % (ref 3–10)
NEUTS SEG # BLD: 3.6 K/UL (ref 1.8–8)
NEUTS SEG NFR BLD: 54 % (ref 40–73)
NITRITE UR QL STRIP.AUTO: NEGATIVE
NRBC # BLD: 0 K/UL (ref 0–0.01)
NRBC BLD-RTO: 0 PER 100 WBC
PH UR STRIP: 6.5 (ref 5–8)
PLATELET # BLD AUTO: 216 K/UL (ref 135–420)
PMV BLD AUTO: 10.4 FL (ref 9.2–11.8)
POTASSIUM SERPL-SCNC: 4.2 MMOL/L (ref 3.5–5.5)
PROT SERPL-MCNC: 6.8 G/DL (ref 6.4–8.2)
PROT UR STRIP-MCNC: NEGATIVE MG/DL
PSA SERPL-MCNC: 0.6 NG/ML (ref 0–4)
RBC # BLD AUTO: 5.54 M/UL (ref 4.35–5.65)
RBC #/AREA URNS HPF: NORMAL /HPF (ref 0–5)
SODIUM SERPL-SCNC: 137 MMOL/L (ref 136–145)
SP GR UR REFRACTOMETRY: 1.02 (ref 1–1.03)
TRIGL SERPL-MCNC: 160 MG/DL
UROBILINOGEN UR QL STRIP.AUTO: 0.2 EU/DL (ref 0.2–1)
VLDLC SERPL CALC-MCNC: 32 MG/DL
WBC # BLD AUTO: 6.7 K/UL (ref 4.6–13.2)
WBC URNS QL MICRO: NORMAL /HPF (ref 0–4)

## 2024-05-29 PROCEDURE — 82043 UR ALBUMIN QUANTITATIVE: CPT

## 2024-05-29 PROCEDURE — 81001 URINALYSIS AUTO W/SCOPE: CPT

## 2024-05-29 PROCEDURE — 80061 LIPID PANEL: CPT

## 2024-05-29 PROCEDURE — 36415 COLL VENOUS BLD VENIPUNCTURE: CPT

## 2024-05-29 PROCEDURE — 83036 HEMOGLOBIN GLYCOSYLATED A1C: CPT

## 2024-05-29 PROCEDURE — 83735 ASSAY OF MAGNESIUM: CPT

## 2024-05-29 PROCEDURE — 80053 COMPREHEN METABOLIC PANEL: CPT

## 2024-05-29 PROCEDURE — 82570 ASSAY OF URINE CREATININE: CPT

## 2024-05-29 PROCEDURE — G0103 PSA SCREENING: HCPCS

## 2024-05-29 PROCEDURE — 85025 COMPLETE CBC W/AUTO DIFF WBC: CPT

## 2024-06-08 PROBLEM — Z96.653 HISTORY OF TOTAL KNEE ARTHROPLASTY, BILATERAL: Status: ACTIVE | Noted: 2023-03-18

## 2024-10-11 ENCOUNTER — IMMUNIZATION (OUTPATIENT)
Facility: CLINIC | Age: 64
End: 2024-10-11
Payer: MEDICARE

## 2024-10-11 DIAGNOSIS — Z23 ENCOUNTER FOR IMMUNIZATION: Primary | ICD-10-CM

## 2024-10-11 PROCEDURE — G0008 ADMIN INFLUENZA VIRUS VAC: HCPCS | Performed by: INTERNAL MEDICINE

## 2024-10-11 PROCEDURE — 90653 IIV ADJUVANT VACCINE IM: CPT | Performed by: INTERNAL MEDICINE

## 2024-12-03 ENCOUNTER — TELEPHONE (OUTPATIENT)
Facility: CLINIC | Age: 64
End: 2024-12-03

## 2024-12-03 NOTE — TELEPHONE ENCOUNTER
Pt called in states he saw a commercial to get a cancer screening blood test done and wanted this added to the panel for tomorrow. Pt states there is a family history of cancer.     Please advise. Call back req

## 2024-12-04 ENCOUNTER — HOSPITAL ENCOUNTER (OUTPATIENT)
Facility: HOSPITAL | Age: 64
Setting detail: SPECIMEN
Discharge: HOME | End: 2024-12-07
Payer: MEDICARE

## 2024-12-04 DIAGNOSIS — I10 ESSENTIAL HYPERTENSION: ICD-10-CM

## 2024-12-04 DIAGNOSIS — R73.03 PREDIABETES: ICD-10-CM

## 2024-12-04 DIAGNOSIS — E78.2 MIXED HYPERLIPIDEMIA: ICD-10-CM

## 2024-12-04 DIAGNOSIS — D75.1 ERYTHROCYTOSIS: ICD-10-CM

## 2024-12-04 LAB
ALBUMIN SERPL-MCNC: 4.1 G/DL (ref 3.4–5)
ALBUMIN/GLOB SERPL: 1.5 (ref 0.8–1.7)
ALP SERPL-CCNC: 91 U/L (ref 45–117)
ALT SERPL-CCNC: 46 U/L (ref 16–61)
ANION GAP SERPL CALC-SCNC: 3 MMOL/L (ref 3–18)
AST SERPL-CCNC: 30 U/L (ref 10–38)
BASOPHILS # BLD: 0.1 K/UL (ref 0–0.1)
BASOPHILS NFR BLD: 1 % (ref 0–2)
BILIRUB SERPL-MCNC: 0.8 MG/DL (ref 0.2–1)
BUN SERPL-MCNC: 19 MG/DL (ref 7–18)
BUN/CREAT SERPL: 15 (ref 12–20)
CALCIUM SERPL-MCNC: 9.6 MG/DL (ref 8.5–10.1)
CHLORIDE SERPL-SCNC: 104 MMOL/L (ref 100–111)
CHOLEST SERPL-MCNC: 191 MG/DL
CO2 SERPL-SCNC: 29 MMOL/L (ref 21–32)
CREAT SERPL-MCNC: 1.27 MG/DL (ref 0.6–1.3)
DIFFERENTIAL METHOD BLD: ABNORMAL
EOSINOPHIL # BLD: 0.2 K/UL (ref 0–0.4)
EOSINOPHIL NFR BLD: 3 % (ref 0–5)
ERYTHROCYTE [DISTWIDTH] IN BLOOD BY AUTOMATED COUNT: 12.8 % (ref 11.6–14.5)
EST. AVERAGE GLUCOSE BLD GHB EST-MCNC: 120 MG/DL
GLOBULIN SER CALC-MCNC: 2.7 G/DL (ref 2–4)
GLUCOSE SERPL-MCNC: 117 MG/DL (ref 74–99)
HBA1C MFR BLD: 5.8 % (ref 4.2–5.6)
HCT VFR BLD AUTO: 49.8 % (ref 36–48)
HDLC SERPL-MCNC: 46 MG/DL (ref 40–60)
HDLC SERPL: 4.2 (ref 0–5)
HGB BLD-MCNC: 16.3 G/DL (ref 13–16)
IMM GRANULOCYTES # BLD AUTO: 0 K/UL (ref 0–0.04)
IMM GRANULOCYTES NFR BLD AUTO: 1 % (ref 0–0.5)
LDLC SERPL CALC-MCNC: 123 MG/DL (ref 0–100)
LIPID PANEL: ABNORMAL
LYMPHOCYTES # BLD: 2.2 K/UL (ref 0.9–3.6)
LYMPHOCYTES NFR BLD: 34 % (ref 21–52)
MCH RBC QN AUTO: 30.2 PG (ref 24–34)
MCHC RBC AUTO-ENTMCNC: 32.7 G/DL (ref 31–37)
MCV RBC AUTO: 92.4 FL (ref 78–100)
MONOCYTES # BLD: 0.5 K/UL (ref 0.05–1.2)
MONOCYTES NFR BLD: 8 % (ref 3–10)
NEUTS SEG # BLD: 3.5 K/UL (ref 1.8–8)
NEUTS SEG NFR BLD: 54 % (ref 40–73)
NRBC # BLD: 0 K/UL (ref 0–0.01)
NRBC BLD-RTO: 0 PER 100 WBC
PLATELET # BLD AUTO: 228 K/UL (ref 135–420)
PMV BLD AUTO: 10.8 FL (ref 9.2–11.8)
POTASSIUM SERPL-SCNC: 4.1 MMOL/L (ref 3.5–5.5)
PROT SERPL-MCNC: 6.8 G/DL (ref 6.4–8.2)
RBC # BLD AUTO: 5.39 M/UL (ref 4.35–5.65)
SODIUM SERPL-SCNC: 136 MMOL/L (ref 136–145)
TRIGL SERPL-MCNC: 110 MG/DL
VLDLC SERPL CALC-MCNC: 22 MG/DL
WBC # BLD AUTO: 6.5 K/UL (ref 4.6–13.2)

## 2024-12-04 PROCEDURE — 36415 COLL VENOUS BLD VENIPUNCTURE: CPT

## 2024-12-04 PROCEDURE — 82570 ASSAY OF URINE CREATININE: CPT

## 2024-12-04 PROCEDURE — 83036 HEMOGLOBIN GLYCOSYLATED A1C: CPT

## 2024-12-04 PROCEDURE — 80061 LIPID PANEL: CPT

## 2024-12-04 PROCEDURE — 82043 UR ALBUMIN QUANTITATIVE: CPT

## 2024-12-04 PROCEDURE — 85025 COMPLETE CBC W/AUTO DIFF WBC: CPT

## 2024-12-04 PROCEDURE — 80053 COMPREHEN METABOLIC PANEL: CPT

## 2024-12-05 LAB
CREAT UR-MCNC: 83 MG/DL (ref 30–125)
MICROALBUMIN UR-MCNC: 0.63 MG/DL (ref 0–3)
MICROALBUMIN/CREAT UR-RTO: 8 MG/G (ref 0–30)

## 2024-12-11 ENCOUNTER — OFFICE VISIT (OUTPATIENT)
Facility: CLINIC | Age: 64
End: 2024-12-11

## 2024-12-11 VITALS
HEIGHT: 72 IN | DIASTOLIC BLOOD PRESSURE: 70 MMHG | BODY MASS INDEX: 30.48 KG/M2 | OXYGEN SATURATION: 97 % | HEART RATE: 66 BPM | WEIGHT: 225 LBS | TEMPERATURE: 98.7 F | RESPIRATION RATE: 14 BRPM | SYSTOLIC BLOOD PRESSURE: 116 MMHG

## 2024-12-11 DIAGNOSIS — I35.1 AORTIC VALVE INSUFFICIENCY, ETIOLOGY OF CARDIAC VALVE DISEASE UNSPECIFIED: ICD-10-CM

## 2024-12-11 DIAGNOSIS — Q23.81 BICUSPID AORTIC VALVE: ICD-10-CM

## 2024-12-11 DIAGNOSIS — I77.810 DILATED AORTIC ROOT (HCC): ICD-10-CM

## 2024-12-11 DIAGNOSIS — E78.2 MIXED HYPERLIPIDEMIA: ICD-10-CM

## 2024-12-11 DIAGNOSIS — D75.1 ERYTHROCYTOSIS: ICD-10-CM

## 2024-12-11 DIAGNOSIS — Z12.5 PROSTATE CANCER SCREENING: ICD-10-CM

## 2024-12-11 DIAGNOSIS — M15.0 PRIMARY OSTEOARTHRITIS INVOLVING MULTIPLE JOINTS: ICD-10-CM

## 2024-12-11 DIAGNOSIS — R73.03 PREDIABETES: ICD-10-CM

## 2024-12-11 DIAGNOSIS — E66.09 CLASS 1 OBESITY DUE TO EXCESS CALORIES WITHOUT SERIOUS COMORBIDITY WITH BODY MASS INDEX (BMI) OF 30.0 TO 30.9 IN ADULT: ICD-10-CM

## 2024-12-11 DIAGNOSIS — G47.30 SLEEP APNEA, UNSPECIFIED TYPE: ICD-10-CM

## 2024-12-11 DIAGNOSIS — E66.811 CLASS 1 OBESITY DUE TO EXCESS CALORIES WITHOUT SERIOUS COMORBIDITY WITH BODY MASS INDEX (BMI) OF 30.0 TO 30.9 IN ADULT: ICD-10-CM

## 2024-12-11 DIAGNOSIS — I10 ESSENTIAL HYPERTENSION: Primary | ICD-10-CM

## 2024-12-11 RX ORDER — IBUPROFEN 800 MG/1
800 TABLET, FILM COATED ORAL 2 TIMES DAILY PRN
Qty: 60 TABLET | Refills: 1 | Status: SHIPPED | OUTPATIENT
Start: 2024-12-11

## 2024-12-11 RX ORDER — PANTOPRAZOLE SODIUM 40 MG/1
40 TABLET, DELAYED RELEASE ORAL
Qty: 90 TABLET | Refills: 3 | Status: SHIPPED | OUTPATIENT
Start: 2024-12-11

## 2024-12-11 SDOH — ECONOMIC STABILITY: INCOME INSECURITY: HOW HARD IS IT FOR YOU TO PAY FOR THE VERY BASICS LIKE FOOD, HOUSING, MEDICAL CARE, AND HEATING?: NOT HARD AT ALL

## 2024-12-11 SDOH — ECONOMIC STABILITY: FOOD INSECURITY: WITHIN THE PAST 12 MONTHS, THE FOOD YOU BOUGHT JUST DIDN'T LAST AND YOU DIDN'T HAVE MONEY TO GET MORE.: NEVER TRUE

## 2024-12-11 SDOH — ECONOMIC STABILITY: FOOD INSECURITY: WITHIN THE PAST 12 MONTHS, YOU WORRIED THAT YOUR FOOD WOULD RUN OUT BEFORE YOU GOT MONEY TO BUY MORE.: NEVER TRUE

## 2024-12-11 ASSESSMENT — PATIENT HEALTH QUESTIONNAIRE - PHQ9
SUM OF ALL RESPONSES TO PHQ QUESTIONS 1-9: 0
SUM OF ALL RESPONSES TO PHQ QUESTIONS 1-9: 0
SUM OF ALL RESPONSES TO PHQ9 QUESTIONS 1 & 2: 0
SUM OF ALL RESPONSES TO PHQ QUESTIONS 1-9: 0
2. FEELING DOWN, DEPRESSED OR HOPELESS: NOT AT ALL
1. LITTLE INTEREST OR PLEASURE IN DOING THINGS: NOT AT ALL
SUM OF ALL RESPONSES TO PHQ QUESTIONS 1-9: 0

## 2024-12-11 NOTE — PROGRESS NOTES
HPI:   Philippe Stallworth is a 64 y.o. year old male who presents today for a routine office visit.  He has a history of hypertension, hyperlipidemia, moderate aortic insufficiency, atypical chest pain, prediabetes, GERD, osteoarthritis, lumbar radiculopathy, and chronic venous insufficiency.  He reports that he is doing reasonably well.      He discusses that he has been busy climbing ladders as he is installing lighting in his new garage.  He states that he is remaining active, but acknowledges that he is not exercising.  He is hoping to restart once he completes his garage renovation so he can use his elliptical machine.    He reports that he is no longer having any difficulty with ambulation since his bilateral knee surgeries.  He completed a follow-up appointment with Dr. Fernandez in 6/2024 and was released from care.  He states that he was told he did not require any antibiotic prophylaxis prior to procedures given that it was greater than 1 year since his knee surgeries.    He states today that he is not currently wishing to proceed with a sleep study.  He states that he does not wish a referral to Dr. Obregon since he is very busy.    He had a follow-up visit with Dr. Rios on 2/1/2024 and completed a surveillance echocardiogram (3/8/2024) which showed normal LV size and function (EF 55%), indeterminant diastolic function, normal RV size and function, moderate sclerosis of the aortic valve without stenosis, leaflets not well-visualized, moderate AI, mildly dilated ascending aorta at 4.1 cm, and normal RVSP at 20 mmHg.  There was reportedly no change from his prior study in 3/2022.  He was advised to follow-up annually with Dr. Rios with repeat echocardiogram every 2 years.  He is scheduled for an appointment on 2/3/2025.  He completed abdominal ultrasound screening (3/12/2024) which was negative for an abdominal aortic aneurysm.    He does discuss having increased right hand and thumb pain due to

## 2024-12-11 NOTE — PROGRESS NOTES
Philippe Stallworth presents today for   Chief Complaint   Patient presents with    6 Month Follow-Up       \"Have you been to the ER, urgent care clinic since your last visit?  Hospitalized since your last visit?\"    NO    “Have you seen or consulted any other health care providers outside of [facility] since your last visit?”    NO

## 2024-12-11 NOTE — PATIENT INSTRUCTIONS
your plate by types of foods. Put non-starchy vegetables on half the plate, meat or other protein food on one-quarter of the plate, and a grain or starchy vegetable in the final quarter of the plate. To this you can add a small piece of fruit and 1 cup of milk or yogurt, depending on how many carbs you are supposed to eat at a meal.  Try to eat about the same amount of carbs at each meal. Do not \"save up\" your daily allowance of carbs to eat at one meal.  Proteins have very little or no carbs per serving. Examples of proteins are beef, chicken, turkey, fish, eggs, tofu, cheese, cottage cheese, and peanut butter. A serving size of meat is 3 ounces, which is about the size of a deck of cards. Examples of meat substitute serving sizes (equal to 1 ounce of meat) are 1/4 cup of cottage cheese, 1 egg, 1 tablespoon of peanut butter, and ½ cup of tofu.  How can you eat out and still eat healthy?  Learn to estimate the serving sizes of foods that have carbohydrate. If you measure food at home, it will be easier to estimate the amount in a serving of restaurant food.  If the meal you order has too much carbohydrate (such as potatoes, corn, or baked beans), ask to have a low-carbohydrate food instead. Ask for a salad or green vegetables.  If you use insulin, check your blood sugar before and after eating out to help you plan how much to eat in the future.  If you eat more carbohydrate at a meal than you had planned, take a walk or do other exercise. This will help lower your blood sugar.  What else should you know?  Limit saturated fat, such as the fat from meat and dairy products. This is a healthy choice because people who have diabetes are at higher risk of heart disease. So choose lean cuts of meat and nonfat or low-fat dairy products. Use olive or canola oil instead of butter or shortening when cooking.  Don't skip meals. Your blood sugar may drop too low if you skip meals and take insulin or certain medicines for

## 2025-02-03 ENCOUNTER — OFFICE VISIT (OUTPATIENT)
Age: 65
End: 2025-02-03
Payer: MEDICARE

## 2025-02-03 VITALS
HEART RATE: 61 BPM | WEIGHT: 229 LBS | BODY MASS INDEX: 31.02 KG/M2 | SYSTOLIC BLOOD PRESSURE: 128 MMHG | OXYGEN SATURATION: 96 % | DIASTOLIC BLOOD PRESSURE: 74 MMHG | HEIGHT: 72 IN

## 2025-02-03 DIAGNOSIS — E78.5 HYPERLIPIDEMIA, UNSPECIFIED HYPERLIPIDEMIA TYPE: ICD-10-CM

## 2025-02-03 DIAGNOSIS — I35.1 AORTIC VALVE INSUFFICIENCY, ETIOLOGY OF CARDIAC VALVE DISEASE UNSPECIFIED: Primary | ICD-10-CM

## 2025-02-03 DIAGNOSIS — I10 ESSENTIAL HYPERTENSION: ICD-10-CM

## 2025-02-03 DIAGNOSIS — I77.810 DILATED AORTIC ROOT (HCC): ICD-10-CM

## 2025-02-03 PROBLEM — I35.9 AORTIC VALVE DISORDER: Status: ACTIVE | Noted: 2020-02-24

## 2025-02-03 PROBLEM — I25.10 ATHEROSCLEROTIC HEART DISEASE OF NATIVE CORONARY ARTERY WITHOUT ANGINA PECTORIS: Status: ACTIVE | Noted: 2020-02-24

## 2025-02-03 PROCEDURE — 1126F AMNT PAIN NOTED NONE PRSNT: CPT | Performed by: INTERNAL MEDICINE

## 2025-02-03 PROCEDURE — 1123F ACP DISCUSS/DSCN MKR DOCD: CPT | Performed by: INTERNAL MEDICINE

## 2025-02-03 PROCEDURE — G8427 DOCREV CUR MEDS BY ELIG CLIN: HCPCS | Performed by: INTERNAL MEDICINE

## 2025-02-03 PROCEDURE — 3074F SYST BP LT 130 MM HG: CPT | Performed by: INTERNAL MEDICINE

## 2025-02-03 PROCEDURE — 3078F DIAST BP <80 MM HG: CPT | Performed by: INTERNAL MEDICINE

## 2025-02-03 PROCEDURE — 99214 OFFICE O/P EST MOD 30 MIN: CPT | Performed by: INTERNAL MEDICINE

## 2025-02-03 PROCEDURE — 3017F COLORECTAL CA SCREEN DOC REV: CPT | Performed by: INTERNAL MEDICINE

## 2025-02-03 PROCEDURE — G8417 CALC BMI ABV UP PARAM F/U: HCPCS | Performed by: INTERNAL MEDICINE

## 2025-02-03 PROCEDURE — 93000 ELECTROCARDIOGRAM COMPLETE: CPT | Performed by: INTERNAL MEDICINE

## 2025-02-03 PROCEDURE — 1160F RVW MEDS BY RX/DR IN RCRD: CPT | Performed by: INTERNAL MEDICINE

## 2025-02-03 PROCEDURE — 1036F TOBACCO NON-USER: CPT | Performed by: INTERNAL MEDICINE

## 2025-02-03 PROCEDURE — 1159F MED LIST DOCD IN RCRD: CPT | Performed by: INTERNAL MEDICINE

## 2025-02-03 RX ORDER — AMLODIPINE BESYLATE 2.5 MG/1
2.5 TABLET ORAL DAILY
Qty: 90 TABLET | Refills: 3 | Status: SHIPPED | OUTPATIENT
Start: 2025-02-03

## 2025-02-03 RX ORDER — AMLODIPINE BESYLATE 2.5 MG/1
2.5 TABLET ORAL DAILY
COMMUNITY
End: 2025-02-03 | Stop reason: SDUPTHER

## 2025-02-03 ASSESSMENT — PATIENT HEALTH QUESTIONNAIRE - PHQ9
1. LITTLE INTEREST OR PLEASURE IN DOING THINGS: NOT AT ALL
SUM OF ALL RESPONSES TO PHQ QUESTIONS 1-9: 0
2. FEELING DOWN, DEPRESSED OR HOPELESS: NOT AT ALL
SUM OF ALL RESPONSES TO PHQ QUESTIONS 1-9: 0
SUM OF ALL RESPONSES TO PHQ9 QUESTIONS 1 & 2: 0

## 2025-02-03 ASSESSMENT — ENCOUNTER SYMPTOMS
ABDOMINAL DISTENTION: 0
NAUSEA: 0
ABDOMINAL PAIN: 0
SORE THROAT: 0
VOMITING: 0
SHORTNESS OF BREATH: 0
COUGH: 0

## 2025-02-03 ASSESSMENT — ANXIETY QUESTIONNAIRES
GAD7 TOTAL SCORE: 0
6. BECOMING EASILY ANNOYED OR IRRITABLE: NOT AT ALL
3. WORRYING TOO MUCH ABOUT DIFFERENT THINGS: NOT AT ALL
7. FEELING AFRAID AS IF SOMETHING AWFUL MIGHT HAPPEN: NOT AT ALL
1. FEELING NERVOUS, ANXIOUS, OR ON EDGE: NOT AT ALL
5. BEING SO RESTLESS THAT IT IS HARD TO SIT STILL: NOT AT ALL
4. TROUBLE RELAXING: NOT AT ALL
2. NOT BEING ABLE TO STOP OR CONTROL WORRYING: NOT AT ALL

## 2025-02-03 NOTE — PROGRESS NOTES
Philippe Stallworth presents today for   Chief Complaint   Patient presents with    Follow-up     1 year       Philippe INDRA Basim preferred language for health care discussion is english/other.    Is someone accompanying this pt? no    Is the patient using any DME equipment during OV? no    Depression Screening:  Depression: Not at risk (2/3/2025)    PHQ-2     PHQ-2 Score: 0        Learning Assessment:  Who is the primary learner? Patient    What is the preferred language for health care of the primary learner? ENGLISH    How does the primary learner prefer to learn new concepts? DEMONSTRATION    Answered By patient    Relationship to Learner SELF           Pt currently taking Anticoagulant therapy? no    Pt currently taking Antiplatelet therapy ? Aspirin 81 mg daily      Coordination of Care:  1. Have you been to the ER, urgent care clinic since your last visit? Hospitalized since your last visit? no    2. Have you seen or consulted any other health care providers outside of the [facility] since your last visit? Include any pap smears or colon screening. no

## 2025-02-03 NOTE — PROGRESS NOTES
02/03/25     Philippe Stallworth  is a 64 y.o. male     Chief Complaint   Patient presents with    Follow-up     1 year       HPI    Patient presents for a follow-up office visit.  The patient has a history of hypertension, dyslipidemia, aortic insufficiency and possibly a bicuspid aortic valve with moderate aortic insufficiency.    The patient was hospitalized at [facility] in 2016 for an episode of acute chest pain, which was apparently relieved by sublingual nitroglycerin in the ambulance.  The patient states he had been under increased amount of stress at his job.  He was ruled out for myocardial infarction, but underwent a nuclear stress test which was felt to be intermediate risk with a reversible inferior perfusion defect.  This led to a cardiac catheterization on March 2016  which showed normal coronary anatomy.  He had preserved LV systolic function. Moderate aortic insufficiency which was unchanged.     More recently, he underwent a follow-up echocardiogram  in March 2024 which was unchanged compared to his previous studies.  He continued to have a low normal LVEF of 50-55% with moderate aortic insufficiency and a mild to moderately dilated aortic root measuring 41 mm in diameter.  This has been fairly stable over the past few years.    He was last seen in our office 1 year ago.  Since last visit he states he decrease his amlodipine dosage down to 2.5 mg daily and this appears to be adequately controlling his blood pressure.  He continues to take losartan as well.  He denies any chest pain, no shortness of breath, no leg swelling.  No major change in his weight.      Past Medical History:   Diagnosis Date    Abnormal myocardial perfusion study 02/28/2016    Partially reversible inferior perfusion defect, EF 57%    AI (aortic insufficiency) 2016    Moderate with possible bicuspid aortic valve, cardio-Dr. Rios    Basal cell carcinoma 2014    nose    Chronic low back pain 1994    was treated with steroid

## 2025-02-07 RX ORDER — LOSARTAN POTASSIUM 100 MG/1
100 TABLET ORAL DAILY
Qty: 90 TABLET | Refills: 3 | Status: SHIPPED | OUTPATIENT
Start: 2025-02-07

## 2025-06-12 ENCOUNTER — HOSPITAL ENCOUNTER (OUTPATIENT)
Facility: HOSPITAL | Age: 65
Setting detail: SPECIMEN
Discharge: HOME | End: 2025-06-15
Payer: MEDICARE

## 2025-06-12 DIAGNOSIS — E78.2 MIXED HYPERLIPIDEMIA: ICD-10-CM

## 2025-06-12 DIAGNOSIS — R73.03 PREDIABETES: ICD-10-CM

## 2025-06-12 DIAGNOSIS — Z12.5 PROSTATE CANCER SCREENING: ICD-10-CM

## 2025-06-12 DIAGNOSIS — D75.1 ERYTHROCYTOSIS: ICD-10-CM

## 2025-06-12 DIAGNOSIS — I10 ESSENTIAL HYPERTENSION: ICD-10-CM

## 2025-06-12 LAB
ALBUMIN SERPL-MCNC: 3.9 G/DL (ref 3.4–5)
ALBUMIN/GLOB SERPL: 1.7 (ref 0.8–1.7)
ALP SERPL-CCNC: 78 U/L (ref 45–117)
ALT SERPL-CCNC: 36 U/L (ref 10–50)
ANION GAP SERPL CALC-SCNC: 12 MMOL/L (ref 3–18)
APPEARANCE UR: CLEAR
AST SERPL-CCNC: 28 U/L (ref 10–38)
BACTERIA URNS QL MICRO: ABNORMAL /HPF
BASOPHILS # BLD: 0.08 K/UL (ref 0–0.1)
BASOPHILS NFR BLD: 1.2 % (ref 0–2)
BILIRUB SERPL-MCNC: 0.5 MG/DL (ref 0.2–1)
BILIRUB UR QL: NEGATIVE
BUN SERPL-MCNC: 23 MG/DL (ref 6–23)
BUN/CREAT SERPL: 19 (ref 12–20)
CALCIUM SERPL-MCNC: 9.7 MG/DL (ref 8.5–10.1)
CHLORIDE SERPL-SCNC: 104 MMOL/L (ref 98–107)
CHOLEST SERPL-MCNC: 191 MG/DL
CO2 SERPL-SCNC: 23 MMOL/L (ref 21–32)
COLOR UR: YELLOW
CREAT SERPL-MCNC: 1.24 MG/DL (ref 0.6–1.3)
CREAT UR-MCNC: 168 MG/DL (ref 30–125)
DIFFERENTIAL METHOD BLD: NORMAL
EOSINOPHIL # BLD: 0.27 K/UL (ref 0–0.4)
EOSINOPHIL NFR BLD: 4.1 % (ref 0–5)
EPITH CASTS URNS QL MICRO: ABNORMAL /LPF (ref 0–5)
ERYTHROCYTE [DISTWIDTH] IN BLOOD BY AUTOMATED COUNT: 12.7 % (ref 11.6–14.5)
EST. AVERAGE GLUCOSE BLD GHB EST-MCNC: 132 MG/DL
GLOBULIN SER CALC-MCNC: 2.4 G/DL (ref 2–4)
GLUCOSE SERPL-MCNC: 123 MG/DL (ref 74–108)
GLUCOSE UR STRIP.AUTO-MCNC: NEGATIVE MG/DL
HBA1C MFR BLD: 6.2 % (ref 4.2–5.6)
HCT VFR BLD AUTO: 46.5 % (ref 36–48)
HDLC SERPL-MCNC: 40 MG/DL (ref 40–60)
HDLC SERPL: 4.8 (ref 0–5)
HGB BLD-MCNC: 15.6 G/DL (ref 13–16)
HGB UR QL STRIP: NEGATIVE
IMM GRANULOCYTES # BLD AUTO: 0.02 K/UL (ref 0–0.04)
IMM GRANULOCYTES NFR BLD AUTO: 0.3 % (ref 0–0.5)
KETONES UR QL STRIP.AUTO: NEGATIVE MG/DL
LDLC SERPL CALC-MCNC: 122 MG/DL (ref 0–100)
LEUKOCYTE ESTERASE UR QL STRIP.AUTO: NEGATIVE
LYMPHOCYTES # BLD: 2.3 K/UL (ref 0.9–3.6)
LYMPHOCYTES NFR BLD: 34.7 % (ref 21–52)
MCH RBC QN AUTO: 29.9 PG (ref 24–34)
MCHC RBC AUTO-ENTMCNC: 33.5 G/DL (ref 31–37)
MCV RBC AUTO: 89.3 FL (ref 78–100)
MICROALBUMIN UR-MCNC: <1.2 MG/DL (ref 0–3)
MICROALBUMIN/CREAT UR-RTO: ABNORMAL MG/G (ref 0–30)
MONOCYTES # BLD: 0.51 K/UL (ref 0.05–1.2)
MONOCYTES NFR BLD: 7.7 % (ref 3–10)
NEUTS SEG # BLD: 3.44 K/UL (ref 1.8–8)
NEUTS SEG NFR BLD: 52 % (ref 40–73)
NITRITE UR QL STRIP.AUTO: NEGATIVE
NRBC # BLD: 0 K/UL (ref 0–0.01)
NRBC BLD-RTO: 0 PER 100 WBC
PH UR STRIP: 6.5 (ref 5–8)
PLATELET # BLD AUTO: 187 K/UL (ref 135–420)
PMV BLD AUTO: 11.1 FL (ref 9.2–11.8)
POTASSIUM SERPL-SCNC: 4.3 MMOL/L (ref 3.5–5.5)
PROT SERPL-MCNC: 6.2 G/DL (ref 6.4–8.2)
PROT UR STRIP-MCNC: NEGATIVE MG/DL
PSA SERPL-MCNC: 0.5 NG/ML (ref 1.4–4.4)
RBC # BLD AUTO: 5.21 M/UL (ref 4.35–5.65)
RBC #/AREA URNS HPF: ABNORMAL /HPF (ref 0–5)
SODIUM SERPL-SCNC: 139 MMOL/L (ref 136–145)
SP GR UR REFRACTOMETRY: 1.02 (ref 1–1.03)
TRIGL SERPL-MCNC: 145 MG/DL (ref 0–150)
UROBILINOGEN UR QL STRIP.AUTO: 0.2 EU/DL (ref 0.2–1)
VLDLC SERPL CALC-MCNC: 29 MG/DL
WBC # BLD AUTO: 6.6 K/UL (ref 4.6–13.2)
WBC URNS QL MICRO: ABNORMAL /HPF (ref 0–5)

## 2025-06-12 PROCEDURE — 82043 UR ALBUMIN QUANTITATIVE: CPT

## 2025-06-12 PROCEDURE — 80061 LIPID PANEL: CPT

## 2025-06-12 PROCEDURE — 82570 ASSAY OF URINE CREATININE: CPT

## 2025-06-12 PROCEDURE — 85025 COMPLETE CBC W/AUTO DIFF WBC: CPT

## 2025-06-12 PROCEDURE — 36415 COLL VENOUS BLD VENIPUNCTURE: CPT

## 2025-06-12 PROCEDURE — 80053 COMPREHEN METABOLIC PANEL: CPT

## 2025-06-12 PROCEDURE — 83036 HEMOGLOBIN GLYCOSYLATED A1C: CPT

## 2025-06-12 PROCEDURE — G0103 PSA SCREENING: HCPCS

## 2025-06-12 PROCEDURE — 81001 URINALYSIS AUTO W/SCOPE: CPT

## 2025-06-17 ENCOUNTER — OFFICE VISIT (OUTPATIENT)
Facility: CLINIC | Age: 65
End: 2025-06-17
Payer: MEDICARE

## 2025-06-17 VITALS
WEIGHT: 232 LBS | OXYGEN SATURATION: 92 % | RESPIRATION RATE: 16 BRPM | TEMPERATURE: 98.5 F | DIASTOLIC BLOOD PRESSURE: 68 MMHG | BODY MASS INDEX: 31.42 KG/M2 | SYSTOLIC BLOOD PRESSURE: 132 MMHG | HEART RATE: 70 BPM | HEIGHT: 72 IN

## 2025-06-17 DIAGNOSIS — G47.30 SLEEP APNEA, UNSPECIFIED TYPE: ICD-10-CM

## 2025-06-17 DIAGNOSIS — R73.03 PREDIABETES: ICD-10-CM

## 2025-06-17 DIAGNOSIS — Z71.89 ACP (ADVANCE CARE PLANNING): ICD-10-CM

## 2025-06-17 DIAGNOSIS — I35.1 AORTIC VALVE INSUFFICIENCY, ETIOLOGY OF CARDIAC VALVE DISEASE UNSPECIFIED: ICD-10-CM

## 2025-06-17 DIAGNOSIS — E66.811 CLASS 1 OBESITY DUE TO EXCESS CALORIES WITHOUT SERIOUS COMORBIDITY WITH BODY MASS INDEX (BMI) OF 31.0 TO 31.9 IN ADULT: ICD-10-CM

## 2025-06-17 DIAGNOSIS — E66.09 CLASS 1 OBESITY DUE TO EXCESS CALORIES WITHOUT SERIOUS COMORBIDITY WITH BODY MASS INDEX (BMI) OF 31.0 TO 31.9 IN ADULT: ICD-10-CM

## 2025-06-17 DIAGNOSIS — M15.0 PRIMARY OSTEOARTHRITIS INVOLVING MULTIPLE JOINTS: ICD-10-CM

## 2025-06-17 DIAGNOSIS — I77.810 DILATED AORTIC ROOT: ICD-10-CM

## 2025-06-17 DIAGNOSIS — I10 ESSENTIAL HYPERTENSION: Primary | ICD-10-CM

## 2025-06-17 DIAGNOSIS — Z00.00 MEDICARE ANNUAL WELLNESS VISIT, SUBSEQUENT: ICD-10-CM

## 2025-06-17 DIAGNOSIS — Q23.81 BICUSPID AORTIC VALVE: ICD-10-CM

## 2025-06-17 DIAGNOSIS — D75.1 ERYTHROCYTOSIS: ICD-10-CM

## 2025-06-17 DIAGNOSIS — K76.0 METABOLIC DYSFUNCTION-ASSOCIATED STEATOTIC LIVER DISEASE (MASLD): ICD-10-CM

## 2025-06-17 DIAGNOSIS — E78.2 MIXED HYPERLIPIDEMIA: ICD-10-CM

## 2025-06-17 PROCEDURE — 99497 ADVNCD CARE PLAN 30 MIN: CPT | Performed by: INTERNAL MEDICINE

## 2025-06-17 PROCEDURE — 3017F COLORECTAL CA SCREEN DOC REV: CPT | Performed by: INTERNAL MEDICINE

## 2025-06-17 PROCEDURE — 1036F TOBACCO NON-USER: CPT | Performed by: INTERNAL MEDICINE

## 2025-06-17 PROCEDURE — G8427 DOCREV CUR MEDS BY ELIG CLIN: HCPCS | Performed by: INTERNAL MEDICINE

## 2025-06-17 PROCEDURE — 1126F AMNT PAIN NOTED NONE PRSNT: CPT | Performed by: INTERNAL MEDICINE

## 2025-06-17 PROCEDURE — G8417 CALC BMI ABV UP PARAM F/U: HCPCS | Performed by: INTERNAL MEDICINE

## 2025-06-17 PROCEDURE — G0447 BEHAVIOR COUNSEL OBESITY 15M: HCPCS | Performed by: INTERNAL MEDICINE

## 2025-06-17 PROCEDURE — G0439 PPPS, SUBSEQ VISIT: HCPCS | Performed by: INTERNAL MEDICINE

## 2025-06-17 PROCEDURE — 3075F SYST BP GE 130 - 139MM HG: CPT | Performed by: INTERNAL MEDICINE

## 2025-06-17 PROCEDURE — 1123F ACP DISCUSS/DSCN MKR DOCD: CPT | Performed by: INTERNAL MEDICINE

## 2025-06-17 PROCEDURE — 99215 OFFICE O/P EST HI 40 MIN: CPT | Performed by: INTERNAL MEDICINE

## 2025-06-17 PROCEDURE — 3078F DIAST BP <80 MM HG: CPT | Performed by: INTERNAL MEDICINE

## 2025-06-17 RX ORDER — MELOXICAM 15 MG/1
15 TABLET ORAL DAILY
COMMUNITY
Start: 2025-06-08 | End: 2025-06-17 | Stop reason: SDUPTHER

## 2025-06-17 RX ORDER — MELOXICAM 15 MG/1
15 TABLET ORAL DAILY
Qty: 90 TABLET | Refills: 1 | Status: SHIPPED | OUTPATIENT
Start: 2025-06-17

## 2025-06-17 SDOH — ECONOMIC STABILITY: FOOD INSECURITY: WITHIN THE PAST 12 MONTHS, YOU WORRIED THAT YOUR FOOD WOULD RUN OUT BEFORE YOU GOT MONEY TO BUY MORE.: NEVER TRUE

## 2025-06-17 SDOH — ECONOMIC STABILITY: FOOD INSECURITY: WITHIN THE PAST 12 MONTHS, THE FOOD YOU BOUGHT JUST DIDN'T LAST AND YOU DIDN'T HAVE MONEY TO GET MORE.: NEVER TRUE

## 2025-06-17 ASSESSMENT — PATIENT HEALTH QUESTIONNAIRE - PHQ9
SUM OF ALL RESPONSES TO PHQ QUESTIONS 1-9: 0
SUM OF ALL RESPONSES TO PHQ QUESTIONS 1-9: 0
2. FEELING DOWN, DEPRESSED OR HOPELESS: NOT AT ALL
1. LITTLE INTEREST OR PLEASURE IN DOING THINGS: NOT AT ALL
SUM OF ALL RESPONSES TO PHQ QUESTIONS 1-9: 0
SUM OF ALL RESPONSES TO PHQ QUESTIONS 1-9: 0

## 2025-06-17 ASSESSMENT — LIFESTYLE VARIABLES
HOW OFTEN DO YOU HAVE A DRINK CONTAINING ALCOHOL: 2-4 TIMES A MONTH
HOW MANY STANDARD DRINKS CONTAINING ALCOHOL DO YOU HAVE ON A TYPICAL DAY: 1 OR 2

## 2025-06-17 NOTE — ACP (ADVANCE CARE PLANNING)
Advance Care Planning     Advance Care Planning (ACP) Physician/NP/PA Conversation    Date of Conversation: 6/17/2025  Conducted with: Patient with Decision Making Capacity    Healthcare Decision Maker:      Primary Decision Maker: Isha Stallworth - Spouse - [phone]    Secondary Decision Maker: Ciaran Stallworth - Child - [phone]    Click here to complete Healthcare Decision Makers including selection of the Healthcare Decision Maker Relationship (ie \"Primary\")  Today we confirmed healthcare decision makers as his wife and son    Care Preferences:    Hospitalization:  \"If your health worsens and it becomes clear that your chance of recovery is unlikely, what would be your preference regarding hospitalization?\"  The patient would prefer hospitalization.    Ventilation:  \"If you were unable to breath on your own and your chance of recovery was unlikely, what would be your preference about the use of a ventilator (breathing machine) if it was available to you?\"  The patient would desire the use of a ventilator.    Resuscitation:  \"In the event your heart stopped as a result of an underlying serious health condition, would you want attempts made to restart your heart, or would you prefer a natural death?\"  Yes, attempt to resuscitate.    treatment goals, benefit/burden of treatment options, ventilation preferences, hospitalization preferences, resuscitation preferences, and end of life care preferences (vegetative state/imminent death)    Conversation Outcomes / Follow-Up Plan:  ACP in process - information provided, considering goals and options. Patient states that he would wish resuscitation efforts only as long as meaningful recovery is considered possible and not deemed futile.    Reviewed DNR/DNI and patient elects Full Code (Attempt Resuscitation)    Length of Voluntary ACP Conversation in minutes:  16 minutes    Pearl Mendiola MD                        No

## 2025-06-17 NOTE — PROGRESS NOTES
Medicare Annual Wellness Visit    Philippe Stallworth is here for Medicare AWV    Assessment & Plan   Essential hypertension  -     CBC with Auto Differential; Future  -     Comprehensive Metabolic Panel; Future  Bicuspid aortic valve  Dilated aortic root  Aortic valve insufficiency, etiology of cardiac valve disease unspecified  Mixed hyperlipidemia  -     Comprehensive Metabolic Panel; Future  -     Lipid Panel; Future  Prediabetes  -     Comprehensive Metabolic Panel; Future  -     Hemoglobin A1C; Future  -     Albumin/Creatinine Ratio, Urine; Future  Metabolic dysfunction-associated steatotic liver disease (MASLD)  -     Comprehensive Metabolic Panel; Future  Erythrocytosis  -     CBC with Auto Differential; Future  Sleep apnea, unspecified type  Primary osteoarthritis involving multiple joints  Class 1 obesity due to excess calories without serious comorbidity with body mass index (BMI) of 31.0 to 31.9 in adult  -     [place] [facility] 8-15 minutes []  Medicare annual wellness visit, subsequent  ACP (advance care planning)     Return in about 4 months (around 10/17/2025), or if symptoms worsen or fail to improve.     Subjective   See office progress note for details.      Patient's complete Health Risk Assessment and screening values have been reviewed and are found in Flowsheets. The following problems were reviewed today and where indicated follow up appointments were made and/or referrals ordered.    Positive Risk Factor Screenings with Interventions:              Inactivity:  On average, how many days per week do you engage in moderate to strenuous exercise (like a brisk walk)?: 0 days (!) Abnormal  On average, how many minutes do you engage in exercise at this level?: 0 min  Interventions:  Encouraged to increase activity as tolerated    Poor Eating Habits/Diet:  Do you eat balanced/healthy meals regularly?: (!) No  Interventions:  Stressed importance of need for lifestyle modifications especially 
Philippe Stallworth presents today for   Chief Complaint   Patient presents with    Medicare AWV       \"Have you been to the ER, urgent care clinic since your last visit?  Hospitalized since your last visit?\"    NO    “Have you seen or consulted any other health care providers outside of [facility] since your last visit?”    NO            
evaluated by Dr. Vargas on 1/2/2025.  Started on meloxicam 15 mg daily and finding it to be beneficial in controlling hand pain.  Requesting refill today.  Declined cortisone injection in the right CMC injection given prior benefit, but will return for any worsening symptoms.  9. Lumbar DDD with right sciatica. Progression of symptoms and disease on MRI. Responded well to conservative therapy initially, but recurred and unresponsive to medications and injections. Underwent right L4-L5 and L5-S1 hemilaminectomy, medial facetectomy, and foraminotomy by Dr. Morfin in 7/2018 with great success.  Reported only residual numbness on medial plantar surface of right foot following surgery.  Not noting any recurrence of symptoms today.  Will follow.  10. Left cervical radiculopathy. Evaluated by Dr. Medina on 1/28/2021 and referred to physical therapy with some improvement.  Seen by Dr. Rueda in 6/2021 for left shoulder pain and felt to be secondary to cervical radiculopathy +/-bicipital tendinitis.  Completed physical therapy with improvement. Advised to continue with HEP.  11. Osteopenia. Last bone density scan 10/2016. Using femoral neck T-scores, calculated FRAX score estimates her 10 year risk of a major osteoporetic fracture at 12 % and hip fracture at 2.0 %, which are not an indication for biphosphonate treatment (>20% and >3%, respectively). Continue calcium and Vitamin D. Encouraged exercise, particularly weight bearing activities.   13. History of genital herpes.  Patient states that he is no longer taking Valtrex as preventative therapy.  He has started on lysine 1000 mg daily and believes that this is just as effective in controlling outbreaks.  Will continue to monitor.  13. History of COVID 19 with [facility].  Tested positive on 1/17/2022 at [facility], and reported symptoms of sore throat, headache, myalgias, nasal congestion, cough and mild dyspnea on exertion. Reports loss of taste/smell during

## 2025-06-18 PROBLEM — U09.9 POST-ACUTE SEQUELAE OF COVID-19 (PASC): Status: RESOLVED | Noted: 2022-03-12 | Resolved: 2025-06-18

## 2025-06-18 PROBLEM — I35.9 AORTIC VALVE DISORDER: Status: RESOLVED | Noted: 2020-02-24 | Resolved: 2025-06-18

## 2025-06-18 PROBLEM — K76.0 METABOLIC DYSFUNCTION-ASSOCIATED STEATOTIC LIVER DISEASE (MASLD): Status: ACTIVE | Noted: 2025-06-18

## 2025-06-18 PROBLEM — G47.30 SLEEP APNEA: Status: ACTIVE | Noted: 2025-06-18

## (undated) DEVICE — SOLUTION IV 100ML 0.9% SOD CHL DIL INJ

## (undated) DEVICE — Device

## (undated) DEVICE — SUT VCRL + 2-0 36IN CT1 UD --

## (undated) DEVICE — ELECTRODE PT RET AD L9FT HI MOIST COND ADH HYDRGEL CORDED

## (undated) DEVICE — SUTURE STRATAFIX SYMMETRIC PDS + 1 SGL ARMED CT 18 IN LEN SXPP1A405

## (undated) DEVICE — DEVICE CLOSURE SKIN SURG

## (undated) DEVICE — SUTURE VCRL + SZ 1 L36IN ABSRB UD L36MM CT-1 1/2 CIR VCP947H

## (undated) DEVICE — SUTURE VCRL + SZ 2-0 L36IN ABSRB UD L36MM CT-1 1/2 CIR VCP945H

## (undated) DEVICE — THE CANADY HYBRID PLASMA SCALPEL IS AN ELECTROSURGICAL PLASMA SCALPEL THAT USES AN 85MM BENDABLE PADDLE BLADE TIP. THE ELECTROSURGICAL PLASMA SCALPEL IS USED TO SIMULTANEOUSLY CUT AND COAGULATE BIOLOGICAL TISSUE.: Brand: CANADY HYBRID PLASMA PADDLE BLADE

## (undated) DEVICE — 3 BONE CEMENT MIXER: Brand: MIXEVAC

## (undated) DEVICE — NDL SPNE QNCKE 18GX3.5IN LF --

## (undated) DEVICE — ZIP 16 SURGICAL SKIN CLOSURE DEVICE: Brand: ZIP 16 SURGICAL SKIN CLOSURE DEVICE

## (undated) DEVICE — PIN GUIDE FIX 3.2X62 MM SCREW [GS903A0620322P] [KOMET MEDICAL]

## (undated) DEVICE — PIN GUIDE FIX 3.2X62 MM SCREW GS9030620324P] KOMET MEDICAL]

## (undated) DEVICE — STRYKER PERFORMANCE SERIES SAGITTAL BLADE: Brand: STRYKER PERFORMANCE SERIES

## (undated) DEVICE — DRESSING FOAM W10XL30CM ANTIMIC SELF ADH SAFETAC TECHNOLOGY

## (undated) DEVICE — SOLUTION IV 1000ML LAC RINGERS PH 6.5 INJ USP VIAFLX PLAS

## (undated) DEVICE — SOLUTION SCRB 4OZ 10% PVP I POVIDONE IOD TOP PAINT EXIDINE

## (undated) DEVICE — NDL SPNE QUINCKE 20GA 3.5IN LF --

## (undated) DEVICE — REM POLYHESIVE ADULT PATIENT RETURN ELECTRODE: Brand: VALLEYLAB

## (undated) DEVICE — PIN GUIDE FIX 3.2X62 MM SCREW GS903A0620322P] KOMET MEDICAL]

## (undated) DEVICE — SOL INJ L R 1000ML BG --

## (undated) DEVICE — SOL IRRIGATION INJ NACL 0.9% 500ML BTL

## (undated) DEVICE — SOLUTION IRRIG 500ML 0.9% SOD CHLO USP POUR PLAS BTL

## (undated) DEVICE — HANDPIECE SET WITH HIGH FLOW TIP AND SUCTION TUBE: Brand: INTERPULSE